# Patient Record
Sex: MALE | Race: WHITE | NOT HISPANIC OR LATINO | Employment: OTHER | URBAN - METROPOLITAN AREA
[De-identification: names, ages, dates, MRNs, and addresses within clinical notes are randomized per-mention and may not be internally consistent; named-entity substitution may affect disease eponyms.]

---

## 2017-01-08 DIAGNOSIS — E03.9 ACQUIRED HYPOTHYROIDISM: ICD-10-CM

## 2017-01-08 RX ORDER — LEVOTHYROXINE SODIUM 0.07 MG/1
TABLET ORAL
Qty: 35 TABLET | Refills: 5 | Status: SHIPPED | OUTPATIENT
Start: 2017-01-08 | End: 2017-04-10 | Stop reason: SDUPTHER

## 2017-01-18 ENCOUNTER — CONSULT (OUTPATIENT)
Dept: ONCOLOGY | Facility: CLINIC | Age: 77
End: 2017-01-18

## 2017-01-18 ENCOUNTER — LAB (OUTPATIENT)
Dept: LAB | Facility: HOSPITAL | Age: 77
End: 2017-01-18

## 2017-01-18 VITALS
HEIGHT: 68 IN | OXYGEN SATURATION: 100 % | WEIGHT: 162.2 LBS | HEART RATE: 63 BPM | RESPIRATION RATE: 12 BRPM | SYSTOLIC BLOOD PRESSURE: 110 MMHG | TEMPERATURE: 97.7 F | DIASTOLIC BLOOD PRESSURE: 60 MMHG | BODY MASS INDEX: 24.58 KG/M2

## 2017-01-18 DIAGNOSIS — D69.6 THROMBOCYTOPENIA (HCC): ICD-10-CM

## 2017-01-18 DIAGNOSIS — D75.89 MACROCYTOSIS: ICD-10-CM

## 2017-01-18 DIAGNOSIS — D50.0 IRON DEFICIENCY ANEMIA DUE TO CHRONIC BLOOD LOSS: Primary | ICD-10-CM

## 2017-01-18 DIAGNOSIS — D50.8 OTHER IRON DEFICIENCY ANEMIA: Primary | ICD-10-CM

## 2017-01-18 PROBLEM — D50.9 IRON DEFICIENCY ANEMIA: Status: ACTIVE | Noted: 2017-01-18

## 2017-01-18 LAB
BASOPHILS # BLD AUTO: 0.06 10*3/MM3 (ref 0–0.1)
BASOPHILS NFR BLD AUTO: 1 % (ref 0–1.1)
DEPRECATED RDW RBC AUTO: 44.4 FL (ref 37–49)
EOSINOPHIL # BLD AUTO: 0.21 10*3/MM3 (ref 0–0.36)
EOSINOPHIL NFR BLD AUTO: 3.6 % (ref 1–5)
ERYTHROCYTE [DISTWIDTH] IN BLOOD BY AUTOMATED COUNT: 12.5 % (ref 11.7–14.5)
FERRITIN SERPL-MCNC: 474.4 NG/ML (ref 30–400)
HCT VFR BLD AUTO: 40.2 % (ref 40–49)
HGB BLD-MCNC: 13.4 G/DL (ref 13.5–16.5)
HGB RETIC QN: 37.3 PG (ref 29.8–36.1)
HOLD SPECIMEN: NORMAL
IMM GRANULOCYTES # BLD: 0.01 10*3/MM3 (ref 0–0.03)
IMM GRANULOCYTES NFR BLD: 0.2 % (ref 0–0.5)
IMM RETICS NFR: 2.5 % (ref 3–15.8)
IRON 24H UR-MRATE: 116 MCG/DL (ref 59–158)
IRON SATN MFR SERPL: 44 % (ref 14–48)
LYMPHOCYTES # BLD AUTO: 1.07 10*3/MM3 (ref 1–3.5)
LYMPHOCYTES NFR BLD AUTO: 18.3 % (ref 20–49)
MCH RBC QN AUTO: 32.3 PG (ref 27–33)
MCHC RBC AUTO-ENTMCNC: 33.3 G/DL (ref 32–35)
MCV RBC AUTO: 96.9 FL (ref 83–97)
MONOCYTES # BLD AUTO: 0.62 10*3/MM3 (ref 0.25–0.8)
MONOCYTES NFR BLD AUTO: 10.6 % (ref 4–12)
NEUTROPHILS # BLD AUTO: 3.88 10*3/MM3 (ref 1.5–7)
NEUTROPHILS NFR BLD AUTO: 66.3 % (ref 39–75)
NRBC BLD MANUAL-RTO: 0 /100 WBC (ref 0–0)
PLATELET # BLD AUTO: 141 10*3/MM3 (ref 150–375)
PLATELETS.RETICULATED NFR BLD AUTO: 5.5 % (ref 1.1–6.1)
PMV BLD AUTO: 9.9 FL (ref 8.9–12.1)
RBC # BLD AUTO: 4.15 10*6/MM3 (ref 4.3–5.5)
RETICS/RBC NFR AUTO: 0.79 % (ref 0.6–2)
TIBC SERPL-MCNC: 265 MCG/DL (ref 249–505)
TRANSFERRIN SERPL-MCNC: 189 MG/DL (ref 200–360)
WBC NRBC COR # BLD: 5.85 10*3/MM3 (ref 4–10)
WHOLE BLOOD HOLD SPECIMEN: NORMAL

## 2017-01-18 PROCEDURE — 83540 ASSAY OF IRON: CPT | Performed by: INTERNAL MEDICINE

## 2017-01-18 PROCEDURE — 82728 ASSAY OF FERRITIN: CPT | Performed by: INTERNAL MEDICINE

## 2017-01-18 PROCEDURE — 82607 VITAMIN B-12: CPT | Performed by: INTERNAL MEDICINE

## 2017-01-18 PROCEDURE — 85046 RETICYTE/HGB CONCENTRATE: CPT | Performed by: INTERNAL MEDICINE

## 2017-01-18 PROCEDURE — 36415 COLL VENOUS BLD VENIPUNCTURE: CPT | Performed by: INTERNAL MEDICINE

## 2017-01-18 PROCEDURE — 85055 RETICULATED PLATELET ASSAY: CPT | Performed by: INTERNAL MEDICINE

## 2017-01-18 PROCEDURE — 84466 ASSAY OF TRANSFERRIN: CPT | Performed by: INTERNAL MEDICINE

## 2017-01-18 PROCEDURE — 85025 COMPLETE CBC W/AUTO DIFF WBC: CPT | Performed by: INTERNAL MEDICINE

## 2017-01-18 PROCEDURE — 99204 OFFICE O/P NEW MOD 45 MIN: CPT | Performed by: INTERNAL MEDICINE

## 2017-01-18 NOTE — MR AVS SNAPSHOT
Rosas Christianson   1/18/2017 11:00 AM   Consult    Dept Phone:  [phone]   Encounter #:  [id number]    Provider:  June Smiley MD   Department:  [facility] CBC GROUP: CONSULTANTS IN BLOOD DISORDERS AND CANCER                Your Full Care Plan              Your Updated Medication List          This list is accurate as of: 1/18/17 11:36 AM.  Always use your most recent med list.                celecoxib 200 MG capsule   Commonly known as:  CeleBREX   TAKE ONE CAPSULE BY MOUTH DAILY       glucosamine sulfate 500 MG capsule capsule       levothyroxine 75 MCG tablet   Commonly known as:  SYNTHROID, LEVOTHROID   TAKE ONE TABLET BY MOUTH DAILY MONDAY THROUGH SATURDAY AND TAKE TWO TABLETS ON SUNDAY       lisinopril 2.5 MG tablet   Commonly known as:  PRINIVIL,ZESTRIL   Take 1 tablet by mouth Daily.       MULTIVITAMIN ADULT PO       Saw [place] (Serenoa repens) 1000 MG capsule               Instructions     None    Patient Instructions History      Upcoming Appointments     Visit Type Date Time Department    LAB NEW PT CBC CHANTELLE 1/18/2017 10:00 AM [facility] LAB [facility]    NEW PT - HEMATOLOGY 1/18/2017 11:00 AM [facility] ONC CBC [facility]    FOLLOW UP 1 UNIT 4/11/2017 11:40 AM [facility] ONC CBC [facility]    LAB 4/11/2017 11:00 AM [facility] ONC CBC LAB [facility]      MyChart Signup     Our records indicate that you have declined [facility] sign[facility]. If you would like to sign up for [employer]hart, please email [facility] [email or web address] or call [phone] to obtain an activation code.             Other Info from Your Visit           Your Appointments     Apr 11, 2017 11:00 AM EDT   Lab with LAB CHAIR 4 CBC NIYA   [facility] ONCOLOGY CBC LAB ([place])    [address] [address]   [phone]            Apr 11, 2017 11:40 AM EDT   FOLLOW UP with June Smiley MD   [facility] CBC GROUP: CONSULTANTS IN BLOOD DISORDERS AND CANCER (CBC  "[facility]    [address] [address] [phone]              Allergies     Aspirin      Nsaids      Prednisone        Reason for Visit     Follow-up new patient      Vital Signs     Blood Pressure Pulse Temperature Respirations Height Weight    110/60 63 97.7 °F (36.5 °C) (Oral) 12 68.11\" (173 cm) 162 lb 3.2 oz (73.6 kg)    Oxygen Saturation Body Mass Index Smoking Status             100% 24.58 kg/m2 Former Smoker           "

## 2017-01-18 NOTE — LETTER
January 18, 2017     Mann Bee MD  [address] [facility]  [address] [facility]  [facility] [address]    Patient: Rosas Christianson   YOB: 1940   Date of Visit: 1/18/2017       Dear Dr. Rohit MD:    Thank you for referring Rosas Christianson to me for evaluation. Below are the relevant portions of my assessment and plan of care.    If you have questions, please do not hesitate to call me. I look forward to following Rossa along with you.         Sincerely,        June Smiley MD        CC: MD June Pavon MD  1/18/2017  1:11 PM  Signed  Subjective     REASON FOR CONSULTATION:  Provide an opinion on any further workup or treatment on:    · Anemia  · Thrombocytopenia                       REQUESTING PHYSICIAN: Mann Bee, *      RECORDS OBTAINED:  Records of the patients history including those obtained from the referring provider were reviewed and summarized in detail.    HISTORY OF PRESENT ILLNESS:      Rosas Christianson is a 76 y.o. patient was referred by Mann Bee, * for evaluation of anemia and thrombocytopenia.    Patient has ulcerative colitis which was diagnosed in the year 2000.  He had severe blood per rectum that persisted for a long period of time.  He did not respond to prednisone therapy and had total colectomy in 2001.  He patient's bowel movements are currently loose.  He is not noticing blood in the stool.    Patient start taking a multiple vitamin several years ago.  He has not needed to take iron or B12 in the past.  He denies having fatigue at present but reports having history of fatigue that improved after he was placed on thyroid replacement.     In October 2016, he was found to be anemic with a hemoglobin of 13.0 and his platelets were at 124,000.      Patient reports easy bruisability limited to the upper extremities.  He is not having problems with bleeding and is not having bruising in other areas of positive.    Past Medical History      Diagnosis Date   • Arthritis    • Esophageal reflux    • Eye exam, routine 2011   • H/O ulcer disease    • Herpes zoster    • Hypertension    • Hypothyroidism    • Macrocytic anemia    • Neuropathy    • Thrombocytopenia    • Torn rotator cuff      Right SHoulder   • Ulcerative colitis        Past Surgical History   Procedure Laterality Date   • Cataract extraction Bilateral    • Colonoscopy  2005     has a colostomy   • Skin cancer destruction  01/2016     on head         MEDICATIONS:    Current Outpatient Prescriptions:   •  celecoxib (CeleBREX) 200 MG capsule, TAKE ONE CAPSULE BY MOUTH DAILY, Disp: 30 capsule, Rfl: 4  •  glucosamine sulfate 500 MG capsule capsule, Take  by mouth 3 (three) times a day with meals., Disp: , Rfl:   •  levothyroxine (SYNTHROID, LEVOTHROID) 75 MCG tablet, TAKE ONE TABLET BY MOUTH DAILY MONDAY THROUGH SATURDAY AND TAKE TWO TABLETS ON SUNDAY, Disp: 35 tablet, Rfl: 5  •  lisinopril (PRINIVIL,ZESTRIL) 2.5 MG tablet, Take 1 tablet by mouth Daily., Disp: 30 tablet, Rfl: 5  •  Multiple Vitamins-Minerals (MULTIVITAMIN ADULT PO), Take  by mouth., Disp: , Rfl:   •  Saw [place], Serenoa repens, 1000 MG capsule, Take  by mouth., Disp: , Rfl:      ALLERGIES:  Allergies   Allergen Reactions   • Aspirin    • Nsaids    • Prednisone         Social History     Social History   • Marital status:      Spouse name: N/A   • Number of children: N/A   • Years of education: N/A     Occupational History   • Not on file.     Social History Main Topics   • Smoking status: Former Smoker   • Smokeless tobacco: Never Used   • Alcohol use Yes      Comment: rare   • Drug use: Not on file   • Sexual activity: Not on file     Other Topics Concern   • Not on file     Social History Narrative       Family history is unknown by patient.    REVIEW OF SYSTEMS:  GENERAL:  No fever or chills. No weight change.    SKIN:  No rashes or lesions.  HEME/LYMPH: Anemia and thrombocytopenia.  He has easy bruisability over the  "upper extremities  EYES:  No vision changes or diplopia.  ENT:  No mouth or gum bleeding, epistaxis, hoarseness or dysphagia.  RESPIRATORY:  No cough, shortness of breath, hemoptysis or wheezing.  CVS:  No chest pain, palpitations or lower extremity swelling.  GI:  See history of present illness.  :  No dysuria, hematuria or increased frequency.   MUSCULOSKELETAL:  No bone pain or joint stiffness.  NEUROLOGICAL:  No dizziness, global weakness, loss of consciousness or seizures.  PSYCHIATRIC:  No anxiety, mood changes or depression.      Objective   VITAL SIGNS:  Vitals:    01/18/17 1017   BP: 110/60   Pulse: 63   Resp: 12   Temp: 97.7 °F (36.5 °C)   TempSrc: Oral   SpO2: 100%   Weight: 162 lb 3.2 oz (73.6 kg)   Height: 68.11\" (173 cm)  Comment: new height   PainSc: 0-No pain       Wt Readings from Last 3 Encounters:   01/18/17 162 lb 3.2 oz (73.6 kg)   12/21/16 169 lb 3.2 oz (76.7 kg)   11/16/16 166 lb (75.3 kg)       PHYSICAL EXAMINATION  GENERAL:  The patient appears in good general condition not in acute distress.  SKIN:  No skin rashes or lesions. No ecchymosis or petechiae.  HEAD:  Normocephalic.  EYES: No jaundice. No pallor.   NECK:  Supple with good ROM. No thyromegaly or masses.  LYMPHATICS:  No cervical, supraclavicular or axillary lymphadenopathy.  CHEST:  Lungs clear to auscultation. No added sounds.  CARDIAC:  Normal S1 & S2. No murmurs.  ABDOMEN:  Soft and non-tender. No palpable hepatosplenomegaly or masses.  He has ostomy in the right lower quadrant.  Liquid stool with no blood.  EXTREMITIES:  No cyanosis or edema. No calf tenderness.  NEUROLOGICAL:  No focal neurological deficits.  .    RESULT REVIEW:     Results from last 7 days  Lab Units 01/18/17  1006   WBC 10*3/mm3 5.85   NEUTROS ABS 10*3/mm3 3.88   HEMOGLOBIN g/dL 13.4*   HEMATOCRIT % 40.2   PLATELETS 10*3/mm3 141*           Lab Results   Component Value Date    FERRITIN 398.50 11/16/2016    TIBC 295 11/16/2016     No results found for: " FOLATE  Lab Results   Component Value Date    SQAIKXCR50 795 10/24/2016       Assessment/Plan   1.  Macrocytic anemia.  He had the globin of 13.0 with an .5 on 10/17/16.  The patient's history of ulcerative colitis with symmetrical increased risk for B12 deficiency especially that there is involvement of the distal ileum disease.  B12 level was obtained on 10/24/16 and was at 795.  Folate was normal as well.  He has been taking a multivitamin for several years.  Repeat labs from today showed some improvement.  To further evaluate for B12 deficiency I will obtain a methylmalonic acid level today.  With his prior history of rectal bleeding, I would repeat iron studies today.  He has hypothyroidism and he is being treated with levothyroxin.  He does not have history of heavy alcohol use.  He had an ultrasound of the abdomen that did not show evidence of hepatosplenomegaly.    2.  Thrombocytopenia.  Platelets were at 124,000 on 10/17/16 and they have improved to 141,000 today.  In addition to the B12 deficiency, immune mediated from cytopenia would need to be considered since he does have an underlying autoimmune disease in the form of ulcerative colitis.    We'll contact the patient if he is deficient.  We will see him in follow-up in 3 months and repeats CBC at his return visit.      June Smiley MD  01/18/17

## 2017-01-18 NOTE — Clinical Note
January 18, 2017     Mann Bee MD  [address] [facility]  Second [address]  [place] [address]    Patient: Rosas Christianson   YOB: 1940   Date of Visit: 1/18/2017       Dear Dr. Rohit MD:    Thank you for referring Rosas Christianson to me for evaluation. Below are the relevant portions of my assessment and plan of care.     Macrocytic anemia.  He had the globin of 13.0 with an .5 on 10/17/16.  The patient's history of ulcerative colitis with symmetrical increased risk for B12 deficiency especially that there is involvement of the distal ileum disease.  B12 level was obtained on 10/24/16 and was at 795.  Folate was normal as well.  He has been taking a multivitamin for several years.  Repeat labs from today showed some improvement.  To further evaluate for B12 deficiency I will obtain a methylmalonic acid level today.  With his prior history of rectal bleeding, I would repeat iron studies today.  He has hypothyroidism and he is being treated with levothyroxin.  He does not have history of heavy alcohol use.  He had an ultrasound of the abdomen that did not show evidence of hepatosplenomegaly.    2.  Thrombocytopenia.  Platelets were at 124,000 on 10/17/16 and they have improved to 141,000 today.  In addition to the B12 deficiency, immune mediated from cytopenia would need to be considered since he does have an underlying autoimmune disease in the form of ulcerative colitis.    We'll contact the patient if he is deficient.  We will see him in follow-up in 3 months and repeats CBC at his return visit.      June Smiley MD  01/18/17                     If you have questions, please do not hesitate to call me. I look forward to following Rosas along with you.         Sincerely,        June Smiley MD        CC: No Recipients

## 2017-01-18 NOTE — PROGRESS NOTES
Subjective     REASON FOR CONSULTATION:  Provide an opinion on any further workup or treatment on:    · Anemia  · Thrombocytopenia                       REQUESTING PHYSICIAN: Mann Bee, *      RECORDS OBTAINED:  Records of the patients history including those obtained from the referring provider were reviewed and summarized in detail.    HISTORY OF PRESENT ILLNESS:      Rosas Christianson is a 76 y.o. patient was referred by Mann Bee, * for evaluation of anemia and thrombocytopenia.    Patient has ulcerative colitis which was diagnosed in the year 2000.  He had severe blood per rectum that persisted for a long period of time.  He did not respond to prednisone therapy and had total colectomy in 2001.  He patient's bowel movements are currently loose.  He is not noticing blood in the stool.    Patient start taking a multiple vitamin several years ago.  He has not needed to take iron or B12 in the past.  He denies having fatigue at present but reports having history of fatigue that improved after he was placed on thyroid replacement.     In October 2016, he was found to be anemic with a hemoglobin of 13.0 and his platelets were at 124,000.      Patient reports easy bruisability limited to the upper extremities.  He is not having problems with bleeding and is not having bruising in other areas of positive.    Past Medical History   Diagnosis Date   • Arthritis    • Esophageal reflux    • Eye exam, routine 2011   • H/O ulcer disease    • Herpes zoster    • Hypertension    • Hypothyroidism    • Macrocytic anemia    • Neuropathy    • Thrombocytopenia    • Torn rotator cuff      Right SHoulder   • Ulcerative colitis        Past Surgical History   Procedure Laterality Date   • Cataract extraction Bilateral    • Colonoscopy  2005     has a colostomy   • Skin cancer destruction  01/2016     on head         MEDICATIONS:    Current Outpatient Prescriptions:   •  celecoxib (CeleBREX) 200 MG capsule, TAKE ONE  CAPSULE BY MOUTH DAILY, Disp: 30 capsule, Rfl: 4  •  glucosamine sulfate 500 MG capsule capsule, Take  by mouth 3 (three) times a day with meals., Disp: , Rfl:   •  levothyroxine (SYNTHROID, LEVOTHROID) 75 MCG tablet, TAKE ONE TABLET BY MOUTH DAILY MONDAY THROUGH SATURDAY AND TAKE TWO TABLETS ON SUNDAY, Disp: 35 tablet, Rfl: 5  •  lisinopril (PRINIVIL,ZESTRIL) 2.5 MG tablet, Take 1 tablet by mouth Daily., Disp: 30 tablet, Rfl: 5  •  Multiple Vitamins-Minerals (MULTIVITAMIN ADULT PO), Take  by mouth., Disp: , Rfl:   •  Saw [place], Serenoa repens, 1000 MG capsule, Take  by mouth., Disp: , Rfl:      ALLERGIES:  Allergies   Allergen Reactions   • Aspirin    • Nsaids    • Prednisone         Social History     Social History   • Marital status:      Spouse name: N/A   • Number of children: N/A   • Years of education: N/A     Occupational History   • Not on file.     Social History Main Topics   • Smoking status: Former Smoker   • Smokeless tobacco: Never Used   • Alcohol use Yes      Comment: rare   • Drug use: Not on file   • Sexual activity: Not on file     Other Topics Concern   • Not on file     Social History Narrative       Family history is unknown by patient.    REVIEW OF SYSTEMS:  GENERAL:  No fever or chills. No weight change.    SKIN:  No rashes or lesions.  HEME/LYMPH: Anemia and thrombocytopenia.  He has easy bruisability over the upper extremities  EYES:  No vision changes or diplopia.  ENT:  No mouth or gum bleeding, epistaxis, hoarseness or dysphagia.  RESPIRATORY:  No cough, shortness of breath, hemoptysis or wheezing.  CVS:  No chest pain, palpitations or lower extremity swelling.  GI:  See history of present illness.  :  No dysuria, hematuria or increased frequency.   MUSCULOSKELETAL:  No bone pain or joint stiffness.  NEUROLOGICAL:  No dizziness, global weakness, loss of consciousness or seizures.  PSYCHIATRIC:  No anxiety, mood changes or depression.      Objective   VITAL SIGNS:  Vitals:  "   01/18/17 1017   BP: 110/60   Pulse: 63   Resp: 12   Temp: 97.7 °F (36.5 °C)   TempSrc: Oral   SpO2: 100%   Weight: 162 lb 3.2 oz (73.6 kg)   Height: 68.11\" (173 cm)  Comment: new height   PainSc: 0-No pain       Wt Readings from Last 3 Encounters:   01/18/17 162 lb 3.2 oz (73.6 kg)   12/21/16 169 lb 3.2 oz (76.7 kg)   11/16/16 166 lb (75.3 kg)       PHYSICAL EXAMINATION  GENERAL:  The patient appears in good general condition not in acute distress.  SKIN:  No skin rashes or lesions. No ecchymosis or petechiae.  HEAD:  Normocephalic.  EYES: No jaundice. No pallor.   NECK:  Supple with good ROM. No thyromegaly or masses.  LYMPHATICS:  No cervical, supraclavicular or axillary lymphadenopathy.  CHEST:  Lungs clear to auscultation. No added sounds.  CARDIAC:  Normal S1 & S2. No murmurs.  ABDOMEN:  Soft and non-tender. No palpable hepatosplenomegaly or masses.  He has ostomy in the right lower quadrant.  Liquid stool with no blood.  EXTREMITIES:  No cyanosis or edema. No calf tenderness.  NEUROLOGICAL:  No focal neurological deficits.  .    RESULT REVIEW:     Results from last 7 days  Lab Units 01/18/17  1006   WBC 10*3/mm3 5.85   NEUTROS ABS 10*3/mm3 3.88   HEMOGLOBIN g/dL 13.4*   HEMATOCRIT % 40.2   PLATELETS 10*3/mm3 141*           Lab Results   Component Value Date    FERRITIN 398.50 11/16/2016    TIBC 295 11/16/2016     No results found for: FOLATE  Lab Results   Component Value Date    [id number] 795 10/24/2016       Assessment/Plan   1.  Macrocytic anemia.  He had the globin of 13.0 with an .5 on 10/17/16.  The patient's history of ulcerative colitis with symmetrical increased risk for B12 deficiency especially that there is involvement of the distal ileum disease.  B12 level was obtained on 10/24/16 and was at 795.  Folate was normal as well.  He has been taking a multivitamin for several years.  Repeat labs from today showed some improvement.  To further evaluate for B12 deficiency I will obtain a " methylmalonic acid level today.  With his prior history of rectal bleeding, I would repeat iron studies today.  He has hypothyroidism and he is being treated with levothyroxin.  He does not have history of heavy alcohol use.  He had an ultrasound of the abdomen that did not show evidence of hepatosplenomegaly.    2.  Thrombocytopenia.  Platelets were at 124,000 on 10/17/16 and they have improved to 141,000 today.  In addition to the B12 deficiency, immune mediated from cytopenia would need to be considered since he does have an underlying autoimmune disease in the form of ulcerative colitis.    We'll contact the patient if he is deficient.  We will see him in follow-up in 3 months and repeats CBC at his return visit.      June Smiley MD  01/18/17

## 2017-01-23 LAB — METHYLMALONATE SERPL-SCNC: 229 NMOL/L (ref 0–378)

## 2017-04-10 ENCOUNTER — OFFICE VISIT (OUTPATIENT)
Dept: INTERNAL MEDICINE | Facility: CLINIC | Age: 77
End: 2017-04-10

## 2017-04-10 VITALS
BODY MASS INDEX: 25.67 KG/M2 | HEIGHT: 68 IN | HEART RATE: 55 BPM | WEIGHT: 169.4 LBS | OXYGEN SATURATION: 99 % | TEMPERATURE: 97.9 F | SYSTOLIC BLOOD PRESSURE: 130 MMHG | DIASTOLIC BLOOD PRESSURE: 64 MMHG

## 2017-04-10 DIAGNOSIS — E03.9 ACQUIRED HYPOTHYROIDISM: ICD-10-CM

## 2017-04-10 DIAGNOSIS — M15.3 POST-TRAUMATIC OSTEOARTHRITIS OF MULTIPLE JOINTS: ICD-10-CM

## 2017-04-10 DIAGNOSIS — I10 ESSENTIAL HYPERTENSION: Primary | ICD-10-CM

## 2017-04-10 PROCEDURE — 99214 OFFICE O/P EST MOD 30 MIN: CPT | Performed by: FAMILY MEDICINE

## 2017-04-10 RX ORDER — LISINOPRIL 2.5 MG/1
2.5 TABLET ORAL DAILY
Qty: 90 TABLET | Refills: 2 | Status: SHIPPED | OUTPATIENT
Start: 2017-04-10 | End: 2018-01-14 | Stop reason: SDUPTHER

## 2017-04-10 RX ORDER — CELECOXIB 200 MG/1
200 CAPSULE ORAL DAILY
Qty: 90 CAPSULE | Refills: 2 | Status: SHIPPED | OUTPATIENT
Start: 2017-04-10 | End: 2018-01-02 | Stop reason: SDUPTHER

## 2017-04-10 RX ORDER — LEVOTHYROXINE SODIUM 0.07 MG/1
75 TABLET ORAL DAILY
Qty: 90 TABLET | Refills: 2 | Status: SHIPPED | OUTPATIENT
Start: 2017-04-10 | End: 2017-11-08 | Stop reason: DRUGHIGH

## 2017-04-10 NOTE — PROGRESS NOTES
Subjective   Rosas Christianson is a 77 y.o. male.     Chief Complaint   Patient presents with   • Hypertension   • Thyroid Problem   • Arthritis         History of Present Illness patient comes with long-standing hypertension hypothyroidism and arthritis is doing very well with his medications he has no abdominal pain and elevated blood pressure small dose of ACE inhibitor to control his blood pressure he likes the benefit is scheduled Celebrex would like continue same labs reviewed and has been therapeutic in the past.  We will order labs to be done in the next couple months he is in the midst of a workup for hematologic issue at this point and is being followed by hemolytic    The following portions of the patient's history were reviewed and updated as appropriate: allergies, current medications, past family history, past medical history, past social history, past surgical history and problem list.    Review of Systems   Constitutional: Negative.    HENT: Negative.    Eyes: Negative.    Respiratory: Negative.    Cardiovascular: Negative.    Gastrointestinal: Negative.    Endocrine: Negative.    Genitourinary: Negative.    Musculoskeletal: Positive for gait problem.   Hematological: Negative.    Psychiatric/Behavioral: Negative.        Objective   Physical Exam   Constitutional: He is oriented to person, place, and time. He appears well-developed and well-nourished.   HENT:   Head: Atraumatic.   Mouth/Throat: Oropharynx is clear and moist.   Eyes: Conjunctivae are normal. Pupils are equal, round, and reactive to light.   Neck: Normal range of motion. No thyromegaly present.   Cardiovascular: Normal rate and regular rhythm.    Pulmonary/Chest: Effort normal and breath sounds normal.   Musculoskeletal: He exhibits no edema.   Lymphadenopathy:     He has no cervical adenopathy.   Neurological: He is alert and oriented to person, place, and time.   Skin: Skin is warm and dry.   Psychiatric: He has a normal mood and affect.  His behavior is normal. Judgment and thought content normal.   Nursing note and vitals reviewed.      Assessment/Plan   Rosas was seen today for hypertension, thyroid problem and arthritis.    Diagnoses and all orders for this visit:    Essential hypertension    Acquired hypothyroidism  -     levothyroxine (SYNTHROID, LEVOTHROID) 75 MCG tablet; Take 1 tablet by mouth Daily.    Post-traumatic osteoarthritis of multiple joints    Other orders  -     celecoxib (CeleBREX) 200 MG capsule; Take 1 capsule by mouth Daily.  -     lisinopril (PRINIVIL,ZESTRIL) 2.5 MG tablet; Take 1 tablet by mouth Daily.      Follow-up as needed usually in 6 months labs before that visit any monitoring blood pressure goals less than 140/90

## 2017-04-11 ENCOUNTER — LAB (OUTPATIENT)
Dept: LAB | Facility: HOSPITAL | Age: 77
End: 2017-04-11

## 2017-04-11 ENCOUNTER — OFFICE VISIT (OUTPATIENT)
Dept: ONCOLOGY | Facility: CLINIC | Age: 77
End: 2017-04-11

## 2017-04-11 VITALS
RESPIRATION RATE: 14 BRPM | WEIGHT: 152.1 LBS | BODY MASS INDEX: 23.05 KG/M2 | HEIGHT: 68 IN | TEMPERATURE: 97.7 F | SYSTOLIC BLOOD PRESSURE: 110 MMHG | HEART RATE: 54 BPM | DIASTOLIC BLOOD PRESSURE: 70 MMHG

## 2017-04-11 DIAGNOSIS — D50.0 IRON DEFICIENCY ANEMIA DUE TO CHRONIC BLOOD LOSS: Primary | ICD-10-CM

## 2017-04-11 DIAGNOSIS — D50.0 IRON DEFICIENCY ANEMIA DUE TO CHRONIC BLOOD LOSS: ICD-10-CM

## 2017-04-11 DIAGNOSIS — D69.6 THROMBOCYTOPENIA (HCC): ICD-10-CM

## 2017-04-11 LAB
BASOPHILS # BLD AUTO: 0.07 10*3/MM3 (ref 0–0.1)
BASOPHILS NFR BLD AUTO: 1.3 % (ref 0–1.1)
DEPRECATED RDW RBC AUTO: 44.4 FL (ref 37–49)
EOSINOPHIL # BLD AUTO: 0.16 10*3/MM3 (ref 0–0.36)
EOSINOPHIL NFR BLD AUTO: 2.9 % (ref 1–5)
ERYTHROCYTE [DISTWIDTH] IN BLOOD BY AUTOMATED COUNT: 12.4 % (ref 11.7–14.5)
HCT VFR BLD AUTO: 39.8 % (ref 40–49)
HGB BLD-MCNC: 13.3 G/DL (ref 13.5–16.5)
IMM GRANULOCYTES # BLD: 0.01 10*3/MM3 (ref 0–0.03)
IMM GRANULOCYTES NFR BLD: 0.2 % (ref 0–0.5)
LYMPHOCYTES # BLD AUTO: 1.26 10*3/MM3 (ref 1–3.5)
LYMPHOCYTES NFR BLD AUTO: 22.7 % (ref 20–49)
MCH RBC QN AUTO: 32.6 PG (ref 27–33)
MCHC RBC AUTO-ENTMCNC: 33.4 G/DL (ref 32–35)
MCV RBC AUTO: 97.5 FL (ref 83–97)
MONOCYTES # BLD AUTO: 0.66 10*3/MM3 (ref 0.25–0.8)
MONOCYTES NFR BLD AUTO: 11.9 % (ref 4–12)
NEUTROPHILS # BLD AUTO: 3.38 10*3/MM3 (ref 1.5–7)
NEUTROPHILS NFR BLD AUTO: 61 % (ref 39–75)
NRBC BLD MANUAL-RTO: 0 /100 WBC (ref 0–0)
PLATELET # BLD AUTO: 107 10*3/MM3 (ref 150–375)
PMV BLD AUTO: 10.3 FL (ref 8.9–12.1)
RBC # BLD AUTO: 4.08 10*6/MM3 (ref 4.3–5.5)
WBC NRBC COR # BLD: 5.54 10*3/MM3 (ref 4–10)

## 2017-04-11 PROCEDURE — 85025 COMPLETE CBC W/AUTO DIFF WBC: CPT

## 2017-04-11 PROCEDURE — 36415 COLL VENOUS BLD VENIPUNCTURE: CPT

## 2017-04-11 PROCEDURE — 99214 OFFICE O/P EST MOD 30 MIN: CPT | Performed by: INTERNAL MEDICINE

## 2017-04-11 NOTE — PROGRESS NOTES
Subjective     REASON FOR FOLLOW UP:    · Anemia  · Thrombocytopenia    HISTORY OF PRESENT ILLNESS:      Rosas Christianson is a 77 y.o. patient with anemia and thrombocytopenia. Returns today for follow-up reporting no problems with fatigue or bleeding.  He reports some easy bruisability but this is chronic effects upper extremities.  He is not noticing any blood in the stool.  He has an ostomy bag. He isTaking a multivitamin once daily      Past Medical History:   Diagnosis Date   • Arthritis    • Cancer     non melatonin   • Esophageal reflux    • Eye exam, routine 2011   • H/O ulcer disease    • Herpes zoster    • Hypertension    • Hypothyroidism    • Macrocytic anemia    • Neuropathy    • Peptic ulceration    • Thrombocytopenia    • Torn rotator cuff     Right SHoulder   • Ulcerative colitis        Past Surgical History:   Procedure Laterality Date   • CATARACT EXTRACTION Bilateral    • CATARACT EXTRACTION, BILATERAL     • COLON SURGERY  2000   • COLONOSCOPY  2005    has a colostomy   • DENTAL PROCEDURE     • HAND SURGERY Left    • SKIN CANCER DESTRUCTION  01/2016    on head         MEDICATIONS:    Current Outpatient Prescriptions:   •  celecoxib (CeleBREX) 200 MG capsule, Take 1 capsule by mouth Daily., Disp: 90 capsule, Rfl: 2  •  glucosamine sulfate 500 MG capsule capsule, Take 2,000 mg by mouth Daily., Disp: , Rfl:   •  levothyroxine (SYNTHROID, LEVOTHROID) 75 MCG tablet, Take 1 tablet by mouth Daily., Disp: 90 tablet, Rfl: 2  •  lisinopril (PRINIVIL,ZESTRIL) 2.5 MG tablet, Take 1 tablet by mouth Daily., Disp: 90 tablet, Rfl: 2  •  Multiple Vitamins-Minerals (MULTIVITAMIN ADULT PO), Take  by mouth., Disp: , Rfl:   •  Saw Palmetto, Serenoa repens, 1000 MG capsule, Take  by mouth., Disp: , Rfl:      ALLERGIES:  Allergies   Allergen Reactions   • Aspirin    • Nsaids    • Prednisone         Social History     Social History   • Marital status:      Spouse name: Cynthia   • Number of children: 2   • Years of  "education: College     Occupational History   •       [employer]     Social History Main Topics   • Smoking status: Former Smoker     Packs/day: 3.00     Years: 30.00     Types: Cigarettes   • Smokeless tobacco: Never Used   • Alcohol use Yes      Comment: rare   • Drug use: No   • Sexual activity: Not on file     Other Topics Concern   • Not on file     Social History Narrative       Cancer-related family history is not on file.    REVIEW OF SYSTEMS:  GENERAL:  No fever or chills. No weight change.  No fatigue.  SKIN:  No rashes or lesions.  HEME/LYMPH: Anemia and thrombocytopenia.  He has easy bruisability over the upper extremities  EYES:  No vision changes or diplopia.  ENT:  No mouth or gum bleeding, epistaxis, hoarseness or dysphagia.  RESPIRATORY:  No cough, shortness of breath, hemoptysis or wheezing.  CVS:  No chest pain, palpitations or lower extremity swelling.  GI:  Bowel movements are usually loose.  :  No dysuria, hematuria or increased frequency.   MUSCULOSKELETAL:  No bone pain or joint stiffness.        Objective   VITAL SIGNS:  Vitals:    04/11/17 1122   BP: 110/70   Pulse: 54   Resp: 14   Temp: 97.7 °F (36.5 °C)   Weight: 152 lb 1.6 oz (69 kg)   Height: 68.11\" (173 cm)   PainSc: 1  Comment: rt. shoulder       Wt Readings from Last 3 Encounters:   04/11/17 152 lb 1.6 oz (69 kg)   04/10/17 169 lb 6.4 oz (76.8 kg)   01/18/17 162 lb 3.2 oz (73.6 kg)       PHYSICAL EXAMINATION  GENERAL:  The patient appears in good general condition not in acute distress.  SKIN:  No skin rashes or lesions. No ecchymosis or petechiae.  HEAD:  Normocephalic.  EYES: No jaundice. No pallor.   NECK:  Supple with good ROM. No thyromegaly or masses.  LYMPHATICS:  No cervical, supraclavicular or axillary lymphadenopathy.  CHEST:  Lungs clear to auscultation. No added sounds.  CARDIAC:  Normal S1 & S2. No murmurs.  ABDOMEN:  Soft and non-tender. No palpable hepatosplenomegaly or masses.  He has ostomy in the " right lower quadrant.        RESULT REVIEW:     Results from last 7 days  Lab Units 04/11/17  1024   WBC 10*3/mm3 5.54   NEUTROS ABS 10*3/mm3 3.38   HEMOGLOBIN g/dL 13.3*   HEMATOCRIT % 39.8*   PLATELETS 10*3/mm3 107*           Lab Results   Component Value Date    FERRITIN 474.40 (H) 01/18/2017    FERRITIN 398.50 11/16/2016    IRON 116 01/18/2017    TIBC 265 01/18/2017    TIBC 295 11/16/2016       Lab Results   Component Value Date    LEBQWUCH39 1410 (H) 01/18/2017    [id number] 795 10/24/2016       Assessment/Plan   1.  Macrocytic anemia.  He had Hemoglobin of 13.0 with an .5 on 10/17/16.  He does not have evidence of vitamin deficiency at present to explain the mild anemia.  The anemia is likely attributed to anemia of chronic disease.  He is taking a multivitamin daily and I asked him to continue it.    2.  Thrombocytopenia. Immature platelet fraction slightly elevated. 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  The count is staying above 100,000 and he is asymptomatic.  Therefore, not requiring any treatment.  We discussed today the changes that would develop if the platelet count significantly drops and he will notify us if he notices any worsening of his bruising or if he notices any petechiae.  If platelet count significantly drops, he would need to stop Celebrex.      3.  The patient indicated that he may need to have a right shoulder surgery.  Platelet count is above 100,000 and he is cleared to have the procedure done from a hematologic standpoint.    We will see the patient in follow-up 6 months with a CBC, reticulocyte, IPF ferritin and iron panel.        June Smiley MD  04/11/17

## 2017-04-13 LAB — METHYLMALONATE SERPL-SCNC: 217 NMOL/L (ref 0–378)

## 2017-09-26 ENCOUNTER — OFFICE VISIT (OUTPATIENT)
Dept: ONCOLOGY | Facility: CLINIC | Age: 77
End: 2017-09-26

## 2017-09-26 ENCOUNTER — LAB (OUTPATIENT)
Dept: LAB | Facility: HOSPITAL | Age: 77
End: 2017-09-26

## 2017-09-26 VITALS
HEIGHT: 68 IN | SYSTOLIC BLOOD PRESSURE: 132 MMHG | HEART RATE: 49 BPM | OXYGEN SATURATION: 100 % | RESPIRATION RATE: 16 BRPM | TEMPERATURE: 97.6 F | BODY MASS INDEX: 24.86 KG/M2 | WEIGHT: 164 LBS | DIASTOLIC BLOOD PRESSURE: 78 MMHG

## 2017-09-26 DIAGNOSIS — D69.6 THROMBOCYTOPENIA (HCC): Primary | ICD-10-CM

## 2017-09-26 DIAGNOSIS — D50.0 IRON DEFICIENCY ANEMIA DUE TO CHRONIC BLOOD LOSS: ICD-10-CM

## 2017-09-26 DIAGNOSIS — D63.8 ANEMIA OF CHRONIC DISEASE: ICD-10-CM

## 2017-09-26 DIAGNOSIS — D69.6 THROMBOCYTOPENIA (HCC): ICD-10-CM

## 2017-09-26 LAB
BASOPHILS # BLD AUTO: 0.04 10*3/MM3 (ref 0–0.1)
BASOPHILS NFR BLD AUTO: 0.7 % (ref 0–1.1)
DEPRECATED RDW RBC AUTO: 48.3 FL (ref 37–49)
EOSINOPHIL # BLD AUTO: 0.19 10*3/MM3 (ref 0–0.36)
EOSINOPHIL NFR BLD AUTO: 3.3 % (ref 1–5)
ERYTHROCYTE [DISTWIDTH] IN BLOOD BY AUTOMATED COUNT: 13.2 % (ref 11.7–14.5)
FERRITIN SERPL-MCNC: 449.9 NG/ML (ref 30–400)
HCT VFR BLD AUTO: 39.7 % (ref 40–49)
HGB BLD-MCNC: 13 G/DL (ref 13.5–16.5)
HGB RETIC QN: 36.7 PG (ref 29.8–36.1)
IMM GRANULOCYTES # BLD: 0.02 10*3/MM3 (ref 0–0.03)
IMM GRANULOCYTES NFR BLD: 0.3 % (ref 0–0.5)
IMM RETICS NFR: 5.1 % (ref 3–15.8)
IRON 24H UR-MRATE: 91 MCG/DL (ref 59–158)
IRON SATN MFR SERPL: 39 % (ref 14–48)
LYMPHOCYTES # BLD AUTO: 1.25 10*3/MM3 (ref 1–3.5)
LYMPHOCYTES NFR BLD AUTO: 21.9 % (ref 20–49)
MCH RBC QN AUTO: 32.3 PG (ref 27–33)
MCHC RBC AUTO-ENTMCNC: 32.7 G/DL (ref 32–35)
MCV RBC AUTO: 98.8 FL (ref 83–97)
MONOCYTES # BLD AUTO: 0.77 10*3/MM3 (ref 0.25–0.8)
MONOCYTES NFR BLD AUTO: 13.5 % (ref 4–12)
NEUTROPHILS # BLD AUTO: 3.45 10*3/MM3 (ref 1.5–7)
NEUTROPHILS NFR BLD AUTO: 60.3 % (ref 39–75)
NRBC BLD MANUAL-RTO: 0 /100 WBC (ref 0–0)
PLATELET # BLD AUTO: 109 10*3/MM3 (ref 150–375)
PLATELETS.RETICULATED NFR BLD AUTO: 6.6 % (ref 1.1–6.1)
PMV BLD AUTO: 10.5 FL (ref 8.9–12.1)
RBC # BLD AUTO: 4.02 10*6/MM3 (ref 4.3–5.5)
RETICS/RBC NFR AUTO: 0.83 % (ref 0.6–2)
TIBC SERPL-MCNC: 231 MCG/DL (ref 249–505)
TRANSFERRIN SERPL-MCNC: 165 MG/DL (ref 200–360)
WBC NRBC COR # BLD: 5.72 10*3/MM3 (ref 4–10)

## 2017-09-26 PROCEDURE — 85046 RETICYTE/HGB CONCENTRATE: CPT | Performed by: INTERNAL MEDICINE

## 2017-09-26 PROCEDURE — 85025 COMPLETE CBC W/AUTO DIFF WBC: CPT | Performed by: INTERNAL MEDICINE

## 2017-09-26 PROCEDURE — 83540 ASSAY OF IRON: CPT | Performed by: INTERNAL MEDICINE

## 2017-09-26 PROCEDURE — 85055 RETICULATED PLATELET ASSAY: CPT | Performed by: INTERNAL MEDICINE

## 2017-09-26 PROCEDURE — 99213 OFFICE O/P EST LOW 20 MIN: CPT | Performed by: INTERNAL MEDICINE

## 2017-09-26 PROCEDURE — 84466 ASSAY OF TRANSFERRIN: CPT | Performed by: INTERNAL MEDICINE

## 2017-09-26 PROCEDURE — 82728 ASSAY OF FERRITIN: CPT | Performed by: INTERNAL MEDICINE

## 2017-09-26 PROCEDURE — 36415 COLL VENOUS BLD VENIPUNCTURE: CPT | Performed by: INTERNAL MEDICINE

## 2017-09-26 NOTE — PROGRESS NOTES
Subjective     REASON FOR FOLLOW UP:    · Anemia  · Thrombocytopenia    HISTORY OF PRESENT ILLNESS:      Rosas Christianson is a 77 y.o. patient with anemia and thrombocytopenia. Returns today for follow-up reporting no new symptoms.  He denies fatigue or bleeding.  He reports easy bruisability over his upper extremities.  He is not noticing any blood in the stool.  He has an ostomy bag. He is taking a multivitamin once daily      Past Medical History:   Diagnosis Date   • Arthritis    • Cancer     non melatonin   • Esophageal reflux    • Eye exam, routine 2011   • H/O ulcer disease    • Herpes zoster    • Hypertension    • Hypothyroidism    • Macrocytic anemia    • Neuropathy    • Peptic ulceration    • Thrombocytopenia    • Torn rotator cuff     Right SHoulder   • Ulcerative colitis        Past Surgical History:   Procedure Laterality Date   • CATARACT EXTRACTION Bilateral    • CATARACT EXTRACTION, BILATERAL     • COLON SURGERY  2000    removed colon   • COLONOSCOPY  2005    has a colostomy   • DENTAL PROCEDURE     • HAND SURGERY Left    • SKIN CANCER DESTRUCTION  01/2016    on head         MEDICATIONS:    Current Outpatient Prescriptions:   •  celecoxib (CeleBREX) 200 MG capsule, Take 1 capsule by mouth Daily., Disp: 90 capsule, Rfl: 2  •  glucosamine sulfate 500 MG capsule capsule, Take 2,000 mg by mouth Daily., Disp: , Rfl:   •  levothyroxine (SYNTHROID, LEVOTHROID) 75 MCG tablet, Take 1 tablet by mouth Daily., Disp: 90 tablet, Rfl: 2  •  lisinopril (PRINIVIL,ZESTRIL) 2.5 MG tablet, Take 1 tablet by mouth Daily., Disp: 90 tablet, Rfl: 2  •  Multiple Vitamins-Minerals (MULTIVITAMIN ADULT PO), Take  by mouth., Disp: , Rfl:   •  Saw Palmetto, Serenoa repens, 1000 MG capsule, Take  by mouth., Disp: , Rfl:      ALLERGIES:  Allergies   Allergen Reactions   • Aspirin    • Nsaids    • Prednisone         Social History     Social History   • Marital status:      Spouse name: Cynthia   • Number of children: 2   • Years  "of education: College     Occupational History   •  [employer]     [employer]     Social History Main Topics   • Smoking status: Former Smoker     Packs/day: 3.00     Years: 30.00     Types: Cigarettes   • Smokeless tobacco: Never Used   • Alcohol use Yes      Comment: rare   • Drug use: No   • Sexual activity: Not on file     Other Topics Concern   • Not on file     Social History Narrative       Cancer-related family history is not on file.    REVIEW OF SYSTEMS:  GENERAL:  No fever or chills. No weight change.  No fatigue.  SKIN:  No rashes or lesions.  HEME/LYMPH: Anemia and thrombocytopenia.  He has easy bruisability over the upper extremities  RESPIRATORY:  No cough, shortness of breath, hemoptysis or wheezing.  CVS:  No chest pain, palpitations or lower extremity swelling.  GI:  Bowel movements are usually loose.  :  No hematuria or increased frequency.   MUSCULOSKELETAL:  No bone pain or joint stiffness.        Objective   VITAL SIGNS:  Vitals:    09/26/17 1026   BP: 132/78   Pulse: (!) 49   Resp: 16   Temp: 97.6 °F (36.4 °C)   TempSrc: Oral   SpO2: 100%   Weight: 164 lb (74.4 kg)   Height: 68.11\" (173 cm)   PainSc: 3  Comment: Bilat foot pain.   PainLoc: Foot       Wt Readings from Last 3 Encounters:   09/26/17 164 lb (74.4 kg)   04/11/17 152 lb 1.6 oz (69 kg)   04/10/17 169 lb 6.4 oz (76.8 kg)       PHYSICAL EXAMINATION  GENERAL:  The patient appears in good general condition not in acute distress.  SKIN:  No skin rashes or lesions. Ecchymosis over upper extremities.   EYES: No jaundice. No pallor.         RESULT REVIEW:     Results from last 7 days  Lab Units 09/26/17  1010   WBC 10*3/mm3 5.72   NEUTROS ABS 10*3/mm3 3.45   HEMOGLOBIN g/dL 13.0*   HEMATOCRIT % 39.7*   PLATELETS 10*3/mm3 109*       Results from last 7 days  Lab Units 09/26/17  1010   FERRITIN ng/mL 449.90*   IRON mcg/dL 91   TIBC mcg/dL 231*       Lab Results   Component Value Date    FERRITIN 449.90 (H) 09/26/2017    FERRITIN " 474.40 (H) 01/18/2017    FERRITIN 398.50 11/16/2016    IRON 91 09/26/2017    IRON 116 01/18/2017    TIBC 231 (L) 09/26/2017    TIBC 265 01/18/2017    TIBC 295 11/16/2016       Lab Results   Component Value Date    [id number] 1410 (H) 01/18/2017    [id number] 795 10/24/2016       Assessment/Plan   1.  Macrocytic anemia likely attributed to anemia of chronic disease.  He has a Hemoglobin of 13.0.  He does not have evidence of vitamin deficiency.  He has been taking a senior multivitamin daily for a long time.    2.  Thrombocytopenia. Immature platelet fraction slightly elevated. 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  The platelet count is stable. If he has a progressive drop in the platelet count, he would need to stop Celebrex.  However, he states that he develops severe arthritic pain if he stops Celebrex.      The patient has labs done through his PCP every 3 months. We will therefore see him in follow-up 1 year with a CBC and IPF.        June Smiley MD  09/26/17

## 2017-10-04 ENCOUNTER — OFFICE VISIT (OUTPATIENT)
Dept: PAIN MEDICINE | Facility: CLINIC | Age: 77
End: 2017-10-04

## 2017-10-04 VITALS
BODY MASS INDEX: 24.71 KG/M2 | WEIGHT: 163 LBS | SYSTOLIC BLOOD PRESSURE: 135 MMHG | HEART RATE: 52 BPM | RESPIRATION RATE: 16 BRPM | DIASTOLIC BLOOD PRESSURE: 85 MMHG | OXYGEN SATURATION: 99 % | TEMPERATURE: 97.5 F | HEIGHT: 68 IN

## 2017-10-04 DIAGNOSIS — G62.9 NEUROPATHY: Primary | ICD-10-CM

## 2017-10-04 DIAGNOSIS — D63.8 ANEMIA OF CHRONIC DISEASE: ICD-10-CM

## 2017-10-04 DIAGNOSIS — D69.6 THROMBOCYTOPENIA (HCC): ICD-10-CM

## 2017-10-04 DIAGNOSIS — G89.21 CHRONIC PAIN DUE TO TRAUMA: ICD-10-CM

## 2017-10-04 DIAGNOSIS — Z91.89 HISTORY OF FROSTBITE: ICD-10-CM

## 2017-10-04 LAB
POC AMPHETAMINES: NEGATIVE
POC BARBITURATES: NEGATIVE
POC BENZODIAZEPHINES: NEGATIVE
POC COCAINE: NEGATIVE
POC METHADONE: POSITIVE
POC METHAMPHETAMINE SCREEN URINE: NEGATIVE
POC OPIATES: NEGATIVE
POC OXYCODONE: NEGATIVE
POC PHENCYCLIDINE: NEGATIVE
POC PROPOXYPHENE: NEGATIVE
POC THC: NEGATIVE
POC TRICYCLIC ANTIDEPRESSANTS: POSITIVE

## 2017-10-04 PROCEDURE — 80305 DRUG TEST PRSMV DIR OPT OBS: CPT | Performed by: ANESTHESIOLOGY

## 2017-10-04 PROCEDURE — 99213 OFFICE O/P EST LOW 20 MIN: CPT | Performed by: ANESTHESIOLOGY

## 2017-10-04 RX ORDER — METHADONE HYDROCHLORIDE 5 MG/1
5 TABLET ORAL DAILY
COMMUNITY
End: 2017-10-04 | Stop reason: SDUPTHER

## 2017-10-04 RX ORDER — METHADONE HYDROCHLORIDE 5 MG/1
5 TABLET ORAL DAILY
Qty: 30 TABLET | Refills: 0 | Status: SHIPPED | OUTPATIENT
Start: 2017-10-04 | End: 2017-11-06 | Stop reason: SDUPTHER

## 2017-10-04 NOTE — PATIENT INSTRUCTIONS
--- Follow-up in one month  -- initiate methadone for peripheral neuropathy    A medication management agreement is signed by the patient and the provider today.  Reviewed with the patient that this contract is specific to controlled substances, specifically pain medication.  Reviewed core of pain medicine is to decrease pain and increase functional level.  Reviewed the medication must be picked up as a written prescription from this office and will not be called into any pharmacy.  Reviewed the use of [place] within the office setting.  Reviewed causes for potential of discontinuation of opiates,  including but not limited to consideration for diversion, obtaining other controlled substances from other license practitioners, or  inappropriate office behavior.  Patient understands to use a controlled substance as prescribed by physician and to avoid improper use of controlled substances.  Patient will also verbalized understanding that prescriptions to be filled at the same pharmacy  unless office staff is made aware of this.  Patient understands they can be submitted to random urine drug screens and/or random pill counts on any request.  Patient understands they are not to receive early refills.  The patient must produce an official police report for any effort to replace controlled substances that are lost or stolen.  No use of illegal street drugs while receiving controlled substances from this prescribing provider.  The patient is to take medication as prescribed  and not deviate from the normal schedule without consultation from the provider.  Patient is not to share the medication with others or to take medication with alcohol or other sedatives.  Use caution when driving or operating machinery.  Alert office if the patient becomes pregnant or begins nursing a child.  Reviewed the use of controlled substances recreates a risk for respiratory depression, which may result in serious harm or even death.  Must  always keep the prescription in the original container.  Patient is to store controlled substances in a locked cabinet or other secure storage unit that is cool, dry and out of sunlight.  Patient must immediately notify office if any controlled substances is stolen or improperly taken by another individual.  Reviewed risk for physical dependence, tolerance and addiction.    he does have a significant history of PN and demonstrates moderate improvement with multimodal therapy including opioid therapy, which allows him to engage in ADLs and family activities. The continuation of opioid therapy is therefore not contraindicated. We will however respect the March 2016 CDC Guidelines and will plan to avoid overexposure to opioids and avoid dose escalation.    We discussed alternatives, but he has failed with therapeutic doses of gabapentin and Lyrica, currently is using NSAID, no PT options for this problem, could consider advanced neuromodulation implanted device but he also has thrombocytopenia which makes this consideration difficult.

## 2017-10-04 NOTE — PROGRESS NOTES
"CHIEF COMPLAINT    F/U bilateral foot pain, last visit 6-. Pt states he is not having pain in his back as he was before and that his cane has helped that. His foot pain which he's had for years has never gone away, and states he only gets relief by taking an old prescription of methadone-bottle dated 2-5-15. He states has only been taking one a day and it makes the pain tolerable. His pain is a 2/10 sitting down, but goes up to 4-7/10 after he has been up and walking. He also states Gabapentin and Lyrica (old prescriptions) have not helped his pain. Describes the pain as his foot going to sleep and feeling like it is on fire.     Subjective   Rosas Christianson is a 77 y.o. male  who presents to the office for follow-up.He has a history of low back pain and sciatica as well as a history of peripheral neuropathy in the feet.  He has a history of thrombocytopenia.   History of IPN with a history of frostbite injury in the distant past.        Foot Pain   This is a chronic problem. The current episode started more than 1 year ago. The problem occurs constantly. The problem has been gradually worsening. Associated symptoms include fatigue (pt is seeing a hematoligist because they thought he may be anemic \"low platelets\") and numbness. Pertinent negatives include no chest pain, chills, coughing, fever, headaches, nausea, vomiting or weakness. The symptoms are aggravated by walking, standing and exertion. He has tried acetaminophen, heat, ice, NSAIDs, oral narcotics, rest, relaxation, position changes and immobilization for the symptoms.        PEG Assessment   What number best describes your pain on average in the past week?6  What number best describes how, during the past week, pain has interfered with your enjoyment of life?4  What number best describes how, during the past week, pain has interfered with your general activity?  6      The following portions of the patient's history were reviewed and updated as " appropriate: allergies, current medications, past family history, past medical history, past social history, past surgical history and problem list.    -------    The following portions of the patient's history were reviewed and updated as appropriate: allergies, current medications, past family history, past medical history, past social history, past surgical history and problem list.    Allergies   Allergen Reactions   • Aspirin    • Nsaids    • Prednisone          Current Outpatient Prescriptions:   •  celecoxib (CeleBREX) 200 MG capsule, Take 1 capsule by mouth Daily., Disp: 90 capsule, Rfl: 2  •  glucosamine sulfate 500 MG capsule capsule, Take 2,000 mg by mouth Daily., Disp: , Rfl:   •  levothyroxine (SYNTHROID, LEVOTHROID) 75 MCG tablet, Take 1 tablet by mouth Daily., Disp: 90 tablet, Rfl: 2  •  lisinopril (PRINIVIL,ZESTRIL) 2.5 MG tablet, Take 1 tablet by mouth Daily., Disp: 90 tablet, Rfl: 2  •  methadone (DOLOPHINE) 5 MG tablet, Take 1 tablet by mouth Daily., Disp: 30 tablet, Rfl: 0  •  Multiple Vitamins-Minerals (MULTIVITAMIN ADULT PO), Take  by mouth., Disp: , Rfl:   •  Saw Palmetto, Serenoa repens, 1000 MG capsule, Take  by mouth., Disp: , Rfl:     Current Outpatient Prescriptions on File Prior to Visit   Medication Sig Dispense Refill   • celecoxib (CeleBREX) 200 MG capsule Take 1 capsule by mouth Daily. 90 capsule 2   • glucosamine sulfate 500 MG capsule capsule Take 2,000 mg by mouth Daily.     • levothyroxine (SYNTHROID, LEVOTHROID) 75 MCG tablet Take 1 tablet by mouth Daily. 90 tablet 2   • lisinopril (PRINIVIL,ZESTRIL) 2.5 MG tablet Take 1 tablet by mouth Daily. 90 tablet 2   • Multiple Vitamins-Minerals (MULTIVITAMIN ADULT PO) Take  by mouth.     • Saw Palmetto, Serenoa repens, 1000 MG capsule Take  by mouth.       No current facility-administered medications on file prior to visit.        Patient Active Problem List   Diagnosis   • Post-traumatic osteoarthritis of multiple joints   • Esophageal  "reflux   • Hypertension   • Hypothyroidism   • Neuropathy   • Ulcerative colitis   • H/O colectomy   • Anemia of chronic disease   • Thrombocytopenia   • History of frostbite       Past Medical History:   Diagnosis Date   • Arthritis    • Cancer     non melatonin   • Esophageal reflux    • Eye exam, routine 2011   • H/O ulcer disease    • Herpes zoster    • Hypertension    • Hypothyroidism    • Macrocytic anemia    • Neuropathy    • Peptic ulceration    • Thrombocytopenia    • Torn rotator cuff     Right [place]oulder   • Torn rotator cuff     left shoulder   • Ulcerative colitis        Past Surgical History:   Procedure Laterality Date   • CATARACT EXTRACTION Bilateral    • CATARACT EXTRACTION, BILATERAL     • COLON SURGERY  2000    removed colon   • COLONOSCOPY  2005    has a colostomy   • DENTAL PROCEDURE     • HAND SURGERY Left    • SKIN CANCER DESTRUCTION  01/2016    on head       Family History   Problem Relation Age of Onset   • Heart disease Mother    • Heart disease Father    • Asthma Brother        Social History     Social History   • Marital status:      Spouse name: Cynthia   • Number of children: 2   • Years of education: College     Occupational History   •  [employer]     [place]     Social History Main Topics   • Smoking status: Former Smoker     Packs/day: 3.00     Years: 30.00     Types: Cigarettes   • Smokeless tobacco: Never Used   • Alcohol use Yes      Comment: rare   • Drug use: No   • Sexual activity: Defer     Other Topics Concern   • Not on file     Social History Narrative       -------        Review of Systems   Constitutional: Positive for fatigue (pt is seeing a hematoligist because they thought he may be anemic \"low platelets\"). Negative for chills and fever.   Respiratory: Negative for apnea, cough, shortness of breath and wheezing.    Cardiovascular: Negative for chest pain and palpitations.   Gastrointestinal: Negative for constipation, diarrhea, nausea and vomiting. " "  Genitourinary: Positive for difficulty urinating (pt states some because of prostate for years).   Musculoskeletal: Positive for gait problem (walks with cane).   Neurological: Positive for numbness. Negative for dizziness, weakness, light-headedness and headaches.   Psychiatric/Behavioral: Positive for sleep disturbance. Negative for agitation, confusion, hallucinations and suicidal ideas. The patient is not nervous/anxious.                I reviewed records from [facility] from 2015, and methadone was trialed for idiopathic peripheral neuropathy at that time.    --    -------    Prelim UDS Immunoassay:    THC  (marijuana)  negative  SANDY (cocaine) negative  OPI (opiate) negative  AMP (amphet) negative  MET (methamph) negative  PCP (pcp)  negative  MDMA (ecstacy) negative  BAR (barbituate) negative  BZO (benzodiaz) negative  MTD (methadone) positive  TCA (tricyclic) positive  OXY (oxycodone) negative    GreenTemp warm  Appearance WNL    -------        --    Vitals:    10/04/17 0818   BP: 135/85   Pulse: 52   Resp: 16   Temp: 97.5 °F (36.4 °C)   SpO2: 99%   Weight: 163 lb (73.9 kg)   Height: 68\" (172.7 cm)   PainSc:   2   PainLoc: Foot  Comment: bilateral         Objective   Physical Exam   Constitutional: He is oriented to person, place, and time. Vital signs are normal. He appears well-developed and well-nourished.  Non-toxic appearance. No distress.   HENT:   Head: Normocephalic and atraumatic.   Right Ear: Hearing and external ear normal.   Left Ear: Hearing and external ear normal.   Nose: Nose normal.   Eyes: Conjunctivae and lids are normal. Pupils are equal, round, and reactive to light.   Pulmonary/Chest: Effort normal. No respiratory distress.   Abdominal: Normal appearance.   Musculoskeletal:        Right foot: There is tenderness.        Left foot: There is tenderness.       Neurological Sensory Findings -  Altered sharp/dull right ankle/foot discrimination and altered sharp/dull left ankle/foot " discrimination.    Vascular Status -  His exam exhibits no right foot edema. His exam exhibits no left foot edema.   Skin Integrity  -  His right foot skin is intact.     Rosas 's left foot skin is intact. .  Neurological: He is alert and oriented to person, place, and time. No cranial nerve deficit.   Psychiatric: He has a normal mood and affect. His behavior is normal.   Nursing note and vitals reviewed.          Assessment/Plan   Rosas was seen today for pain.    Diagnoses and all orders for this visit:    Neuropathy  -     POC Urine Drug Screen, Triage  -     Urine Drug Screen Confirmation - Urine, Urine, Clean Catch    Chronic pain due to trauma  -     POC Urine Drug Screen, Triage  -     Urine Drug Screen Confirmation - Urine, Urine, Clean Catch    History of frostbite  -     POC Urine Drug Screen, Triage  -     Urine Drug Screen Confirmation - Urine, Urine, Clean Catch    Anemia of chronic disease  -     POC Urine Drug Screen, Triage  -     Urine Drug Screen Confirmation - Urine, Urine, Clean Catch    Thrombocytopenia  -     POC Urine Drug Screen, Triage  -     Urine Drug Screen Confirmation - Urine, Urine, Clean Catch    Other orders  -     methadone (DOLOPHINE) 5 MG tablet; Take 1 tablet by mouth Daily.  -     SCANNED - LABS      At last visit we had trialed methadone which was previously tolerated well.  Use of methadone at judicious doses is not unreasonable.      --- Follow-up in one month  -- initiate methadone for peripheral neuropathy    A medication management agreement is signed by the patient and the provider today.  Reviewed with the patient that this contract is specific to controlled substances, specifically pain medication.  Reviewed core of pain medicine is to decrease pain and increase functional level.  Reviewed the medication must be picked up as a written prescription from this office and will not be called into any pharmacy.  Reviewed the use of [place] within the office setting.   Reviewed causes for potential of discontinuation of opiates,  including but not limited to consideration for diversion, obtaining other controlled substances from other license practitioners, or  inappropriate office behavior.  Patient understands to use a controlled substance as prescribed by physician and to avoid improper use of controlled substances.  Patient will also verbalized understanding that prescriptions to be filled at the same pharmacy  unless office staff is made aware of this.  Patient understands they can be submitted to random urine drug screens and/or random pill counts on any request.  Patient understands they are not to receive early refills.  The patient must produce an official police report for any effort to replace controlled substances that are lost or stolen.  No use of illegal street drugs while receiving controlled substances from this prescribing provider.  The patient is to take medication as prescribed  and not deviate from the normal schedule without consultation from the provider.  Patient is not to share the medication with others or to take medication with alcohol or other sedatives.  Use caution when driving or operating machinery.  Alert office if the patient becomes pregnant or begins nursing a child.  Reviewed the use of controlled substances recreates a risk for respiratory depression, which may result in serious harm or even death.  Must always keep the prescription in the original container.  Patient is to store controlled substances in a locked cabinet or other secure storage unit that is cool, dry and out of sunlight.  Patient must immediately notify office if any controlled substances is stolen or improperly taken by another individual.  Reviewed risk for physical dependence, tolerance and addiction.    he does have a significant history of PN and demonstrates moderate improvement with multimodal therapy including opioid therapy, which allows him to engage in ADLs and  family activities. The continuation of opioid therapy is therefore not contraindicated. We will however respect the March 2016 CDC Guidelines and will plan to avoid overexposure to opioids and avoid dose escalation.    We discussed alternatives, but he has failed with therapeutic doses of gabapentin and Lyrica, currently is using NSAID, no PT options for this problem, could consider advanced neuromodulation implanted device but he also has thrombocytopenia which makes this consideration difficult.               JENNA REPORT    As part of the patient's treatment plan, I am prescribing controlled substances. The patient has been made aware of appropriate use of such medications, including potential risk of somnolence, limited ability to drive and/or work safely, and the potential for dependence or overdose. It has also bee made clear that these medications are for use by this patient only, without concomitant use of alcohol or other substances unless prescribed.     Patient has completed prescribing agreement detailing terms of continued prescribing of controlled substances, including monitoring JENNA reports, urine drug screening, and pill counts if necessary. The patient is aware that inappropriate use will results in cessation of prescribing such medications.    JENNA report has been reviewed and scanned into the patient's chart.    Date of last JENNA : as above    History and physical exam exhibit continued safe and appropriate use of controlled substances.      EMR Dragon/Transcription disclaimer:   Much of this encounter note is an electronic transcription/translation of spoken language to printed text. The electronic translation of spoken language may permit erroneous, or at times, nonsensical words or phrases to be inadvertently transcribed; Although I have reviewed the note for such errors, some may still exist.

## 2017-11-03 ENCOUNTER — OFFICE VISIT (OUTPATIENT)
Dept: INTERNAL MEDICINE | Facility: CLINIC | Age: 77
End: 2017-11-03

## 2017-11-03 VITALS
WEIGHT: 162.2 LBS | OXYGEN SATURATION: 99 % | SYSTOLIC BLOOD PRESSURE: 124 MMHG | BODY MASS INDEX: 24.58 KG/M2 | DIASTOLIC BLOOD PRESSURE: 66 MMHG | HEIGHT: 68 IN | HEART RATE: 58 BPM | TEMPERATURE: 97.8 F

## 2017-11-03 DIAGNOSIS — E03.9 ACQUIRED HYPOTHYROIDISM: ICD-10-CM

## 2017-11-03 DIAGNOSIS — I10 ESSENTIAL HYPERTENSION: Primary | ICD-10-CM

## 2017-11-03 DIAGNOSIS — K21.9 GASTROESOPHAGEAL REFLUX DISEASE WITHOUT ESOPHAGITIS: ICD-10-CM

## 2017-11-03 DIAGNOSIS — D63.8 ANEMIA OF CHRONIC DISEASE: ICD-10-CM

## 2017-11-03 DIAGNOSIS — Z00.00 HEALTH CARE MAINTENANCE: ICD-10-CM

## 2017-11-03 DIAGNOSIS — Z93.3 PRESENCE OF COLOSTOMY (HCC): ICD-10-CM

## 2017-11-03 DIAGNOSIS — Z90.49 H/O COLECTOMY: ICD-10-CM

## 2017-11-03 PROBLEM — M19.90 ARTHRITIS: Status: ACTIVE | Noted: 2017-11-03

## 2017-11-03 LAB
ALBUMIN SERPL-MCNC: 3.8 G/DL (ref 3.5–5.2)
ALBUMIN/GLOB SERPL: 1.3 G/DL
ALP SERPL-CCNC: 103 U/L (ref 39–117)
ALT SERPL-CCNC: 21 U/L (ref 1–41)
AST SERPL-CCNC: 29 U/L (ref 1–40)
BILIRUB SERPL-MCNC: 0.4 MG/DL (ref 0.1–1.2)
BUN SERPL-MCNC: 18 MG/DL (ref 8–23)
BUN/CREAT SERPL: 17.6 (ref 7–25)
CALCIUM SERPL-MCNC: 9.5 MG/DL (ref 8.6–10.5)
CHLORIDE SERPL-SCNC: 105 MMOL/L (ref 98–107)
CO2 SERPL-SCNC: 27.1 MMOL/L (ref 22–29)
CREAT SERPL-MCNC: 1.02 MG/DL (ref 0.76–1.27)
GFR SERPLBLD CREATININE-BSD FMLA CKD-EPI: 71 ML/MIN/1.73
GFR SERPLBLD CREATININE-BSD FMLA CKD-EPI: 86 ML/MIN/1.73
GLOBULIN SER CALC-MCNC: 2.9 GM/DL
GLUCOSE SERPL-MCNC: 85 MG/DL (ref 65–99)
POTASSIUM SERPL-SCNC: 4.8 MMOL/L (ref 3.5–5.2)
PROT SERPL-MCNC: 6.7 G/DL (ref 6–8.5)
SODIUM SERPL-SCNC: 143 MMOL/L (ref 136–145)
TSH SERPL DL<=0.005 MIU/L-ACNC: 4.78 MIU/ML (ref 0.27–4.2)

## 2017-11-03 PROCEDURE — 99397 PER PM REEVAL EST PAT 65+ YR: CPT | Performed by: FAMILY MEDICINE

## 2017-11-03 PROCEDURE — 90732 PPSV23 VACC 2 YRS+ SUBQ/IM: CPT | Performed by: FAMILY MEDICINE

## 2017-11-03 PROCEDURE — 99214 OFFICE O/P EST MOD 30 MIN: CPT | Performed by: FAMILY MEDICINE

## 2017-11-03 PROCEDURE — 90662 IIV NO PRSV INCREASED AG IM: CPT | Performed by: FAMILY MEDICINE

## 2017-11-03 PROCEDURE — 90472 IMMUNIZATION ADMIN EACH ADD: CPT | Performed by: FAMILY MEDICINE

## 2017-11-03 PROCEDURE — 90471 IMMUNIZATION ADMIN: CPT | Performed by: FAMILY MEDICINE

## 2017-11-03 RX ORDER — LANSOPRAZOLE 30 MG/1
30 CAPSULE, DELAYED RELEASE ORAL DAILY
Qty: 30 CAPSULE | Refills: 2 | Status: SHIPPED | OUTPATIENT
Start: 2017-11-03 | End: 2019-08-29 | Stop reason: SDUPTHER

## 2017-11-03 NOTE — PROGRESS NOTES
Rosas Christianson is a 77 y.o. male.  Seen 11/03/2017    Assessment/Plan   Problem List Items Addressed This Visit        Cardiovascular and Mediastinum    Hypertension - Primary    Relevant Orders    Comprehensive Metabolic Panel       Digestive    Esophageal reflux    Relevant Medications    lansoprazole (PREVACID) 30 MG capsule       Endocrine    Hypothyroidism    Relevant Orders    TSH       Hematopoietic and Hemostatic    Anemia of chronic disease    Overview     Followed by Dr. Scott            Other    H/O colectomy    Overview     2000  [facility]         Presence of colostomy    Overview     2000         Health care maintenance    Relevant Orders    Pneumococcal Polysaccharide Vaccine 23-Valent Greater Than or Equal To 1yo Subcutaneous / IM (Completed)    Flu Vaccine High Dose PF 65YR+ (Completed)           Continue levothyroxin PPI hypertensive medication and arthritis medication follow-up results of blood work to guide ongoing treatment otherwise as needed or in 6 months for chronic medical problems    Subjective     Chief Complaint   Patient presents with   • Annual Exam   • follow up to hypertension   • follow up to arthritis   Hypothyroidism and GERD  Social History   Substance Use Topics   • Smoking status: Former Smoker     Packs/day: 3.00     Years: 30.00     Types: Cigarettes   • Smokeless tobacco: Never Used   • Alcohol use Yes      Comment: rare       History of Present Illness   She comes in for follow-up for chronic medical problems of hypertension and arthritis hypothyroidism and GERD he feels fairly stable has not changed his activity much his blood pressures well controlled he is here for therapeutic monitoring of his thyroid replacement therapy his reflux is well controlled with Prevacid is no chest pain shortness of breath or increased fatigue  The following portions of the patient's history were reviewed and updated as appropriate:PMHroutine: Social history , Allergies, Current Medications,  "Active Problem List and Health Maintenance    Review of Systems   Constitutional: Negative.    HENT: Negative.    Eyes: Negative.    Respiratory: Negative.    Cardiovascular: Negative.    Gastrointestinal: Negative.    Endocrine: Negative.    Genitourinary: Negative.    Musculoskeletal: Positive for gait problem.   Hematological: Negative.    Psychiatric/Behavioral: Negative.        Objective   Vitals:    11/03/17 0757   BP: 124/66   BP Location: Left arm   Patient Position: Sitting   Cuff Size: Adult   Pulse: 58   Temp: 97.8 °F (36.6 °C)   TempSrc: Oral   SpO2: 99%   Weight: 162 lb 3.2 oz (73.6 kg)   Height: 68\" (172.7 cm)     Body mass index is 24.66 kg/(m^2).  Physical Exam   Constitutional: He is oriented to person, place, and time. He appears well-developed and well-nourished. No distress.   HENT:   Head: Normocephalic and atraumatic.   Eyes: Conjunctivae and EOM are normal. Pupils are equal, round, and reactive to light. Right eye exhibits no discharge. Left eye exhibits no discharge. No scleral icterus.   Neck: Normal range of motion. Neck supple. No tracheal deviation present. No thyromegaly present.   Cardiovascular: Normal rate, regular rhythm, normal heart sounds, intact distal pulses and normal pulses.  Exam reveals no gallop.    No murmur heard.  Pulmonary/Chest: Effort normal and breath sounds normal. No respiratory distress. He has no wheezes. He has no rales.   Abdominal: Soft. Bowel sounds are normal.       Musculoskeletal: Normal range of motion.   Neurological: He is alert and oriented to person, place, and time. He exhibits normal muscle tone. Coordination normal.   Skin: Skin is warm. No rash noted. No erythema. No pallor.   Psychiatric: He has a normal mood and affect. His behavior is normal. Judgment and thought content normal.   Nursing note and vitals reviewed.    Reviewed Data:  Office Visit on 10/04/2017   Component Date Value Ref Range Status   • Methamphetaine Screen, Urine 10/04/2017 " Negative  Negative Final   • POC Amphetamines 10/04/2017 Negative  Negative Final   • Barbiturates Screen 10/04/2017 Negative  Negative Final   • Benzodiazepine Screen 10/04/2017 Negative  Negative Final   • Cocaine Screen 10/04/2017 Negative  Negative Final   • Methadone Screen 10/04/2017 Positive  Negative Final   • Opiate Screen 10/04/2017 Negative  Negative Final   • Oxycodone, Screen 10/04/2017 Negative  Negative Final   • Phencyclidine (PCP) Screen 10/04/2017 Negative  Negative Final   • Propoxyphene Screen 10/04/2017 Negative  Negative Final   • THC, Screen 10/04/2017 Negative  Negative Final   • Tricyclic Antidepressants Screen 10/04/2017 Positive  Negative Final     He is using methadone to control his bilateral foot pain

## 2017-11-03 NOTE — PROGRESS NOTES
Subjective   Rosas Christianson is a 77 y.o. male.     Chief Complaint   Patient presents with   • Annual Exam   • follow up to hypertension   • follow up to arthritis         History of Present Illness   Rosas Christianson 77 y.o. male who presents for an Annual Wellness Visit.  he has a history of   Patient Active Problem List   Diagnosis   • Post-traumatic osteoarthritis of multiple joints   • Esophageal reflux   • Hypertension   • Hypothyroidism   • Neuropathy   • Ulcerative colitis   • H/O colectomy   • Anemia of chronic disease   • Thrombocytopenia   • History of frostbite   • Arthritis   • Presence of colostomy   • Health care maintenance   .  he has been feeling stable no change.  Labs results discussed in detail with the patient.  Plan to update vaccines if needed today.  I  reviewed health maintenance with him as part of my preventative care plan.    Health Habits:  Dental Exam. unknown  Eye Exam. unknown  Exercise: 0 times/week.  Current exercise activities include: none    The following portions of the patient's history were reviewed and updated as appropriate: allergies, current medications, past family history, past medical history, past social history, past surgical history and problem list.    Review of Systems   Constitutional: Negative.    HENT: Negative.    Eyes: Negative.    Respiratory: Negative.    Cardiovascular: Negative.    Gastrointestinal: Negative.    Endocrine: Negative.    Genitourinary: Negative.    Musculoskeletal: Positive for gait problem.   Hematological: Negative.    Psychiatric/Behavioral: Negative.        Objective   Physical Exam   Constitutional: He is oriented to person, place, and time. He appears well-developed and well-nourished.   HENT:   Head: Normocephalic and atraumatic.   Mouth/Throat: Oropharynx is clear and moist.   Eyes: Conjunctivae are normal. Pupils are equal, round, and reactive to light.   Neck: Normal range of motion. No thyromegaly present.   Cardiovascular: Normal rate  and regular rhythm.    Pulmonary/Chest: Effort normal and breath sounds normal.   Abdominal: Soft. Bowel sounds are normal.       Musculoskeletal: He exhibits edema.   Lymphadenopathy:     He has no cervical adenopathy.   Neurological: He is alert and oriented to person, place, and time.   Skin: Skin is warm and dry.   Psychiatric: He has a normal mood and affect. His behavior is normal. Judgment and thought content normal.   Nursing note and vitals reviewed.      Assessment/Plan   Rosas was seen today for annual exam, follow up to hypertension and follow up to arthritis.    Diagnoses and all orders for this visit:    Essential hypertension  -     Comprehensive Metabolic Panel    Gastroesophageal reflux disease without esophagitis  -     lansoprazole (PREVACID) 30 MG capsule; Take 1 capsule by mouth Daily.    Acquired hypothyroidism  -     TSH    Presence of colostomy    H/O colectomy    Anemia of chronic disease    Health care maintenance  -     Pneumococcal Polysaccharide Vaccine 23-Valent Greater Than or Equal To 1yo Subcutaneous / IM  -     Flu Vaccine High Dose PF 65YR+

## 2017-11-06 ENCOUNTER — OFFICE VISIT (OUTPATIENT)
Dept: PAIN MEDICINE | Facility: CLINIC | Age: 77
End: 2017-11-06

## 2017-11-06 ENCOUNTER — HOSPITAL ENCOUNTER (OUTPATIENT)
Dept: CARDIOLOGY | Facility: HOSPITAL | Age: 77
Discharge: HOME | End: 2017-11-06
Attending: ANESTHESIOLOGY | Admitting: ANESTHESIOLOGY

## 2017-11-06 VITALS
TEMPERATURE: 96 F | DIASTOLIC BLOOD PRESSURE: 69 MMHG | HEIGHT: 68 IN | HEART RATE: 68 BPM | SYSTOLIC BLOOD PRESSURE: 113 MMHG | OXYGEN SATURATION: 98 % | RESPIRATION RATE: 18 BRPM | BODY MASS INDEX: 24.46 KG/M2 | WEIGHT: 161.4 LBS

## 2017-11-06 DIAGNOSIS — Z79.891 LONG TERM CURRENT USE OF METHADONE FOR PAIN CONTROL: ICD-10-CM

## 2017-11-06 DIAGNOSIS — M79.672 BILATERAL FOOT PAIN: ICD-10-CM

## 2017-11-06 DIAGNOSIS — G89.4 CHRONIC PAIN SYNDROME: Primary | ICD-10-CM

## 2017-11-06 DIAGNOSIS — M15.3 POST-TRAUMATIC OSTEOARTHRITIS OF MULTIPLE JOINTS: ICD-10-CM

## 2017-11-06 DIAGNOSIS — D69.6 THROMBOCYTOPENIA (HCC): ICD-10-CM

## 2017-11-06 DIAGNOSIS — M79.671 BILATERAL FOOT PAIN: ICD-10-CM

## 2017-11-06 DIAGNOSIS — Z91.89 HISTORY OF FROSTBITE: ICD-10-CM

## 2017-11-06 DIAGNOSIS — G62.9 NEUROPATHY: Chronic | ICD-10-CM

## 2017-11-06 PROCEDURE — 93005 ELECTROCARDIOGRAM TRACING: CPT | Performed by: ANESTHESIOLOGY

## 2017-11-06 PROCEDURE — 99214 OFFICE O/P EST MOD 30 MIN: CPT | Performed by: ANESTHESIOLOGY

## 2017-11-06 PROCEDURE — 93010 ELECTROCARDIOGRAM REPORT: CPT | Performed by: INTERNAL MEDICINE

## 2017-11-06 RX ORDER — METHADONE HYDROCHLORIDE 5 MG/1
5 TABLET ORAL DAILY
Qty: 30 TABLET | Refills: 0 | Status: SHIPPED | OUTPATIENT
Start: 2017-11-06 | End: 2018-05-03

## 2017-11-06 NOTE — PROGRESS NOTES
CHIEF COMPLAINT  Follow-up for foot pain. Mr. Christianson states that his foot pain is a little better since his last appt.    Subjective   Rosas Christianson is a 77 y.o. male  who presents to the office for follow-up.He has a history of bilateral foot pain.      Foot Pain   This is a chronic problem. The current episode started more than 1 year ago. The problem occurs constantly. The problem has been gradually improving. Associated symptoms include arthralgias and coughing. Pertinent negatives include no chest pain, chills, congestion, fatigue, fever, headaches, nausea, numbness, rash, vomiting or weakness. The symptoms are aggravated by walking, standing and exertion. He has tried acetaminophen, heat, ice, NSAIDs, oral narcotics, rest, relaxation, position changes and immobilization for the symptoms. The treatment provided significant relief.        PEG Assessment   What number best describes your pain on average in the past week?4  What number best describes how, during the past week, pain has interfered with your enjoyment of life?2  What number best describes how, during the past week, pain has interfered with your general activity?  4      The following portions of the patient's history were reviewed and updated as appropriate: allergies, current medications, past family history, past medical history, past social history, past surgical history and problem list.    Review of Systems   Constitutional: Negative for chills, fatigue and fever.   HENT: Negative for congestion.    Eyes: Negative for visual disturbance.   Respiratory: Positive for cough. Negative for shortness of breath and wheezing.    Cardiovascular: Negative.  Negative for chest pain.   Gastrointestinal: Positive for diarrhea. Negative for constipation, nausea and vomiting.   Endocrine: Negative for polydipsia and polyuria.   Genitourinary: Negative for difficulty urinating.   Musculoskeletal: Positive for arthralgias.        Bilateral foot pain   Skin:  "Negative for rash and wound.   Allergic/Immunologic: Negative for immunocompromised state.   Neurological: Negative for weakness, numbness and headaches.   Hematological: Bruises/bleeds easily.   Psychiatric/Behavioral: Negative for sleep disturbance and suicidal ideas. The patient is not nervous/anxious.                  Vitals:    11/06/17 0847   BP: 113/69   Pulse: 68   Resp: 18   Temp: 96 °F (35.6 °C)   SpO2: 98%   Weight: 161 lb 6.4 oz (73.2 kg)   Height: 68\" (172.7 cm)   PainSc:   3   PainLoc: Foot         Objective   Physical Exam   Constitutional: He is oriented to person, place, and time. Vital signs are normal. He appears well-developed and well-nourished.  Non-toxic appearance. No distress.   HENT:   Head: Normocephalic and atraumatic.   Right Ear: Hearing normal.   Left Ear: Hearing normal.   Eyes: Conjunctivae and lids are normal.   Pulmonary/Chest: Effort normal. No respiratory distress.   Abdominal: Normal appearance.   Musculoskeletal:        Right foot: There is tenderness.        Left foot: There is tenderness.       Neurological Sensory Findings -  Altered sharp/dull right ankle/foot discrimination and altered sharp/dull left ankle/foot discrimination.    Vascular Status -  His exam exhibits no right foot edema. His exam exhibits no left foot edema.   Skin Integrity  -  His right foot skin is intact.     Rosas 's left foot skin is intact. .  Neurological: He is alert and oriented to person, place, and time. No cranial nerve deficit.   Psychiatric: He has a normal mood and affect. His behavior is normal.   Nursing note and vitals reviewed.          Assessment/Plan   Rosas was seen today for foot pain.    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        --- Follow-up 3 months    -- continue multimodal management with methadone and also Celebrex    -- last EKG was reviewed and is above.  Last year's QTc was appropriate... Obtaining another EKG as patients who use methadone need an annual EKG at a minimum to monitor against excessive QTc prolongation    Patient appears stable with current regimen. No adverse effects. Regarding continuation of opioids, there is no evidence of aberrant behavior or any red flags.  The patient continues with appropriate response to opioid therapy. ADL's remain intact by self.     he does have a significant history of traumatic foot pain and demonstrates moderate improvement with multimodal therapy including opioid therapy, which allows him to engage in ADLs and family activities. The continuation of opioid therapy is therefore not contraindicated. We will however respect the March 2016 CDC Guidelines and will plan to avoid overexposure to opioids and avoid dose escalation.                 JENNA REPORT    As part of the patient's treatment plan, I am prescribing controlled substances. The patient has been made aware of appropriate use of such medications, including potential risk of somnolence, limited ability to drive and/or work safely, and the potential for dependence or overdose. It has also bee made clear that these medications are for use by this patient only, without concomitant use of alcohol or other substances unless prescribed.     Patient has completed prescribing agreement detailing terms of continued prescribing of controlled substances, including monitoring JENNA reports, urine drug screening, and pill counts if necessary. The patient is aware that inappropriate use will results in cessation of prescribing such medications.    JENNA report has been reviewed and scanned into the patient's chart.    Date of last JENNA : as  above    History and physical exam exhibit continued safe and appropriate use of controlled substances.      EMR Dragon/Transcription disclaimer:   Much of this encounter note is an electronic transcription/translation of spoken language to printed text. The electronic translation of spoken language may permit erroneous, or at times, nonsensical words or phrases to be inadvertently transcribed; Although I have reviewed the note for such errors, some may still exist.

## 2017-11-08 ENCOUNTER — TELEPHONE (OUTPATIENT)
Dept: INTERNAL MEDICINE | Facility: CLINIC | Age: 77
End: 2017-11-08

## 2017-11-08 DIAGNOSIS — E03.9 HYPOTHYROIDISM, UNSPECIFIED TYPE: Primary | ICD-10-CM

## 2017-11-08 RX ORDER — LEVOTHYROXINE SODIUM 88 UG/1
88 TABLET ORAL DAILY
Qty: 30 TABLET | Refills: 0 | Status: SHIPPED | OUTPATIENT
Start: 2017-11-08 | End: 2017-12-04 | Stop reason: SDUPTHER

## 2017-11-08 NOTE — TELEPHONE ENCOUNTER
----- Message from Rashid Klein MD sent at 11/6/2017  8:04 AM EST -----  Check levothyroxine dose if 75 µg increased to 88 µg daily # 30 RF 1recheck TSH in one month

## 2017-12-03 ENCOUNTER — RESULTS ENCOUNTER (OUTPATIENT)
Dept: INTERNAL MEDICINE | Facility: CLINIC | Age: 77
End: 2017-12-03

## 2017-12-03 DIAGNOSIS — E03.9 HYPOTHYROIDISM, UNSPECIFIED TYPE: ICD-10-CM

## 2017-12-04 RX ORDER — LEVOTHYROXINE SODIUM 88 UG/1
TABLET ORAL
Qty: 30 TABLET | Refills: 2 | Status: SHIPPED | OUTPATIENT
Start: 2017-12-04 | End: 2018-05-27 | Stop reason: SDUPTHER

## 2017-12-06 ENCOUNTER — TELEPHONE (OUTPATIENT)
Dept: INTERNAL MEDICINE | Facility: CLINIC | Age: 77
End: 2017-12-06

## 2017-12-06 ENCOUNTER — APPOINTMENT (OUTPATIENT)
Dept: LAB | Facility: HOSPITAL | Age: 77
End: 2017-12-06

## 2017-12-06 LAB — TSH SERPL DL<=0.05 MIU/L-ACNC: 3.57 MIU/ML (ref 0.27–4.2)

## 2017-12-06 PROCEDURE — 84443 ASSAY THYROID STIM HORMONE: CPT | Performed by: FAMILY MEDICINE

## 2017-12-06 PROCEDURE — 36415 COLL VENOUS BLD VENIPUNCTURE: CPT

## 2017-12-06 NOTE — TELEPHONE ENCOUNTER
----- Message from Rashid Klein MD sent at 12/6/2017  3:10 PM EST -----  Reconcile levothyroxine medication if he is taking 88 µg daily continue 88 µg daily recheck in 9 months

## 2018-01-02 RX ORDER — CELECOXIB 200 MG/1
CAPSULE ORAL
Qty: 90 CAPSULE | Refills: 0 | Status: SHIPPED | OUTPATIENT
Start: 2018-01-02 | End: 2018-03-29 | Stop reason: SDUPTHER

## 2018-01-15 RX ORDER — LISINOPRIL 2.5 MG/1
TABLET ORAL
Qty: 90 TABLET | Refills: 0 | Status: SHIPPED | OUTPATIENT
Start: 2018-01-15 | End: 2018-05-03

## 2018-02-05 ENCOUNTER — TELEPHONE (OUTPATIENT)
Dept: INTERNAL MEDICINE | Facility: CLINIC | Age: 78
End: 2018-02-05

## 2018-02-05 DIAGNOSIS — M79.644 FINGER PAIN, RIGHT: Primary | ICD-10-CM

## 2018-02-05 NOTE — TELEPHONE ENCOUNTER
----- Message from Jenn Rowe CMA sent at 2/5/2018  9:49 AM EST -----  Regarding: RE: Non-Urgent Medical Question  Contact: [phone]  .    ----- Message -----     From: Rosas Christianson     Sent: 2/5/2018   9:41 AM       To: [facility]  Subject: Non-Urgent Medical Question                      I have something inbedded in my index finger that is infected and very sore. I has been there for more then a year and not visible. I have tried to dig it out many times but have not succeeded. It is getting worse and my whole finger is swollen and red. It will probably take an xray to find it and surgery to get it out, so what do I do?

## 2018-02-09 ENCOUNTER — TELEPHONE (OUTPATIENT)
Dept: INTERNAL MEDICINE | Facility: CLINIC | Age: 78
End: 2018-02-09

## 2018-02-09 NOTE — TELEPHONE ENCOUNTER
----- Message from Rosas Christianson sent at 2/9/2018  8:56 AM EST -----  Regarding: Referral Request  Contact: [phone]  Dr Klein referred me to a doctor to look at my infected finger, and they were supposed to contact me, but they have not done so. Please send me there name and or phone number so I can make an appointment.

## 2018-02-09 NOTE — TELEPHONE ENCOUNTER
----- Message from Rosas Christianson sent at 2/9/2018  8:56 AM EST -----  Regarding: Referral Request  Contact: [phone]  Dr Klein referred me to a doctor to look at my infected finger, and they were supposed to contact me, but they have not done so. Please send me there name and or phone number so I can make an appointment.

## 2018-03-14 ENCOUNTER — TRANSCRIBE ORDERS (OUTPATIENT)
Dept: PHYSICAL THERAPY | Facility: CLINIC | Age: 78
End: 2018-03-14

## 2018-03-14 DIAGNOSIS — S60.229A CONTUSION OF HAND INCLUDING FINGERS, INITIAL ENCOUNTER: Primary | ICD-10-CM

## 2018-03-14 DIAGNOSIS — S60.00XA CONTUSION OF HAND INCLUDING FINGERS, INITIAL ENCOUNTER: Primary | ICD-10-CM

## 2018-03-20 ENCOUNTER — TREATMENT (OUTPATIENT)
Dept: PHYSICAL THERAPY | Facility: CLINIC | Age: 78
End: 2018-03-20

## 2018-03-20 DIAGNOSIS — M79.644 PAIN IN FINGER OF RIGHT HAND: Primary | ICD-10-CM

## 2018-03-20 PROCEDURE — 97018 PARAFFIN BATH THERAPY: CPT | Performed by: PHYSICAL THERAPIST

## 2018-03-20 PROCEDURE — 97161 PT EVAL LOW COMPLEX 20 MIN: CPT | Performed by: PHYSICAL THERAPIST

## 2018-03-20 PROCEDURE — 97035 APP MDLTY 1+ULTRASOUND EA 15: CPT | Performed by: PHYSICAL THERAPIST

## 2018-03-20 NOTE — PROGRESS NOTES
Physical Therapy Initial Evaluation and Plan of Care    Patient Name: Rosas Christianson       Patient MRN: [id number]  : 1940  Physician: Aristides Akhtar MD  Date: 3/20/2018    Encounter Diagnoses   Name Primary?   • Pain in finger of right hand Yes     Subjective Evaluation    History of Present Illness  Onset date: 1 year ago.  Mechanism of injury: I got a thorn in it about a year ago I dug out. Finger swell off and on. Just finished antibiotic for infection in finger. Swelling is worse in the morning. Pain is not as bad now. Used to be very tender on the tip of finger.      Patient Occupation:  Pain  Current pain ratin  At worst pain ratin  Location: tip palmar side of IF  Quality: sharp  Aggravating factors: lifting, movement and repetitive movement  Progression: improved    Social Support  Lives with: spouse    Hand dominance: right    Diagnostic Tests  X-ray: normal    Treatments  No previous or current treatments  Patient Goals  Patient goals for therapy: increased strength, independence with ADLs/IADLs, increased motion, decreased pain and decreased edema        Objective       Observations     Right Wrist/Hand   Positive for edema (mod in R IF).     Neurological Testing     Sensation     Wrist/Hand     Right   Intact: light touch    Active Range of Motion     Right Wrist   Normal active range of motion    Left Digits    Flexion   Index     MCP: 80 degrees    PIP: 104 degrees    DIP: 75 degrees    Right Digits   Flexion   Index     MCP: 80 degrees    PIP: 90 degrees    DIP: 60 degrees  Middle     MCP: 0 degrees    PIP: 0 degrees    DIP: 0 degrees    Additional Active Range of Motion Details  Normal motion of all other fingers     Passive Range of Motion     Right Digits   Flexion   Index     MCP: 99 degrees    PIP: 100 degrees    DIP: 70 degrees    Strength/Myotome Testing     Left Wrist/Hand      (2nd hand position)     Trial 1: 72 lbs    Thumb  Strength  Palmar/Three-Point Pinch     Trial 1: 17 lbs    Right Wrist/Hand      (2nd hand position)     Trial 1: 83 lbs    Trial 2: 83 lbs    Trial 3: 80 lbs    Average: 82 lbs    Thumb Strength   Palmar/Three-Point Pinch     Trial 1: 11 lbs    Comments: pain    Tests     Right Wrist/Hand   Negative digital stress, RCL varus stress and UCL valgus stress.     Swelling     Left Wrist/Hand   Index     Proximal: 6.2 cm    Middle: 7.5 cm    Distal: 8.1 cm    Right Wrist/Hand   Index     Proximal: 6.8 cm    Middle: 9.5 cm    Distal: 9 cm    Additional Swelling Details  DIP, PIP,MCP joints         Assessment & Plan     Assessment  Impairments: abnormal or restricted ROM, activity intolerance, impaired physical strength, lacks appropriate home exercise program and pain with function  Assessment details: Patient is a good candidate for physical therapy to address the listed impairments and functional limitations.   Prognosis: good  Functional Limitations: carrying objects, lifting, pulling, pushing and unable to perform repetitive tasks  Goals  Plan Goals: STGs: To be met in 2 weeks:  1. Patient is independent with HEP to maintain gains made in PT.  2. Circumferential measurement of R IF PIP joint is at least 0.2 cm less.   LTGs: To be met in 4 weeks:  1. Patient can achieve full composite flexion of IF without pain or restriction.  2. Circumferential measurement is at least 0.5 cm less at R IF PIP joint.     Plan  Therapy options: will be seen for skilled physical therapy services  Planned modality interventions: cryotherapy, thermotherapy (paraffin bath), ultrasound and high voltage pulsed current (pain management)  Planned therapy interventions: functional ROM exercises, joint mobilization, home exercise program, manual therapy, stretching and strengthening  Frequency: 2x week  Duration in weeks: 4  Treatment plan discussed with: patient        See Exercise, Manual, and Modality Logs for complete treatment.      PROCEDURES AND MODALITIES:  Paraffin: Rx Minutes: 8 min + setup pre-rx  Moist Heat:    Ice: Rx Minutes: Other: (5 min) post-rx  E-Stim:    Ultrasound: Rx Minutes: 7/9  Ionto:   Traction:    Dry Needling:         Therapy Exercise [id number] 5 minutes     Timed Code Treatment: 14 Minutes  Total Treatment Time: 55 Minutes    PT SIGNATURE: Fiorella Hyde PT, DPT  [place] License # [id number]  DATE TREATMENT INITIATED: 3/20/2018    Initial Certification  Certification Period: 6/18/2018  I certify that the therapy services are furnished while this patient is under my care.  The services outlined above are required by this patient, and will be reviewed every 90 days.     PHYSICIAN: Aristides Akhtar MD     DATE:     Please sign and return via fax to [phone].. Thank you, [facility].

## 2018-03-23 ENCOUNTER — TREATMENT (OUTPATIENT)
Dept: PHYSICAL THERAPY | Facility: CLINIC | Age: 78
End: 2018-03-23

## 2018-03-23 DIAGNOSIS — M79.644 PAIN IN FINGER OF RIGHT HAND: Primary | ICD-10-CM

## 2018-03-23 PROCEDURE — 97140 MANUAL THERAPY 1/> REGIONS: CPT | Performed by: PHYSICAL THERAPIST

## 2018-03-23 PROCEDURE — 97018 PARAFFIN BATH THERAPY: CPT | Performed by: PHYSICAL THERAPIST

## 2018-03-23 NOTE — PROGRESS NOTES
Physical Therapy Daily Progress Note    Visit #: 2  Rosas Christianson reports: I am wearing compression. I have trouble finding time to do the exercises and use ice.   Pain Scale (0-10):     Subjective       Objective       Swelling     Right Wrist/Hand   Index     Proximal: 8.7 cm    Middle: 8.5 cm    Distal: 6.7 cm     See Exercise, Manual, and Modality Logs for complete treatment.     PROCEDURES AND MODALITIES:  Paraffin: Rx Minutes: 10 min + setup pre-rx  Moist Heat:    Ice: Rx Minutes: Other: (5 min) post-rx  E-Stim:    Ultrasound: Rx Minutes: 7/9  Ionto:   Traction:    Dry Needling:         Manual PT [id number] 5 minutes and Therapy Exercise [id number] 3 minutes     Timed Code Treatment: 17 Minutes  Total Treatment Time: 35 Minutes    Assessment/Plan  Edema in finger is improved as circumferential measurements are improved at all joints. Continue current HEP and compression.    Progress strengthening /stabilization /functional activity      Fiorella Hyde PT, DPT  Physical Therapist  [place] License # [id number]

## 2018-03-28 ENCOUNTER — TREATMENT (OUTPATIENT)
Dept: PHYSICAL THERAPY | Facility: CLINIC | Age: 78
End: 2018-03-28

## 2018-03-28 DIAGNOSIS — M79.644 PAIN IN FINGER OF RIGHT HAND: Primary | ICD-10-CM

## 2018-03-28 PROCEDURE — 97110 THERAPEUTIC EXERCISES: CPT | Performed by: PHYSICAL THERAPIST

## 2018-03-28 PROCEDURE — 97014 ELECTRIC STIMULATION THERAPY: CPT | Performed by: PHYSICAL THERAPIST

## 2018-03-28 PROCEDURE — 97018 PARAFFIN BATH THERAPY: CPT | Performed by: PHYSICAL THERAPIST

## 2018-03-28 NOTE — PROGRESS NOTES
Physical Therapy Daily Progress Note    Visit #: 3  Rosas Christianson reports: Finger feels about the same. More swollen in the morning.   Pain Scale (0-10):     Subjective       Objective       Swelling     Right Wrist/Hand   Index     Proximal: 9.2 cm    Middle: 9 cm    Distal: 6.7 cm     See Exercise, Manual, and Modality Logs for complete treatment.     PROCEDURES AND MODALITIES:  Paraffin: Rx Minutes: 10 min + setup pre-rx  Moist Heat:    Ice: Rx Minutes: Other: (5 min) post-rx  E-Stim: Rx Minutes: 15 mins  Ultrasound:    Ionto:   Traction:    Dry Needling:         Manual PT [id number] 5 minutes and Therapy Exercise [id number] 6 minutes     Timed Code Treatment: 11 Minutes  Total Treatment Time: 30 Minutes    Assessment/Plan  Measurements taken today pre estim were larger than last visit mahnaz at MCP and PIP joints. Post estim, ice and compression IF PIP joint measured 8.5 cm in circumference.      Progress per Plan of Care      Fiorella Hyde PT, DPT  Physical Therapist  [place] License # [id number]

## 2018-03-29 DIAGNOSIS — E03.9 ACQUIRED HYPOTHYROIDISM: ICD-10-CM

## 2018-03-29 RX ORDER — CELECOXIB 200 MG/1
CAPSULE ORAL
Qty: 90 CAPSULE | Refills: 0 | Status: SHIPPED | OUTPATIENT
Start: 2018-03-29 | End: 2018-06-24 | Stop reason: SDUPTHER

## 2018-03-29 RX ORDER — LEVOTHYROXINE SODIUM 0.07 MG/1
TABLET ORAL
Qty: 90 TABLET | Refills: 1 | OUTPATIENT
Start: 2018-03-29

## 2018-03-30 ENCOUNTER — TREATMENT (OUTPATIENT)
Dept: PHYSICAL THERAPY | Facility: CLINIC | Age: 78
End: 2018-03-30

## 2018-03-30 ENCOUNTER — TELEPHONE (OUTPATIENT)
Dept: INTERNAL MEDICINE | Facility: CLINIC | Age: 78
End: 2018-03-30

## 2018-03-30 DIAGNOSIS — M79.644 PAIN IN FINGER OF RIGHT HAND: Primary | ICD-10-CM

## 2018-03-30 PROCEDURE — 97110 THERAPEUTIC EXERCISES: CPT | Performed by: PHYSICAL THERAPIST

## 2018-03-30 PROCEDURE — 97014 ELECTRIC STIMULATION THERAPY: CPT | Performed by: PHYSICAL THERAPIST

## 2018-03-30 PROCEDURE — 97018 PARAFFIN BATH THERAPY: CPT | Performed by: PHYSICAL THERAPIST

## 2018-03-30 NOTE — PROGRESS NOTES
Physical Therapy Daily Progress Note    Visit #: 4  Rosas Christianson reports: Estim helped with swelling but it came back.   Pain Scale (0-10):     Subjective       Objective       Swelling     Right Wrist/Hand   Index     Proximal: 8.8 cm    Middle: 8.2 cm     See Exercise, Manual, and Modality Logs for complete treatment.     PROCEDURES AND MODALITIES:  Paraffin: Rx Minutes: 10 min + setup pre-rx  Moist Heat:    Ice: Rx Minutes: 15 mins post-rx  E-Stim: Rx Minutes: 15 mins  Ultrasound:    Ionto:   Traction:    Dry Needling:         Manual PT [id number] 4 minutes and Therapy Exercise [id number] 6 minutes     Timed Code Treatment: 10 Minutes  Total Treatment Time: 40 Minutes    Assessment/Plan  Circumference of IF is improved significantly after estim, ice and compression. Returns within a day. Encouraged patient to continue wearing compression and using ice. Mobility of finger is good and no c/o pain.     Progress per Plan of Care      Fiorella Hyde PT, DPT  Physical Therapist  [place] License # [id number]

## 2018-03-30 NOTE — TELEPHONE ENCOUNTER
----- Message from Rosas Christianson sent at 3/30/2018 10:44 AM EDT -----  Regarding: Prescription Question  Contact: [phone]  My Levothyroxine was canceled, why?

## 2018-03-30 NOTE — TELEPHONE ENCOUNTER
Patient notified that the office received a refill request for levothyroxine 75 mcg not 88 mcg that he is to be taking.  He said that he does not need this now and will contact his pharmacy when he needs this.

## 2018-04-02 ENCOUNTER — TREATMENT (OUTPATIENT)
Dept: PHYSICAL THERAPY | Facility: CLINIC | Age: 78
End: 2018-04-02

## 2018-04-02 DIAGNOSIS — M79.644 PAIN IN FINGER OF RIGHT HAND: Primary | ICD-10-CM

## 2018-04-02 PROCEDURE — 97014 ELECTRIC STIMULATION THERAPY: CPT | Performed by: PHYSICAL THERAPIST

## 2018-04-02 PROCEDURE — 97110 THERAPEUTIC EXERCISES: CPT | Performed by: PHYSICAL THERAPIST

## 2018-04-02 PROCEDURE — 97018 PARAFFIN BATH THERAPY: CPT | Performed by: PHYSICAL THERAPIST

## 2018-04-02 NOTE — PROGRESS NOTES
Physical Therapy Daily Progress Note    Visit #: 5  Rosas Christianson reports: Finger felt pretty good yesterday, swollen this morning.   Pain Scale (0-10):     Subjective       Objective       Swelling     Right Wrist/Hand   Little     Middle: 8 cm     See Exercise, Manual, and Modality Logs for complete treatment.     PROCEDURES AND MODALITIES:  Paraffin: Rx Minutes: 10 min + setup pre-rx  Moist Heat:    Ice: Rx Minutes: 15 mins post-rx  E-Stim: Rx Minutes: 20 mins  Ultrasound:    Ionto:   Traction:    Dry Needling:         Manual PT [id number] 4 minutes and Therapy Exercise [id number] 6 minutes     Timed Code Treatment: 10 Minutes  Total Treatment Time: 45 Minutes    Assessment/Plan  Edema is improving with each visit per circumferential measurements.     Progress per Plan of Care      Fiorella Hyde PT, DPT  Physical Therapist  [place] License # [id number]

## 2018-04-04 ENCOUNTER — TREATMENT (OUTPATIENT)
Dept: PHYSICAL THERAPY | Facility: CLINIC | Age: 78
End: 2018-04-04

## 2018-04-04 DIAGNOSIS — M79.644 PAIN IN FINGER OF RIGHT HAND: Primary | ICD-10-CM

## 2018-04-04 PROCEDURE — 97110 THERAPEUTIC EXERCISES: CPT | Performed by: PHYSICAL THERAPIST

## 2018-04-04 PROCEDURE — 97018 PARAFFIN BATH THERAPY: CPT | Performed by: PHYSICAL THERAPIST

## 2018-04-04 PROCEDURE — 97014 ELECTRIC STIMULATION THERAPY: CPT | Performed by: PHYSICAL THERAPIST

## 2018-04-04 NOTE — PROGRESS NOTES
Physical Therapy Daily Progress Note    Visit #: 6  Rosas Christianson reports: Finger is more swollen today. Think it is getting better overall.  Pain Scale (0-10):     Subjective       Objective   See Exercise, Manual, and Modality Logs for complete treatment.     PROCEDURES AND MODALITIES:  Paraffin: Rx Minutes: 10 min + setup pre-rx  Moist Heat:    Ice: Rx Minutes: 15 mins post-rx  E-Stim: Rx Minutes: 20 mins  Ultrasound:    Ionto:   Traction:    Dry Needling:         Manual PT [id number] 4 minutes and Therapy Exercise [id number] 6 minutes     Timed Code Treatment: 10 Minutes  Total Treatment Time: 45 Minutes    Assessment/Plan  estim and compression sees to be the most beneficial in reducing edema in IF. Edema is significantly reduced at end of visit but returns in between visits. Maintaining full AROM.     Progress per Plan of Care      Fiorella Hyde PT, DPT  Physical Therapist  [place] License # [id number]

## 2018-04-09 ENCOUNTER — TREATMENT (OUTPATIENT)
Dept: PHYSICAL THERAPY | Facility: CLINIC | Age: 78
End: 2018-04-09

## 2018-04-09 DIAGNOSIS — M79.644 PAIN IN FINGER OF RIGHT HAND: Primary | ICD-10-CM

## 2018-04-09 PROCEDURE — 97018 PARAFFIN BATH THERAPY: CPT | Performed by: PHYSICAL THERAPIST

## 2018-04-09 PROCEDURE — 97110 THERAPEUTIC EXERCISES: CPT | Performed by: PHYSICAL THERAPIST

## 2018-04-09 PROCEDURE — 97014 ELECTRIC STIMULATION THERAPY: CPT | Performed by: PHYSICAL THERAPIST

## 2018-04-09 NOTE — PROGRESS NOTES
Physical Therapy Daily Progress Note    Visit #: 7  Rosas Christianson reports: Finger is red but not as swollen.  Pain Scale (0-10):     Subjective       Objective       Swelling     Right Wrist/Hand   Index     Middle: 8.2 cm     See Exercise, Manual, and Modality Logs for complete treatment.     PROCEDURES AND MODALITIES:  Paraffin: Rx Minutes: 10 min + setup pre-rx  Moist Heat:    Ice: Rx Minutes: 15 mins post-rx  E-Stim: Rx Minutes: 20 mins  Ultrasound:    Ionto:   Traction:    Dry Needling:         Therapy Exercise [id number] 9 minutes     Timed Code Treatment: 9 Minutes  Total Treatment Time: 42 Minutes    Assessment/Plan  Circumference of IF improved overall and mostly posy estim and compression. Reduced to 8.0 cm at PIP joint.     Progress strengthening /stabilization /functional activity      Fiorella Hyde PT, DPT  Physical Therapist  [place] License # [id number]

## 2018-04-11 ENCOUNTER — TREATMENT (OUTPATIENT)
Dept: PHYSICAL THERAPY | Facility: CLINIC | Age: 78
End: 2018-04-11

## 2018-04-11 DIAGNOSIS — M79.644 PAIN IN FINGER OF RIGHT HAND: Primary | ICD-10-CM

## 2018-04-11 PROCEDURE — 97014 ELECTRIC STIMULATION THERAPY: CPT | Performed by: PHYSICAL THERAPIST

## 2018-04-11 PROCEDURE — 97110 THERAPEUTIC EXERCISES: CPT | Performed by: PHYSICAL THERAPIST

## 2018-04-11 PROCEDURE — 97018 PARAFFIN BATH THERAPY: CPT | Performed by: PHYSICAL THERAPIST

## 2018-04-11 NOTE — PROGRESS NOTES
Physical Therapy Daily Progress Note    Visit #: 8  Rosas Christianson reports: MD wants me to continue PT. He put me on a new medication for inflammation.   Pain Scale (0-10):     Subjective       Objective   See Exercise, Manual, and Modality Logs for complete treatment.     PROCEDURES AND MODALITIES:  Paraffin: Rx Minutes: 10 min + setup pre-rx  Moist Heat:    Ice: Rx Minutes: 10 mins post-rx  E-Stim: Rx Minutes: 20 mins  Ultrasound:    Ionto:   Traction:    Dry Needling:         Therapy Exercise [id number] 9 minutes     Timed Code Treatment: 9 Minutes  Total Treatment Time: 45 Minutes    Assessment/Plan  Reduction of edema is apparent but continues to have edema in redness in IF, no pain.     Progress per Plan of Care      Fiorella Hyde, PT, DPT  Physical Therapist  [place] License # [id number]

## 2018-04-16 ENCOUNTER — TREATMENT (OUTPATIENT)
Dept: PHYSICAL THERAPY | Facility: CLINIC | Age: 78
End: 2018-04-16

## 2018-04-16 DIAGNOSIS — M79.644 PAIN IN FINGER OF RIGHT HAND: Primary | ICD-10-CM

## 2018-04-16 PROCEDURE — 97018 PARAFFIN BATH THERAPY: CPT | Performed by: PHYSICAL THERAPIST

## 2018-04-16 PROCEDURE — 97110 THERAPEUTIC EXERCISES: CPT | Performed by: PHYSICAL THERAPIST

## 2018-04-16 PROCEDURE — 97014 ELECTRIC STIMULATION THERAPY: CPT | Performed by: PHYSICAL THERAPIST

## 2018-04-16 NOTE — PROGRESS NOTES
Physical Therapy Daily Progress Note    Visit #: 9  Rosas Christianson reports: Knuckle is not as swollen . Finger is better since starting PT. Stays red all the time.    Pain Scale (0-10):     Subjective       Objective       Swelling     Right Wrist/Hand   Index     Proximal: 8.8 cm    Middle: 8.2 cm    Distal: 6.5 cm     See Exercise, Manual, and Modality Logs for complete treatment.     PROCEDURES AND MODALITIES:  Paraffin: Rx Minutes: 10 min + setup pre-rx  Moist Heat:    Ice: Rx Minutes: 10 mins post-rx  E-Stim: Rx Minutes: 20 mins  Ultrasound:    Ionto:   Traction:    Dry Needling:         Therapy Exercise [id number] 9 minutes     Timed Code Treatment: 9 Minutes  Total Treatment Time: 45 Minutes    Assessment/Plan  Significant improvement in edema since IE. Minimal in past few visits.    Progress per Plan of Care      Fiorella Hyde PT, DPT  Physical Therapist  [place] License # [id number]

## 2018-04-18 ENCOUNTER — TREATMENT (OUTPATIENT)
Dept: PHYSICAL THERAPY | Facility: CLINIC | Age: 78
End: 2018-04-18

## 2018-04-18 DIAGNOSIS — M79.644 PAIN IN FINGER OF RIGHT HAND: Primary | ICD-10-CM

## 2018-04-18 PROCEDURE — 97018 PARAFFIN BATH THERAPY: CPT | Performed by: PHYSICAL THERAPIST

## 2018-04-18 PROCEDURE — 97014 ELECTRIC STIMULATION THERAPY: CPT | Performed by: PHYSICAL THERAPIST

## 2018-04-18 PROCEDURE — 97110 THERAPEUTIC EXERCISES: CPT | Performed by: PHYSICAL THERAPIST

## 2018-04-18 NOTE — PROGRESS NOTES
Physical Therapy Daily Progress Note    Visit #: 10  Rosas Christianson reports: Finger is less swollen today. Knuckle is purple today.   Pain Scale (0-10):     Subjective       Objective       Swelling     Right Wrist/Hand   Index     Middle: 7.9 cm     See Exercise, Manual, and Modality Logs for complete treatment.     PROCEDURES AND MODALITIES:  Paraffin: Rx Minutes: 10 min + setup pre-rx  Moist Heat:    Ice: Rx Minutes: 10 mins post-rx  E-Stim: Rx Minutes: 20 mins  Ultrasound:    Ionto:   Traction:    Dry Needling:         Therapy Exercise [id number] 9 minutes     Timed Code Treatment: 9 Minutes  Total Treatment Time: 45 Minutes    Assessment/Plan  Edema is improved in finger today. Improved overall. All goals met.     Other DC       Fiorella Hyde, PT, DPT  Physical Therapist  [place] License # [id number]

## 2018-05-03 ENCOUNTER — OFFICE VISIT (OUTPATIENT)
Dept: INTERNAL MEDICINE | Facility: CLINIC | Age: 78
End: 2018-05-03

## 2018-05-03 VITALS
HEIGHT: 68 IN | DIASTOLIC BLOOD PRESSURE: 62 MMHG | SYSTOLIC BLOOD PRESSURE: 101 MMHG | RESPIRATION RATE: 16 BRPM | WEIGHT: 162.3 LBS | OXYGEN SATURATION: 98 % | HEART RATE: 59 BPM | BODY MASS INDEX: 24.6 KG/M2

## 2018-05-03 DIAGNOSIS — I10 ESSENTIAL HYPERTENSION: Primary | ICD-10-CM

## 2018-05-03 DIAGNOSIS — K21.9 GASTROESOPHAGEAL REFLUX DISEASE WITHOUT ESOPHAGITIS: ICD-10-CM

## 2018-05-03 DIAGNOSIS — E03.9 ACQUIRED HYPOTHYROIDISM: ICD-10-CM

## 2018-05-03 PROCEDURE — 99214 OFFICE O/P EST MOD 30 MIN: CPT | Performed by: FAMILY MEDICINE

## 2018-05-03 NOTE — PROGRESS NOTES
Rosas Christianson is a 78 y.o. male.      Assessment/Plan   Problem List Items Addressed This Visit        Cardiovascular and Mediastinum    Hypertension - Primary       Digestive    Esophageal reflux       Endocrine    Hypothyroidism      Other Visit Diagnoses    None.        continue levothyroxine continue Prevacid  Stop his lisinopril he's going to continue monitoring his blood pressure follow up otherwise as needed or in 6 months with blood work at that time      Chief Complaint   Patient presents with   • follow up for hypertension   • follow up for thyroid     Social History   Substance Use Topics   • Smoking status: Former Smoker     Packs/day: 3.00     Years: 30.00     Types: Cigarettes   • Smokeless tobacco: Never Used   • Alcohol use Yes      Comment: rare       History of Present Illness   Follow up Chronic medical problems of hypertension, esophageal reflux, hypothyroidism.  HE monitors his blood pressure and getting readings in the low 100s, his reflux medicine is adequately working as well as recent TSH thyroid therapeutic replacement with levothyroxine 88 µg daily.  He has not had any recurrence of symptoms that were associated with peptic ulcer in the past  The following portions of the patient's history were reviewed and updated as appropriate:PMHroutine: Social history , Past Medical History, Allergies, Current Medications, Active Problem List and Health Maintenance    Review of Systems   Constitutional: Negative.    HENT: Negative.    Eyes: Negative.    Respiratory: Negative.    Cardiovascular: Negative.    Gastrointestinal: Negative.    Endocrine: Negative.    Genitourinary: Negative.    Musculoskeletal: Positive for gait problem.   Hematological: Negative.    Psychiatric/Behavioral: Negative.        Objective   Vitals:    05/03/18 0805   BP: 101/62   BP Location: Right arm   Patient Position: Sitting   Cuff Size: Adult   Pulse: 59   Resp: 16   SpO2: 98%   Weight: 73.6 kg (162 lb 4.8 oz)   Height:  "172.7 cm (68\")     Body mass index is 24.68 kg/m².  Physical Exam   Constitutional: He is oriented to person, place, and time. He appears well-developed and well-nourished. No distress.   HENT:   Head: Normocephalic and atraumatic.   Eyes: Conjunctivae are normal. Right eye exhibits no discharge. Left eye exhibits no discharge. No scleral icterus.   Neck: Normal range of motion. Neck supple. No tracheal deviation present. No thyromegaly present.   Cardiovascular: Normal rate, regular rhythm, normal heart sounds, intact distal pulses and normal pulses.  Exam reveals no gallop.    No murmur heard.  Pulmonary/Chest: Effort normal and breath sounds normal. No respiratory distress. He has no wheezes. He has no rales.   Musculoskeletal: Normal range of motion.   He has a cane   Neurological: He is alert and oriented to person, place, and time. He exhibits normal muscle tone. Coordination normal.   Skin: Skin is warm. No rash noted. No erythema. No pallor.   Psychiatric: He has a normal mood and affect. His behavior is normal. Judgment and thought content normal.   Nursing note and vitals reviewed.    Reviewed Data:  No visits with results within 1 Month(s) from this visit.   Latest known visit with results is:   Results Encounter on 12/03/2017   Component Date Value Ref Range Status   • TSH 12/06/2017 3.570  0.270 - 4.200 mIU/mL Final         "

## 2018-05-18 ENCOUNTER — DOCUMENTATION (OUTPATIENT)
Dept: PHYSICAL THERAPY | Facility: CLINIC | Age: 78
End: 2018-05-18

## 2018-05-18 DIAGNOSIS — M79.644 PAIN IN FINGER OF RIGHT HAND: Primary | ICD-10-CM

## 2018-05-29 RX ORDER — LEVOTHYROXINE SODIUM 88 UG/1
TABLET ORAL
Qty: 30 TABLET | Refills: 6 | Status: SHIPPED | OUTPATIENT
Start: 2018-05-29 | End: 2018-12-03 | Stop reason: DRUGHIGH

## 2018-06-25 RX ORDER — CELECOXIB 200 MG/1
CAPSULE ORAL
Qty: 90 CAPSULE | Refills: 1 | Status: SHIPPED | OUTPATIENT
Start: 2018-06-25 | End: 2018-12-23 | Stop reason: SDUPTHER

## 2018-07-12 NOTE — PROGRESS NOTES
Discharge Report    Patient Name: Rosas Christianson       Patient MRN: [id number]  : 1940  Physician:  Date: 2018    Encounter Diagnoses   Name Primary?   • Pain in finger of right hand Yes       Treatment has included therapeutic exercise, manual therapy, electrical stimulation, cryotherapy and paraaffin    No notes on file      Assessment: Edema is improved in finger today. Improved overall. All goals met.      Progress towards goals: All Met    Discharge Reason: Patient achieved goals      Plan of Care: Continue with current home exercise program as instructed    Prognosis: excellent    Understanding at Discharge: good

## 2018-10-05 ENCOUNTER — OFFICE VISIT (OUTPATIENT)
Dept: ONCOLOGY | Facility: CLINIC | Age: 78
End: 2018-10-05

## 2018-10-05 ENCOUNTER — LAB (OUTPATIENT)
Dept: LAB | Facility: HOSPITAL | Age: 78
End: 2018-10-05

## 2018-10-05 VITALS
HEIGHT: 68 IN | RESPIRATION RATE: 16 BRPM | TEMPERATURE: 97.4 F | SYSTOLIC BLOOD PRESSURE: 140 MMHG | WEIGHT: 163.8 LBS | BODY MASS INDEX: 24.83 KG/M2 | OXYGEN SATURATION: 100 % | HEART RATE: 52 BPM | DIASTOLIC BLOOD PRESSURE: 76 MMHG

## 2018-10-05 DIAGNOSIS — D69.6 THROMBOCYTOPENIA (HCC): ICD-10-CM

## 2018-10-05 DIAGNOSIS — D63.8 ANEMIA OF CHRONIC DISEASE: ICD-10-CM

## 2018-10-05 DIAGNOSIS — D63.8 ANEMIA OF CHRONIC DISEASE: Primary | ICD-10-CM

## 2018-10-05 LAB
BASOPHILS # BLD AUTO: 0.11 10*3/MM3 (ref 0–0.1)
BASOPHILS NFR BLD AUTO: 1.6 % (ref 0–1.1)
DEPRECATED RDW RBC AUTO: 44.8 FL (ref 37–49)
EOSINOPHIL # BLD AUTO: 0.37 10*3/MM3 (ref 0–0.36)
EOSINOPHIL NFR BLD AUTO: 5.3 % (ref 1–5)
ERYTHROCYTE [DISTWIDTH] IN BLOOD BY AUTOMATED COUNT: 12.9 % (ref 11.7–14.5)
HCT VFR BLD AUTO: 38.1 % (ref 40–49)
HGB BLD-MCNC: 13 G/DL (ref 13.5–16.5)
IMM GRANULOCYTES # BLD: 0.18 10*3/MM3 (ref 0–0.03)
IMM GRANULOCYTES NFR BLD: 2.6 % (ref 0–0.5)
LYMPHOCYTES # BLD AUTO: 1.54 10*3/MM3 (ref 1–3.5)
LYMPHOCYTES NFR BLD AUTO: 22.1 % (ref 20–49)
MCH RBC QN AUTO: 32.4 PG (ref 27–33)
MCHC RBC AUTO-ENTMCNC: 34.1 G/DL (ref 32–35)
MCV RBC AUTO: 95 FL (ref 83–97)
MONOCYTES # BLD AUTO: 0.81 10*3/MM3 (ref 0.25–0.8)
MONOCYTES NFR BLD AUTO: 11.6 % (ref 4–12)
NEUTROPHILS # BLD AUTO: 3.96 10*3/MM3 (ref 1.5–7)
NEUTROPHILS NFR BLD AUTO: 56.8 % (ref 39–75)
NRBC BLD MANUAL-RTO: 0 /100 WBC (ref 0–0)
PLATELET # BLD AUTO: 108 10*3/MM3 (ref 150–375)
PLATELETS.RETICULATED NFR BLD AUTO: 5.6 % (ref 1.1–6.1)
PMV BLD AUTO: 11.2 FL (ref 8.9–12.1)
RBC # BLD AUTO: 4.01 10*6/MM3 (ref 4.3–5.5)
WBC NRBC COR # BLD: 6.97 10*3/MM3 (ref 4–10)

## 2018-10-05 PROCEDURE — 36415 COLL VENOUS BLD VENIPUNCTURE: CPT | Performed by: INTERNAL MEDICINE

## 2018-10-05 PROCEDURE — 85055 RETICULATED PLATELET ASSAY: CPT | Performed by: INTERNAL MEDICINE

## 2018-10-05 PROCEDURE — 85025 COMPLETE CBC W/AUTO DIFF WBC: CPT | Performed by: INTERNAL MEDICINE

## 2018-10-05 PROCEDURE — 99213 OFFICE O/P EST LOW 20 MIN: CPT | Performed by: INTERNAL MEDICINE

## 2018-10-05 NOTE — PROGRESS NOTES
Subjective     CHIEF COMPLAINT:      Chief Complaint   Patient presents with   • Annual Exam     no concerns         HISTORY OF PRESENT ILLNESS:     Rosas Christianson is a 78 y.o. male patient who returns today for follow up on anemia and thrombocytopenia.  Patient returns today for follow-up reporting no new symptoms.  His osteoarthritis is under good control with him taking Celebrex daily.  He noticed that his arthritis worsens all over his body about 3 days after stopping Celebrex.  Is not having problems with bleeding.  He has some bruises over his forearms from bumping into objects.      Past Medical History:   Diagnosis Date   • Arthritis    • Cancer (CMS/HCC)     non melatonin   • Esophageal reflux    • Eye exam, routine 2011   • H/O ulcer disease    • Herpes zoster    • Hypertension    • Hypothyroidism    • Macrocytic anemia    • Neuropathy    • Peptic ulceration    • Thrombocytopenia (CMS/HCC)    • Torn rotator cuff     Right [place]   • Torn rotator cuff     left shoulder   • Ulcerative colitis (CMS/HCC)        Past Surgical History:   Procedure Laterality Date   • CATARACT EXTRACTION Bilateral    • CATARACT EXTRACTION, BILATERAL     • COLON SURGERY  2000    removed colon   • COLONOSCOPY  2005    has a colostomy   • DENTAL PROCEDURE     • HAND SURGERY Left    • SKIN CANCER DESTRUCTION  01/2016    on head       Cancer-related family history is not on file.  Social History   Substance Use Topics   • Smoking status: Former Smoker     Packs/day: 3.00     Years: 30.00     Types: Cigarettes   • Smokeless tobacco: Never Used   • Alcohol use Yes      Comment: rare       MEDICATIONS:    Current Outpatient Prescriptions:   •  celecoxib (CeleBREX) 200 MG capsule, TAKE ONE CAPSULE BY MOUTH DAILY, Disp: 90 capsule, Rfl: 1  •  glucosamine sulfate 500 MG capsule capsule, Take 2,000 mg by mouth Daily., Disp: , Rfl:   •  lansoprazole (PREVACID) 30 MG capsule, Take 1 capsule by mouth Daily. (Patient taking differently: Take 30  "mg by mouth As Needed.), Disp: 30 capsule, Rfl: 2  •  levothyroxine (SYNTHROID, LEVOTHROID) 88 MCG tablet, TAKE ONE TABLET BY MOUTH DAILY, Disp: 30 tablet, Rfl: 6  •  Multiple Vitamins-Minerals (MULTIVITAMIN ADULT PO), Take 1 tablet by mouth Daily., Disp: , Rfl:   •  Saw Palmetto, Serenoa repens, 1000 MG capsule, Take 1 tablet by mouth Daily., Disp: , Rfl:     ALLERGIES:  Allergies   Allergen Reactions   • Aspirin    • Nsaids    • Prednisone        REVIEW OF SYSTEMS:  Review of Systems   Constitutional: Negative for chills, fever and unexpected weight change.   HENT: Negative for mouth sores, nosebleeds, sore throat and voice change.    Eyes: Negative for visual disturbance.   Respiratory: Negative for cough and shortness of breath.    Cardiovascular: Negative for chest pain and leg swelling.   Gastrointestinal: Negative for abdominal pain, blood in stool, constipation, diarrhea, nausea and vomiting.   Genitourinary: Negative for dysuria, frequency and hematuria.   Musculoskeletal: Positive for back pain and joint swelling. Negative for arthralgias.   Skin: Negative for rash.   Neurological: Positive for numbness. Negative for dizziness and headaches.   Hematological: Negative for adenopathy. Does not bruise/bleed easily.   Psychiatric/Behavioral: Negative for dysphoric mood. The patient is not nervous/anxious.          Objective   VITAL SIGNS:     Vitals:    10/05/18 0924   BP: 140/76   Pulse: 52   Resp: 16   Temp: 97.4 °F (36.3 °C)   TempSrc: Oral   SpO2: 100%   Weight: 74.3 kg (163 lb 12.8 oz)   Height: 172 cm (67.72\")  Comment: new    PainSc: 0-No pain     Body mass index is 25.11 kg/m².     Wt Readings from Last 3 Encounters:   10/05/18 74.3 kg (163 lb 12.8 oz)   05/03/18 73.6 kg (162 lb 4.8 oz)   11/06/17 73.2 kg (161 lb 6.4 oz)       PHYSICAL EXAMINATION:  GENERAL:  The patient appears in good general condition, not in acute distress.  SKIN: Warm and dry.  Ecchymosis over the forearms.  No petechiae.  HEAD: "  Normocephalic.  EYES:  No Jaundice. No Pallor. Pupils equal. EOMI.  NECK:  Supple with Good ROM. No Thyromegaly. No Masses.  LYMPHATICS:  No cervical or supraclavicular lymphadenopathy.  ABDOMEN:  Soft. No tenderness. No Hepatomegaly. No Splenomegaly. No masses.  EXTREMITIES:  Osteoarthritis changes in the hands. No Edema. No Calf tenderness.   NEUROLOGICAL:  No Focal neurological deficits.      DIAGNOSTIC DATA:       Results from last 7 days  Lab Units 10/05/18  0848   WBC 10*3/mm3 6.97   NEUTROS ABS 10*3/mm3 3.96   HEMOGLOBIN g/dL 13.0*   HEMATOCRIT % 38.1*   PLATELETS 10*3/mm3 108*     IPF 5.6%    Assessment/Plan    1.  Macrocytic anemia.  It is attributed to anemia of chronic disease.  No evidence of vitamin deficiency.  He had elevated iron levels in the labs in 2017.  I asked him to check his multivitamin to make sure he does not have any iron.  He states that he eats raisins regularly and we discussed cutting down on this.  His anemia is stable.  He is asymptomatic.  I did not recommend any intervention.    2.  Thrombocytopenia.  He previously had elevated immature platelet fraction.  He has history of inflammatory bowel disease.  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  It is stable.  There is a possibility that Celebrex is contributing to the thrombocytopenia.  He needs the medicine due to his arthritis and usually worked notices worsening after coming off of it.  Therefore, I gave him the okay to continue Celebrex especially that his platelet count is only mildly low and stable.      PLAN:    1.  Okay to continue Celebrex.    2.  I asked the patient check on his supplement to make sure it does not contain any iron.    3.  Follow-up in one year with CBC and IPF.  I asked him to notify us sooner if he is noticing any worsening of the bruising.      June Smiley MD  10/05/18        Handoff

## 2018-11-02 ENCOUNTER — DOCUMENTATION (OUTPATIENT)
Dept: INTERNAL MEDICINE | Facility: CLINIC | Age: 78
End: 2018-11-02

## 2018-11-26 ENCOUNTER — TELEPHONE (OUTPATIENT)
Dept: INTERNAL MEDICINE | Facility: CLINIC | Age: 78
End: 2018-11-26

## 2018-11-26 NOTE — TELEPHONE ENCOUNTER
"Regarding: Complaint  Contact: [phone]  ----- Message from Mychart, Generic sent at 11/21/2018  2:27 PM EST -----    I cannot finish the\"reason for visit\" because the reason is not there! It is an annual physical exam, with NO specific  reason. I am not going to lie to finish it!  "

## 2018-11-28 ENCOUNTER — OFFICE VISIT (OUTPATIENT)
Dept: INTERNAL MEDICINE | Facility: CLINIC | Age: 78
End: 2018-11-28

## 2018-11-28 VITALS
OXYGEN SATURATION: 99 % | TEMPERATURE: 97.5 F | SYSTOLIC BLOOD PRESSURE: 144 MMHG | HEART RATE: 51 BPM | BODY MASS INDEX: 24.95 KG/M2 | DIASTOLIC BLOOD PRESSURE: 76 MMHG | HEIGHT: 68 IN | WEIGHT: 164.6 LBS

## 2018-11-28 DIAGNOSIS — G62.9 NEUROPATHY: Chronic | ICD-10-CM

## 2018-11-28 DIAGNOSIS — E03.9 ACQUIRED HYPOTHYROIDISM: ICD-10-CM

## 2018-11-28 DIAGNOSIS — K21.9 GASTROESOPHAGEAL REFLUX DISEASE WITHOUT ESOPHAGITIS: Primary | ICD-10-CM

## 2018-11-28 DIAGNOSIS — Z90.49 H/O COLECTOMY: ICD-10-CM

## 2018-11-28 DIAGNOSIS — Z23 NEED FOR INFLUENZA VACCINATION: ICD-10-CM

## 2018-11-28 DIAGNOSIS — Z00.00 HEALTH CARE MAINTENANCE: ICD-10-CM

## 2018-11-28 DIAGNOSIS — M19.90 ARTHRITIS: ICD-10-CM

## 2018-11-28 LAB
CHOLEST SERPL-MCNC: 139 MG/DL (ref 0–200)
HDLC SERPL-MCNC: 50 MG/DL (ref 40–60)
LDLC SERPL CALC-MCNC: 77 MG/DL (ref 0–100)
TRIGL SERPL-MCNC: 58 MG/DL (ref 0–150)
TSH SERPL DL<=0.005 MIU/L-ACNC: 5.29 MIU/ML (ref 0.27–4.2)
VLDLC SERPL CALC-MCNC: 11.6 MG/DL (ref 5–40)

## 2018-11-28 PROCEDURE — G0008 ADMIN INFLUENZA VIRUS VAC: HCPCS | Performed by: FAMILY MEDICINE

## 2018-11-28 PROCEDURE — 90662 IIV NO PRSV INCREASED AG IM: CPT | Performed by: FAMILY MEDICINE

## 2018-11-28 PROCEDURE — 99397 PER PM REEVAL EST PAT 65+ YR: CPT | Performed by: FAMILY MEDICINE

## 2018-11-28 PROCEDURE — 99214 OFFICE O/P EST MOD 30 MIN: CPT | Performed by: FAMILY MEDICINE

## 2018-11-28 NOTE — PROGRESS NOTES
Subjective   Rosas Christianson is a 78 y.o. male.     Chief Complaint   Patient presents with   • Annual Exam   • follow up to GERD   • follow up to hypothyroidism   • follow up to arthritis         History of Present Illness   Follow-up chronic medical problems of GERD hypothyroidism arthritis doing well with no chest pain shortness breath increased fatigue symptoms be well controlled present medications.  Rosas Christianson 78 y.o. male who presents for an Annual Wellness Visit.  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  he has been feeling well.  Labs results discussed in detail with the patient.  Plan to update vaccines if needed today.  I  reviewed health maintenance with him as part of my preventative care plan.    Health Habits:  Dental Exam. dentures  Eye Exam. up to date  Exercise: 3 times/week.  Current exercise activities include: walking      The following portions of the patient's history were reviewed and updated as appropriate: allergies, current medications, past family history, past medical history, past social history, past surgical history and problem list.    Review of Systems   Constitutional: Negative.    HENT: Negative.    Eyes: Negative.    Respiratory: Negative.    Cardiovascular: Negative.    Gastrointestinal: Negative.    Endocrine: Negative.    Genitourinary: Negative.    Musculoskeletal: Positive for gait problem.   Hematological: Negative.    Psychiatric/Behavioral: Negative.        Objective   Physical Exam   Constitutional: He is oriented to person, place,  and time. He appears well-developed and well-nourished. No distress.   HENT:   Head: Normocephalic and atraumatic.   Right Ear: External ear normal.   Left Ear: External ear normal.   Nose: Nose normal.   Mouth/Throat: Oropharynx is clear and moist.   Eyes: Conjunctivae and EOM are normal. Pupils are equal, round, and reactive to light. Right eye exhibits no discharge. Left eye exhibits no discharge. No scleral icterus.   Neck: Normal range of motion. Neck supple. No tracheal deviation present. No thyromegaly present.   Cardiovascular: Normal rate, regular rhythm, normal heart sounds, intact distal pulses and normal pulses. Exam reveals no gallop.   No murmur heard.  Pulmonary/Chest: Effort normal and breath sounds normal. No respiratory distress. He has no wheezes. He has no rales.   Abdominal: Soft. Bowel sounds are normal. He exhibits no distension. There is no tenderness.   Musculoskeletal: Normal range of motion. He exhibits no edema.   Neurological: He is alert and oriented to person, place, and time. He exhibits normal muscle tone. Coordination normal.   Skin: Skin is warm. No rash noted. No erythema. No pallor.   Psychiatric: He has a normal mood and affect. His behavior is normal. Judgment and thought content normal.   Nursing note and vitals reviewed.      Assessment/Plan   Rosas was seen today for annual exam, follow up to gerd, follow up to hypothyroidism and follow up to arthritis.    Diagnoses and all orders for this visit:    Gastroesophageal reflux disease without esophagitis    Acquired hypothyroidism  -     TSH    Arthritis    Health care maintenance  -     Lipid Panel    Neuropathy    H/O colectomy    Need for influenza vaccination    Other orders  -     Fluzone High Dose =>65Years      Continue present management of chronic medical problems follow-up results of    Ongoing medication adjustments follow-up otherwise as needed or in 6-9 months

## 2018-12-03 ENCOUNTER — TELEPHONE (OUTPATIENT)
Dept: INTERNAL MEDICINE | Facility: CLINIC | Age: 78
End: 2018-12-03

## 2018-12-03 DIAGNOSIS — E03.9 ACQUIRED HYPOTHYROIDISM: Primary | ICD-10-CM

## 2018-12-03 RX ORDER — LEVOTHYROXINE SODIUM 0.1 MG/1
100 TABLET ORAL DAILY
Qty: 30 TABLET | Refills: 1 | Status: SHIPPED | OUTPATIENT
Start: 2018-12-03 | End: 2019-01-07 | Stop reason: DRUGHIGH

## 2018-12-03 NOTE — TELEPHONE ENCOUNTER
----- Message from Rashid Klein MD sent at 11/29/2018  4:22 PM EST -----  Increase levothyroxine to 100 µg daily #30 Rf 1 recheck TSH in one month confirm patient's previous dose of levothyroxine was 88 µg daily

## 2018-12-08 ENCOUNTER — RESULTS ENCOUNTER (OUTPATIENT)
Dept: INTERNAL MEDICINE | Facility: CLINIC | Age: 78
End: 2018-12-08

## 2018-12-08 DIAGNOSIS — E03.9 ACQUIRED HYPOTHYROIDISM: ICD-10-CM

## 2018-12-24 RX ORDER — CELECOXIB 200 MG/1
CAPSULE ORAL
Qty: 90 CAPSULE | Refills: 0 | Status: SHIPPED | OUTPATIENT
Start: 2018-12-24 | End: 2019-03-25 | Stop reason: SDUPTHER

## 2019-01-03 LAB — TSH SERPL DL<=0.005 MIU/L-ACNC: 4.21 MIU/ML (ref 0.27–4.2)

## 2019-01-07 ENCOUNTER — TELEPHONE (OUTPATIENT)
Dept: INTERNAL MEDICINE | Facility: CLINIC | Age: 79
End: 2019-01-07

## 2019-01-07 DIAGNOSIS — E03.9 ACQUIRED HYPOTHYROIDISM: Primary | ICD-10-CM

## 2019-01-07 RX ORDER — LEVOTHYROXINE SODIUM 0.12 MG/1
125 TABLET ORAL DAILY
Qty: 30 TABLET | Refills: 0 | Status: SHIPPED | OUTPATIENT
Start: 2019-01-07 | End: 2019-02-04 | Stop reason: SDUPTHER

## 2019-01-07 NOTE — TELEPHONE ENCOUNTER
Patient notified and expressed understanding.  Patient confirmed that he is taking levothyroxine 100 mcg daily - new prescription sent to [facility].

## 2019-01-07 NOTE — TELEPHONE ENCOUNTER
----- Message from Rashid Klein MD sent at 1/4/2019  1:21 PM EST -----  Confirm levothyroxine dose 100 µg daily if he has been taking this regularly then increase to 125 µg daily #30 Rf 1 recheck TSH in one month

## 2019-01-29 DIAGNOSIS — E03.9 ACQUIRED HYPOTHYROIDISM: ICD-10-CM

## 2019-01-29 RX ORDER — LEVOTHYROXINE SODIUM 0.1 MG/1
TABLET ORAL
Qty: 30 TABLET | Refills: 0 | OUTPATIENT
Start: 2019-01-29

## 2019-02-01 ENCOUNTER — RESULTS ENCOUNTER (OUTPATIENT)
Dept: INTERNAL MEDICINE | Facility: CLINIC | Age: 79
End: 2019-02-01

## 2019-02-01 DIAGNOSIS — E03.9 ACQUIRED HYPOTHYROIDISM: ICD-10-CM

## 2019-02-04 RX ORDER — LEVOTHYROXINE SODIUM 0.12 MG/1
TABLET ORAL
Qty: 30 TABLET | Refills: 0 | Status: SHIPPED | OUTPATIENT
Start: 2019-02-04 | End: 2019-03-06 | Stop reason: SDUPTHER

## 2019-02-05 LAB — TSH SERPL DL<=0.005 MIU/L-ACNC: 3.02 MIU/ML (ref 0.27–4.2)

## 2019-02-06 ENCOUNTER — TELEPHONE (OUTPATIENT)
Dept: INTERNAL MEDICINE | Facility: CLINIC | Age: 79
End: 2019-02-06

## 2019-02-06 NOTE — TELEPHONE ENCOUNTER
----- Message from Rashid Klein MD sent at 2/5/2019  4:16 PM EST -----  Thyroid therapeutic continue same dose recheck tsh  in 6 months

## 2019-03-06 RX ORDER — LEVOTHYROXINE SODIUM 0.12 MG/1
TABLET ORAL
Qty: 30 TABLET | Refills: 2 | Status: SHIPPED | OUTPATIENT
Start: 2019-03-06 | End: 2019-06-01 | Stop reason: SDUPTHER

## 2019-03-25 RX ORDER — CELECOXIB 200 MG/1
CAPSULE ORAL
Qty: 90 CAPSULE | Refills: 0 | Status: SHIPPED | OUTPATIENT
Start: 2019-03-25 | End: 2019-06-19 | Stop reason: SDUPTHER

## 2019-04-22 ENCOUNTER — TELEPHONE (OUTPATIENT)
Dept: INTERNAL MEDICINE | Facility: CLINIC | Age: 79
End: 2019-04-22

## 2019-04-22 NOTE — TELEPHONE ENCOUNTER
Patient called stating that he received a summons for jury duty.  He wanted to know if Dr. Klein would be able to write a letter stating that he is unable to do this.  Please advise.

## 2019-04-23 NOTE — TELEPHONE ENCOUNTER
Please write note for patient on stationary to be excluded from jury duty secondary to medical history and present problems

## 2019-06-01 DIAGNOSIS — E03.9 ACQUIRED HYPOTHYROIDISM: Primary | ICD-10-CM

## 2019-06-03 RX ORDER — LEVOTHYROXINE SODIUM 0.12 MG/1
TABLET ORAL
Qty: 90 TABLET | Refills: 0 | Status: SHIPPED | OUTPATIENT
Start: 2019-06-03 | End: 2019-08-28 | Stop reason: SDUPTHER

## 2019-06-19 RX ORDER — CELECOXIB 200 MG/1
CAPSULE ORAL
Qty: 90 CAPSULE | Refills: 0 | Status: SHIPPED | OUTPATIENT
Start: 2019-06-19 | End: 2019-09-19 | Stop reason: SDUPTHER

## 2019-08-28 DIAGNOSIS — E03.9 ACQUIRED HYPOTHYROIDISM: ICD-10-CM

## 2019-08-28 RX ORDER — LEVOTHYROXINE SODIUM 0.12 MG/1
TABLET ORAL
Qty: 90 TABLET | Refills: 0 | Status: SHIPPED | OUTPATIENT
Start: 2019-08-28 | End: 2019-11-23 | Stop reason: SDUPTHER

## 2019-08-29 ENCOUNTER — OFFICE VISIT (OUTPATIENT)
Dept: INTERNAL MEDICINE | Facility: CLINIC | Age: 79
End: 2019-08-29

## 2019-08-29 VITALS
DIASTOLIC BLOOD PRESSURE: 72 MMHG | OXYGEN SATURATION: 98 % | SYSTOLIC BLOOD PRESSURE: 120 MMHG | WEIGHT: 171 LBS | TEMPERATURE: 98.8 F | HEART RATE: 51 BPM | BODY MASS INDEX: 24.54 KG/M2

## 2019-08-29 DIAGNOSIS — K21.9 GASTROESOPHAGEAL REFLUX DISEASE WITHOUT ESOPHAGITIS: Primary | ICD-10-CM

## 2019-08-29 DIAGNOSIS — D63.8 ANEMIA OF CHRONIC DISEASE: ICD-10-CM

## 2019-08-29 DIAGNOSIS — E03.9 ACQUIRED HYPOTHYROIDISM: ICD-10-CM

## 2019-08-29 PROCEDURE — 99214 OFFICE O/P EST MOD 30 MIN: CPT | Performed by: FAMILY MEDICINE

## 2019-08-29 NOTE — PROGRESS NOTES
Rosas Christianson is a 79 y.o. male.      Assessment/Plan   Problem List Items Addressed This Visit        Digestive    Esophageal reflux - Primary       Endocrine    Hypothyroidism    Relevant Orders    TSH    T4, Free       Hematopoietic and Hemostatic    Anemia of chronic disease    Overview     Followed by Dr. Scott         Relevant Orders    CBC & Differential         Follow-up results of blood work for ongoing management of chronic problems otherwise as needed or    Return in about 6 months (around 2/29/2020), or if symptoms worsen or fail to improve, for Recheck, Next scheduled follow up.      Chief Complaint   Patient presents with   • follow up to gastroesophageal reflux disease w/o esophagitis     Social History     Tobacco Use   • Smoking status: Former Smoker     Packs/day: 3.00     Years: 30.00     Pack years: 90.00     Types: Cigarettes   • Smokeless tobacco: Never Used   Substance Use Topics   • Alcohol use: Yes     Comment: rare   • Drug use: No       History of Present Illness   Patient follow-up for chronic medical problems of GERD hypothyroidism anemia doing fairly well with present treatment plan although he like to continue the Celebrex because it seems to help his musculoskeletal pain  He needs laboratory evaluation of thyroid.  There is been no change in resting heart rate  The following portions of the patient's history were reviewed and updated as appropriate:PMHroutine: Social history , Allergies, Current Medications, Active Problem List and Health Maintenance    Review of Systems   Constitutional: Negative.    HENT: Negative.    Eyes: Negative.    Respiratory: Negative.    Cardiovascular: Negative.    Gastrointestinal: Negative.    Endocrine: Negative.    Genitourinary: Negative.    Musculoskeletal: Positive for gait problem.   Hematological: Negative.    Psychiatric/Behavioral: Negative.        Objective   Vitals:    08/29/19 1228   BP: 120/72   BP Location: Left arm   Pulse: 51   Temp:  98.8 °F (37.1 °C)   SpO2: 98%   Weight: 77.6 kg (171 lb)     Body mass index is 24.54 kg/m².  Physical Exam   Constitutional: He is oriented to person, place, and time. He appears well-developed and well-nourished. No distress.   HENT:   Head: Normocephalic and atraumatic.   Eyes: Conjunctivae are normal. Right eye exhibits no discharge. Left eye exhibits no discharge. No scleral icterus.   Neck: Normal range of motion. Neck supple. No tracheal deviation present. No thyromegaly present.   Cardiovascular: Normal rate, regular rhythm, normal heart sounds, intact distal pulses and normal pulses. Exam reveals no gallop.   No murmur heard.  Pulmonary/Chest: Effort normal and breath sounds normal. No respiratory distress. He has no wheezes. He has no rales.   Abdominal: There is no tenderness.   colostomy   Musculoskeletal: Normal range of motion. He exhibits no edema.   He has a cane   Neurological: He is alert and oriented to person, place, and time. He exhibits normal muscle tone. Coordination normal.   Skin: Skin is warm. No rash noted. No erythema. No pallor.   Psychiatric: He has a normal mood and affect. His behavior is normal. Judgment and thought content normal.   Nursing note and vitals reviewed.    Reviewed Data:  No visits with results within 1 Month(s) from this visit.   Latest known visit with results is:   Results Encounter on 02/01/2019   Component Date Value Ref Range Status   • TSH 02/04/2019 3.020  0.270 - 4.200 mIU/mL Final

## 2019-08-30 LAB
BASOPHILS # BLD AUTO: (no result) 10*3/UL
BASOPHILS # BLD MANUAL: 0.07 10*3/MM3 (ref 0–0.2)
BASOPHILS NFR BLD MANUAL: 1 % (ref 0–1.5)
DIFFERENTIAL COMMENT: ABNORMAL
EOSINOPHIL # BLD AUTO: (no result) 10*3/UL
EOSINOPHIL # BLD MANUAL: 0.22 10*3/MM3 (ref 0–0.4)
EOSINOPHIL NFR BLD AUTO: (no result) %
EOSINOPHIL NFR BLD MANUAL: 3.1 % (ref 0.3–6.2)
ERYTHROCYTE [DISTWIDTH] IN BLOOD BY AUTOMATED COUNT: 13.3 % (ref 12.3–15.4)
HCT VFR BLD AUTO: 42.3 % (ref 37.5–51)
HGB BLD-MCNC: 13.5 G/DL (ref 13–17.7)
LYMPHOCYTES # BLD AUTO: (no result) 10*3/UL
LYMPHOCYTES # BLD MANUAL: 1.24 10*3/MM3 (ref 0.7–3.1)
LYMPHOCYTES NFR BLD AUTO: (no result) %
LYMPHOCYTES NFR BLD MANUAL: 17.5 % (ref 19.6–45.3)
MCH RBC QN AUTO: 31.5 PG (ref 26.6–33)
MCHC RBC AUTO-ENTMCNC: 31.9 G/DL (ref 31.5–35.7)
MCV RBC AUTO: 98.8 FL (ref 79–97)
MONOCYTES # BLD MANUAL: 0.58 10*3/MM3 (ref 0.1–0.9)
MONOCYTES NFR BLD AUTO: (no result) %
MONOCYTES NFR BLD MANUAL: 8.2 % (ref 5–12)
NEUTROPHILS # BLD MANUAL: 4.98 10*3/MM3 (ref 1.7–7)
NEUTROPHILS NFR BLD AUTO: (no result) %
NEUTROPHILS NFR BLD MANUAL: 70.1 % (ref 42.7–76)
PLATELET # BLD AUTO: 97 10*3/MM3 (ref 140–450)
PLATELET BLD QL SMEAR: ABNORMAL
RBC # BLD AUTO: 4.28 10*6/MM3 (ref 4.14–5.8)
RBC MORPH BLD: ABNORMAL
T4 FREE SERPL-MCNC: 1.55 NG/DL (ref 0.82–1.77)
TSH SERPL DL<=0.005 MIU/L-ACNC: 2.55 UIU/ML (ref 0.45–4.5)
WBC # BLD AUTO: 7.1 10*3/MM3 (ref 3.4–10.8)

## 2019-09-06 ENCOUNTER — OFFICE VISIT (OUTPATIENT)
Dept: INTERNAL MEDICINE | Facility: CLINIC | Age: 79
End: 2019-09-06

## 2019-09-06 VITALS
TEMPERATURE: 98 F | SYSTOLIC BLOOD PRESSURE: 118 MMHG | BODY MASS INDEX: 23.45 KG/M2 | OXYGEN SATURATION: 99 % | HEIGHT: 70 IN | HEART RATE: 46 BPM | WEIGHT: 163.8 LBS | DIASTOLIC BLOOD PRESSURE: 66 MMHG

## 2019-09-06 DIAGNOSIS — K51.018 ULCERATIVE PANCOLITIS WITH OTHER COMPLICATION (HCC): ICD-10-CM

## 2019-09-06 DIAGNOSIS — Z23 NEED FOR INFLUENZA VACCINATION: ICD-10-CM

## 2019-09-06 DIAGNOSIS — M79.672 BILATERAL FOOT PAIN: ICD-10-CM

## 2019-09-06 DIAGNOSIS — Z93.3 PRESENCE OF COLOSTOMY (HCC): ICD-10-CM

## 2019-09-06 DIAGNOSIS — M79.671 BILATERAL FOOT PAIN: ICD-10-CM

## 2019-09-06 DIAGNOSIS — G62.9 NEUROPATHY: Chronic | ICD-10-CM

## 2019-09-06 DIAGNOSIS — Z00.00 MEDICARE ANNUAL WELLNESS VISIT, SUBSEQUENT: Primary | ICD-10-CM

## 2019-09-06 PROCEDURE — G0439 PPPS, SUBSEQ VISIT: HCPCS | Performed by: FAMILY MEDICINE

## 2019-09-06 PROCEDURE — G0008 ADMIN INFLUENZA VIRUS VAC: HCPCS | Performed by: FAMILY MEDICINE

## 2019-09-06 PROCEDURE — 90653 IIV ADJUVANT VACCINE IM: CPT | Performed by: FAMILY MEDICINE

## 2019-09-06 NOTE — PROGRESS NOTES
The ABCs of the Annual Wellness Visit  Subsequent Medicare Wellness Visit    Chief Complaint   Patient presents with   • Medicare Wellness-subsequent       Subjective   History of Present Illness:  Rosas Christianson is a 79 y.o. male who presents for a Subsequent Medicare Wellness Visit.    HEALTH RISK ASSESSMENT    Recent Hospitalizations:  No hospitalization(s) within the last year.    Current Medical Providers:  Patient Care Team:  Rashid Klein MD as PCP - General (Family Medicine)  June Smiley MD as Consulting Physician (Hematology and Oncology)  Mann Bee MD as Referring Physician (Gastroenterology)  Garfield Santacruz MD as Consulting Physician (Dermatology)    Smoking Status:  Social History     Tobacco Use   Smoking Status Former Smoker   • Packs/day: 3.00   • Years: 30.00   • Pack years: 90.00   • Types: Cigarettes   Smokeless Tobacco Never Used       Alcohol Consumption:  Social History     Substance and Sexual Activity   Alcohol Use Yes    Comment: rare       Depression Screen:   PHQ-2/PHQ-9 Depression Screening 9/6/2019   Little interest or pleasure in doing things 0   Feeling down, depressed, or hopeless 0   Trouble falling or staying asleep, or sleeping too much -   Feeling tired or having little energy -   Poor appetite or overeating -   Feeling bad about yourself - or that you are a failure or have let yourself or your family down -   Trouble concentrating on things, such as reading the newspaper or watching television -   Moving or speaking so slowly that other people could have noticed. Or the opposite - being so fidgety or restless that you have been moving around a lot more than usual -   Thoughts that you would be better off dead, or of hurting yourself in some way -   Total Score 0   If you checked off any problems, how difficult have these problems made it for you to do your work, take care of things at home, or get along with other people? -       Fall Risk  Screen:  LUPE Fall Risk Assessment was completed, and patient is at LOW risk for falls.Assessment completed on:8/29/2019    Health Habits and Functional and Cognitive Screening:  Functional & Cognitive Status 9/6/2019   Do you have difficulty preparing food and eating? No   Do you have difficulty bathing yourself, getting dressed or grooming yourself? No   Do you have difficulty using the toilet? No   Do you have difficulty moving around from place to place? Yes   Do you have trouble with steps or getting out of a bed or a chair? No   Current Diet Well Balanced Diet   Dental Exam Up to date   Eye Exam Up to date   Exercise (times per week) 6 times per week   Current Exercise Activities Include Stationary Bicycling/Spin Class   Do you need help using the phone?  No   Are you deaf or do you have serious difficulty hearing?  No   Do you need help with transportation? Yes   Do you need help shopping? No   Do you need help preparing meals?  No   Do you need help with housework?  No   Do you need help with laundry? No   Do you need help taking your medications? No   Do you need help managing money? No   Do you ever drive or ride in a car without wearing a seat belt? No   Have you felt unusual stress, anger or loneliness in the last month? No   Who do you live with? Spouse   If you need help, do you have trouble finding someone available to you? No   Have you been bothered in the last four weeks by sexual problems? No   Do you have difficulty concentrating, remembering or making decisions? No         Does the patient have evidence of cognitive impairment? No    Asprin use counseling:Does not need ASA but is currently taking (advised patient that ASA is not indicated and patient chooses to stop it)    Age-appropriate Screening Schedule:  Refer to the list below for future screening recommendations based on patient's age, sex and/or medical conditions. Orders for these recommended tests are listed in the plan section. The  patient has been provided with a written plan.    Health Maintenance   Topic Date Due   • ZOSTER VACCINE (2 of 2) 12/19/2016   • INFLUENZA VACCINE  08/01/2019   • TDAP/TD VACCINES (2 - Td) 10/24/2026   • PNEUMOCOCCAL VACCINES (65+ LOW/MEDIUM RISK)  Addressed          The following portions of the patient's history were reviewed and updated as appropriate: allergies, current medications, past family history, past medical history, past social history, past surgical history and problem list.    Outpatient Medications Prior to Visit   Medication Sig Dispense Refill   • celecoxib (CeleBREX) 200 MG capsule TAKE ONE CAPSULE BY MOUTH DAILY 90 capsule 0   • glucosamine sulfate 500 MG capsule capsule Take 2,000 mg by mouth Daily.     • levothyroxine (SYNTHROID, LEVOTHROID) 125 MCG tablet TAKE ONE TABLET BY MOUTH DAILY 90 tablet 0   • Multiple Vitamins-Minerals (MULTIVITAMIN ADULT PO) Take 1 tablet by mouth Daily.     • Saw Palmetto, Serenoa repens, 1000 MG capsule Take 1 tablet by mouth Daily.       No facility-administered medications prior to visit.        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  Compared to one year ago, the patient feels his mental health is the same.    Reviewed chart for potential of high risk  "medication in the elderly: yes  Reviewed chart for potential of harmful drug interactions in the elderly:yes    Objective         Vitals:    09/06/19 1145   BP: 118/66   BP Location: Left arm   Patient Position: Sitting   Cuff Size: Adult   Pulse: (!) 46   Temp: 98 °F (36.7 °C)   TempSrc: Oral   SpO2: 99%   Weight: 74.3 kg (163 lb 12.8 oz)   Height: 177.8 cm (70\")   PainSc: 0-No pain       Body mass index is 23.5 kg/m².  Discussed the patient's BMI with him. The BMI is in the acceptable range.    Physical Exam          Assessment/Plan   Medicare Risks and Personalized Health Plan  CMS Preventative Services Quick Reference  Advance Directive Discussion  Fall Risk Neuropathy    The above risks/problems have been discussed with the patient.  Pertinent information has been shared with the patient in the After Visit Summary.  Follow up plans and orders are seen below in the Assessment/Plan Section.    Diagnoses and all orders for this visit:    1. Medicare annual wellness visit, subsequent (Primary)    2. Neuropathy    3. Bilateral foot pain    4. Ulcerative pancolitis with other complication (CMS/HCC)    5. Presence of colostomy (CMS/HCC)    6. Need for influenza vaccination    Other orders  -     Fluad Quad >65 years      Follow Up:  Return in about 6 months (around 3/6/2020), or if symptoms worsen or fail to improve, for Recheck, Next scheduled follow up.     An After Visit Summary and PPPS were given to the patient.             "

## 2019-09-19 RX ORDER — CELECOXIB 200 MG/1
CAPSULE ORAL
Qty: 90 CAPSULE | Refills: 0 | Status: SHIPPED | OUTPATIENT
Start: 2019-09-19 | End: 2019-12-15 | Stop reason: SDUPTHER

## 2019-10-21 ENCOUNTER — LAB (OUTPATIENT)
Dept: LAB | Facility: HOSPITAL | Age: 79
End: 2019-10-21

## 2019-10-21 ENCOUNTER — OFFICE VISIT (OUTPATIENT)
Dept: ONCOLOGY | Facility: CLINIC | Age: 79
End: 2019-10-21

## 2019-10-21 VITALS
TEMPERATURE: 97.6 F | RESPIRATION RATE: 16 BRPM | HEIGHT: 70 IN | WEIGHT: 163.9 LBS | BODY MASS INDEX: 23.46 KG/M2 | OXYGEN SATURATION: 100 % | SYSTOLIC BLOOD PRESSURE: 158 MMHG | DIASTOLIC BLOOD PRESSURE: 90 MMHG | HEART RATE: 51 BPM

## 2019-10-21 DIAGNOSIS — D69.6 THROMBOCYTOPENIA (HCC): Primary | ICD-10-CM

## 2019-10-21 DIAGNOSIS — D53.9 MACROCYTIC ANEMIA: ICD-10-CM

## 2019-10-21 LAB
BASOPHILS # BLD AUTO: 0.1 10*3/MM3 (ref 0–0.2)
BASOPHILS NFR BLD AUTO: 1.2 % (ref 0–1.5)
DEPRECATED RDW RBC AUTO: 44.7 FL (ref 37–54)
EOSINOPHIL # BLD AUTO: 0.36 10*3/MM3 (ref 0–0.4)
EOSINOPHIL NFR BLD AUTO: 4.3 % (ref 0.3–6.2)
ERYTHROCYTE [DISTWIDTH] IN BLOOD BY AUTOMATED COUNT: 13 % (ref 12.3–15.4)
HCT VFR BLD AUTO: 41.7 % (ref 37.5–51)
HGB BLD-MCNC: 14 G/DL (ref 13–17.7)
IMM GRANULOCYTES # BLD AUTO: 0.11 10*3/MM3 (ref 0–0.05)
IMM GRANULOCYTES NFR BLD AUTO: 1.3 % (ref 0–0.5)
LYMPHOCYTES # BLD AUTO: 1.44 10*3/MM3 (ref 0.7–3.1)
LYMPHOCYTES NFR BLD AUTO: 17.1 % (ref 19.6–45.3)
MCH RBC QN AUTO: 31.7 PG (ref 26.6–33)
MCHC RBC AUTO-ENTMCNC: 33.6 G/DL (ref 31.5–35.7)
MCV RBC AUTO: 94.6 FL (ref 79–97)
MONOCYTES # BLD AUTO: 1.25 10*3/MM3 (ref 0.1–0.9)
MONOCYTES NFR BLD AUTO: 14.9 % (ref 5–12)
NEUTROPHILS # BLD AUTO: 5.14 10*3/MM3 (ref 1.7–7)
NEUTROPHILS NFR BLD AUTO: 61.2 % (ref 42.7–76)
NRBC BLD AUTO-RTO: 0 /100 WBC (ref 0–0.2)
PLATELET # BLD AUTO: 103 10*3/MM3 (ref 140–450)
PLATELETS.RETICULATED NFR BLD AUTO: 6 % (ref 0.9–6.5)
PMV BLD AUTO: 11.2 FL (ref 6–12)
RBC # BLD AUTO: 4.41 10*6/MM3 (ref 4.14–5.8)
WBC NRBC COR # BLD: 8.4 10*3/MM3 (ref 3.4–10.8)

## 2019-10-21 PROCEDURE — G0463 HOSPITAL OUTPT CLINIC VISIT: HCPCS | Performed by: INTERNAL MEDICINE

## 2019-10-21 PROCEDURE — 85025 COMPLETE CBC W/AUTO DIFF WBC: CPT

## 2019-10-21 PROCEDURE — 99213 OFFICE O/P EST LOW 20 MIN: CPT | Performed by: INTERNAL MEDICINE

## 2019-10-21 PROCEDURE — 85055 RETICULATED PLATELET ASSAY: CPT

## 2019-10-21 PROCEDURE — 36416 COLLJ CAPILLARY BLOOD SPEC: CPT

## 2019-10-21 NOTE — PROGRESS NOTES
Subjective     CHIEF COMPLAINT:      Chief Complaint   Patient presents with   • Annual Exam     no concerns       HISTORY OF PRESENT ILLNESS:     Rosas Christianson is a 79 y.o. male patient who returns today for follow up on his thrombocytopenia.  He returns today for follow-up reporting no problem with bleeding.  However, he has easy bruising especially over his upper extremities.  He continues to take Celebrex 1 tablet daily.  Without it, his arthritis pain is severe.        REVIEW OF SYSTEMS:  Review of Systems   Constitutional: Negative for chills, fever and unexpected weight change.   HENT: Negative for mouth sores, nosebleeds, sore throat and voice change.    Eyes: Negative for visual disturbance.   Respiratory: Negative for cough and shortness of breath.    Cardiovascular: Negative for chest pain and leg swelling.   Gastrointestinal: Negative for abdominal pain, blood in stool, constipation, diarrhea, nausea and vomiting.   Genitourinary: Negative for dysuria, frequency and hematuria.   Musculoskeletal: Negative for arthralgias, back pain and joint swelling.   Skin: Negative for rash.   Neurological: Negative for dizziness, numbness and headaches.   Hematological: Negative for adenopathy. Bruises/bleeds easily.   Psychiatric/Behavioral: Negative for dysphoric mood. The patient is not nervous/anxious.      I verified the ROS obtained by the MA.        MEDICATIONS:    Current Outpatient Medications:   •  celecoxib (CeleBREX) 200 MG capsule, TAKE ONE CAPSULE BY MOUTH DAILY, Disp: 90 capsule, Rfl: 0  •  glucosamine sulfate 500 MG capsule capsule, Take 2,000 mg by mouth Daily., Disp: , Rfl:   •  levothyroxine (SYNTHROID, LEVOTHROID) 125 MCG tablet, TAKE ONE TABLET BY MOUTH DAILY, Disp: 90 tablet, Rfl: 0  •  Multiple Vitamins-Minerals (MULTIVITAMIN ADULT PO), Take 1 tablet by mouth Daily., Disp: , Rfl:   •  Saw Palmetto, Serenoa repens, 1000 MG capsule, Take 1 tablet by mouth Daily., Disp: , Rfl:  "    ALLERGIES:  Allergies   Allergen Reactions   • Aspirin    • Nsaids    • Prednisone          Objective   VITAL SIGNS:     Vitals:    10/21/19 1154   BP: 158/90   Pulse: 51   Resp: 16   Temp: 97.6 °F (36.4 °C)   TempSrc: Oral   SpO2: 100%   Weight: 74.3 kg (163 lb 14.4 oz)   Height: 177.8 cm (70\")   PainSc: 0-No pain     Body mass index is 23.52 kg/m².     Wt Readings from Last 3 Encounters:   10/21/19 74.3 kg (163 lb 14.4 oz)   09/06/19 74.3 kg (163 lb 12.8 oz)   08/29/19 77.6 kg (171 lb)       PHYSICAL EXAMINATION:  GENERAL:  The patient appears in good general condition, not in acute distress.  SKIN: Warm and dry. No skin rashes.  Mild upper extremity ecchymosis.  HEAD:  Normocephalic.  EYES:  No Jaundice. No Pallor.   ABDOMEN:  Soft. No tenderness. No Hepatomegaly. No Splenomegaly. No masses.  Colostomy in the right lower quadrant.  NEUROLOGICAL:  No Focal neurological deficits.         DIAGNOSTIC DATA:     Results from last 7 days   Lab Units 10/21/19  1116   WBC 10*3/mm3 8.40   NEUTROS ABS 10*3/mm3 5.14   HEMOGLOBIN g/dL 14.0   HEMATOCRIT % 41.7   PLATELETS 10*3/mm3 103*       Assessment/Plan   1.  Macrocytic anemia.  It is attributed to anemia of chronic disease.  his anemia improved and hemoglobin is today in the normal range at 14.0.    2.  Chronic ITP.  He previously had elevated immature platelet fraction.  Celebrex is likely contributing.  However, he needs the medicine for his significant arthritis.  Given that his platelets are relatively stable, he was given okay to continue Celebrex.      PLAN:    1.  Patient given okay to continue Celebrex.  2.  Follow-up in 1 year with CBC.  We will see him sooner if he is noticing changes in his bruising.    June Smiley MD  10/21/19            "

## 2019-11-23 DIAGNOSIS — E03.9 ACQUIRED HYPOTHYROIDISM: ICD-10-CM

## 2019-11-25 RX ORDER — LEVOTHYROXINE SODIUM 0.12 MG/1
TABLET ORAL
Qty: 90 TABLET | Refills: 0 | Status: SHIPPED | OUTPATIENT
Start: 2019-11-25 | End: 2020-02-20

## 2019-12-16 RX ORDER — CELECOXIB 200 MG/1
CAPSULE ORAL
Qty: 90 CAPSULE | Refills: 1 | Status: SHIPPED | OUTPATIENT
Start: 2019-12-16 | End: 2020-06-15

## 2020-02-20 DIAGNOSIS — E03.9 ACQUIRED HYPOTHYROIDISM: ICD-10-CM

## 2020-02-20 RX ORDER — LEVOTHYROXINE SODIUM 0.12 MG/1
TABLET ORAL
Qty: 90 TABLET | Refills: 0 | Status: SHIPPED | OUTPATIENT
Start: 2020-02-20 | End: 2020-05-20

## 2020-02-28 ENCOUNTER — OFFICE VISIT (OUTPATIENT)
Dept: INTERNAL MEDICINE | Facility: CLINIC | Age: 80
End: 2020-02-28

## 2020-02-28 VITALS
HEIGHT: 70 IN | DIASTOLIC BLOOD PRESSURE: 74 MMHG | SYSTOLIC BLOOD PRESSURE: 146 MMHG | HEART RATE: 47 BPM | WEIGHT: 162.4 LBS | TEMPERATURE: 97.7 F | BODY MASS INDEX: 23.25 KG/M2 | OXYGEN SATURATION: 99 %

## 2020-02-28 DIAGNOSIS — M19.90 ARTHRITIS: ICD-10-CM

## 2020-02-28 DIAGNOSIS — G62.9 NEUROPATHY: Chronic | ICD-10-CM

## 2020-02-28 DIAGNOSIS — E03.9 ACQUIRED HYPOTHYROIDISM: ICD-10-CM

## 2020-02-28 DIAGNOSIS — K21.9 GASTROESOPHAGEAL REFLUX DISEASE WITHOUT ESOPHAGITIS: Primary | ICD-10-CM

## 2020-02-28 PROBLEM — Z79.891 LONG TERM CURRENT USE OF METHADONE FOR PAIN CONTROL: Status: RESOLVED | Noted: 2017-11-06 | Resolved: 2020-02-28

## 2020-02-28 LAB
ALBUMIN SERPL-MCNC: 4 G/DL (ref 3.5–5.2)
ALBUMIN/GLOB SERPL: 1.4 G/DL
ALP SERPL-CCNC: 82 U/L (ref 39–117)
ALT SERPL-CCNC: 18 U/L (ref 1–41)
AST SERPL-CCNC: 32 U/L (ref 1–40)
BILIRUB SERPL-MCNC: 0.6 MG/DL (ref 0.2–1.2)
BUN SERPL-MCNC: 22 MG/DL (ref 8–23)
BUN/CREAT SERPL: 18.2 (ref 7–25)
CALCIUM SERPL-MCNC: 9.3 MG/DL (ref 8.6–10.5)
CHLORIDE SERPL-SCNC: 105 MMOL/L (ref 98–107)
CO2 SERPL-SCNC: 24.9 MMOL/L (ref 22–29)
CREAT SERPL-MCNC: 1.21 MG/DL (ref 0.76–1.27)
GLOBULIN SER CALC-MCNC: 2.9 GM/DL
GLUCOSE SERPL-MCNC: 91 MG/DL (ref 65–99)
POTASSIUM SERPL-SCNC: 4.3 MMOL/L (ref 3.5–5.2)
PROT SERPL-MCNC: 6.9 G/DL (ref 6–8.5)
SODIUM SERPL-SCNC: 140 MMOL/L (ref 136–145)
T4 FREE SERPL-MCNC: 1.47 NG/DL (ref 0.93–1.7)
TSH SERPL DL<=0.005 MIU/L-ACNC: 2.71 UIU/ML (ref 0.27–4.2)

## 2020-02-28 PROCEDURE — 99214 OFFICE O/P EST MOD 30 MIN: CPT | Performed by: FAMILY MEDICINE

## 2020-02-28 NOTE — PROGRESS NOTES
Rosas Christianson is a 80 y.o. male.      Assessment/Plan   Problem List Items Addressed This Visit        Digestive    Esophageal reflux - Primary       Endocrine    Hypothyroidism    Relevant Orders    TSH    T4, Free       Nervous and Auditory    Neuropathy (Chronic)    Overview     Followed by Dr. Denise colorado         Relevant Orders    Comprehensive Metabolic Panel       Musculoskeletal and Integument    Arthritis    Overview     celebrex              Trial of Metanx  Follow-up results of blood work to renal function abdominal side effects Celebrex use both regulation of thyroid therapy    Return in about 6 months (around 8/28/2020), or if symptoms worsen or fail to improve, for Recheck, Next scheduled follow up.      Chief Complaint   Patient presents with   • follow up to hypothyroidism   GERD neuropathy arthritis  Social History     Tobacco Use   • Smoking status: Former Smoker     Packs/day: 3.00     Years: 30.00     Pack years: 90.00     Types: Cigarettes   • Smokeless tobacco: Never Used   Substance Use Topics   • Alcohol use: Yes     Comment: rare   • Drug use: No       History of Present Illness   Patient follows up for chronic problems of neuropathy hypothyroidism neuropathy arthritis who is not taking methadone anymore has bilateral foot pain that is chronic longstanding seems to be getting worse thought to be neuropathic in origin ever since he had his feet frostbite bilaterally years ago he is using a cane he is not falling down he just feels that they are most worse with pain when he is walking  The following portions of the patient's history were reviewed and updated as appropriate:PMHroutine: Social history , Allergies, Current Medications, Active Problem List and Health Maintenance    Review of Systems   Constitutional: Negative.    HENT: Negative.    Eyes: Negative.    Respiratory: Negative.    Cardiovascular: Negative.    Gastrointestinal: Negative.    Endocrine: Negative.    Genitourinary:  "Negative.    Musculoskeletal: Positive for gait problem.        Bilateral chronic foot pain   Neurological: Negative for dizziness, tremors, weakness and light-headedness.   Hematological: Negative.    Psychiatric/Behavioral: Negative.        Objective   Vitals:    02/28/20 0840   BP: 146/74   BP Location: Right arm   Patient Position: Sitting   Cuff Size: Adult   Pulse: (!) 47   Temp: 97.7 °F (36.5 °C)   TempSrc: Oral   SpO2: 99%   Weight: 73.7 kg (162 lb 6.4 oz)   Height: 177.8 cm (70\")     Body mass index is 23.3 kg/m².  Physical Exam   Constitutional: He is oriented to person, place, and time. He appears well-developed and well-nourished.   HENT:   Head: Normocephalic and atraumatic.   Eyes: Pupils are equal, round, and reactive to light. No scleral icterus.   Neck: Normal range of motion. No thyromegaly present.   Cardiovascular: Normal rate and regular rhythm.   Pulmonary/Chest: Effort normal and breath sounds normal.   Musculoskeletal: Normal range of motion. He exhibits no edema.   Neurological: He is alert and oriented to person, place, and time.   Skin: Skin is warm. No rash noted. No erythema. No pallor.   Psychiatric: He has a normal mood and affect. His behavior is normal. Judgment and thought content normal.   Nursing note and vitals reviewed.    Reviewed Data:  No visits with results within 1 Month(s) from this visit.   Latest known visit with results is:   Lab on 10/21/2019   Component Date Value Ref Range Status   • IPF 10/21/2019 6.00  0.90 - 6.50 % Final   • WBC 10/21/2019 8.40  3.40 - 10.80 10*3/mm3 Final   • RBC 10/21/2019 4.41  4.14 - 5.80 10*6/mm3 Final   • Hemoglobin 10/21/2019 14.0  13.0 - 17.7 g/dL Final   • Hematocrit 10/21/2019 41.7  37.5 - 51.0 % Final   • MCV 10/21/2019 94.6  79.0 - 97.0 fL Final   • MCH 10/21/2019 31.7  26.6 - 33.0 pg Final   • MCHC 10/21/2019 33.6  31.5 - 35.7 g/dL Final   • RDW 10/21/2019 13.0  12.3 - 15.4 % Final   • RDW-SD 10/21/2019 44.7  37.0 - 54.0 fl Final   • " MPV 10/21/2019 11.2  6.0 - 12.0 fL Final   • Platelets 10/21/2019 103* 140 - 450 10*3/mm3 Final   • Neutrophil % 10/21/2019 61.2  42.7 - 76.0 % Final   • Lymphocyte % 10/21/2019 17.1* 19.6 - 45.3 % Final   • Monocyte % 10/21/2019 14.9* 5.0 - 12.0 % Final   • Eosinophil % 10/21/2019 4.3  0.3 - 6.2 % Final   • Basophil % 10/21/2019 1.2  0.0 - 1.5 % Final   • Immature Grans % 10/21/2019 1.3* 0.0 - 0.5 % Final   • Neutrophils, Absolute 10/21/2019 5.14  1.70 - 7.00 10*3/mm3 Final   • Lymphocytes, Absolute 10/21/2019 1.44  0.70 - 3.10 10*3/mm3 Final   • Monocytes, Absolute 10/21/2019 1.25* 0.10 - 0.90 10*3/mm3 Final   • Eosinophils, Absolute 10/21/2019 0.36  0.00 - 0.40 10*3/mm3 Final   • Basophils, Absolute 10/21/2019 0.10  0.00 - 0.20 10*3/mm3 Final   • Immature Grans, Absolute 10/21/2019 0.11* 0.00 - 0.05 10*3/mm3 Final   • nRBC 10/21/2019 0.0  0.0 - 0.2 /100 WBC Final

## 2020-03-16 ENCOUNTER — TELEPHONE (OUTPATIENT)
Dept: INTERNAL MEDICINE | Facility: CLINIC | Age: 80
End: 2020-03-16

## 2020-03-16 RX ORDER — L-METHYLFOLATE-ALGAE-VIT B12-B6 CAP 3-90.314-2-35 MG 3-90.314-2-35 MG
1 CAP ORAL 2 TIMES DAILY
Qty: 60 CAPSULE | Refills: 6 | Status: SHIPPED | OUTPATIENT
Start: 2020-03-16 | End: 2020-10-29

## 2020-03-16 NOTE — TELEPHONE ENCOUNTER
Patient called stating that Dr. Kleni gave him a sample of Metanx (?) and this is seems to be working.  He would like to have a prescription for this.

## 2020-04-23 RX ORDER — LISINOPRIL 2.5 MG/1
2.5 TABLET ORAL DAILY
Qty: 90 TABLET | Refills: 1 | Status: SHIPPED | OUTPATIENT
Start: 2020-04-23 | End: 2020-10-13

## 2020-05-20 DIAGNOSIS — E03.9 ACQUIRED HYPOTHYROIDISM: ICD-10-CM

## 2020-05-20 RX ORDER — LEVOTHYROXINE SODIUM 0.12 MG/1
TABLET ORAL
Qty: 90 TABLET | Refills: 0 | Status: SHIPPED | OUTPATIENT
Start: 2020-05-20 | End: 2020-08-14

## 2020-06-15 RX ORDER — CELECOXIB 200 MG/1
CAPSULE ORAL
Qty: 90 CAPSULE | Refills: 0 | Status: SHIPPED | OUTPATIENT
Start: 2020-06-15 | End: 2020-09-18 | Stop reason: SDUPTHER

## 2020-08-14 DIAGNOSIS — E03.9 ACQUIRED HYPOTHYROIDISM: ICD-10-CM

## 2020-08-14 RX ORDER — LEVOTHYROXINE SODIUM 0.12 MG/1
TABLET ORAL
Qty: 90 TABLET | Refills: 0 | Status: SHIPPED | OUTPATIENT
Start: 2020-08-14 | End: 2020-11-10

## 2020-08-24 ENCOUNTER — TELEPHONE (OUTPATIENT)
Dept: INTERNAL MEDICINE | Facility: CLINIC | Age: 80
End: 2020-08-24

## 2020-08-24 DIAGNOSIS — M75.100 TEAR OF ROTATOR CUFF, UNSPECIFIED LATERALITY, UNSPECIFIED TEAR EXTENT, UNSPECIFIED WHETHER TRAUMATIC: Primary | ICD-10-CM

## 2020-08-24 NOTE — TELEPHONE ENCOUNTER
----- Message from Jenn Rowe CMA sent at 8/24/2020  7:38 AM EDT -----  Regarding: Complaint  Contact: [phone]  .    ----- Message -----  From: Rosas Christianson  Sent: 8/23/2020   1:55 PM EDT  To: Alison Morton [facility]  Subject: Complaint                                        I tore my rotator cuff, again. I was with [facility] the last time I tore it, but they do not accept my [employer] insurance. I need a new doctor recommendation.  It really hurts me now.

## 2020-09-14 RX ORDER — CELECOXIB 200 MG/1
CAPSULE ORAL
Qty: 90 CAPSULE | Refills: 0 | OUTPATIENT
Start: 2020-09-14

## 2020-09-18 RX ORDER — CELECOXIB 200 MG/1
200 CAPSULE ORAL DAILY
Qty: 30 CAPSULE | Refills: 0 | Status: SHIPPED | OUTPATIENT
Start: 2020-09-18 | End: 2020-10-13

## 2020-10-13 RX ORDER — LISINOPRIL 2.5 MG/1
TABLET ORAL
Qty: 90 TABLET | Refills: 0 | Status: SHIPPED | OUTPATIENT
Start: 2020-10-13 | End: 2021-01-12

## 2020-10-13 RX ORDER — CELECOXIB 200 MG/1
CAPSULE ORAL
Qty: 30 CAPSULE | Refills: 0 | Status: SHIPPED | OUTPATIENT
Start: 2020-10-13 | End: 2020-10-19

## 2020-10-19 RX ORDER — CELECOXIB 200 MG/1
CAPSULE ORAL
Qty: 30 CAPSULE | Refills: 0 | Status: SHIPPED | OUTPATIENT
Start: 2020-10-19 | End: 2020-11-10

## 2020-10-29 ENCOUNTER — LAB (OUTPATIENT)
Dept: LAB | Facility: HOSPITAL | Age: 80
End: 2020-10-29

## 2020-10-29 ENCOUNTER — OFFICE VISIT (OUTPATIENT)
Dept: ONCOLOGY | Facility: CLINIC | Age: 80
End: 2020-10-29

## 2020-10-29 VITALS
SYSTOLIC BLOOD PRESSURE: 129 MMHG | WEIGHT: 163 LBS | HEART RATE: 51 BPM | HEIGHT: 68 IN | RESPIRATION RATE: 18 BRPM | BODY MASS INDEX: 24.71 KG/M2 | TEMPERATURE: 97.5 F | OXYGEN SATURATION: 100 % | DIASTOLIC BLOOD PRESSURE: 74 MMHG

## 2020-10-29 DIAGNOSIS — R79.89 ELEVATED FERRITIN LEVEL: ICD-10-CM

## 2020-10-29 DIAGNOSIS — D69.6 THROMBOCYTOPENIA (HCC): Primary | ICD-10-CM

## 2020-10-29 DIAGNOSIS — D53.9 MACROCYTIC ANEMIA: ICD-10-CM

## 2020-10-29 LAB
BASOPHILS # BLD AUTO: 0.09 10*3/MM3 (ref 0–0.2)
BASOPHILS NFR BLD AUTO: 1.3 % (ref 0–1.5)
DEPRECATED RDW RBC AUTO: 45.9 FL (ref 37–54)
EOSINOPHIL # BLD AUTO: 0.29 10*3/MM3 (ref 0–0.4)
EOSINOPHIL NFR BLD AUTO: 4.1 % (ref 0.3–6.2)
ERYTHROCYTE [DISTWIDTH] IN BLOOD BY AUTOMATED COUNT: 12.5 % (ref 12.3–15.4)
HCT VFR BLD AUTO: 38.5 % (ref 37.5–51)
HGB BLD-MCNC: 12.6 G/DL (ref 13–17.7)
IMM GRANULOCYTES # BLD AUTO: 0.03 10*3/MM3 (ref 0–0.05)
IMM GRANULOCYTES NFR BLD AUTO: 0.4 % (ref 0–0.5)
LYMPHOCYTES # BLD AUTO: 1.42 10*3/MM3 (ref 0.7–3.1)
LYMPHOCYTES NFR BLD AUTO: 19.9 % (ref 19.6–45.3)
MCH RBC QN AUTO: 32.6 PG (ref 26.6–33)
MCHC RBC AUTO-ENTMCNC: 32.7 G/DL (ref 31.5–35.7)
MCV RBC AUTO: 99.7 FL (ref 79–97)
MONOCYTES # BLD AUTO: 1.06 10*3/MM3 (ref 0.1–0.9)
MONOCYTES NFR BLD AUTO: 14.8 % (ref 5–12)
NEUTROPHILS NFR BLD AUTO: 4.25 10*3/MM3 (ref 1.7–7)
NEUTROPHILS NFR BLD AUTO: 59.5 % (ref 42.7–76)
NRBC BLD AUTO-RTO: 0 /100 WBC (ref 0–0.2)
PLATELET # BLD AUTO: 111 10*3/MM3 (ref 140–450)
PMV BLD AUTO: 9.6 FL (ref 6–12)
RBC # BLD AUTO: 3.86 10*6/MM3 (ref 4.14–5.8)
WBC # BLD AUTO: 7.14 10*3/MM3 (ref 3.4–10.8)

## 2020-10-29 PROCEDURE — 85025 COMPLETE CBC W/AUTO DIFF WBC: CPT

## 2020-10-29 PROCEDURE — 36415 COLL VENOUS BLD VENIPUNCTURE: CPT

## 2020-10-29 PROCEDURE — 99214 OFFICE O/P EST MOD 30 MIN: CPT | Performed by: INTERNAL MEDICINE

## 2020-10-29 RX ORDER — INFLUENZA A VIRUS A/MICHIGAN/45/2015 X-275 (H1N1) ANTIGEN (FORMALDEHYDE INACTIVATED), INFLUENZA A VIRUS A/SINGAPORE/INFIMH-16-0019/2016 IVR-186 (H3N2) ANTIGEN (FORMALDEHYDE INACTIVATED), INFLUENZA B VIRUS B/PHUKET/3073/2013 ANTIGEN (FORMALDEHYDE INACTIVATED), AND INFLUENZA B VIRUS B/MARYLAND/15/2016 BX-69A ANTIGEN (FORMALDEHYDE INACTIVATED) 60; 60; 60; 60 UG/.7ML; UG/.7ML; UG/.7ML; UG/.7ML
INJECTION, SUSPENSION INTRAMUSCULAR
COMMUNITY
Start: 2020-09-30 | End: 2020-10-30

## 2020-10-29 RX ORDER — LANOLIN ALCOHOL/MO/W.PET/CERES
1000 CREAM (GRAM) TOPICAL DAILY
Start: 2020-10-29 | End: 2022-12-12 | Stop reason: HOSPADM

## 2020-10-29 RX ORDER — CHLORAL HYDRATE 500 MG
1000 CAPSULE ORAL
COMMUNITY

## 2020-10-29 NOTE — PROGRESS NOTES
Subjective     CHIEF COMPLAINT:      Chief Complaint   Patient presents with   • Follow-up       HISTORY OF PRESENT ILLNESS:     Rosas Christianson is a 80 y.o. male patient who returns today for follow up on his thrombocytopenia and anemia.  He returns today for follow-up reporting no new changes.  He is not having problems with bleeding or bruising.  He denies fatigue.    Patient states that he is not able to tolerate vegetables in his diet.  When he eats vegetables, he has a bowel movement shortly after and notices that the food is undigested.    REVIEW OF SYSTEMS:  Review of Systems   Constitutional: Negative for fatigue, fever and unexpected weight change.   HENT: Negative for nosebleeds and voice change.    Eyes: Negative for visual disturbance.   Respiratory: Negative for cough and shortness of breath.    Cardiovascular: Negative for chest pain and leg swelling.   Gastrointestinal: Positive for diarrhea. Negative for abdominal pain, blood in stool, constipation, nausea and vomiting.        Patient has a colostomy   Genitourinary: Negative for frequency and hematuria.   Musculoskeletal: Positive for arthralgias. Negative for back pain and joint swelling.   Skin: Negative for rash.   Neurological: Negative for dizziness and headaches.   Hematological: Negative for adenopathy. Does not bruise/bleed easily.   Psychiatric/Behavioral: Negative for dysphoric mood. The patient is not nervous/anxious.          I reviewed and verified the CC and ROS obtained by the MA.     Past Medical History:   Diagnosis Date   • Arthritis    • Cancer (CMS/HCC)     non melatonin   • Esophageal reflux    • Eye exam, routine 2011   • H/O ulcer disease    • Herpes zoster    • Hypertension    • Hypothyroidism    • Macrocytic anemia    • Neuropathy    • Peptic ulceration    • Thrombocytopenia (CMS/HCC)    • Torn rotator cuff     Right [place]   • Torn rotator cuff     left shoulder   • Ulcerative colitis (CMS/HCC)        Past Surgical  "History:   Procedure Laterality Date   • CATARACT EXTRACTION Bilateral    • CATARACT EXTRACTION, BILATERAL     • COLON SURGERY  2000    removed colon   • COLONOSCOPY  2005    has a colostomy   • DENTAL PROCEDURE     • HAND SURGERY Left    • SKIN CANCER DESTRUCTION  01/2016    on head       Cancer-related family history is not on file.  Social History     Tobacco Use   • Smoking status: Former Smoker     Packs/day: 3.00     Years: 30.00     Pack years: 90.00     Types: Cigarettes   • Smokeless tobacco: Never Used   Substance Use Topics   • Alcohol use: Yes     Comment: rare       MEDICATIONS:    Current Outpatient Medications:   •  celecoxib (CeleBREX) 200 MG capsule, TAKE ONE CAPSULE BY MOUTH DAILY, Disp: 30 capsule, Rfl: 0  •  Cholecalciferol (VITAMIN D3 PO), Take  by mouth., Disp: , Rfl:   •  Fluzone High-Dose Quadrivalent 0.7 ML suspension prefilled syringe, , Disp: , Rfl:   •  glucosamine sulfate 500 MG capsule capsule, Take 2,000 mg by mouth Daily., Disp: , Rfl:   •  levothyroxine (SYNTHROID, LEVOTHROID) 125 MCG tablet, TAKE ONE TABLET BY MOUTH DAILY, Disp: 90 tablet, Rfl: 0  •  lisinopril (PRINIVIL,ZESTRIL) 2.5 MG tablet, TAKE ONE TABLET BY MOUTH DAILY, Disp: 90 tablet, Rfl: 0  •  Multiple Vitamins-Minerals (MULTIVITAMIN ADULT PO), Take 1 tablet by mouth Daily., Disp: , Rfl:   •  Omega-3 Fatty Acids (fish oil) 1000 MG capsule capsule, Take  by mouth., Disp: , Rfl:   •  Saw Palmetto, Serenoa repens, 1000 MG capsule, Take 1 tablet by mouth Daily., Disp: , Rfl:     ALLERGIES:  Allergies   Allergen Reactions   • Aspirin    • Nsaids    • Prednisone          Objective   VITAL SIGNS:     Vitals:    10/29/20 1229   BP: 129/74   Pulse: 51   Resp: 18   Temp: 97.5 °F (36.4 °C)   TempSrc: Skin   SpO2: 100%   Weight: 73.9 kg (163 lb)   Height: 171.5 cm (67.52\")  Comment: new height   PainSc: 0-No pain     Body mass index is 25.14 kg/m².     Wt Readings from Last 3 Encounters:   10/29/20 73.9 kg (163 lb)   02/28/20 73.7 kg " (162 lb 6.4 oz)   10/21/19 74.3 kg (163 lb 14.4 oz)       PHYSICAL EXAMINATION:  GENERAL:  The patient appears in good general condition, not in acute distress.  SKIN: No skin rashes. No ecchymosis.  HEAD:  Normocephalic.  EYES:  No Jaundice. No Pallor. Pupils equal. EOMI.  NECK:  Supple with Good ROM. No Thyromegaly. No Masses.  LYMPHATICS:  No cervical or supraclavicular lymphadenopathy.  CHEST: Normal respiratory effort.   CARDIAC:  No edema.  ABDOMEN:  Soft. No tenderness. No Hepatomegaly. No Splenomegaly. No masses.  Colostomy in the right lower quadrant.  EXTREMITIES: Osteoarthritis changes in the hands bilaterally.  NEUROLOGICAL:  No Focal neurological deficits.       DIAGNOSTIC DATA:     Results from last 7 days   Lab Units 10/29/20  1225   WBC 10*3/mm3 7.14   NEUTROS ABS 10*3/mm3 4.25   HEMOGLOBIN g/dL 12.6*   HEMATOCRIT % 38.5   PLATELETS 10*3/mm3 111*     Component      Latest Ref Rng & Units 1/18/2017 9/26/2017 10/5/2018 10/21/2019          12:18 PM 10:10 AM  8:48 AM 11:16 AM   IPF      0.90 - 6.50 % 5.50 6.60 (H) 5.60 6.00     Component      Latest Ref Rng & Units 1/18/2017 1/18/2017 9/26/2017 9/26/2017          12:18 PM 12:18 PM 10:10 AM 10:10 AM   TIBC      249 - 505 mcg/dL  265 231 (L)    UIBC      mcg/dL       Iron      59 - 158 mcg/dL  116 91    Iron Saturation      14 - 48 %  44 39    Transferrin      200 - 360 mg/dL  189 (L) 165 (L)    Ferritin      30.00 - 400.00 ng/mL 474.40 (H)   449.90 (H)       Assessment/Plan   1.  Macrocytic anemia.    · It is attributed to anemia of chronic disease.    · His anemia worsened and hemoglobin is down to 12.6 today.  · With the patient's ulcerative colitis and prior colon surgery, he is at increased risk for decreased absorption of vitamin B12.  · Previous labs showed no evidence of iron deficiency.  · I recommended starting vitamin B12 1000 mcg daily.    2.  Chronic thrombocytopenia.  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  · He had a mildly  elevated IPF.  · Celebrex may be contributing to his thrombocytopenia.  However, he needs the medicine due to his significant arthritis.  He usually develops significant worsening of his arthritis about 3 days after stopping Celebrex.  · He was therefore given the okay to continue Celebrex despite the thrombocytopenia.  · He is not having problems with bleeding.    3.  Elevated ferritin level.  He had ferritin up to 450 in the past.  However, transferrin saturation was less than 50%.  I explained to the patient that the pattern is indicating a reactive process (acute phase reaction).  I explained that this is unlikely to represent underlying hemochromatosis.      PLAN:    1.  Start vitamin B12 1000 mcg daily.  2.  Okay to continue Celebrex.  3.  Follow-up in 1 year with CBC ferritin iron panel B12 and folate levels.        June Smiley MD  10/29/20

## 2020-10-30 ENCOUNTER — OFFICE VISIT (OUTPATIENT)
Dept: INTERNAL MEDICINE | Facility: CLINIC | Age: 80
End: 2020-10-30

## 2020-10-30 ENCOUNTER — LAB (OUTPATIENT)
Dept: LAB | Facility: HOSPITAL | Age: 80
End: 2020-10-30

## 2020-10-30 VITALS
BODY MASS INDEX: 24.51 KG/M2 | SYSTOLIC BLOOD PRESSURE: 114 MMHG | HEIGHT: 68 IN | WEIGHT: 161.7 LBS | HEART RATE: 50 BPM | OXYGEN SATURATION: 99 % | DIASTOLIC BLOOD PRESSURE: 74 MMHG | TEMPERATURE: 97.1 F

## 2020-10-30 DIAGNOSIS — Z93.3 PRESENCE OF COLOSTOMY (HCC): ICD-10-CM

## 2020-10-30 DIAGNOSIS — I10 ESSENTIAL HYPERTENSION: Primary | ICD-10-CM

## 2020-10-30 DIAGNOSIS — E03.9 ACQUIRED HYPOTHYROIDISM: ICD-10-CM

## 2020-10-30 DIAGNOSIS — Z90.49 H/O COLECTOMY: ICD-10-CM

## 2020-10-30 DIAGNOSIS — M19.90 ARTHRITIS: ICD-10-CM

## 2020-10-30 LAB
ALBUMIN SERPL-MCNC: 3.8 G/DL (ref 3.5–5.2)
ALBUMIN/GLOB SERPL: 1.5 G/DL
ALP SERPL-CCNC: 90 U/L (ref 39–117)
ALT SERPL W P-5'-P-CCNC: 23 U/L (ref 1–41)
ANION GAP SERPL CALCULATED.3IONS-SCNC: 7.8 MMOL/L (ref 5–15)
AST SERPL-CCNC: 33 U/L (ref 1–40)
BILIRUB SERPL-MCNC: 0.4 MG/DL (ref 0–1.2)
BUN SERPL-MCNC: 30 MG/DL (ref 8–23)
BUN/CREAT SERPL: 25.4 (ref 7–25)
CALCIUM SPEC-SCNC: 9.4 MG/DL (ref 8.6–10.5)
CHLORIDE SERPL-SCNC: 106 MMOL/L (ref 98–107)
CHOLEST SERPL-MCNC: 119 MG/DL (ref 0–200)
CO2 SERPL-SCNC: 22.2 MMOL/L (ref 22–29)
CREAT SERPL-MCNC: 1.18 MG/DL (ref 0.76–1.27)
GFR SERPL CREATININE-BSD FRML MDRD: 59 ML/MIN/1.73
GLOBULIN UR ELPH-MCNC: 2.6 GM/DL
GLUCOSE SERPL-MCNC: 96 MG/DL (ref 65–99)
HDLC SERPL-MCNC: 44 MG/DL (ref 40–60)
LDLC SERPL CALC-MCNC: 63 MG/DL (ref 0–100)
LDLC/HDLC SERPL: 1.45 {RATIO}
POTASSIUM SERPL-SCNC: 4.7 MMOL/L (ref 3.5–5.2)
PROT SERPL-MCNC: 6.4 G/DL (ref 6–8.5)
SODIUM SERPL-SCNC: 136 MMOL/L (ref 136–145)
T4 FREE SERPL-MCNC: 1.37 NG/DL (ref 0.93–1.7)
TRIGL SERPL-MCNC: 55 MG/DL (ref 0–150)
TSH SERPL DL<=0.05 MIU/L-ACNC: 1.66 UIU/ML (ref 0.27–4.2)
VLDLC SERPL-MCNC: 12 MG/DL (ref 5–40)

## 2020-10-30 PROCEDURE — 84439 ASSAY OF FREE THYROXINE: CPT | Performed by: FAMILY MEDICINE

## 2020-10-30 PROCEDURE — 80053 COMPREHEN METABOLIC PANEL: CPT | Performed by: FAMILY MEDICINE

## 2020-10-30 PROCEDURE — 80061 LIPID PANEL: CPT | Performed by: FAMILY MEDICINE

## 2020-10-30 PROCEDURE — 99214 OFFICE O/P EST MOD 30 MIN: CPT | Performed by: FAMILY MEDICINE

## 2020-10-30 PROCEDURE — 84443 ASSAY THYROID STIM HORMONE: CPT | Performed by: FAMILY MEDICINE

## 2020-10-30 PROCEDURE — G0439 PPPS, SUBSEQ VISIT: HCPCS | Performed by: FAMILY MEDICINE

## 2020-10-30 NOTE — PROGRESS NOTES
The ABCs of the Annual Wellness Visit  Subsequent Medicare Wellness Visit    Chief Complaint   Patient presents with   • follow up to hypothyroidism   • follow up to hypertension   • Medicare Wellness-subsequent       Subjective   History of Present Illness:  Rosas Christianson is a 80 y.o. male who presents for a Subsequent Medicare Wellness Visit.    HEALTH RISK ASSESSMENT    Recent Hospitalizations:  No hospitalization(s) within the last year.    Current Medical Providers:  Patient Care Team:  Rashid Klein MD as PCP - General (Family Medicine)  June Smiley MD as Consulting Physician (Hematology and Oncology)  Mann Bee MD as Referring Physician (Gastroenterology)  Garfield Santacruz MD as Consulting Physician (Dermatology)    Smoking Status:  Social History     Tobacco Use   Smoking Status Former Smoker   • Packs/day: 3.00   • Years: 30.00   • Pack years: 90.00   • Types: Cigarettes   Smokeless Tobacco Never Used       Alcohol Consumption:  Social History     Substance and Sexual Activity   Alcohol Use Yes    Comment: rare       Depression Screen:   PHQ-2/PHQ-9 Depression Screening 10/30/2020   Little interest or pleasure in doing things 0   Feeling down, depressed, or hopeless 0   Trouble falling or staying asleep, or sleeping too much 0   Feeling tired or having little energy 0   Poor appetite or overeating 0   Feeling bad about yourself - or that you are a failure or have let yourself or your family down 0   Trouble concentrating on things, such as reading the newspaper or watching television 0   Moving or speaking so slowly that other people could have noticed. Or the opposite - being so fidgety or restless that you have been moving around a lot more than usual 0   Thoughts that you would be better off dead, or of hurting yourself in some way 0   Total Score 0   If you checked off any problems, how difficult have these problems made it for you to do your work, take care of things at  home, or get along with other people? Not difficult at all       Fall Risk Screen:  LUPE Fall Risk Assessment was completed, and patient is at HIGH risk for falls. Assessment completed on:10/30/2020    Health Habits and Functional and Cognitive Screening:  Functional & Cognitive Status 10/30/2020   Do you have difficulty preparing food and eating? No   Do you have difficulty bathing yourself, getting dressed or grooming yourself? No   Do you have difficulty using the toilet? No   Do you have difficulty moving around from place to place? No   Do you have trouble with steps or getting out of a bed or a chair? No   Current Diet Well Balanced Diet   Dental Exam Up to date   Eye Exam Up to date   Exercise (times per week) 6 times per week   Current Exercises Include Bicycling Outdoors;Stationary Bicycling/Spin Class   Current Exercise Activities Include -   Do you need help using the phone?  No   Are you deaf or do you have serious difficulty hearing?  No   Do you need help with transportation? No   Do you need help shopping? No   Do you need help preparing meals?  No   Do you need help with housework?  No   Do you need help with laundry? No   Do you need help taking your medications? No   Do you need help managing money? No   Do you ever drive or ride in a car without wearing a seat belt? No   Have you felt unusual stress, anger or loneliness in the last month? No   Who do you live with? Spouse   If you need help, do you have trouble finding someone available to you? No   Have you been bothered in the last four weeks by sexual problems? No   Do you have difficulty concentrating, remembering or making decisions? No         Does the patient have evidence of cognitive impairment? No    Asprin use counseling:Does not need ASA (and currently is not on it)    Age-appropriate Screening Schedule:  Refer to the list below for future screening recommendations based on patient's age, sex and/or medical conditions. Orders for  these recommended tests are listed in the plan section. The patient has been provided with a written plan.    Health Maintenance   Topic Date Due   • ZOSTER VACCINE (2 of 2) 12/19/2016   • TDAP/TD VACCINES (2 - Td) 10/24/2026   • INFLUENZA VACCINE  Completed          The following portions of the patient's history were reviewed and updated as appropriate: allergies, current medications, past family history, past medical history, past social history, past surgical history and problem list.    Outpatient Medications Prior to Visit   Medication Sig Dispense Refill   • celecoxib (CeleBREX) 200 MG capsule TAKE ONE CAPSULE BY MOUTH DAILY 30 capsule 0   • Cholecalciferol (VITAMIN D3 PO) Take 1 each by mouth Daily.     • glucosamine sulfate 500 MG capsule capsule Take 2,000 mg by mouth Daily.     • levothyroxine (SYNTHROID, LEVOTHROID) 125 MCG tablet TAKE ONE TABLET BY MOUTH DAILY 90 tablet 0   • lisinopril (PRINIVIL,ZESTRIL) 2.5 MG tablet TAKE ONE TABLET BY MOUTH DAILY 90 tablet 0   • Multiple Vitamins-Minerals (MULTIVITAMIN ADULT PO) Take 1 tablet by mouth Daily.     • Omega-3 Fatty Acids (fish oil) 1000 MG capsule capsule Take 1,000 mg by mouth Daily With Breakfast.     • Saw Palmetto, Serenoa repens, 1000 MG capsule Take 1 tablet by mouth Daily.     • vitamin B-12 (CYANOCOBALAMIN) 1000 MCG tablet Take 1 tablet by mouth Daily.     • Fluzone High-Dose Quadrivalent 0.7 ML suspension prefilled syringe        No facility-administered medications prior to visit.        Patient Active Problem List   Diagnosis   • Post-traumatic osteoarthritis of multiple joints   • Esophageal reflux   • Hypertension   • Hypothyroidism   • Neuropathy   • Ulcerative colitis (CMS/HCC)   • H/O colectomy   • Anemia of chronic disease   • Thrombocytopenia (CMS/HCC)   • History of frostbite   • Arthritis   • Presence of colostomy (CMS/HCC)   • Health care maintenance   • Bilateral foot pain   • ERRONEOUS ENCOUNTER--DISREGARD   • Medicare annual  "wellness visit, subsequent       Advanced Care Planning:  ACP discussion was held with the patient during this visit. Patient does not have an advance directive, information provided.    Review of Systems    Compared to one year ago, the patient feels his physical health is better.  Compared to one year ago, the patient feels his mental health is better.    Reviewed chart for potential of high risk medication in the elderly: yes  Reviewed chart for potential of harmful drug interactions in the elderly:yes    Objective         Vitals:    10/30/20 0753   BP: 114/74   BP Location: Right arm   Patient Position: Sitting   Cuff Size: Adult   Pulse: 50   Temp: 97.1 °F (36.2 °C)   TempSrc: Temporal   SpO2: 99%   Weight: 73.3 kg (161 lb 11.2 oz)   Height: 171.5 cm (67.52\")   PainSc: 0-No pain       Body mass index is 24.94 kg/m².  Discussed the patient's BMI with him. The BMI is in the acceptable range.    Physical Exam          Assessment/Plan   Medicare Risks and Personalized Health Plan  CMS Preventative Services Quick Reference  Advance Directive Discussion  Inactivity/Sedentary    The above risks/problems have been discussed with the patient.  Pertinent information has been shared with the patient in the After Visit Summary.  Follow up plans and orders are seen below in the Assessment/Plan Section.    Diagnoses and all orders for this visit:    1. Essential hypertension (Primary)  -     Lipid Panel  -     Comprehensive Metabolic Panel    2. Acquired hypothyroidism  -     TSH  -     T4, Free    3. H/O colectomy    4. Presence of colostomy (CMS/[facility])    5. Arthritis      Follow Up:  Return in about 6 months (around 4/30/2021), or if symptoms worsen or fail to improve, for Recheck.     An After Visit Summary and PPPS were given to the patient.       Chronic medical problems      "

## 2020-10-30 NOTE — PROGRESS NOTES
Rosas Christianson is a 80 y.o. male.      Assessment/Plan   Problems Addressed this Visit        Cardiovascular and Mediastinum    Hypertension - Primary    Relevant Orders    Lipid Panel    Comprehensive Metabolic Panel       Endocrine    Hypothyroidism    Relevant Orders    TSH    T4, Free       Musculoskeletal and Integument    Arthritis       Other    H/O colectomy    Presence of colostomy (CMS/[facility])      Diagnoses       Codes Comments    Essential hypertension    -  Primary ICD-10-CM: I10  ICD-9-CM: 401.9     Acquired hypothyroidism     ICD-10-CM: E03.9  ICD-9-CM: 244.9     H/O colectomy     ICD-10-CM: Z90.49  ICD-9-CM: V45.89     Presence of colostomy (CMS/HCC)     ICD-10-CM: Z93.3  ICD-9-CM: V44.3     Arthritis     ICD-10-CM: M19.90  ICD-9-CM: 716.90            Continue present treatment of hypertension hypothyroidism and arthritis as prescribed monitor for signs and symptoms attributable to dysfunctioning colostomy follow-up results of blood work for further management otherwise as needed or    Return in about 6 months (around 4/30/2021), or if symptoms worsen or fail to improve, for Recheck.      Chief Complaint   Patient presents with   • follow up to hypothyroidism   • follow up to hypertension   • Medicare Wellness-subsequent     Social History     Tobacco Use   • Smoking status: Former Smoker     Packs/day: 3.00     Years: 30.00     Pack years: 90.00     Types: Cigarettes   • Smokeless tobacco: Never Used   Substance Use Topics   • Alcohol use: Yes     Comment: rare   • Drug use: No       History of Present Illness   Patient follows up for ongoing management of chronic medical problems of hypothyroidism hypertension arthritis doing well no acute concerns functioning colostomy no problems  Blood pressure well controlled no chest pain shortness of breath or increased fatigue no no signs or symptoms of poor regulation of his thyroid  The following portions of the patient's history were reviewed and updated as  "appropriate:PMHroutine: Social history , Allergies, Current Medications, Active Problem List and Health Maintenance    Review of Systems   Constitutional: Negative.    HENT: Negative.    Respiratory: Negative.    Cardiovascular: Negative.    Gastrointestinal:        Colostomy in place   Genitourinary: Negative.    Musculoskeletal: Negative.    Neurological: Negative.        Objective   Vitals:    10/30/20 0753   BP: 114/74   BP Location: Right arm   Patient Position: Sitting   Cuff Size: Adult   Pulse: 50   Temp: 97.1 °F (36.2 °C)   TempSrc: Temporal   SpO2: 99%   Weight: 73.3 kg (161 lb 11.2 oz)   Height: 171.5 cm (67.52\")     Body mass index is 24.94 kg/m².  Physical Exam  Vitals signs and nursing note reviewed.   Constitutional:       Appearance: Normal appearance. He is well-developed. He is not diaphoretic.   HENT:      Head: Normocephalic and atraumatic.      Right Ear: Tympanic membrane, ear canal and external ear normal.      Left Ear: Tympanic membrane, ear canal and external ear normal.   Eyes:      General: Lids are normal. No scleral icterus.     Extraocular Movements: Extraocular movements intact.      Conjunctiva/sclera: Conjunctivae normal.   Neck:      Musculoskeletal: Normal range of motion.      Thyroid: No thyroid mass or thyromegaly.      Vascular: No carotid bruit or JVD.   Cardiovascular:      Rate and Rhythm: Normal rate and regular rhythm.      Pulses: Normal pulses.           Radial pulses are 2+ on the right side and 2+ on the left side.      Heart sounds: Normal heart sounds. No murmur.   Pulmonary:      Effort: Pulmonary effort is normal. No respiratory distress.      Breath sounds: Normal breath sounds.   Abdominal:      General: There is no distension.      Palpations: Abdomen is soft. There is no mass.      Tenderness: There is no abdominal tenderness. There is no guarding or rebound.      Hernia: No hernia is present.      Comments: Right-sided colostomy   Musculoskeletal:      Right " lower leg: No edema.      Left lower leg: No edema.   Skin:     General: Skin is warm and dry.      Coloration: Skin is not pale.      Findings: No erythema or rash.   Neurological:      General: No focal deficit present.      Mental Status: He is alert and oriented to person, place, and time.      Sensory: No sensory deficit.      Deep Tendon Reflexes: Reflexes are normal and symmetric.   Psychiatric:         Mood and Affect: Mood normal.         Behavior: Behavior normal. Behavior is cooperative.         Thought Content: Thought content normal.         Judgment: Judgment normal.       Reviewed Data:  Lab on 10/29/2020   Component Date Value Ref Range Status   • WBC 10/29/2020 7.14  3.40 - 10.80 10*3/mm3 Final   • RBC 10/29/2020 3.86* 4.14 - 5.80 10*6/mm3 Final   • Hemoglobin 10/29/2020 12.6* 13.0 - 17.7 g/dL Final   • Hematocrit 10/29/2020 38.5  37.5 - 51.0 % Final   • MCV 10/29/2020 99.7* 79.0 - 97.0 fL Final   • MCH 10/29/2020 32.6  26.6 - 33.0 pg Final   • MCHC 10/29/2020 32.7  31.5 - 35.7 g/dL Final   • RDW 10/29/2020 12.5  12.3 - 15.4 % Final   • RDW-SD 10/29/2020 45.9  37.0 - 54.0 fl Final   • MPV 10/29/2020 9.6  6.0 - 12.0 fL Final   • Platelets 10/29/2020 111* 140 - 450 10*3/mm3 Final   • Neutrophil % 10/29/2020 59.5  42.7 - 76.0 % Final   • Lymphocyte % 10/29/2020 19.9  19.6 - 45.3 % Final   • Monocyte % 10/29/2020 14.8* 5.0 - 12.0 % Final   • Eosinophil % 10/29/2020 4.1  0.3 - 6.2 % Final   • Basophil % 10/29/2020 1.3  0.0 - 1.5 % Final   • Immature Grans % 10/29/2020 0.4  0.0 - 0.5 % Final   • Neutrophils, Absolute 10/29/2020 4.25  1.70 - 7.00 10*3/mm3 Final   • Lymphocytes, Absolute 10/29/2020 1.42  0.70 - 3.10 10*3/mm3 Final   • Monocytes, Absolute 10/29/2020 1.06* 0.10 - 0.90 10*3/mm3 Final   • Eosinophils, Absolute 10/29/2020 0.29  0.00 - 0.40 10*3/mm3 Final   • Basophils, Absolute 10/29/2020 0.09  0.00 - 0.20 10*3/mm3 Final   • Immature Grans, Absolute 10/29/2020 0.03  0.00 - 0.05 10*3/mm3 Final    • nRBC 10/29/2020 0.0  0.0 - 0.2 /100 WBC Final

## 2020-11-09 DIAGNOSIS — E03.9 ACQUIRED HYPOTHYROIDISM: ICD-10-CM

## 2020-11-10 RX ORDER — LEVOTHYROXINE SODIUM 0.12 MG/1
TABLET ORAL
Qty: 90 TABLET | Refills: 3 | Status: SHIPPED | OUTPATIENT
Start: 2020-11-10 | End: 2020-11-12

## 2020-11-10 RX ORDER — CELECOXIB 200 MG/1
CAPSULE ORAL
Qty: 30 CAPSULE | Refills: 6 | Status: SHIPPED | OUTPATIENT
Start: 2020-11-10 | End: 2020-12-07

## 2020-11-12 DIAGNOSIS — E03.9 ACQUIRED HYPOTHYROIDISM: ICD-10-CM

## 2020-11-12 RX ORDER — LEVOTHYROXINE SODIUM 0.12 MG/1
TABLET ORAL
Qty: 90 TABLET | Refills: 3 | Status: SHIPPED | OUTPATIENT
Start: 2020-11-12 | End: 2021-10-27 | Stop reason: SDUPTHER

## 2020-12-08 RX ORDER — CELECOXIB 200 MG/1
CAPSULE ORAL
Qty: 30 CAPSULE | Refills: 5 | Status: SHIPPED | OUTPATIENT
Start: 2020-12-08 | End: 2021-10-27 | Stop reason: SDUPTHER

## 2021-01-12 RX ORDER — LISINOPRIL 2.5 MG/1
TABLET ORAL
Qty: 90 TABLET | Refills: 2 | Status: SHIPPED | OUTPATIENT
Start: 2021-01-12 | End: 2021-01-14 | Stop reason: SDUPTHER

## 2021-01-14 RX ORDER — LISINOPRIL 2.5 MG/1
2.5 TABLET ORAL DAILY
Qty: 90 TABLET | Refills: 2 | Status: SHIPPED | OUTPATIENT
Start: 2021-01-14 | End: 2021-10-27 | Stop reason: SDUPTHER

## 2021-03-09 DIAGNOSIS — Z23 IMMUNIZATION DUE: ICD-10-CM

## 2021-04-27 ENCOUNTER — OFFICE VISIT (OUTPATIENT)
Dept: INTERNAL MEDICINE | Facility: CLINIC | Age: 81
End: 2021-04-27

## 2021-04-27 ENCOUNTER — LAB (OUTPATIENT)
Dept: LAB | Facility: HOSPITAL | Age: 81
End: 2021-04-27

## 2021-04-27 VITALS
WEIGHT: 161.7 LBS | OXYGEN SATURATION: 99 % | BODY MASS INDEX: 24.51 KG/M2 | HEART RATE: 55 BPM | HEIGHT: 68 IN | SYSTOLIC BLOOD PRESSURE: 134 MMHG | DIASTOLIC BLOOD PRESSURE: 72 MMHG

## 2021-04-27 DIAGNOSIS — E03.9 ACQUIRED HYPOTHYROIDISM: ICD-10-CM

## 2021-04-27 DIAGNOSIS — M17.0 PRIMARY OSTEOARTHRITIS OF BOTH KNEES: ICD-10-CM

## 2021-04-27 DIAGNOSIS — M19.90 ARTHRITIS: ICD-10-CM

## 2021-04-27 DIAGNOSIS — I10 ESSENTIAL HYPERTENSION: Primary | ICD-10-CM

## 2021-04-27 LAB
ALBUMIN SERPL-MCNC: 3.4 G/DL (ref 3.5–5.2)
ALBUMIN/GLOB SERPL: 1.5 G/DL
ALP SERPL-CCNC: 69 U/L (ref 39–117)
ALT SERPL W P-5'-P-CCNC: 19 U/L (ref 1–41)
ANION GAP SERPL CALCULATED.3IONS-SCNC: 8.6 MMOL/L (ref 5–15)
AST SERPL-CCNC: 25 U/L (ref 1–40)
BILIRUB SERPL-MCNC: 0.4 MG/DL (ref 0–1.2)
BUN SERPL-MCNC: 26 MG/DL (ref 8–23)
BUN/CREAT SERPL: 24.3 (ref 7–25)
CALCIUM SPEC-SCNC: 9 MG/DL (ref 8.6–10.5)
CHLORIDE SERPL-SCNC: 105 MMOL/L (ref 98–107)
CO2 SERPL-SCNC: 24.4 MMOL/L (ref 22–29)
CREAT SERPL-MCNC: 1.07 MG/DL (ref 0.76–1.27)
GFR SERPL CREATININE-BSD FRML MDRD: 66 ML/MIN/1.73
GLOBULIN UR ELPH-MCNC: 2.3 GM/DL
GLUCOSE SERPL-MCNC: 90 MG/DL (ref 65–99)
POTASSIUM SERPL-SCNC: 4.8 MMOL/L (ref 3.5–5.2)
PROT SERPL-MCNC: 5.7 G/DL (ref 6–8.5)
SODIUM SERPL-SCNC: 138 MMOL/L (ref 136–145)

## 2021-04-27 PROCEDURE — 99214 OFFICE O/P EST MOD 30 MIN: CPT | Performed by: FAMILY MEDICINE

## 2021-04-27 PROCEDURE — 36415 COLL VENOUS BLD VENIPUNCTURE: CPT | Performed by: FAMILY MEDICINE

## 2021-04-27 PROCEDURE — 80053 COMPREHEN METABOLIC PANEL: CPT | Performed by: FAMILY MEDICINE

## 2021-04-27 NOTE — PROGRESS NOTES
"Chief Complaint  follow up to hypertension    Subjective          Rosas Christianson presents to [facility]  History of Present Illness  Patient follows up for ongoing management of chronic medical problems of hypothyroidism hypertension bilateral knee pain.  He has been taking Celebrex he thinks it helps although he has still has persistent pain he has been having difficulty contacting orthopedics for further knee evaluation although he does not want any knee surgery  2016 knee x-rays reveal joint space narrowing and arthritis  Objective   Vital Signs:   /72 (BP Location: Right arm, Patient Position: Sitting, Cuff Size: Adult)   Pulse 55   Ht 171.5 cm (67.52\")   Wt 73.3 kg (161 lb 11.2 oz)   SpO2 99%   BMI 24.94 kg/m²     Physical Exam  Constitutional:       Appearance: Normal appearance.   HENT:      Head: Normocephalic and atraumatic.   Neck:      Comments: Thyroid no nodules felt or enlargement  Cardiovascular:      Rate and Rhythm: Normal rate and regular rhythm.      Pulses: Normal pulses.      Heart sounds: Normal heart sounds.   Pulmonary:      Effort: Pulmonary effort is normal.      Breath sounds: Normal breath sounds.   Musculoskeletal:      Right lower leg: Edema present.      Left lower leg: Edema present.      Comments: Decreased range of motion knees bilaterally uses a cane for ambulation   Skin:     General: Skin is warm and dry.      Findings: No erythema.   Neurological:      Mental Status: He is alert.   Psychiatric:         Mood and Affect: Mood normal.         Behavior: Behavior normal.         Thought Content: Thought content normal.         Judgment: Judgment normal.        Result Review :     Common labs    Common Labsle 10/29/20 10/30/20 10/30/20     [id number]   Glucose   96   BUN   30 (A)   Creatinine   1.18   eGFR Non African Am   59 (A)   Sodium   136   Potassium   4.7   Chloride   106   Calcium   9.4   Albumin   3.80   Total Bilirubin   0.4 "   Alkaline Phosphatase   90   AST (SGOT)   33   ALT (SGPT)   23   WBC 7.14     Hemoglobin 12.6 (A)     Hematocrit 38.5     Platelets 111 (A)     Total Cholesterol  119    Triglycerides  55    HDL Cholesterol  44    LDL Cholesterol   63    (A) Abnormal value                      Assessment and Plan    Diagnoses and all orders for this visit:    1. Essential hypertension (Primary)  -     Comprehensive Metabolic Panel    2. Acquired hypothyroidism    3. Arthritis    4. Primary osteoarthritis of both knees  -     Ambulatory Referral to Sports Medicine    Recommend consultation with sports medicine for injection versus evaluation for synthetic cartilage.  Hypertension continue lisinopril  Hypothyroidism continue levothyroxine  Follow-up results of CMP for further evaluation of renal status with long-term anti-inflammatory use follow-up otherwise as needed or    Follow Up   Return in about 6 months (around 10/27/2021), or if symptoms worsen or fail to improve, for Recheck.  Patient was given instructions and counseling regarding his condition or for health maintenance advice. Please see specific information pulled into the AVS if appropriate.

## 2021-04-30 ENCOUNTER — OFFICE VISIT (OUTPATIENT)
Dept: SPORTS MEDICINE | Facility: CLINIC | Age: 81
End: 2021-04-30

## 2021-04-30 VITALS
DIASTOLIC BLOOD PRESSURE: 71 MMHG | SYSTOLIC BLOOD PRESSURE: 125 MMHG | RESPIRATION RATE: 16 BRPM | BODY MASS INDEX: 25.27 KG/M2 | OXYGEN SATURATION: 99 % | WEIGHT: 161 LBS | HEART RATE: 54 BPM | TEMPERATURE: 97.3 F | HEIGHT: 67 IN

## 2021-04-30 DIAGNOSIS — M25.562 PAIN IN BOTH KNEES, UNSPECIFIED CHRONICITY: Primary | ICD-10-CM

## 2021-04-30 DIAGNOSIS — M25.561 PAIN IN BOTH KNEES, UNSPECIFIED CHRONICITY: Primary | ICD-10-CM

## 2021-04-30 DIAGNOSIS — M17.0 BILATERAL PRIMARY OSTEOARTHRITIS OF KNEE: ICD-10-CM

## 2021-04-30 PROCEDURE — 73562 X-RAY EXAM OF KNEE 3: CPT | Performed by: FAMILY MEDICINE

## 2021-04-30 PROCEDURE — 99214 OFFICE O/P EST MOD 30 MIN: CPT | Performed by: FAMILY MEDICINE
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
## 2021-05-18 ENCOUNTER — TELEPHONE (OUTPATIENT)
Dept: INTERNAL MEDICINE | Facility: CLINIC | Age: 81
End: 2021-05-18

## 2021-05-28 ENCOUNTER — TELEPHONE (OUTPATIENT)
Dept: SPORTS MEDICINE | Facility: CLINIC | Age: 81
End: 2021-05-28

## 2021-05-28 NOTE — TELEPHONE ENCOUNTER
[facility] 3 weeks instead of 5. Rx was filled with [employer] and delivered on 05/28/21.     Patient has been scheduled.    Xavier as: PT SUPPLIED

## 2021-06-02 ENCOUNTER — PROCEDURE VISIT (OUTPATIENT)
Dept: SPORTS MEDICINE | Facility: CLINIC | Age: 81
End: 2021-06-02

## 2021-06-02 VITALS
HEART RATE: 58 BPM | WEIGHT: 160 LBS | SYSTOLIC BLOOD PRESSURE: 126 MMHG | OXYGEN SATURATION: 98 % | DIASTOLIC BLOOD PRESSURE: 78 MMHG | BODY MASS INDEX: 25.11 KG/M2 | TEMPERATURE: 96.9 F | HEIGHT: 67 IN

## 2021-06-02 DIAGNOSIS — M17.0 BILATERAL PRIMARY OSTEOARTHRITIS OF KNEE: Primary | ICD-10-CM

## 2021-06-02 PROCEDURE — 20610 DRAIN/INJ JOINT/BURSA W/O US: CPT | Performed by: FAMILY MEDICINE

## 2021-06-02 NOTE — PROGRESS NOTES
Large Joint Arthrocentesis: R knee  Date/Time: 6/2/2021 1:27 PM  Consent given by: patient  Timeout: Immediately prior to procedure a time out was called to verify the correct patient, procedure, equipment, support staff and site/side marked as required   Supporting Documentation  Indications: pain   Procedure Details  Location: knee - R knee  Needle size: 25 G  Approach: anterolateral  Medications administered: 25 mg sodium hyaluronate 25 MG/2.5ML  Patient tolerance: patient tolerated the procedure well with no immediate complications    Large Joint Arthrocentesis: L knee  Date/Time: 6/2/2021 1:27 PM  Consent given by: patient  Timeout: Immediately prior to procedure a time out was called to verify the correct patient, procedure, equipment, support staff and site/side marked as required   Supporting Documentation  Indications: pain   Procedure Details  Location: knee - L knee  Needle size: 25 G  Approach: anterolateral  Medications administered: 25 mg sodium hyaluronate 25 MG/2.5ML  Patient tolerance: patient tolerated the procedure well with no immediate complications

## 2021-06-09 ENCOUNTER — PROCEDURE VISIT (OUTPATIENT)
Dept: SPORTS MEDICINE | Facility: CLINIC | Age: 81
End: 2021-06-09

## 2021-06-09 VITALS
DIASTOLIC BLOOD PRESSURE: 60 MMHG | HEIGHT: 67 IN | BODY MASS INDEX: 25.11 KG/M2 | WEIGHT: 160 LBS | HEART RATE: 59 BPM | OXYGEN SATURATION: 99 % | TEMPERATURE: 98.4 F | SYSTOLIC BLOOD PRESSURE: 110 MMHG

## 2021-06-09 DIAGNOSIS — M17.0 BILATERAL PRIMARY OSTEOARTHRITIS OF KNEE: Primary | ICD-10-CM

## 2021-06-09 PROCEDURE — 20610 DRAIN/INJ JOINT/BURSA W/O US: CPT | Performed by: FAMILY MEDICINE

## 2021-06-09 NOTE — PROGRESS NOTES
Large Joint Arthrocentesis: L knee  Date/Time: 6/9/2021 2:00 PM  Consent given by: patient  Timeout: Immediately prior to procedure a time out was called to verify the correct patient, procedure, equipment, support staff and site/side marked as required   Supporting Documentation  Indications: pain   Procedure Details  Location: knee - L knee  Needle size: 25 G  Approach: anterolateral  Medications administered: 25 mg sodium hyaluronate 25 MG/2.5ML  Patient tolerance: patient tolerated the procedure well with no immediate complications    Large Joint Arthrocentesis: R knee  Date/Time: 6/9/2021 2:00 PM  Consent given by: patient  Timeout: Immediately prior to procedure a time out was called to verify the correct patient, procedure, equipment, support staff and site/side marked as required   Supporting Documentation  Indications: pain   Procedure Details  Location: knee - R knee  Needle size: 25 G  Approach: anterolateral  Medications administered: 25 mg sodium hyaluronate 25 MG/2.5ML  Patient tolerance: patient tolerated the procedure well with no immediate complications

## 2021-06-16 ENCOUNTER — PROCEDURE VISIT (OUTPATIENT)
Dept: SPORTS MEDICINE | Facility: CLINIC | Age: 81
End: 2021-06-16

## 2021-06-16 VITALS
BODY MASS INDEX: 25.11 KG/M2 | SYSTOLIC BLOOD PRESSURE: 138 MMHG | TEMPERATURE: 98.9 F | HEART RATE: 60 BPM | HEIGHT: 67 IN | WEIGHT: 160 LBS | DIASTOLIC BLOOD PRESSURE: 80 MMHG | OXYGEN SATURATION: 99 %

## 2021-06-16 DIAGNOSIS — M17.0 BILATERAL PRIMARY OSTEOARTHRITIS OF KNEE: Primary | ICD-10-CM

## 2021-06-16 PROCEDURE — 20610 DRAIN/INJ JOINT/BURSA W/O US: CPT | Performed by: FAMILY MEDICINE

## 2021-06-16 NOTE — PROGRESS NOTES
Large Joint Arthrocentesis: R knee  Date/Time: 6/16/2021 1:59 PM  Consent given by: patient  Timeout: Immediately prior to procedure a time out was called to verify the correct patient, procedure, equipment, support staff and site/side marked as required   Supporting Documentation  Indications: pain   Procedure Details  Location: knee - R knee  Needle size: 25 G  Approach: anterolateral  Medications administered: 25 mg sodium hyaluronate 25 MG/2.5ML  Patient tolerance: patient tolerated the procedure well with no immediate complications    Large Joint Arthrocentesis: L knee  Date/Time: 6/16/2021 2:00 PM  Consent given by: patient  Timeout: Immediately prior to procedure a time out was called to verify the correct patient, procedure, equipment, support staff and site/side marked as required   Supporting Documentation  Indications: pain   Procedure Details  Location: knee - L knee  Needle size: 25 G  Approach: anterolateral  Medications administered: 25 mg sodium hyaluronate 25 MG/2.5ML  Patient tolerance: patient tolerated the procedure well with no immediate complications

## 2021-10-27 ENCOUNTER — LAB (OUTPATIENT)
Dept: LAB | Facility: HOSPITAL | Age: 81
End: 2021-10-27

## 2021-10-27 ENCOUNTER — OFFICE VISIT (OUTPATIENT)
Dept: INTERNAL MEDICINE | Facility: CLINIC | Age: 81
End: 2021-10-27

## 2021-10-27 VITALS — WEIGHT: 160.1 LBS | BODY MASS INDEX: 25.13 KG/M2 | OXYGEN SATURATION: 100 % | HEIGHT: 67 IN

## 2021-10-27 DIAGNOSIS — I10 PRIMARY HYPERTENSION: ICD-10-CM

## 2021-10-27 DIAGNOSIS — E03.9 ACQUIRED HYPOTHYROIDISM: Primary | ICD-10-CM

## 2021-10-27 LAB
ANION GAP SERPL CALCULATED.3IONS-SCNC: 8.7 MMOL/L (ref 5–15)
BUN SERPL-MCNC: 25 MG/DL (ref 8–23)
BUN/CREAT SERPL: 22.7 (ref 7–25)
CALCIUM SPEC-SCNC: 9.4 MG/DL (ref 8.6–10.5)
CHLORIDE SERPL-SCNC: 105 MMOL/L (ref 98–107)
CO2 SERPL-SCNC: 22.3 MMOL/L (ref 22–29)
CREAT SERPL-MCNC: 1.1 MG/DL (ref 0.76–1.27)
GFR SERPL CREATININE-BSD FRML MDRD: 64 ML/MIN/1.73
GLUCOSE SERPL-MCNC: 99 MG/DL (ref 65–99)
POTASSIUM SERPL-SCNC: 4.8 MMOL/L (ref 3.5–5.2)
SODIUM SERPL-SCNC: 136 MMOL/L (ref 136–145)
T4 FREE SERPL-MCNC: 1.37 NG/DL (ref 0.93–1.7)
TSH SERPL DL<=0.05 MIU/L-ACNC: 2.43 UIU/ML (ref 0.27–4.2)

## 2021-10-27 PROCEDURE — 80048 BASIC METABOLIC PNL TOTAL CA: CPT | Performed by: FAMILY MEDICINE

## 2021-10-27 PROCEDURE — 36415 COLL VENOUS BLD VENIPUNCTURE: CPT | Performed by: FAMILY MEDICINE

## 2021-10-27 PROCEDURE — 84439 ASSAY OF FREE THYROXINE: CPT | Performed by: FAMILY MEDICINE

## 2021-10-27 PROCEDURE — 84443 ASSAY THYROID STIM HORMONE: CPT | Performed by: FAMILY MEDICINE

## 2021-10-27 PROCEDURE — 99214 OFFICE O/P EST MOD 30 MIN: CPT | Performed by: FAMILY MEDICINE

## 2021-10-27 RX ORDER — CELECOXIB 200 MG/1
200 CAPSULE ORAL DAILY
Qty: 90 CAPSULE | Refills: 2 | Status: SHIPPED | OUTPATIENT
Start: 2021-10-27 | End: 2022-05-04 | Stop reason: SDUPTHER

## 2021-10-27 RX ORDER — LISINOPRIL 2.5 MG/1
2.5 TABLET ORAL DAILY
Qty: 90 TABLET | Refills: 2 | Status: SHIPPED | OUTPATIENT
Start: 2021-10-27 | End: 2022-02-17 | Stop reason: SDUPTHER

## 2021-10-27 RX ORDER — LEVOTHYROXINE SODIUM 0.12 MG/1
125 TABLET ORAL DAILY
Qty: 90 TABLET | Refills: 3 | Status: SHIPPED | OUTPATIENT
Start: 2021-10-27 | End: 2022-10-24

## 2021-10-27 NOTE — PROGRESS NOTES
"Chief Complaint  follow up to hypertension    Subjective          Rosas Christianson presents to [facility] PRIMARY [facility]  History of Present Illness  Patient follows up for ongoing management of chronic medical problems hypertension hypothyroidism osteoarthritis he had injections through sports medicine he has no symptoms were attributable to dysregulation of his thyroid and his need of thyroid monitoring  Feel he is a little dizzy lightheaded when he tries to walk and so he does not walk as much as he has in the past he does not have any weakness in his lower extremities he says he drinks about 50 ounces of fluid daily discussed the need of adequate hydration with colostomy present  Objective   Vital Signs:   Ht 170.2 cm (67.01\")   Wt 72.6 kg (160 lb 1.6 oz)   SpO2 100%   BMI 25.07 kg/m²     Physical Exam  Vitals and nursing note reviewed.   Constitutional:       Appearance: Normal appearance.   HENT:      Head: Normocephalic and atraumatic.   Neck:      Vascular: No carotid bruit.   Cardiovascular:      Rate and Rhythm: Regular rhythm. Bradycardia present.      Pulses: Normal pulses.      Heart sounds: Normal heart sounds.   Pulmonary:      Effort: Pulmonary effort is normal.      Breath sounds: Normal breath sounds.   Musculoskeletal:      Right lower leg: No edema.      Left lower leg: No edema.   Neurological:      Mental Status: He is alert.   Psychiatric:         Mood and Affect: Mood normal.         Behavior: Behavior normal.         Thought Content: Thought content normal.         Judgment: Judgment normal.        Result Review :     Common labs    Common Labsle 4/27/21   Glucose 90   BUN 26 (A)   Creatinine 1.07   eGFR Non African Am 66   Sodium 138   Potassium 4.8   Chloride 105   Calcium 9.0   Albumin 3.40 (A)   Total Bilirubin 0.4   Alkaline Phosphatase 69   AST (SGOT) 25   ALT (SGPT) 19   (A) Abnormal value                      Assessment and Plan    Diagnoses and all orders for this " visit:    1. Acquired hypothyroidism (Primary)  -     TSH  -     T4, Free  -     levothyroxine (SYNTHROID, LEVOTHROID) 125 MCG tablet; Take 1 tablet by mouth Daily.  Dispense: 90 tablet; Refill: 3    2. Primary hypertension  -     Basic Metabolic Panel    Other orders  -     lisinopril (PRINIVIL,ZESTRIL) 2.5 MG tablet; Take 1 tablet by mouth Daily.  Dispense: 90 tablet; Refill: 2  -     celecoxib (CeleBREX) 200 MG capsule; Take 1 capsule by mouth Daily.  Dispense: 90 capsule; Refill: 2    Increase fluids drinking at least 60 ounces daily follow-up results of blood work monitor blood pressure goals less than 140/90 greater than 100/60 heart rate is well call with results over the next month otherwise    Follow Up {Instructions Charge Capture  Follow-up Communications :23}  Return in about 6 months (around 4/27/2022), or if symptoms worsen or fail to improve, for Recheck.  Patient was given instructions and counseling regarding his condition or for health maintenance advice. Please see specific information pulled into the AVS if appropriate.

## 2021-10-28 ENCOUNTER — OFFICE VISIT (OUTPATIENT)
Dept: ONCOLOGY | Facility: CLINIC | Age: 81
End: 2021-10-28

## 2021-10-28 ENCOUNTER — LAB (OUTPATIENT)
Dept: LAB | Facility: HOSPITAL | Age: 81
End: 2021-10-28

## 2021-10-28 VITALS
RESPIRATION RATE: 16 BRPM | OXYGEN SATURATION: 100 % | SYSTOLIC BLOOD PRESSURE: 134 MMHG | BODY MASS INDEX: 25.55 KG/M2 | HEIGHT: 67 IN | WEIGHT: 162.8 LBS | TEMPERATURE: 97.7 F | HEART RATE: 57 BPM | DIASTOLIC BLOOD PRESSURE: 76 MMHG

## 2021-10-28 DIAGNOSIS — D53.9 MACROCYTIC ANEMIA: Primary | ICD-10-CM

## 2021-10-28 DIAGNOSIS — D53.9 MACROCYTIC ANEMIA: ICD-10-CM

## 2021-10-28 DIAGNOSIS — D69.6 THROMBOCYTOPENIA (HCC): ICD-10-CM

## 2021-10-28 DIAGNOSIS — R79.89 ELEVATED FERRITIN LEVEL: ICD-10-CM

## 2021-10-28 LAB
BASOPHILS # BLD AUTO: 0.08 10*3/MM3 (ref 0–0.2)
BASOPHILS NFR BLD AUTO: 1.2 % (ref 0–1.5)
DEPRECATED RDW RBC AUTO: 44.6 FL (ref 37–54)
EOSINOPHIL # BLD AUTO: 0.22 10*3/MM3 (ref 0–0.4)
EOSINOPHIL NFR BLD AUTO: 3.2 % (ref 0.3–6.2)
ERYTHROCYTE [DISTWIDTH] IN BLOOD BY AUTOMATED COUNT: 12.5 % (ref 12.3–15.4)
FERRITIN SERPL-MCNC: 630.8 NG/ML (ref 30–400)
FOLATE SERPL-MCNC: >20 NG/ML (ref 4.78–24.2)
HCT VFR BLD AUTO: 39.5 % (ref 37.5–51)
HGB BLD-MCNC: 13 G/DL (ref 13–17.7)
IMM GRANULOCYTES # BLD AUTO: 0.02 10*3/MM3 (ref 0–0.05)
IMM GRANULOCYTES NFR BLD AUTO: 0.3 % (ref 0–0.5)
IRON 24H UR-MRATE: 127 MCG/DL (ref 59–158)
IRON SATN MFR SERPL: 45 % (ref 14–48)
LYMPHOCYTES # BLD AUTO: 1.51 10*3/MM3 (ref 0.7–3.1)
LYMPHOCYTES NFR BLD AUTO: 22.1 % (ref 19.6–45.3)
MCH RBC QN AUTO: 31.9 PG (ref 26.6–33)
MCHC RBC AUTO-ENTMCNC: 32.9 G/DL (ref 31.5–35.7)
MCV RBC AUTO: 97.1 FL (ref 79–97)
MONOCYTES # BLD AUTO: 0.77 10*3/MM3 (ref 0.1–0.9)
MONOCYTES NFR BLD AUTO: 11.3 % (ref 5–12)
NEUTROPHILS NFR BLD AUTO: 4.22 10*3/MM3 (ref 1.7–7)
NEUTROPHILS NFR BLD AUTO: 61.9 % (ref 42.7–76)
NRBC BLD AUTO-RTO: 0 /100 WBC (ref 0–0.2)
PLATELET # BLD AUTO: 106 10*3/MM3 (ref 140–450)
PMV BLD AUTO: 9.8 FL (ref 6–12)
RBC # BLD AUTO: 4.07 10*6/MM3 (ref 4.14–5.8)
TIBC SERPL-MCNC: 280 MCG/DL (ref 249–505)
TRANSFERRIN SERPL-MCNC: 200 MG/DL (ref 200–360)
VIT B12 BLD-MCNC: 1260 PG/ML (ref 211–946)
WBC # BLD AUTO: 6.82 10*3/MM3 (ref 3.4–10.8)

## 2021-10-28 PROCEDURE — 82746 ASSAY OF FOLIC ACID SERUM: CPT | Performed by: INTERNAL MEDICINE

## 2021-10-28 PROCEDURE — 82728 ASSAY OF FERRITIN: CPT

## 2021-10-28 PROCEDURE — 85025 COMPLETE CBC W/AUTO DIFF WBC: CPT

## 2021-10-28 PROCEDURE — 82607 VITAMIN B-12: CPT | Performed by: INTERNAL MEDICINE

## 2021-10-28 PROCEDURE — 84466 ASSAY OF TRANSFERRIN: CPT

## 2021-10-28 PROCEDURE — 99214 OFFICE O/P EST MOD 30 MIN: CPT | Performed by: INTERNAL MEDICINE

## 2021-10-28 PROCEDURE — 36415 COLL VENOUS BLD VENIPUNCTURE: CPT

## 2021-10-28 PROCEDURE — 83540 ASSAY OF IRON: CPT

## 2021-10-28 NOTE — PROGRESS NOTES
"Subjective     CHIEF COMPLAINT:      Chief Complaint   Patient presents with   • Annual Exam     no concerns       HISTORY OF PRESENT ILLNESS:     Rosas Christianson is a 81 y.o. male patient who returns today for follow up on his anemia and thrombocytopenia.  He returns today for follow-up reporting no new symptoms.  He is taking the vitamin B12 daily as instructed.  He reports occasional bruising.  No problem with bleeding.      ROS:  Pertinent ROS is in the HPI.     Past medical, surgical, social and family history were reviewed.     MEDICATIONS:    Current Outpatient Medications:   •  celecoxib (CeleBREX) 200 MG capsule, Take 1 capsule by mouth Daily., Disp: 90 capsule, Rfl: 2  •  Cholecalciferol (VITAMIN D3 PO), Take 1 each by mouth Daily., Disp: , Rfl:   •  glucosamine sulfate 500 MG capsule capsule, Take 2,000 mg by mouth Daily., Disp: , Rfl:   •  levothyroxine (SYNTHROID, LEVOTHROID) 125 MCG tablet, Take 1 tablet by mouth Daily., Disp: 90 tablet, Rfl: 3  •  lisinopril (PRINIVIL,ZESTRIL) 2.5 MG tablet, Take 1 tablet by mouth Daily., Disp: 90 tablet, Rfl: 2  •  Multiple Vitamins-Minerals (MULTIVITAMIN ADULT PO), Take 1 tablet by mouth Daily., Disp: , Rfl:   •  Omega-3 Fatty Acids (fish oil) 1000 MG capsule capsule, Take 1,000 mg by mouth Daily With Breakfast., Disp: , Rfl:   •  Saw Palmetto, Serenoa repens, 1000 MG capsule, Take 1 tablet by mouth Daily., Disp: , Rfl:   •  vitamin B-12 (CYANOCOBALAMIN) 1000 MCG tablet, Take 1 tablet by mouth Daily., Disp: , Rfl:     Objective   VITAL SIGNS:     Vitals:    10/28/21 1343   BP: 134/76   Pulse: 57   Resp: 16   Temp: 97.7 °F (36.5 °C)   TempSrc: Temporal   SpO2: 100%   Weight: 73.8 kg (162 lb 12.8 oz)   Height: 171 cm (67.32\")  Comment: new ht   PainSc: 0-No pain     Body mass index is 25.25 kg/m².     Wt Readings from Last 5 Encounters:   10/28/21 73.8 kg (162 lb 12.8 oz)   10/27/21 72.6 kg (160 lb 1.6 oz)   06/16/21 72.6 kg (160 lb)   06/09/21 72.6 kg (160 lb) "   06/02/21 72.6 kg (160 lb)       PHYSICAL EXAMINATION:   GENERAL: The patient appears in good general condition, not in acute distress.   SKIN: Small areas of ecchymosis over the hands.  No petechiae.  EYES: No jaundice.  LYMPHATICS: No cervical, supraclavicular or axillary lymphadenopathy.  CHEST: Normal respiratory effort.   ABDOMEN: Soft. No tenderness. No Hepatomegaly. No Splenomegaly. No masses.  Patient has ostomy in place.  EXTREMITIES: Osteoarthritic changes in the hands bilaterally.    DIAGNOSTIC DATA:     Results from last 7 days   Lab Units 10/28/21  1332   WBC 10*3/mm3 6.82   NEUTROS ABS 10*3/mm3 4.22   HEMOGLOBIN g/dL 13.0   HEMATOCRIT % 39.5   PLATELETS 10*3/mm3 106*     Results from last 7 days   Lab Units 10/27/21  0936   SODIUM mmol/L 136   POTASSIUM mmol/L 4.8   CHLORIDE mmol/L 105   CO2 mmol/L 22.3   BUN mg/dL 25*   CREATININE mg/dL 1.10   CALCIUM mg/dL 9.4   GLUCOSE mg/dL 99         Lab 10/28/21  1332   IRON 127   IRON SATURATION 45   TIBC 280   TRANSFERRIN 200   FERRITIN 630.80*   FOLATE >20.00   VITAMIN B 12 1,260*        Assessment/Plan   *Macrocytic anemia.    · It is attributed to anemia of chronic disease.    · Hemoglobin decreased to 12.6 on 10/29/2020.  · With the patient's ulcerative colitis and prior colon surgery, he is at increased risk for decreased absorption of vitamin B12.  · Patient was started on vitamin B12 1000 mcg daily on 10/29/2020.  · Hemoglobin improved to 13.0 today.  · MCV improved to 97.1 today.   · Vitamin B12 is 1260 and folate is >20.    *Chronic thrombocytopenia.  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  · He had a mildly elevated IPF.  · Celebrex may be contributing to his thrombocytopenia.  However, he needs the medicine due to his significant arthritis.  He usually develops significant worsening of his arthritis about 3 days after stopping Celebrex.  · Platelet count was 111,000 on 10/29/2020.  · Platelets are 106,000 today.  · Patient is not having  problems with bleeding or bruising.    *Elevated ferritin level.    · He had ferritin up to 450 in the past.    · However, transferrin saturation was less than 50%.    · This was attributed to acute phase reaction.  Hemochromatosis was considered unlikely.   · Ferritin is 630 today. Transferrin saturation is 45%.      PLAN:    1.  Continue vitamin B12 1000 mcg daily.  2.  Since platelets are staying >70,000, okay to continue Celebrex.  3.  We will see him in follow-up in 1 year.  We will obtain CBC ferritin iron panel B12 and folate levels at his return visit.        June Smiley MD  10/28/21

## 2022-02-17 RX ORDER — LISINOPRIL 2.5 MG/1
2.5 TABLET ORAL DAILY
Qty: 90 TABLET | Refills: 1 | Status: SHIPPED | OUTPATIENT
Start: 2022-02-17 | End: 2022-04-11 | Stop reason: SDUPTHER

## 2022-04-11 RX ORDER — LISINOPRIL 2.5 MG/1
2.5 TABLET ORAL DAILY
Qty: 90 TABLET | Refills: 1 | Status: SHIPPED | OUTPATIENT
Start: 2022-04-11 | End: 2022-05-19 | Stop reason: SDUPTHER

## 2022-04-21 ENCOUNTER — OFFICE VISIT (OUTPATIENT)
Dept: SPORTS MEDICINE | Facility: CLINIC | Age: 82
End: 2022-04-21

## 2022-04-21 VITALS
WEIGHT: 170 LBS | SYSTOLIC BLOOD PRESSURE: 120 MMHG | DIASTOLIC BLOOD PRESSURE: 80 MMHG | OXYGEN SATURATION: 99 % | HEART RATE: 94 BPM | RESPIRATION RATE: 16 BRPM | HEIGHT: 67 IN | TEMPERATURE: 98 F | BODY MASS INDEX: 26.68 KG/M2

## 2022-04-21 DIAGNOSIS — M17.0 BILATERAL PRIMARY OSTEOARTHRITIS OF KNEE: Primary | ICD-10-CM

## 2022-04-21 PROCEDURE — 99213 OFFICE O/P EST LOW 20 MIN: CPT | Performed by: FAMILY MEDICINE

## 2022-04-21 PROCEDURE — 73562 X-RAY EXAM OF KNEE 3: CPT | Performed by: FAMILY MEDICINE

## 2022-04-21 NOTE — PROGRESS NOTES
"Rosas is a 82 y.o. year old male presents to [facility]    Chief Complaint   Patient presents with   • Osteoarthritis     F/u eval for B/L knee pain with OA - NKI - reports pain has returned - previous gel injections 06/2021 - here today with new x-rays for further evaluation and treatment        History of Present Illness  sxs recurred past mo. Not to the point of taking OTCs. Both knees bother him. Good response to gel, minimal to CSI. Interested in again.    I have reviewed the patient's medical, family, and social history in detail and updated the computerized patient record.    /80 (BP Location: Left arm, Patient Position: Sitting, Cuff Size: Adult)   Pulse 94   Temp 98 °F (36.7 °C) (Temporal)   Resp 16   Ht 171 cm (67.32\")   Wt 77.1 kg (170 lb)   SpO2 99%   BMI 26.37 kg/m²      Physical Exam    Mask worn thru encounter  Vital signs reviewed.   General: No acute distress.  Eyes: conjunctiva clear; pupils equally round and reactive  ENT: external ears atraumatic  CV: no peripheral edema  Resp: normal respiratory effort, no use of accessory muscles  Skin: no rashes or wounds; normal turgor  Psych: mood and affect appropriate; recent and remote memory intact  Neuro: sensation to light touch intact    MSK Exam  B/l knee: + crepitus. No effusion. Full ROM      Bilateral Knee X-Ray  Indication: Pain    Views: AP, Lateral, and [place]    Findings:  No fracture  No bony lesion  Normal soft tissues  Tricompartmental DJD, bone on bone phenomenon of medial compartments.     prior studies were available for comparison.         Diagnoses and all orders for this visit:    Bilateral primary osteoarthritis of knee  -     XR Knee 3 View Bilateral  -     Visco Treatment; Future    responded well to visco in past. Will set up for another series.      Follow Up   No follow-ups on file.  Patient was given instructions and counseling regarding his condition or for health maintenance " advice. Please see specific information pulled into the AVS if appropriate.     EMR Dragon/Transcription disclaimer:    Much of this encounter note is an electronic transcription/translation of spoken language to printed text.  The electronic translation of spoken language may permit erroneous, or at times, nonsensical words or phrases to be inadvertently transcribed.  Although I have reviewed the note for such errors some may still exist.

## 2022-04-27 ENCOUNTER — LAB (OUTPATIENT)
Dept: LAB | Facility: HOSPITAL | Age: 82
End: 2022-04-27

## 2022-04-27 ENCOUNTER — OFFICE VISIT (OUTPATIENT)
Dept: INTERNAL MEDICINE | Facility: CLINIC | Age: 82
End: 2022-04-27

## 2022-04-27 VITALS
WEIGHT: 158.3 LBS | SYSTOLIC BLOOD PRESSURE: 150 MMHG | BODY MASS INDEX: 24.84 KG/M2 | HEART RATE: 58 BPM | HEIGHT: 67 IN | OXYGEN SATURATION: 99 % | DIASTOLIC BLOOD PRESSURE: 80 MMHG

## 2022-04-27 DIAGNOSIS — E03.9 ACQUIRED HYPOTHYROIDISM: ICD-10-CM

## 2022-04-27 DIAGNOSIS — Z00.00 MEDICARE ANNUAL WELLNESS VISIT, SUBSEQUENT: ICD-10-CM

## 2022-04-27 DIAGNOSIS — I10 PRIMARY HYPERTENSION: Primary | ICD-10-CM

## 2022-04-27 DIAGNOSIS — R35.1 NOCTURIA: ICD-10-CM

## 2022-04-27 LAB
ANION GAP SERPL CALCULATED.3IONS-SCNC: 9 MMOL/L (ref 5–15)
BUN SERPL-MCNC: 16 MG/DL (ref 8–23)
BUN/CREAT SERPL: 13.7 (ref 7–25)
CALCIUM SPEC-SCNC: 9.1 MG/DL (ref 8.6–10.5)
CHLORIDE SERPL-SCNC: 98 MMOL/L (ref 98–107)
CO2 SERPL-SCNC: 26 MMOL/L (ref 22–29)
CREAT SERPL-MCNC: 1.17 MG/DL (ref 0.76–1.27)
EGFRCR SERPLBLD CKD-EPI 2021: 62.2 ML/MIN/1.73
GLUCOSE SERPL-MCNC: 97 MG/DL (ref 65–99)
POTASSIUM SERPL-SCNC: 4.3 MMOL/L (ref 3.5–5.2)
SODIUM SERPL-SCNC: 133 MMOL/L (ref 136–145)
T4 FREE SERPL-MCNC: 1.49 NG/DL (ref 0.93–1.7)
TSH SERPL DL<=0.05 MIU/L-ACNC: 2.23 UIU/ML (ref 0.27–4.2)

## 2022-04-27 PROCEDURE — 80048 BASIC METABOLIC PNL TOTAL CA: CPT | Performed by: FAMILY MEDICINE

## 2022-04-27 PROCEDURE — 84439 ASSAY OF FREE THYROXINE: CPT | Performed by: FAMILY MEDICINE

## 2022-04-27 PROCEDURE — 1126F AMNT PAIN NOTED NONE PRSNT: CPT | Performed by: FAMILY MEDICINE

## 2022-04-27 PROCEDURE — 1170F FXNL STATUS ASSESSED: CPT | Performed by: FAMILY MEDICINE

## 2022-04-27 PROCEDURE — G0439 PPPS, SUBSEQ VISIT: HCPCS | Performed by: FAMILY MEDICINE

## 2022-04-27 PROCEDURE — 1159F MED LIST DOCD IN RCRD: CPT | Performed by: FAMILY MEDICINE

## 2022-04-27 PROCEDURE — 84443 ASSAY THYROID STIM HORMONE: CPT | Performed by: FAMILY MEDICINE

## 2022-04-27 PROCEDURE — 36415 COLL VENOUS BLD VENIPUNCTURE: CPT | Performed by: FAMILY MEDICINE

## 2022-04-27 PROCEDURE — 99214 OFFICE O/P EST MOD 30 MIN: CPT | Performed by: FAMILY MEDICINE

## 2022-04-27 RX ORDER — TAMSULOSIN HYDROCHLORIDE 0.4 MG/1
1 CAPSULE ORAL DAILY
Qty: 30 CAPSULE | Refills: 3 | Status: SHIPPED | OUTPATIENT
Start: 2022-04-27 | End: 2022-07-22 | Stop reason: SDUPTHER

## 2022-04-27 NOTE — PROGRESS NOTES
"Chief Complaint  follow up to hypothyroidism, follow up to hypertension, and Medicare Wellness-subsequent    Subjective          Rosas Christianson presents to [facility] PRIMARY [facility]ARE  History of Present Illness  Patient follows up for ongoing management of chronic problems of hypothyroidism hypertension he brings in blood pressure readings mostly less than 140/90 occasional 150/90 no chest pain shortness of breath or increased fatigue he has been adjusting his lisinopril presently at 2.5 mg and is trying to increase his fluid since previous blood work revealed some dehydration  Is followed routinely through hematology for chronic anemia  He gets he wakes quite a few times during the night for urinating he attributes this to increasing his fluid intake  Objective   Vital Signs:   /80 (BP Location: Left arm, Patient Position: Sitting, Cuff Size: Adult)   Pulse 58   Ht 171 cm (67.32\")   Wt 71.8 kg (158 lb 4.8 oz)   SpO2 99%   BMI 24.56 kg/m²     Physical Exam  Vitals and nursing note reviewed.   Constitutional:       Appearance: Normal appearance.   HENT:      Head: Normocephalic and atraumatic.      Right Ear: Tympanic membrane, ear canal and external ear normal.      Left Ear: Tympanic membrane, ear canal and external ear normal.   Eyes:      General: No scleral icterus.  Cardiovascular:      Rate and Rhythm: Normal rate and regular rhythm.      Pulses: Normal pulses.      Heart sounds: Normal heart sounds.   Pulmonary:      Effort: Pulmonary effort is normal.      Breath sounds: Normal breath sounds.   Musculoskeletal:      Right lower leg: Edema present.      Left lower leg: Edema present.      Comments: Trace  Cane for walking   Neurological:      Mental Status: He is alert.   Psychiatric:         Mood and Affect: Mood normal.         Behavior: Behavior normal.         Thought Content: Thought content normal.         Judgment: Judgment normal.        Result Review :     Common labs  "   Common Labsle 10/27/21 10/28/21   Glucose 99    BUN 25 (A)    Creatinine 1.10    eGFR Non African Am 64    Sodium 136    Potassium 4.8    Chloride 105    Calcium 9.4    WBC  6.82   Hemoglobin  13.0   Hematocrit  39.5   Platelets  106 (A)   (A) Abnormal value                      Assessment and Plan    Diagnoses and all orders for this visit:    1. Primary hypertension (Primary)  -     Basic Metabolic Panel    2. Acquired hypothyroidism  -     TSH  -     T4, Free    3. Medicare annual wellness visit, subsequent    4. Nocturia  -     tamsulosin (FLOMAX) 0.4 MG capsule 24 hr capsule; Take 1 capsule by mouth Daily.  Dispense: 30 capsule; Refill: 3      BMI is within normal parameters. No follow-up required.  Follow-up results of blood work  Patient will call in 1 month benefit of Flomax for nocturia if symptoms persist recommend consultation with urology   Recommend continue adequate hydration but do not drink much fluid after supper    Follow Up   Return in about 6 months (around 10/27/2022), or if symptoms worsen or fail to improve, for Recheck.  Patient was given instructions and counseling regarding his condition or for health maintenance advice. Please see specific information pulled into the AVS if appropriate.

## 2022-04-27 NOTE — PROGRESS NOTES
The ABCs of the Annual Wellness Visit  Subsequent Medicare Wellness Visit    Chief Complaint   Patient presents with   • follow up to hypothyroidism   • follow up to hypertension   • Medicare Wellness-subsequent      Subjective    History of Present Illness:  Rosas Christianson is a 82 y.o. male who presents for a Subsequent Medicare Wellness Visit.    The following portions of the patient's history were reviewed and   updated as appropriate: allergies, current medications, past family history, past medical history, past social history, past surgical history and problem list.     Compared to one year ago, the patient feels his physical   health is the same.    Compared to one year ago, the patient feels his mental   health is the same.    Recent Hospitalizations:  He was not admitted to the hospital during the last year.       Current Medical Providers:  Patient Care Team:  Rashid Klein MD as PCP - General (Family Medicine)  June Smiley MD as Consulting Physician (Hematology and Oncology)  Mann Bee MD as Referring Physician (Gastroenterology)  Garfield Santacruz MD as Consulting Physician (Dermatology)    Outpatient Medications Prior to Visit   Medication Sig Dispense Refill   • celecoxib (CeleBREX) 200 MG capsule Take 1 capsule by mouth Daily. 90 capsule 2   • Cholecalciferol (VITAMIN D3 PO) Take 1 each by mouth Daily.     • glucosamine sulfate 500 MG capsule capsule Take 2,000 mg by mouth Daily.     • levothyroxine (SYNTHROID, LEVOTHROID) 125 MCG tablet Take 1 tablet by mouth Daily. 90 tablet 3   • lisinopril (PRINIVIL,ZESTRIL) 2.5 MG tablet Take 1 tablet by mouth Daily. 90 tablet 1   • Multiple Vitamins-Minerals (MULTIVITAMIN ADULT PO) Take 1 tablet by mouth Daily.     • Omega-3 Fatty Acids (fish oil) 1000 MG capsule capsule Take 1,000 mg by mouth Daily With Breakfast.     • vitamin B-12 (CYANOCOBALAMIN) 1000 MCG tablet Take 1 tablet by mouth Daily.     • Saw Palmetto, Serenoa repens,  "1000 MG capsule Take 1 tablet by mouth Daily.       No facility-administered medications prior to visit.       No opioid medication identified on active medication list. I have reviewed chart for other potential  high risk medication/s and harmful drug interactions in the elderly.          Aspirin is not on active medication list.  Aspirin use is not indicated based on review of current medical condition/s. Risk of harm outweighs potential benefits.  .    Patient Active Problem List   Diagnosis   • Post-traumatic osteoarthritis of multiple joints   • Esophageal reflux   • Hypertension   • Hypothyroidism   • Neuropathy   • Ulcerative colitis (HCC)   • H/O colectomy   • Anemia of chronic disease   • Thrombocytopenia (HCC)   • History of frostbite   • Arthritis   • Presence of colostomy (HCC)   • Health care maintenance   • Bilateral foot pain   • ERRONEOUS ENCOUNTER--DISREGARD   • Medicare annual wellness visit, subsequent   • Primary osteoarthritis of both knees     Advance Care Planning   Advance Directive is not on file.  Information provided  Hanane - daughter        Objective       Vitals:    22 0802   BP: 150/80   BP Location: Left arm   Patient Position: Sitting   Cuff Size: Adult   Pulse: 58   SpO2: 99%   Weight: 71.8 kg (158 lb 4.8 oz)   Height: 171 cm (67.32\")   PainSc: 0-No pain     BMI Readings from Last 1 Encounters:   22 24.56 kg/m²   BMI is within normal parameters. No follow-up required.    Does the patient have evidence of cognitive impairment? No    Physical Exam            HEALTH RISK ASSESSMENT    Smoking Status:  Social History     Tobacco Use   Smoking Status Former Smoker   • Packs/day: 3.00   • Years: 33.00   • Pack years: 99.00   • Types: Cigarettes   • Start date: 1956   • Quit date: 1985   • Years since quittin.0   Smokeless Tobacco Never Used   Tobacco Comment    parents gave them to me     Alcohol Consumption:  Social History     Substance and Sexual Activity "   Alcohol Use Yes    Comment: rare     Fall Risk Screen:    LUPE Fall Risk Assessment was completed, and patient is at MODERATE risk for falls. Assessment completed on:4/27/2022    Depression Screening:  PHQ-2/PHQ-9 Depression Screening 4/27/2022   Retired PHQ-9 Total Score -   Retired Total Score -   Little Interest or Pleasure in Doing Things 0-->not at all   Feeling Down, Depressed or Hopeless 0-->not at all   PHQ-9: Brief Depression Severity Measure Score 0       Health Habits and Functional and Cognitive Screening:  Functional & Cognitive Status 4/27/2022   Do you have difficulty preparing food and eating? No   Do you have difficulty bathing yourself, getting dressed or grooming yourself? No   Do you have difficulty using the toilet? No   Do you have difficulty moving around from place to place? No   Do you have trouble with steps or getting out of a bed or a chair? No   Current Diet Well Balanced Diet   Dental Exam Up to date   Eye Exam Not up to date   Exercise (times per week) 6 times per week   Current Exercises Include Stationary Bicycling/Spin Class   Current Exercise Activities Include -   Do you need help using the phone?  No   Are you deaf or do you have serious difficulty hearing?  No   Do you need help with transportation? No   Do you need help shopping? No   Do you need help preparing meals?  No   Do you need help with housework?  No   Do you need help with laundry? No   Do you need help taking your medications? No   Do you need help managing money? No   Do you ever drive or ride in a car without wearing a seat belt? No   Have you felt unusual stress, anger or loneliness in the last month? No   Who do you live with? Spouse   If you need help, do you have trouble finding someone available to you? No   Have you been bothered in the last four weeks by sexual problems? No   Do you have difficulty concentrating, remembering or making decisions? Yes       Age-appropriate Screening Schedule:  Refer to  the list below for future screening recommendations based on patient's age, sex and/or medical conditions. Orders for these recommended tests are listed in the plan section. The patient has been provided with a written plan.    Health Maintenance   Topic Date Due   • ZOSTER VACCINE (2 of 2) 12/19/2016   • INFLUENZA VACCINE  08/01/2022   • TDAP/TD VACCINES (2 - Td or Tdap) 10/24/2026              Assessment/Plan     CMS Preventative Services Quick Reference  Risk Factors Identified During Encounter  Immunizations Discussed/Encouraged (specific Immunizations; Shingrix  The above risks/problems have been discussed with the patient.  Follow up actions/plans if indicated are seen below in the Assessment/Plan Section.  Pertinent information has been shared with the patient in the After Visit Summary.    Diagnoses and all orders for this visit:    1. Primary hypertension (Primary)  -     Basic Metabolic Panel    2. Acquired hypothyroidism  -     TSH  -     T4, Free    3. Medicare annual wellness visit, subsequent    4. Nocturia  -     tamsulosin (FLOMAX) 0.4 MG capsule 24 hr capsule; Take 1 capsule by mouth Daily.  Dispense: 30 capsule; Refill: 3        Follow Up:   Return in about 6 months (around 10/27/2022), or if symptoms worsen or fail to improve, for Recheck.     An After Visit Summary and PPPS were given to the patient.  Ongoing  management of chronic medical problems.

## 2022-05-04 RX ORDER — CELECOXIB 200 MG/1
200 CAPSULE ORAL DAILY
Qty: 90 CAPSULE | Refills: 1 | Status: SHIPPED | OUTPATIENT
Start: 2022-05-04 | End: 2022-10-31 | Stop reason: SDUPTHER

## 2022-05-19 RX ORDER — LISINOPRIL 5 MG/1
5 TABLET ORAL DAILY
Qty: 90 TABLET | Refills: 1 | Status: SHIPPED | OUTPATIENT
Start: 2022-05-19 | End: 2022-11-14

## 2022-05-26 ENCOUNTER — APPOINTMENT (OUTPATIENT)
Dept: GENERAL RADIOLOGY | Facility: HOSPITAL | Age: 82
End: 2022-05-26

## 2022-05-26 PROCEDURE — 71101 X-RAY EXAM UNILAT RIBS/CHEST: CPT | Performed by: FAMILY MEDICINE

## 2022-05-26 PROCEDURE — 73030 X-RAY EXAM OF SHOULDER: CPT | Performed by: FAMILY MEDICINE

## 2022-06-02 ENCOUNTER — OFFICE VISIT (OUTPATIENT)
Dept: INTERNAL MEDICINE | Facility: CLINIC | Age: 82
End: 2022-06-02

## 2022-06-02 VITALS
OXYGEN SATURATION: 100 % | BODY MASS INDEX: 22.73 KG/M2 | SYSTOLIC BLOOD PRESSURE: 176 MMHG | HEART RATE: 62 BPM | HEIGHT: 70 IN | WEIGHT: 158.8 LBS | DIASTOLIC BLOOD PRESSURE: 80 MMHG

## 2022-06-02 DIAGNOSIS — S22.41XG CLOSED FRACTURE OF MULTIPLE RIBS OF RIGHT SIDE WITH DELAYED HEALING: ICD-10-CM

## 2022-06-02 DIAGNOSIS — I10 PRIMARY HYPERTENSION: ICD-10-CM

## 2022-06-02 DIAGNOSIS — R91.1 LUNG NODULE: Primary | ICD-10-CM

## 2022-06-02 PROCEDURE — 99214 OFFICE O/P EST MOD 30 MIN: CPT | Performed by: FAMILY MEDICINE

## 2022-06-02 RX ORDER — HYDROCODONE BITARTRATE AND ACETAMINOPHEN 5; 325 MG/1; MG/1
1 TABLET ORAL EVERY 8 HOURS PRN
Qty: 40 TABLET | Refills: 0 | Status: SHIPPED | OUTPATIENT
Start: 2022-06-02 | End: 2022-10-27

## 2022-06-02 NOTE — PROGRESS NOTES
"Chief Complaint  discuss spot on lung hypertension hypothyroidism    Subjective        Rosas Christianson presents to [facility] PRIMARY [facility]ARE  History of Present Illness  Patient with recent fall tripped and 2.  Cannot to a nail fell off deck fractured right ribs 2 through 4 x-ray revealed nodule right upper lung patient is not getting adequate pain relief with Tylenol and anti-inflammatories  He did have leftover hydrocodone which seemed to help him with his pain much better he has colostomy bowel effects  No associated chest pain shortness of breath dizziness or vision changes.  Blood pressure well controlled  Objective   Vital Signs:  /80 (BP Location: Left arm, Patient Position: Sitting, Cuff Size: Adult)   Pulse 62   Ht 177.8 cm (70\")   Wt 72 kg (158 lb 12.8 oz)   SpO2 100%   BMI 22.79 kg/m²     BMI is within normal parameters. No other follow-up for BMI required.      Physical Exam  Vitals and nursing note reviewed.   Cardiovascular:      Rate and Rhythm: Normal rate and regular rhythm.      Pulses: Normal pulses.      Heart sounds: Normal heart sounds.   Pulmonary:      Effort: Pulmonary effort is normal.      Breath sounds: Normal breath sounds.   Chest:      Chest wall: Tenderness present.   Neurological:      Mental Status: He is alert.   Psychiatric:         Mood and Affect: Mood normal.         Behavior: Behavior normal.         Thought Content: Thought content normal.         Judgment: Judgment normal.        Result Review :    Common labs    Common Labsle 10/27/21 10/28/21 4/27/22   Glucose 99  97   BUN 25 (A)  16   Creatinine 1.10  1.17   eGFR Non African Am 64     Sodium 136  133 (A)   Potassium 4.8  4.3   Chloride 105  98   Calcium 9.4  9.1   WBC  6.82    Hemoglobin  13.0    Hematocrit  39.5    Platelets  106 (A)    (A) Abnormal value            Data reviewed: Radiologic studies May 2022 films fracture right 2 4          Assessment and Plan   Diagnoses and all orders for " this visit:    1. Lung nodule (Primary)  -     CT Chest Without Contrast; Future    2. Closed fracture of multiple ribs of right side with delayed healing  -     CT Chest Without Contrast; Future  -     HYDROcodone-acetaminophen (NORCO) 5-325 MG per tablet; Take 1 tablet by mouth Every 8 (Eight) Hours As Needed for Moderate Pain .  Dispense: 40 tablet; Refill: 0    3. Primary hypertension    Continue lisinopril for hypertension monitor with a goal less than 140/90  Follow-up results of testing for further delineation of ribs as well as lung nodule  Pain control with hydrocodone         Follow Up   Return in about 2 weeks (around 6/16/2022), or if symptoms worsen or fail to improve, for Recheck.  Patient was given instructions and counseling regarding his condition or for health maintenance advice. Please see specific information pulled into the AVS if appropriate.

## 2022-06-14 ENCOUNTER — PROCEDURE VISIT (OUTPATIENT)
Dept: SPORTS MEDICINE | Facility: CLINIC | Age: 82
End: 2022-06-14

## 2022-06-14 VITALS
RESPIRATION RATE: 16 BRPM | DIASTOLIC BLOOD PRESSURE: 70 MMHG | SYSTOLIC BLOOD PRESSURE: 120 MMHG | HEIGHT: 70 IN | BODY MASS INDEX: 22.62 KG/M2 | WEIGHT: 158 LBS | HEART RATE: 61 BPM | OXYGEN SATURATION: 99 % | TEMPERATURE: 98 F

## 2022-06-14 DIAGNOSIS — M17.0 BILATERAL PRIMARY OSTEOARTHRITIS OF KNEE: ICD-10-CM

## 2022-06-14 PROCEDURE — 20611 DRAIN/INJ JOINT/BURSA W/US: CPT | Performed by: FAMILY MEDICINE

## 2022-06-14 RX ORDER — SODIUM HYALURONATE INTRA-ARTICULAR SOLN PREF SYR 25 MG/2.5ML 25/2.5 MG/ML
SOLUTION PREFILLED SYRINGE INTRAARTICULAR
COMMUNITY
Start: 2022-06-06 | End: 2022-12-12 | Stop reason: HOSPADM

## 2022-06-14 NOTE — PROGRESS NOTES
- Large Joint Arthrocentesis: R knee on 6/14/2022 3:51 PM  Indications: pain  Details: 22 G needle, ultrasound-guided superolateral approach  Medications: 25 mg sodium hyaluronate 25 MG/2.5ML  Outcome: tolerated well, no immediate complications  Procedure, treatment alternatives, risks and benefits explained, specific risks discussed. Consent was given by the patient. Immediately prior to procedure a time out was called to verify the correct patient, procedure, equipment, support staff and site/side marked as required. Patient was prepped and draped in the usual sterile fashion.     - Large Joint Arthrocentesis: L knee on 6/14/2022 3:52 PM  Indications: pain  Details: 22 G needle, ultrasound-guided superolateral approach  Medications: 25 mg sodium hyaluronate 25 MG/2.5ML  Outcome: tolerated well, no immediate complications  Procedure, treatment alternatives, risks and benefits explained, specific risks discussed. Consent was given by the patient. Immediately prior to procedure a time out was called to verify the correct patient, procedure, equipment, support staff and site/side marked as required. Patient was prepped and draped in the usual sterile fashion.

## 2022-06-21 ENCOUNTER — PROCEDURE VISIT (OUTPATIENT)
Dept: SPORTS MEDICINE | Facility: CLINIC | Age: 82
End: 2022-06-21

## 2022-06-21 VITALS
OXYGEN SATURATION: 98 % | SYSTOLIC BLOOD PRESSURE: 115 MMHG | BODY MASS INDEX: 22.62 KG/M2 | HEIGHT: 70 IN | DIASTOLIC BLOOD PRESSURE: 60 MMHG | TEMPERATURE: 97.8 F | HEART RATE: 55 BPM | RESPIRATION RATE: 16 BRPM | WEIGHT: 158 LBS

## 2022-06-21 DIAGNOSIS — M17.0 BILATERAL PRIMARY OSTEOARTHRITIS OF KNEE: Primary | ICD-10-CM

## 2022-06-21 PROCEDURE — 20611 DRAIN/INJ JOINT/BURSA W/US: CPT | Performed by: FAMILY MEDICINE

## 2022-06-21 NOTE — PROGRESS NOTES
- Large Joint Arthrocentesis: R knee on 6/21/2022 4:05 PM  Indications: pain  Details: 22 G needle, ultrasound-guided superolateral approach  Medications: 25 mg sodium hyaluronate 25 MG/2.5ML  Outcome: tolerated well, no immediate complications  Procedure, treatment alternatives, risks and benefits explained, specific risks discussed. Consent was given by the patient. Immediately prior to procedure a time out was called to verify the correct patient, procedure, equipment, support staff and site/side marked as required. Patient was prepped and draped in the usual sterile fashion.     - Large Joint Arthrocentesis: L knee on 6/21/2022 4:05 PM  Indications: pain  Details: 22 G needle, ultrasound-guided superolateral approach  Medications: 25 mg sodium hyaluronate 25 MG/2.5ML  Outcome: tolerated well, no immediate complications  Procedure, treatment alternatives, risks and benefits explained, specific risks discussed. Consent was given by the patient. Immediately prior to procedure a time out was called to verify the correct patient, procedure, equipment, support staff and site/side marked as required. Patient was prepped and draped in the usual sterile fashion.

## 2022-06-27 ENCOUNTER — HOSPITAL ENCOUNTER (OUTPATIENT)
Dept: CT IMAGING | Facility: HOSPITAL | Age: 82
Discharge: HOME | End: 2022-06-27
Admitting: FAMILY MEDICINE

## 2022-06-27 DIAGNOSIS — R91.1 LUNG NODULE: ICD-10-CM

## 2022-06-27 DIAGNOSIS — S22.41XG CLOSED FRACTURE OF MULTIPLE RIBS OF RIGHT SIDE WITH DELAYED HEALING: ICD-10-CM

## 2022-06-27 PROCEDURE — 76377 3D RENDER W/INTRP POSTPROCES: CPT

## 2022-06-27 PROCEDURE — 71250 CT THORAX DX C-: CPT

## 2022-06-28 ENCOUNTER — PROCEDURE VISIT (OUTPATIENT)
Dept: SPORTS MEDICINE | Facility: CLINIC | Age: 82
End: 2022-06-28

## 2022-06-28 VITALS
SYSTOLIC BLOOD PRESSURE: 130 MMHG | OXYGEN SATURATION: 99 % | WEIGHT: 158 LBS | DIASTOLIC BLOOD PRESSURE: 80 MMHG | HEIGHT: 70 IN | HEART RATE: 62 BPM | TEMPERATURE: 97.8 F | RESPIRATION RATE: 16 BRPM | BODY MASS INDEX: 22.62 KG/M2

## 2022-06-28 DIAGNOSIS — M17.0 BILATERAL PRIMARY OSTEOARTHRITIS OF KNEE: Primary | ICD-10-CM

## 2022-06-28 PROCEDURE — 20611 DRAIN/INJ JOINT/BURSA W/US: CPT | Performed by: FAMILY MEDICINE

## 2022-06-28 NOTE — PROGRESS NOTES
- Large Joint Arthrocentesis: R knee on 6/28/2022 4:17 PM  Indications: pain  Details: 22 G needle, ultrasound-guided superolateral approach  Medications: 25 mg sodium hyaluronate 25 MG/2.5ML  Outcome: tolerated well, no immediate complications  Procedure, treatment alternatives, risks and benefits explained, specific risks discussed. Consent was given by the patient. Immediately prior to procedure a time out was called to verify the correct patient, procedure, equipment, support staff and site/side marked as required. Patient was prepped and draped in the usual sterile fashion.     - Large Joint Arthrocentesis: L knee on 6/28/2022 4:17 PM  Indications: pain  Details: 22 G needle, ultrasound-guided superolateral approach  Medications: 25 mg sodium hyaluronate 25 MG/2.5ML  Outcome: tolerated well, no immediate complications  Procedure, treatment alternatives, risks and benefits explained, specific risks discussed. Consent was given by the patient. Immediately prior to procedure a time out was called to verify the correct patient, procedure, equipment, support staff and site/side marked as required. Patient was prepped and draped in the usual sterile fashion.

## 2022-06-28 NOTE — PROGRESS NOTES
SUPARTZ FX PFS 25MG/2.5ML   DOS 06/21/2022 #2/3 B/L KNEE SUPARTZ INJECTIONS   NDC# [address]-1  LOT# [id number]  EXP 12/31/2024

## 2022-06-28 NOTE — PROGRESS NOTES
SUPARTZ FX PFS 25MG/2.5ML   DOS 06/28/2022 #3/3 B/L KNEE SUPARTZ INJECTIONS   NDC# [address]-6  LOT# [id number]  EXP 12/31/2024

## 2022-07-22 DIAGNOSIS — R35.1 NOCTURIA: ICD-10-CM

## 2022-07-22 RX ORDER — TAMSULOSIN HYDROCHLORIDE 0.4 MG/1
1 CAPSULE ORAL DAILY
Qty: 30 CAPSULE | Refills: 3 | Status: SHIPPED | OUTPATIENT
Start: 2022-07-22 | End: 2022-08-24 | Stop reason: SDUPTHER

## 2022-08-24 DIAGNOSIS — R35.1 NOCTURIA: ICD-10-CM

## 2022-08-24 RX ORDER — TAMSULOSIN HYDROCHLORIDE 0.4 MG/1
1 CAPSULE ORAL DAILY
Qty: 30 CAPSULE | Refills: 3 | Status: SHIPPED | OUTPATIENT
Start: 2022-08-24 | End: 2023-01-23 | Stop reason: SDUPTHER

## 2022-10-24 DIAGNOSIS — E03.9 ACQUIRED HYPOTHYROIDISM: ICD-10-CM

## 2022-10-24 RX ORDER — LEVOTHYROXINE SODIUM 0.12 MG/1
TABLET ORAL
Qty: 90 TABLET | Refills: 1 | Status: SHIPPED | OUTPATIENT
Start: 2022-10-24

## 2022-10-27 ENCOUNTER — OFFICE VISIT (OUTPATIENT)
Dept: INTERNAL MEDICINE | Facility: CLINIC | Age: 82
End: 2022-10-27

## 2022-10-27 VITALS
WEIGHT: 161.4 LBS | BODY MASS INDEX: 23.11 KG/M2 | SYSTOLIC BLOOD PRESSURE: 134 MMHG | OXYGEN SATURATION: 100 % | HEART RATE: 52 BPM | DIASTOLIC BLOOD PRESSURE: 76 MMHG | HEIGHT: 70 IN

## 2022-10-27 DIAGNOSIS — I10 PRIMARY HYPERTENSION: ICD-10-CM

## 2022-10-27 DIAGNOSIS — I10 PRIMARY HYPERTENSION: Primary | ICD-10-CM

## 2022-10-27 DIAGNOSIS — E03.9 ACQUIRED HYPOTHYROIDISM: ICD-10-CM

## 2022-10-27 PROCEDURE — G0009 ADMIN PNEUMOCOCCAL VACCINE: HCPCS | Performed by: FAMILY MEDICINE

## 2022-10-27 PROCEDURE — 99214 OFFICE O/P EST MOD 30 MIN: CPT | Performed by: FAMILY MEDICINE

## 2022-10-27 PROCEDURE — 90677 PCV20 VACCINE IM: CPT | Performed by: FAMILY MEDICINE

## 2022-10-27 NOTE — PROGRESS NOTES
"Chief Complaint  follow up to hypertension and follow up to hypothyroidism    Subjective        Rosas Christiansno presents to [facility]  History of Present Illness  Patient follows up for ongoing management of hypertension hypothyroidism he feels things are well no acute concerns no elevated blood pressure readings no chest pain shortness of breath or increased fatigue and no symptoms relatable to dysregulation of his thyroid  Objective   Vital Signs:  /76 (BP Location: Right arm, Patient Position: Sitting, Cuff Size: Adult)   Pulse 52   Ht 177.8 cm (70\")   Wt 73.2 kg (161 lb 6.4 oz)   SpO2 100%   BMI 23.16 kg/m²   Estimated body mass index is 23.16 kg/m² as calculated from the following:    Height as of this encounter: 177.8 cm (70\").    Weight as of this encounter: 73.2 kg (161 lb 6.4 oz).    BMI is within normal parameters. No other follow-up for BMI required.      Physical Exam  Vitals and nursing note reviewed.   Constitutional:       Appearance: Normal appearance.   HENT:      Head: Normocephalic and atraumatic.      Right Ear: External ear normal.      Left Ear: External ear normal.   Eyes:      General: No scleral icterus.  Cardiovascular:      Rate and Rhythm: Normal rate and regular rhythm.      Pulses: Normal pulses.      Heart sounds: Normal heart sounds.   Pulmonary:      Effort: Pulmonary effort is normal.      Breath sounds: Normal breath sounds.   Musculoskeletal:      Right lower leg: Edema present.      Left lower leg: Edema present.      Comments: Trace  Cane for walking   Neurological:      Mental Status: He is alert.   Psychiatric:         Mood and Affect: Mood normal.         Behavior: Behavior normal.         Thought Content: Thought content normal.         Judgment: Judgment normal.        Result Review :    Common labs    Common Labs 4/27/22   Glucose 97   BUN 16   Creatinine 1.17   Sodium 133 (A)   Potassium 4.3   Chloride 98   Calcium 9.1   (A) " Abnormal value                      Assessment and Plan   Diagnoses and all orders for this visit:    1. Primary hypertension (Primary)  -     Cancel: Basic metabolic panel; Future  -     Basic metabolic panel; Future    2. Acquired hypothyroidism    Other orders  -     Pneumococcal Conjugate Vaccine 20-Valent All    Hypothyroidism continue levothyroxine 125 mcg daily  Follow-up results of blood work otherwise as needed or       Follow Up   Return in about 6 months (around 4/27/2023), or if symptoms worsen or fail to improve, for Recheck.  Patient was given instructions and counseling regarding his condition or for health maintenance advice. Please see specific information pulled into the AVS if appropriate.

## 2022-10-28 LAB
BUN SERPL-MCNC: 23 MG/DL (ref 8–23)
BUN/CREAT SERPL: 20.7 (ref 7–25)
CALCIUM SERPL-MCNC: 9.2 MG/DL (ref 8.6–10.5)
CHLORIDE SERPL-SCNC: 102 MMOL/L (ref 98–107)
CO2 SERPL-SCNC: 25.4 MMOL/L (ref 22–29)
CREAT SERPL-MCNC: 1.11 MG/DL (ref 0.76–1.27)
EGFRCR SERPLBLD CKD-EPI 2021: 66.3 ML/MIN/1.73
GLUCOSE SERPL-MCNC: 97 MG/DL (ref 65–99)
POTASSIUM SERPL-SCNC: 4.9 MMOL/L (ref 3.5–5.2)
SODIUM SERPL-SCNC: 133 MMOL/L (ref 136–145)

## 2022-10-31 RX ORDER — CELECOXIB 200 MG/1
200 CAPSULE ORAL DAILY
Qty: 90 CAPSULE | Refills: 1 | Status: SHIPPED | OUTPATIENT
Start: 2022-10-31 | End: 2023-02-01 | Stop reason: SDUPTHER

## 2022-11-08 ENCOUNTER — OFFICE VISIT (OUTPATIENT)
Dept: ONCOLOGY | Facility: CLINIC | Age: 82
End: 2022-11-08

## 2022-11-08 ENCOUNTER — LAB (OUTPATIENT)
Dept: LAB | Facility: HOSPITAL | Age: 82
End: 2022-11-08

## 2022-11-08 VITALS
TEMPERATURE: 97.1 F | WEIGHT: 164.2 LBS | HEIGHT: 70 IN | HEART RATE: 57 BPM | RESPIRATION RATE: 16 BRPM | BODY MASS INDEX: 23.51 KG/M2 | OXYGEN SATURATION: 100 % | DIASTOLIC BLOOD PRESSURE: 81 MMHG | SYSTOLIC BLOOD PRESSURE: 150 MMHG

## 2022-11-08 DIAGNOSIS — D53.9 MACROCYTIC ANEMIA: Primary | ICD-10-CM

## 2022-11-08 DIAGNOSIS — R79.89 ELEVATED FERRITIN LEVEL: ICD-10-CM

## 2022-11-08 DIAGNOSIS — D69.6 THROMBOCYTOPENIA: ICD-10-CM

## 2022-11-08 LAB
BASOPHILS # BLD AUTO: 0.07 10*3/MM3 (ref 0–0.2)
BASOPHILS NFR BLD AUTO: 1.3 % (ref 0–1.5)
DEPRECATED RDW RBC AUTO: 47.7 FL (ref 37–54)
EOSINOPHIL # BLD AUTO: 0.21 10*3/MM3 (ref 0–0.4)
EOSINOPHIL NFR BLD AUTO: 3.9 % (ref 0.3–6.2)
ERYTHROCYTE [DISTWIDTH] IN BLOOD BY AUTOMATED COUNT: 12.9 % (ref 12.3–15.4)
FERRITIN SERPL-MCNC: 445.2 NG/ML (ref 30–400)
HCT VFR BLD AUTO: 35.9 % (ref 37.5–51)
HGB BLD-MCNC: 12.1 G/DL (ref 13–17.7)
HGB RETIC QN AUTO: 37.7 PG (ref 29.8–36.1)
IMM GRANULOCYTES # BLD AUTO: 0.01 10*3/MM3 (ref 0–0.05)
IMM GRANULOCYTES NFR BLD AUTO: 0.2 % (ref 0–0.5)
IMM RETICS NFR: 3.1 % (ref 3–15.8)
IRON 24H UR-MRATE: 106 MCG/DL (ref 59–158)
IRON SATN MFR SERPL: 39 % (ref 14–48)
LYMPHOCYTES # BLD AUTO: 1.03 10*3/MM3 (ref 0.7–3.1)
LYMPHOCYTES NFR BLD AUTO: 19.2 % (ref 19.6–45.3)
MCH RBC QN AUTO: 33.8 PG (ref 26.6–33)
MCHC RBC AUTO-ENTMCNC: 33.7 G/DL (ref 31.5–35.7)
MCV RBC AUTO: 100.3 FL (ref 79–97)
MONOCYTES # BLD AUTO: 0.74 10*3/MM3 (ref 0.1–0.9)
MONOCYTES NFR BLD AUTO: 13.8 % (ref 5–12)
NEUTROPHILS NFR BLD AUTO: 3.31 10*3/MM3 (ref 1.7–7)
NEUTROPHILS NFR BLD AUTO: 61.6 % (ref 42.7–76)
NRBC BLD AUTO-RTO: 0 /100 WBC (ref 0–0.2)
PLATELET # BLD AUTO: 113 10*3/MM3 (ref 140–450)
PMV BLD AUTO: 9.3 FL (ref 6–12)
RBC # BLD AUTO: 3.58 10*6/MM3 (ref 4.14–5.8)
RETICS # AUTO: 0.04 10*6/MM3 (ref 0.02–0.13)
RETICS/RBC NFR AUTO: 1.06 % (ref 0.7–1.9)
TIBC SERPL-MCNC: 270 MCG/DL (ref 249–505)
TRANSFERRIN SERPL-MCNC: 193 MG/DL (ref 200–360)
WBC NRBC COR # BLD: 5.37 10*3/MM3 (ref 3.4–10.8)

## 2022-11-08 PROCEDURE — 99214 OFFICE O/P EST MOD 30 MIN: CPT | Performed by: INTERNAL MEDICINE

## 2022-11-08 PROCEDURE — 85025 COMPLETE CBC W/AUTO DIFF WBC: CPT

## 2022-11-08 PROCEDURE — 84466 ASSAY OF TRANSFERRIN: CPT

## 2022-11-08 PROCEDURE — 83540 ASSAY OF IRON: CPT

## 2022-11-08 PROCEDURE — 36415 COLL VENOUS BLD VENIPUNCTURE: CPT

## 2022-11-08 PROCEDURE — 85046 RETICYTE/HGB CONCENTRATE: CPT | Performed by: INTERNAL MEDICINE

## 2022-11-08 PROCEDURE — 82728 ASSAY OF FERRITIN: CPT

## 2022-11-08 PROCEDURE — 82607 VITAMIN B-12: CPT | Performed by: INTERNAL MEDICINE

## 2022-11-08 NOTE — PROGRESS NOTES
Subjective     CHIEF COMPLAINT:      Chief Complaint   Patient presents with   • Annual Exam     No concerns     HISTORY OF PRESENT ILLNESS:     Rosas Christianson is a 82 y.o. male patient who returns today for follow up on his anemia and thrombocytopenia.  He returns today for follow-up reporting no problems with fatigue.  He is not noticing any blood in the stool.  He is taking vitamin B12 regularly.    Patient reports that he had a fall 2 months ago.  He did not have problem with bruising but he developed right-sided rib pain due to the fall.    ROS:  Pertinent ROS is in the HPI.     Past medical, surgical, social and family history were reviewed.     MEDICATIONS:    Current Outpatient Medications:   •  celecoxib (CeleBREX) 200 MG capsule, Take 1 capsule by mouth Daily., Disp: 90 capsule, Rfl: 1  •  Cholecalciferol (VITAMIN D3 PO), Take 1 each by mouth Daily., Disp: , Rfl:   •  glucosamine sulfate 500 MG capsule capsule, Take 2,000 mg by mouth Daily., Disp: , Rfl:   •  levothyroxine (SYNTHROID, LEVOTHROID) 125 MCG tablet, TAKE ONE TABLET BY MOUTH DAILY, Disp: 90 tablet, Rfl: 1  •  lisinopril (PRINIVIL,ZESTRIL) 5 MG tablet, Take 1 tablet by mouth Daily., Disp: 90 tablet, Rfl: 1  •  Multiple Vitamins-Minerals (MULTIVITAMIN ADULT PO), Take 1 tablet by mouth Daily., Disp: , Rfl:   •  Omega-3 Fatty Acids (fish oil) 1000 MG capsule capsule, Take 1,000 mg by mouth Daily With Breakfast., Disp: , Rfl:   •  Supartz FX 25 MG/2.5ML solution prefilled syringe, , Disp: , Rfl:   •  tamsulosin (FLOMAX) 0.4 MG capsule 24 hr capsule, Take 1 capsule by mouth Daily., Disp: 30 capsule, Rfl: 3  •  vitamin B-12 (CYANOCOBALAMIN) 1000 MCG tablet, Take 1 tablet by mouth Daily., Disp: , Rfl:   Objective     VITAL SIGNS:     Vitals:    11/08/22 1524   BP: 150/81   Pulse: 57   Resp: 16   Temp: 97.1 °F (36.2 °C)   TempSrc: Temporal   SpO2: 100%   Weight: 74.5 kg (164 lb 3.2 oz)   PainSc: 0-No pain     Body mass index is 23.56 kg/m².     Wt  Readings from Last 5 Encounters:   11/08/22 74.5 kg (164 lb 3.2 oz)   10/27/22 73.2 kg (161 lb 6.4 oz)   06/28/22 71.7 kg (158 lb)   06/21/22 71.7 kg (158 lb)   06/14/22 71.7 kg (158 lb)     PHYSICAL EXAMINATION:   GENERAL: The patient appears in good general condition, not in acute distress.   SKIN: No ecchymosis.  EYES: No jaundice. No pallor.  CHEST: Normal respiratory effort.  Lungs clear bilaterally.  No added sounds.  CVS: Normal S1-S2.  No murmurs.  ABDOMEN: Soft.  Colostomy in the left lower quadrant.  No tenderness. No Hepatomegaly. No Splenomegaly. No masses.    DIAGNOSTIC DATA:     Results from last 7 days   Lab Units 11/08/22  1407   WBC 10*3/mm3 5.37   NEUTROS ABS 10*3/mm3 3.31   HEMOGLOBIN g/dL 12.1*   HEMATOCRIT % 35.9*   PLATELETS 10*3/mm3 113*     Component      Latest Ref Rng & Units 11/8/2022   Immature Reticulocyte Fraction      3.0 - 15.8 % 3.1   Reticulocyte %      0.70 - 1.90 % 1.06   Reticulocyte Absolute      0.0200 - 0.1300 10*6/mm3 0.0378   Reticulated Hgb      29.8 - 36.1 pg 37.7 (H)         Lab 11/08/22  1407   IRON 106   IRON SATURATION 39   TIBC 270   TRANSFERRIN 193*   FERRITIN 445.20*        Assessment & Plan    *Macrocytic anemia.    · It is attributed to anemia of chronic disease.    · Hemoglobin decreased to 12.6 on 10/29/2020.  · With the patient's ulcerative colitis and prior colon surgery, he is at increased risk for decreased absorption of vitamin B12.  · Patient was started on vitamin B12 1000 mcg daily on 10/29/2020.  · Hemoglobin improved to 13.0 on 10/28/2021. MCV improved to 97.1.  · Vitamin B12 was 1260 and folate was >20.  · Hemoglobin decreased to 12.1 today.  · MCV increased to 100.3.  · He is taking vitamin B12 regularly.  · The etiology of the drop in the hemoglobin level is not clear.  No recent blood loss.  · No evidence of hemolysis based on his reticulocyte count of 1.06.    *Chronic thrombocytopenia.  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  · He had a  mildly elevated IPF.  · Celebrex may be contributing to his thrombocytopenia.  However, he needs the medicine due to his significant arthritis.  He usually develops significant worsening of his arthritis about 3 days after stopping Celebrex.  · Platelet count was 111,000 on 10/29/2020.  · Platelet count is 113,000 today.  · He is not having problem with bleeding.    *Elevated ferritin level.    · He had ferritin up to 450 in the past.    · However, transferrin saturation was less than 50%.    · This was attributed to acute phase reaction.  Hemochromatosis was considered unlikely.   · Ferritin was 630 and transferrin saturation was 45% on 10/28/2021.  · Ferritin decreased to 445 today.  Transferrin saturation is 39%.      PLAN:    1.  Continue vitamin B12 1000 mcg daily.  2.  Okay to continue Celebrex since platelet count is above 70,000.   3.  Follow-up in 6 months.  We will obtain CBC ferritin iron panel vitamin B12 and folate levels.        June Smiley MD  11/08/22

## 2022-11-09 LAB — VIT B12 BLD-MCNC: 805 PG/ML (ref 211–946)

## 2022-11-14 RX ORDER — LISINOPRIL 5 MG/1
TABLET ORAL
Qty: 90 TABLET | Refills: 1 | Status: SHIPPED | OUTPATIENT
Start: 2022-11-14

## 2022-11-29 ENCOUNTER — OFFICE VISIT (OUTPATIENT)
Dept: SPORTS MEDICINE | Facility: CLINIC | Age: 82
End: 2022-11-29

## 2022-11-29 VITALS
RESPIRATION RATE: 16 BRPM | WEIGHT: 164 LBS | HEART RATE: 58 BPM | DIASTOLIC BLOOD PRESSURE: 60 MMHG | BODY MASS INDEX: 23.48 KG/M2 | OXYGEN SATURATION: 99 % | SYSTOLIC BLOOD PRESSURE: 122 MMHG | TEMPERATURE: 98.2 F | HEIGHT: 70 IN

## 2022-11-29 DIAGNOSIS — M17.0 BILATERAL PRIMARY OSTEOARTHRITIS OF KNEE: Primary | ICD-10-CM

## 2022-11-29 PROCEDURE — 99213 OFFICE O/P EST LOW 20 MIN: CPT | Performed by: FAMILY MEDICINE

## 2022-11-29 NOTE — PROGRESS NOTES
"Rosas is a 82 y.o. year old male presents to [facility]    Chief Complaint   Patient presents with   • Osteoarthritis     F/u eval for B/L knee pain with OA - ambulating with a cane today, reports walking is becoming more difficult for him - here for further evaluation and treatment        History of Present Illness  Gel series completed June 2022.  This did provide him with relief for several months.  Pain is however recurred, does at times feel unstable on the knees.  Cannot safely bend down due to the pain.  He ambulates with a cane.  Is inquiring about cortisone injections though he also is interested in repeating gel series.    I have reviewed the patient's medical, family, and social history in detail and updated the computerized patient record.    /60 (BP Location: Left arm, Patient Position: Sitting, Cuff Size: Adult)   Pulse 58   Temp 98.2 °F (36.8 °C) (Temporal)   Resp 16   Ht 177.8 cm (70\")   Wt 74.4 kg (164 lb)   SpO2 99%   BMI 23.53 kg/m²      Physical Exam    Mask worn thru encounter  Vital signs reviewed.   General: No acute distress.  Eyes: conjunctiva clear; pupils equally round and reactive  ENT: external ears atraumatic  CV: no peripheral edema  Resp: normal respiratory effort, no use of accessory muscles  Skin: no rashes or wounds; normal turgor  Psych: mood and affect appropriate; recent and remote memory intact  Neuro: sensation to light touch intact    MSK Exam  Antalgic gait                 Diagnoses and all orders for this visit:    Bilateral primary osteoarthritis of knee  -     Visco Treatment; Future    Will plan to do CSI + gel #1. Discussed genicular nerve block as option. Not interested in surgery as he is primary caregiver for wife.      Follow Up   No follow-ups on file.  Patient was given instructions and counseling regarding his condition or for health maintenance advice. Please see specific information pulled into the AVS if " appropriate.     EMR Dragon/Transcription disclaimer:    Much of this encounter note is an electronic transcription/translation of spoken language to printed text.  The electronic translation of spoken language may permit erroneous, or at times, nonsensical words or phrases to be inadvertently transcribed.  Although I have reviewed the note for such errors some may still exist.

## 2022-12-08 ENCOUNTER — APPOINTMENT (OUTPATIENT)
Dept: GENERAL RADIOLOGY | Facility: HOSPITAL | Age: 82
End: 2022-12-08

## 2022-12-08 PROCEDURE — 73610 X-RAY EXAM OF ANKLE: CPT

## 2022-12-08 PROCEDURE — 73610 X-RAY EXAM OF ANKLE: CPT | Performed by: FAMILY MEDICINE

## 2022-12-12 ENCOUNTER — TELEPHONE (OUTPATIENT)
Dept: ORTHOPEDIC SURGERY | Facility: CLINIC | Age: 82
End: 2022-12-12

## 2022-12-12 ENCOUNTER — OFFICE VISIT (OUTPATIENT)
Dept: ORTHOPEDIC SURGERY | Facility: CLINIC | Age: 82
End: 2022-12-12

## 2022-12-12 ENCOUNTER — PATIENT ROUNDING (BHMG ONLY) (OUTPATIENT)
Dept: ORTHOPEDIC SURGERY | Facility: CLINIC | Age: 82
End: 2022-12-12

## 2022-12-12 VITALS — HEIGHT: 70 IN | WEIGHT: 170 LBS | BODY MASS INDEX: 24.34 KG/M2

## 2022-12-12 DIAGNOSIS — S82.55XA CLOSED NONDISPLACED FRACTURE OF MEDIAL MALLEOLUS OF LEFT TIBIA, INITIAL ENCOUNTER: ICD-10-CM

## 2022-12-12 DIAGNOSIS — S82.65XA CLOSED NONDISPLACED FRACTURE OF LATERAL MALLEOLUS OF LEFT FIBULA, INITIAL ENCOUNTER: Primary | ICD-10-CM

## 2022-12-12 PROCEDURE — 99214 OFFICE O/P EST MOD 30 MIN: CPT | Performed by: NURSE PRACTITIONER
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
## 2022-12-12 NOTE — PROGRESS NOTES
A [employer] Message has been sent to the patient for PATIENT ROUNDING with [facility] [place]

## 2022-12-19 ENCOUNTER — OFFICE VISIT (OUTPATIENT)
Dept: ORTHOPEDIC SURGERY | Facility: CLINIC | Age: 82
End: 2022-12-19

## 2022-12-19 VITALS — HEIGHT: 70 IN | WEIGHT: 170 LBS | BODY MASS INDEX: 24.34 KG/M2 | TEMPERATURE: 97.5 F

## 2022-12-19 DIAGNOSIS — S82.65XA CLOSED NONDISPLACED FRACTURE OF LATERAL MALLEOLUS OF LEFT FIBULA, INITIAL ENCOUNTER: ICD-10-CM

## 2022-12-19 DIAGNOSIS — S82.55XA CLOSED NONDISPLACED FRACTURE OF MEDIAL MALLEOLUS OF LEFT TIBIA, INITIAL ENCOUNTER: ICD-10-CM

## 2022-12-19 DIAGNOSIS — R52 PAIN: Primary | ICD-10-CM

## 2022-12-19 PROCEDURE — 73610 X-RAY EXAM OF ANKLE: CPT | Performed by: ORTHOPAEDIC SURGERY

## 2022-12-19 PROCEDURE — 99214 OFFICE O/P EST MOD 30 MIN: CPT | Performed by: ORTHOPAEDIC SURGERY
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
## 2023-01-19 ENCOUNTER — OFFICE VISIT (OUTPATIENT)
Dept: ORTHOPEDIC SURGERY | Facility: CLINIC | Age: 83
End: 2023-01-19
Payer: MEDICARE

## 2023-01-19 VITALS — TEMPERATURE: 97.1 F | HEIGHT: 70 IN | WEIGHT: 159 LBS | BODY MASS INDEX: 22.76 KG/M2

## 2023-01-19 DIAGNOSIS — S82.65XD CLOSED NONDISPLACED FRACTURE OF LATERAL MALLEOLUS OF LEFT FIBULA WITH ROUTINE HEALING, SUBSEQUENT ENCOUNTER: Primary | ICD-10-CM

## 2023-01-19 DIAGNOSIS — S82.55XD CLOSED NONDISPLACED FRACTURE OF MEDIAL MALLEOLUS OF LEFT TIBIA WITH ROUTINE HEALING, SUBSEQUENT ENCOUNTER: ICD-10-CM

## 2023-01-19 PROCEDURE — 73610 X-RAY EXAM OF ANKLE: CPT | Performed by: ORTHOPAEDIC SURGERY

## 2023-01-19 PROCEDURE — 99213 OFFICE O/P EST LOW 20 MIN: CPT | Performed by: ORTHOPAEDIC SURGERY
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
## 2023-01-23 DIAGNOSIS — R35.1 NOCTURIA: ICD-10-CM

## 2023-01-25 RX ORDER — TAMSULOSIN HYDROCHLORIDE 0.4 MG/1
1 CAPSULE ORAL DAILY
Qty: 30 CAPSULE | Refills: 3 | Status: SHIPPED | OUTPATIENT
Start: 2023-01-25

## 2023-02-01 RX ORDER — CELECOXIB 200 MG/1
200 CAPSULE ORAL DAILY
Qty: 90 CAPSULE | Refills: 1 | Status: SHIPPED | OUTPATIENT
Start: 2023-02-01

## 2023-02-09 ENCOUNTER — OFFICE VISIT (OUTPATIENT)
Dept: ORTHOPEDIC SURGERY | Facility: CLINIC | Age: 83
End: 2023-02-09
Payer: MEDICARE

## 2023-02-09 VITALS — WEIGHT: 159 LBS | TEMPERATURE: 97.1 F | BODY MASS INDEX: 22.76 KG/M2 | HEIGHT: 70 IN

## 2023-02-09 DIAGNOSIS — R52 PAIN: Primary | ICD-10-CM

## 2023-02-09 DIAGNOSIS — S82.55XD CLOSED NONDISPLACED FRACTURE OF MEDIAL MALLEOLUS OF LEFT TIBIA WITH ROUTINE HEALING, SUBSEQUENT ENCOUNTER: ICD-10-CM

## 2023-02-09 DIAGNOSIS — S82.65XD CLOSED NONDISPLACED FRACTURE OF LATERAL MALLEOLUS OF LEFT FIBULA WITH ROUTINE HEALING, SUBSEQUENT ENCOUNTER: ICD-10-CM

## 2023-02-09 PROCEDURE — 73610 X-RAY EXAM OF ANKLE: CPT | Performed by: ORTHOPAEDIC SURGERY

## 2023-02-09 PROCEDURE — 99213 OFFICE O/P EST LOW 20 MIN: CPT | Performed by: ORTHOPAEDIC SURGERY
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
## 2023-02-27 ENCOUNTER — PROCEDURE VISIT (OUTPATIENT)
Dept: SPORTS MEDICINE | Facility: CLINIC | Age: 83
End: 2023-02-27
Payer: MEDICARE

## 2023-02-27 VITALS
BODY MASS INDEX: 21.47 KG/M2 | DIASTOLIC BLOOD PRESSURE: 70 MMHG | TEMPERATURE: 98 F | RESPIRATION RATE: 16 BRPM | HEIGHT: 70 IN | OXYGEN SATURATION: 96 % | SYSTOLIC BLOOD PRESSURE: 122 MMHG | WEIGHT: 150 LBS | HEART RATE: 53 BPM

## 2023-02-27 DIAGNOSIS — M17.0 BILATERAL PRIMARY OSTEOARTHRITIS OF KNEE: Primary | ICD-10-CM

## 2023-02-27 PROCEDURE — 20610 DRAIN/INJ JOINT/BURSA W/O US: CPT | Performed by: FAMILY MEDICINE

## 2023-02-27 NOTE — PROGRESS NOTES
Large Joint Arthrocentesis: R knee  Date/Time: 2/27/2023 12:18 PM  Consent given by: patient  Site marked: site marked  Timeout: Immediately prior to procedure a time out was called to verify the correct patient, procedure, equipment, support staff and site/side marked as required   Supporting Documentation  Indications: pain   Procedure Details  Location: knee - R knee  Preparation: Patient was prepped and draped in the usual sterile fashion  Needle size: 22 G  Approach: anterolateral  Medications administered: 25 mg sodium hyaluronate 25 MG/2.5ML  Patient tolerance: patient tolerated the procedure well with no immediate complications    Large Joint Arthrocentesis: L knee  Date/Time: 2/27/2023 12:18 PM  Consent given by: patient  Site marked: site marked  Timeout: Immediately prior to procedure a time out was called to verify the correct patient, procedure, equipment, support staff and site/side marked as required   Supporting Documentation  Indications: pain   Procedure Details  Location: knee - L knee  Preparation: Patient was prepped and draped in the usual sterile fashion  Needle size: 22 G  Approach: anterolateral  Medications administered: 25 mg sodium hyaluronate 25 MG/2.5ML  Patient tolerance: patient tolerated the procedure well with no immediate complications

## 2023-03-02 ENCOUNTER — OFFICE VISIT (OUTPATIENT)
Dept: ORTHOPEDIC SURGERY | Facility: CLINIC | Age: 83
End: 2023-03-02
Payer: MEDICARE

## 2023-03-02 VITALS — WEIGHT: 150 LBS | TEMPERATURE: 97.1 F | BODY MASS INDEX: 21.47 KG/M2 | HEIGHT: 70 IN

## 2023-03-02 DIAGNOSIS — S82.65XD CLOSED NONDISPLACED FRACTURE OF LATERAL MALLEOLUS OF LEFT FIBULA WITH ROUTINE HEALING, SUBSEQUENT ENCOUNTER: Primary | ICD-10-CM

## 2023-03-02 DIAGNOSIS — R52 PAIN: ICD-10-CM

## 2023-03-02 DIAGNOSIS — S82.55XD CLOSED NONDISPLACED FRACTURE OF MEDIAL MALLEOLUS OF LEFT TIBIA WITH ROUTINE HEALING, SUBSEQUENT ENCOUNTER: ICD-10-CM

## 2023-03-02 PROCEDURE — 99213 OFFICE O/P EST LOW 20 MIN: CPT | Performed by: ORTHOPAEDIC SURGERY

## 2023-03-02 PROCEDURE — 73610 X-RAY EXAM OF ANKLE: CPT | Performed by: ORTHOPAEDIC SURGERY
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
## 2023-03-06 ENCOUNTER — PROCEDURE VISIT (OUTPATIENT)
Dept: SPORTS MEDICINE | Facility: CLINIC | Age: 83
End: 2023-03-06
Payer: MEDICARE

## 2023-03-06 VITALS
SYSTOLIC BLOOD PRESSURE: 100 MMHG | WEIGHT: 150 LBS | OXYGEN SATURATION: 98 % | BODY MASS INDEX: 21.47 KG/M2 | TEMPERATURE: 98.5 F | DIASTOLIC BLOOD PRESSURE: 68 MMHG | HEART RATE: 66 BPM | HEIGHT: 70 IN

## 2023-03-06 DIAGNOSIS — M17.0 BILATERAL PRIMARY OSTEOARTHRITIS OF KNEE: Primary | ICD-10-CM

## 2023-03-06 PROCEDURE — 20610 DRAIN/INJ JOINT/BURSA W/O US: CPT | Performed by: FAMILY MEDICINE

## 2023-03-06 NOTE — PROGRESS NOTES
Large Joint Arthrocentesis: L knee  Date/Time: 3/6/2023 11:03 AM  Consent given by: patient  Site marked: site marked  Timeout: Immediately prior to procedure a time out was called to verify the correct patient, procedure, equipment, support staff and site/side marked as required   Supporting Documentation  Indications: pain   Procedure Details  Location: knee - L knee  Preparation: Patient was prepped and draped in the usual sterile fashion  Needle size: 22 G  Approach: anterolateral  Medications administered: 25 mg sodium hyaluronate 25 MG/2.5ML  Patient tolerance: patient tolerated the procedure well with no immediate complications    Large Joint Arthrocentesis: R knee  Date/Time: 3/6/2023 11:03 AM  Consent given by: patient  Site marked: site marked  Timeout: Immediately prior to procedure a time out was called to verify the correct patient, procedure, equipment, support staff and site/side marked as required   Supporting Documentation  Indications: pain   Procedure Details  Location: knee - R knee  Preparation: Patient was prepped and draped in the usual sterile fashion  Needle size: 22 G  Approach: anterolateral  Medications administered: 25 mg sodium hyaluronate 25 MG/2.5ML  Patient tolerance: patient tolerated the procedure well with no immediate complications

## 2023-03-13 ENCOUNTER — PROCEDURE VISIT (OUTPATIENT)
Dept: SPORTS MEDICINE | Facility: CLINIC | Age: 83
End: 2023-03-13
Payer: MEDICARE

## 2023-03-13 VITALS
SYSTOLIC BLOOD PRESSURE: 122 MMHG | HEART RATE: 74 BPM | WEIGHT: 150 LBS | TEMPERATURE: 98.4 F | OXYGEN SATURATION: 98 % | HEIGHT: 70 IN | BODY MASS INDEX: 21.47 KG/M2 | RESPIRATION RATE: 16 BRPM | DIASTOLIC BLOOD PRESSURE: 66 MMHG

## 2023-03-13 DIAGNOSIS — M17.0 BILATERAL PRIMARY OSTEOARTHRITIS OF KNEE: ICD-10-CM

## 2023-03-13 PROCEDURE — 20610 DRAIN/INJ JOINT/BURSA W/O US: CPT | Performed by: FAMILY MEDICINE

## 2023-03-13 NOTE — PROGRESS NOTES
Large Joint Arthrocentesis: L knee  Date/Time: 3/13/2023 12:54 PM  Consent given by: patient  Timeout: Immediately prior to procedure a time out was called to verify the correct patient, procedure, equipment, support staff and site/side marked as required   Supporting Documentation  Indications: pain   Procedure Details  Location: knee - L knee  Needle size: 22 G  Approach: anterolateral  Medications administered: 25 mg sodium hyaluronate 25 MG/2.5ML  Patient tolerance: patient tolerated the procedure well with no immediate complications    Large Joint Arthrocentesis: R knee  Date/Time: 3/13/2023 12:54 PM  Consent given by: patient  Timeout: Immediately prior to procedure a time out was called to verify the correct patient, procedure, equipment, support staff and site/side marked as required   Supporting Documentation  Indications: pain   Procedure Details  Location: knee - R knee  Needle size: 22 G  Approach: anterolateral  Medications administered: 25 mg sodium hyaluronate 25 MG/2.5ML  Patient tolerance: patient tolerated the procedure well with no immediate complications

## 2023-03-23 ENCOUNTER — OFFICE VISIT (OUTPATIENT)
Dept: ORTHOPEDIC SURGERY | Facility: CLINIC | Age: 83
End: 2023-03-23
Payer: MEDICARE

## 2023-03-23 VITALS — TEMPERATURE: 97.6 F | HEIGHT: 70 IN | WEIGHT: 164 LBS | BODY MASS INDEX: 23.48 KG/M2

## 2023-03-23 DIAGNOSIS — S82.65XD CLOSED NONDISPLACED FRACTURE OF LATERAL MALLEOLUS OF LEFT FIBULA WITH ROUTINE HEALING, SUBSEQUENT ENCOUNTER: ICD-10-CM

## 2023-03-23 DIAGNOSIS — S82.55XD CLOSED NONDISPLACED FRACTURE OF MEDIAL MALLEOLUS OF LEFT TIBIA WITH ROUTINE HEALING, SUBSEQUENT ENCOUNTER: ICD-10-CM

## 2023-03-23 DIAGNOSIS — R52 PAIN: Primary | ICD-10-CM

## 2023-03-23 PROCEDURE — 73610 X-RAY EXAM OF ANKLE: CPT | Performed by: ORTHOPAEDIC SURGERY

## 2023-03-23 PROCEDURE — 99213 OFFICE O/P EST LOW 20 MIN: CPT | Performed by: ORTHOPAEDIC SURGERY
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
## 2023-04-19 DIAGNOSIS — E03.9 ACQUIRED HYPOTHYROIDISM: ICD-10-CM

## 2023-04-19 RX ORDER — LEVOTHYROXINE SODIUM 0.12 MG/1
TABLET ORAL
Qty: 90 TABLET | Refills: 1 | Status: SHIPPED | OUTPATIENT
Start: 2023-04-19

## 2023-04-27 DIAGNOSIS — E03.9 ACQUIRED HYPOTHYROIDISM: ICD-10-CM

## 2023-04-28 ENCOUNTER — OFFICE VISIT (OUTPATIENT)
Dept: INTERNAL MEDICINE | Facility: CLINIC | Age: 83
End: 2023-04-28
Payer: MEDICARE

## 2023-04-28 VITALS
WEIGHT: 165.9 LBS | HEIGHT: 70 IN | DIASTOLIC BLOOD PRESSURE: 62 MMHG | OXYGEN SATURATION: 99 % | RESPIRATION RATE: 16 BRPM | BODY MASS INDEX: 23.75 KG/M2 | SYSTOLIC BLOOD PRESSURE: 98 MMHG | HEART RATE: 83 BPM

## 2023-04-28 DIAGNOSIS — Z00.00 MEDICARE ANNUAL WELLNESS VISIT, SUBSEQUENT: Primary | ICD-10-CM

## 2023-04-28 DIAGNOSIS — I10 PRIMARY HYPERTENSION: ICD-10-CM

## 2023-04-28 DIAGNOSIS — E03.9 ACQUIRED HYPOTHYROIDISM: ICD-10-CM

## 2023-04-28 DIAGNOSIS — F41.9 ANXIETY: ICD-10-CM

## 2023-04-28 DIAGNOSIS — M17.0 PRIMARY OSTEOARTHRITIS OF BOTH KNEES: ICD-10-CM

## 2023-04-28 RX ORDER — LEVOTHYROXINE SODIUM 0.12 MG/1
125 TABLET ORAL DAILY
Qty: 90 TABLET | Refills: 1 | Status: SHIPPED | OUTPATIENT
Start: 2023-04-28

## 2023-04-28 RX ORDER — SERTRALINE HYDROCHLORIDE 25 MG/1
25 TABLET, FILM COATED ORAL DAILY
Qty: 30 TABLET | Refills: 2 | Status: SHIPPED | OUTPATIENT
Start: 2023-04-28

## 2023-04-28 RX ORDER — DICLOFENAC SODIUM 30 MG/G
GEL TOPICAL 2 TIMES DAILY
Qty: 100 G | Refills: 0 | Status: SHIPPED | OUTPATIENT
Start: 2023-04-28

## 2023-04-28 NOTE — PROGRESS NOTES
"Chief Complaint  Anxiety, Hypothyroidism, and Hypertension    Subjective        Rosas Christianson presents to [facility]  History of Present Illness  Patient follows up for ongoing management of chronic medical problems arthritis bilateral knee pain failed Synvisc injection with orthopedics using Celebrex as anti-inflammatory  Also has underlying hypothyroidism needs laboratory monitoring for therapeutic maintenance  Hypertension has had some low blood pressure readings he does not check at home very often but is under increased stress he does not drink as much fluid as he should. his wife is worsening dementia.  He has increased anxiety increased stress he has never taken a anti-inflammatory in the past he is going to ask trial some medication to help with emotional responses and fluctuating mood  Objective   Vital Signs:  BP 98/62 (BP Location: Left arm, Patient Position: Sitting, Cuff Size: Adult)   Pulse 83   Resp 16   Ht 177.8 cm (70\")   Wt 75.3 kg (165 lb 14.4 oz)   SpO2 99%   BMI 23.80 kg/m²   Estimated body mass index is 23.8 kg/m² as calculated from the following:    Height as of this encounter: 177.8 cm (70\").    Weight as of this encounter: 75.3 kg (165 lb 14.4 oz).       BMI is within normal parameters. No other follow-up for BMI required.      Physical Exam  Vitals and nursing note reviewed.   Constitutional:       Appearance: Normal appearance.   HENT:      Head: Normocephalic and atraumatic.      Right Ear: External ear normal.      Left Ear: External ear normal.   Eyes:      General: No scleral icterus.  Cardiovascular:      Rate and Rhythm: Normal rate.      Pulses: Normal pulses.   Pulmonary:      Effort: Pulmonary effort is normal.   Abdominal:      Tenderness: There is no right CVA tenderness.   Musculoskeletal:      Right lower leg: Edema present.      Left lower leg: Edema present.      Comments: Trace   Neurological:      Mental Status: He is alert. "   Psychiatric:         Mood and Affect: Mood normal.         Behavior: Behavior normal.         Thought Content: Thought content normal.         Judgment: Judgment normal.        Result Review :    Common labs        10/27/2022    08:59 11/8/2022    14:07   Common Labs   Glucose 97      BUN 23      Creatinine 1.11      Sodium 133      Potassium 4.9      Chloride 102      Calcium 9.2      WBC  5.37     Hemoglobin  12.1     Hematocrit  35.9     Platelets  113                    Assessment and Plan   Diagnoses and all orders for this visit:        1. Acquired hypothyroidism  -     TSH    2. Primary hypertension  -     Comprehensive Metabolic Panel    3. Anxiety  -     sertraline (Zoloft) 25 MG tablet; Take 1 tablet by mouth Daily.  Dispense: 30 tablet; Refill: 2    4. Primary osteoarthritis of both knees  -     Diclofenac Sodium 3 % gel gel; Apply  topically to the appropriate area as directed 2 (Two) Times a Day. for 60 days. Knees  Dispense: 100 g; Refill: 0    Initiate sertraline  Initiate diclofenac gel         Follow Up   Return in about 1 month (around 5/28/2023), or if symptoms worsen or fail to improve, for Recheck.  Patient was given instructions and counseling regarding his condition or for health maintenance advice. Please see specific information pulled into the AVS if appropriate.

## 2023-04-28 NOTE — PROGRESS NOTES
The ABCs of the Annual Wellness Visit  Subsequent Medicare Wellness Visit    Chief Complaint   Patient presents with   • Medicare Wellness-subsequent      Subjective    History of Present Illness:  Rosas Christianson is a 83 y.o. male who presents for a Subsequent Medicare Wellness Visit.    The following portions of the patient's history were reviewed and   updated as appropriate: allergies, current medications, past family history, past medical history, past social history, past surgical history and problem list.     Compared to one year ago, the patient feels his physical   health is the same.    Compared to one year ago, the patient feels his mental   health is worse.    Recent Hospitalizations:  He was not admitted to the hospital during the last year.       Current Medical Providers:  Patient Care Team:  Rashid Klein MD as PCP - General (Family Medicine)  June Smiley MD as Consulting Physician (Hematology and Oncology)  Mann Bee MD as Referring Physician (Gastroenterology)  Garfield Santacruz MD as Consulting Physician (Dermatology)    Outpatient Medications Prior to Visit   Medication Sig Dispense Refill   • celecoxib (CeleBREX) 200 MG capsule Take 1 capsule by mouth Daily. 90 capsule 1   • Cholecalciferol (VITAMIN D3 PO) Take 1 each by mouth Daily.     • glucosamine sulfate 500 MG capsule capsule Take 4 capsules by mouth Daily.     • Multiple Vitamins-Minerals (MULTIVITAMIN ADULT PO) Take 1 tablet by mouth Daily.     • Omega-3 Fatty Acids (fish oil) 1000 MG capsule capsule Take 1 capsule by mouth Daily With Breakfast.     • tamsulosin (FLOMAX) 0.4 MG capsule 24 hr capsule Take 1 capsule by mouth Daily. 30 capsule 3   • levothyroxine (SYNTHROID, LEVOTHROID) 125 MCG tablet TAKE ONE TABLET BY MOUTH DAILY 90 tablet 1   • lisinopril (PRINIVIL,ZESTRIL) 5 MG tablet TAKE ONE TABLET BY MOUTH DAILY 90 tablet 1     No facility-administered medications prior to visit.       No opioid medication  "identified on active medication list. I have reviewed chart for other potential  high risk medication/s and harmful drug interactions in the elderly.          Aspirin is not on active medication list.  Aspirin use is not indicated based on review of current medical condition/s. Risk of harm outweighs potential benefits.  .    Patient Active Problem List   Diagnosis   • Post-traumatic osteoarthritis of multiple joints   • Esophageal reflux   • Hypertension   • Hypothyroidism   • Neuropathy   • Ulcerative colitis   • H/O colectomy   • Anemia of chronic disease   • Thrombocytopenia   • History of frostbite   • Arthritis   • Presence of colostomy   • Health care maintenance   • Bilateral foot pain   • ERRONEOUS ENCOUNTER--DISREGARD   • Medicare annual wellness visit, subsequent   • Primary osteoarthritis of both knees   • Lung nodule   • Closed fracture of multiple ribs of right side with delayed healing   • Anxiety     Advance Care Planning   Advance Directive is not on file.  ACP discussion was held with the patient during this visit. Patient does not have an advance directive, information provided.          Objective       Vitals:    23 0805   BP: 98/62   BP Location: Left arm   Patient Position: Sitting   Cuff Size: Adult   Pulse: 83   Resp: 16   SpO2: 99%   Weight: 75.3 kg (165 lb 14.4 oz)   Height: 177.8 cm (70\")     BMI Readings from Last 1 Encounters:   23 23.80 kg/m²   BMI is within normal parameters. No follow-up required.    Does the patient have evidence of cognitive impairment? No    Physical Exam            HEALTH RISK ASSESSMENT    Smoking Status:  Social History     Tobacco Use   Smoking Status Former   • Packs/day: 3.00   • Years: 33.00   • Pack years: 99.00   • Types: Cigarettes   • Start date: 1956   • Quit date: 1985   • Years since quittin.0   Smokeless Tobacco Never   Tobacco Comments    parents gave them to me     Alcohol Consumption:  Social History     Substance and " Sexual Activity   Alcohol Use Not Currently    Comment: rare     Fall Risk Screen:    LUPE Fall Risk Assessment has not been completed.    Depression Screenin/28/2023     8:08 AM   PHQ-2/PHQ-9 Depression Screening   Little Interest or Pleasure in Doing Things 0-->not at all   Feeling Down, Depressed or Hopeless 0-->not at all   Trouble Falling or Staying Asleep, or Sleeping Too Much 0-->not at all   Feeling Tired or Having Little Energy 0-->not at all   Poor Appetite or Overeating 0-->not at all   Feeling Bad about Yourself - or that You are a Failure or Have Let Yourself or Your Family Down 0-->not at all   Trouble Concentrating on Things, Such as Reading the Newspaper or Watching Television 0-->not at all   Moving or Speaking So Slowly that Other People Could Have Noticed? Or the Opposite - Being So Fidgety 0-->not at all   Thoughts that You Would be Better Off Dead or of Hurting Yourself in Some Way 0-->not at all   PHQ-9: Brief Depression Severity Measure Score 0   If You Checked Off Any Problems, How Difficult Have These Problems Made It For You to Do Your Work, Take Care of Things at Home, or Get Along with Other People? not difficult at all       Health Habits and Functional and Cognitive Screenin/28/2023     8:00 AM   Functional & Cognitive Status   Do you have difficulty preparing food and eating? No   Do you have difficulty bathing yourself, getting dressed or grooming yourself? No   Do you have difficulty using the toilet? No   Do you have difficulty moving around from place to place? Yes   Do you have trouble with steps or getting out of a bed or a chair? Yes   Current Diet Well Balanced Diet   Dental Exam Other        Dental Exam Comment has dentures   Eye Exam Up to date   Exercise (times per week) 0 times per week   Current Exercises Include No Regular Exercise   Do you need help using the phone?  No   Are you deaf or do you have serious difficulty hearing?  No   Do you need help  with transportation? No   Do you need help shopping? No   Do you need help preparing meals?  No   Do you need help with housework?  No   Do you need help with laundry? No   Do you need help taking your medications? No   Do you need help managing money? No   Do you ever drive or ride in a car without wearing a seat belt? No   Have you felt unusual stress, anger or loneliness in the last month? No   Who do you live with? Spouse   If you need help, do you have trouble finding someone available to you? No   Have you been bothered in the last four weeks by sexual problems? No   Do you have difficulty concentrating, remembering or making decisions? No       Age-appropriate Screening Schedule:  Refer to the list below for future screening recommendations based on patient's age, sex and/or medical conditions. Orders for these recommended tests are listed in the plan section. The patient has been provided with a written plan.    Health Maintenance   Topic Date Due   • ZOSTER VACCINE (2 of 2) 12/19/2016   • COVID-19 Vaccine (5 - Booster for Pfizer series) 09/19/2022   • ANNUAL WELLNESS VISIT  04/27/2023   • INFLUENZA VACCINE  08/01/2023   • TDAP/TD VACCINES (2 - Td or Tdap) 10/24/2026   • Pneumococcal Vaccine 65+  Completed              Assessment & Plan     CMS Preventative Services Quick Reference  Risk Factors Identified During Encounter  Chronic Pain: NSAIDs per medication orders.  ortho failed gel injection to knees  Immunizations Discussed/Encouraged: Shingrix  The above risks/problems have been discussed with the patient.  Follow up actions/plans if indicated are seen below in the Assessment/Plan Section.  Pertinent information has been shared with the patient in the After Visit Summary.    Diagnoses and all orders for this visit:    1. Medicare annual wellness visit, subsequent (Primary)      Follow Up:   Return in about 1 month (around 5/28/2023), or if symptoms worsen or fail to improve, for Recheck.     An After  Visit Summary and PPPS were given to the patient.  Ongoing  management of chronic medical problems.

## 2023-04-29 LAB
ALBUMIN SERPL-MCNC: 3.5 G/DL (ref 3.5–5.2)
ALBUMIN/GLOB SERPL: 1.1 G/DL
ALP SERPL-CCNC: 93 U/L (ref 39–117)
ALT SERPL-CCNC: 19 U/L (ref 1–41)
AST SERPL-CCNC: 25 U/L (ref 1–40)
BILIRUB SERPL-MCNC: 0.4 MG/DL (ref 0–1.2)
BUN SERPL-MCNC: 38 MG/DL (ref 8–23)
BUN/CREAT SERPL: 30.6 (ref 7–25)
CALCIUM SERPL-MCNC: 9.9 MG/DL (ref 8.6–10.5)
CHLORIDE SERPL-SCNC: 104 MMOL/L (ref 98–107)
CO2 SERPL-SCNC: 25.4 MMOL/L (ref 22–29)
CREAT SERPL-MCNC: 1.24 MG/DL (ref 0.76–1.27)
EGFRCR SERPLBLD CKD-EPI 2021: 57.7 ML/MIN/1.73
GLOBULIN SER CALC-MCNC: 3.1 GM/DL
GLUCOSE SERPL-MCNC: 85 MG/DL (ref 65–99)
POTASSIUM SERPL-SCNC: 5.9 MMOL/L (ref 3.5–5.2)
PROT SERPL-MCNC: 6.6 G/DL (ref 6–8.5)
SODIUM SERPL-SCNC: 137 MMOL/L (ref 136–145)
TSH SERPL DL<=0.005 MIU/L-ACNC: 3.16 UIU/ML (ref 0.27–4.2)

## 2023-05-03 RX ORDER — CELECOXIB 200 MG/1
200 CAPSULE ORAL DAILY
Qty: 90 CAPSULE | Refills: 1 | Status: SHIPPED | OUTPATIENT
Start: 2023-05-03

## 2023-05-08 ENCOUNTER — TELEPHONE (OUTPATIENT)
Dept: INTERNAL MEDICINE | Facility: CLINIC | Age: 83
End: 2023-05-08

## 2023-05-08 DIAGNOSIS — M17.0 PRIMARY OSTEOARTHRITIS OF BOTH KNEES: ICD-10-CM

## 2023-05-08 NOTE — TELEPHONE ENCOUNTER
Hub staff attempted to follow warm transfer process and was unsuccessful     Caller: Rosas Christianson    Relationship to patient: Self    Best call back number: [phone]    Patient is needing: PATIENT IS CONFUSED AS TO WHETHER HE IS TO MAKE APPOINTMENT FOR BLOOD WORK OR NOT. HE WAS LAST SEEN IN OFFICE ON 04/28/23, ADVISED TO MAKE FOLLOW UP APPOINTMENT, POSSIBLY FOR LABS.     PLEASE CALL TO ADVISE

## 2023-05-09 NOTE — TELEPHONE ENCOUNTER
No he does not, he just comes in and has the lab drawn.  I tried to call pt, no ans. Hub can relay.

## 2023-05-10 RX ORDER — DICLOFENAC SODIUM 30 MG/G
GEL TOPICAL 2 TIMES DAILY
Qty: 100 G | Refills: 0 | OUTPATIENT
Start: 2023-05-10

## 2023-05-11 RX ORDER — LISINOPRIL 5 MG/1
TABLET ORAL
Qty: 90 TABLET | Refills: 0 | Status: SHIPPED | OUTPATIENT
Start: 2023-05-11 | End: 2023-05-12 | Stop reason: SDUPTHER

## 2023-05-12 RX ORDER — LISINOPRIL 5 MG/1
5 TABLET ORAL DAILY
Qty: 90 TABLET | Refills: 1 | Status: SHIPPED | OUTPATIENT
Start: 2023-05-12

## 2023-05-15 RX ORDER — AMLODIPINE BESYLATE 2.5 MG/1
2.5 TABLET ORAL DAILY
Qty: 30 TABLET | Refills: 3 | Status: SHIPPED | OUTPATIENT
Start: 2023-05-15

## 2023-05-22 ENCOUNTER — TELEPHONE (OUTPATIENT)
Dept: INTERNAL MEDICINE | Facility: CLINIC | Age: 83
End: 2023-05-22
Payer: MEDICARE

## 2023-05-22 DIAGNOSIS — M17.0 PRIMARY OSTEOARTHRITIS OF BOTH KNEES: ICD-10-CM

## 2023-05-22 RX ORDER — DICLOFENAC SODIUM 30 MG/G
GEL TOPICAL
Qty: 100 G | Refills: 0 | Status: SHIPPED | OUTPATIENT
Start: 2023-05-22 | End: 2023-05-22 | Stop reason: SDUPTHER

## 2023-05-22 RX ORDER — DICLOFENAC SODIUM 30 MG/G
GEL TOPICAL 2 TIMES DAILY
Qty: 100 G | Refills: 0 | Status: SHIPPED | OUTPATIENT
Start: 2023-05-22 | End: 2023-05-24 | Stop reason: SDUPTHER

## 2023-05-22 NOTE — TELEPHONE ENCOUNTER
Caller: [id number] - [place], [place] - [address] AT [facility] [address] - [phone] [facility] [phone] FX    Relationship: Pharmacy    Best call back number: [phone]    What medications are you currently taking:   Current Outpatient Medications on File Prior to Visit   Medication Sig Dispense Refill   • amLODIPine (NORVASC) 2.5 MG tablet Take 1 tablet by mouth Daily. 30 tablet 3   • celecoxib (CeleBREX) 200 MG capsule Take 1 capsule by mouth Daily. 90 capsule 1   • Cholecalciferol (VITAMIN D3 PO) Take 1 each by mouth Daily.     • Diclofenac Sodium 3 % gel gel Apply to knees bilaterally twice per day 100 g 0   • glucosamine sulfate 500 MG capsule capsule Take 4 capsules by mouth Daily.     • levothyroxine (SYNTHROID, LEVOTHROID) 125 MCG tablet Take 1 tablet by mouth Daily. 90 tablet 1   • lisinopril (PRINIVIL,ZESTRIL) 5 MG tablet Take 1 tablet by mouth Daily. 90 tablet 1   • Multiple Vitamins-Minerals (MULTIVITAMIN ADULT PO) Take 1 tablet by mouth Daily.     • Omega-3 Fatty Acids (fish oil) 1000 MG capsule capsule Take 1 capsule by mouth Daily With Breakfast.     • sertraline (Zoloft) 25 MG tablet Take 1 tablet by mouth Daily. 30 tablet 2   • tamsulosin (FLOMAX) 0.4 MG capsule 24 hr capsule Take 1 capsule by mouth Daily. 30 capsule 3   • [DISCONTINUED] Diclofenac Sodium 3 % gel gel Apply  topically to the appropriate area as directed 2 (Two) Times a Day. for 60 days. Knees 100 g 0     No current facility-administered medications on file prior to visit.      Which medication are you concerned about: DICLOFENAC SODIUM 3%    What are your concerns: HOW MANY GRAMS IS THE PATIENT SUPPOSED TO APPLY TO HIS KNEES? PLEASE ADVISE [facility] ASAP SO THEY CAN FILL THIS FOR THE PATIENT. [facility] WOULD LIKE CLARIFICATION ON WHETHER THIS IS SUPPOSED TO BE A 3% GEL OR A 1% GEL. PHARMACY STATES THAT THE 3% GEL IS TYPICALLY FOR THE SKIN AND THE 1% IS FOR JOINT PAIN. PLEASE  ADVISE ASAP.

## 2023-05-22 NOTE — TELEPHONE ENCOUNTER
This prescription has been written and re-written several times.  Perhaps they can be more clear on what the script should say, since no matter how the physician writes it they refuse to fill it.

## 2023-05-24 ENCOUNTER — TELEPHONE (OUTPATIENT)
Dept: INTERNAL MEDICINE | Facility: CLINIC | Age: 83
End: 2023-05-24
Payer: MEDICARE

## 2023-05-24 DIAGNOSIS — M17.0 PRIMARY OSTEOARTHRITIS OF BOTH KNEES: ICD-10-CM

## 2023-05-24 RX ORDER — DICLOFENAC SODIUM 30 MG/G
GEL TOPICAL 2 TIMES DAILY
Qty: 100 G | Refills: 0 | Status: SHIPPED | OUTPATIENT
Start: 2023-05-24

## 2023-05-24 NOTE — TELEPHONE ENCOUNTER
Caller: [facility] [id number] - [facility] [address] AT [address] [facility] [address] - [phone]  - [phone]     Relationship: Pharmacy    Best call back number: [phone]    What medications are you currently taking:   Current Outpatient Medications on File Prior to Visit   Medication Sig Dispense Refill   • amLODIPine (NORVASC) 2.5 MG tablet Take 1 tablet by mouth Daily. 30 tablet 3   • celecoxib (CeleBREX) 200 MG capsule Take 1 capsule by mouth Daily. 90 capsule 1   • Cholecalciferol (VITAMIN D3 PO) Take 1 each by mouth Daily.     • Diclofenac Sodium 3 % gel gel Apply  topically to the appropriate area as directed 2 (Two) Times a Day. Apply to knees bilaterally twice per day. Pt should be using 1 pump per knee. 100 g 0   • glucosamine sulfate 500 MG capsule capsule Take 4 capsules by mouth Daily.     • levothyroxine (SYNTHROID, LEVOTHROID) 125 MCG tablet Take 1 tablet by mouth Daily. 90 tablet 1   • lisinopril (PRINIVIL,ZESTRIL) 5 MG tablet Take 1 tablet by mouth Daily. 90 tablet 1   • Multiple Vitamins-Minerals (MULTIVITAMIN ADULT PO) Take 1 tablet by mouth Daily.     • Omega-3 Fatty Acids (fish oil) 1000 MG capsule capsule Take 1 capsule by mouth Daily With Breakfast.     • sertraline (Zoloft) 25 MG tablet Take 1 tablet by mouth Daily. 30 tablet 2   • tamsulosin (FLOMAX) 0.4 MG capsule 24 hr capsule Take 1 capsule by mouth Daily. 30 capsule 3     No current facility-administered medications on file prior to visit.          Which medication are you concerned about: DICLOFENAC GEL      What are your concerns: PATIENT'S PHARMACY CALLED AND STATED THAT THEY NEED TO KNOW THE MAX DAILY DOSE AND NUMBER OF GRAMS PER APPLICATION .

## 2023-05-30 ENCOUNTER — OFFICE VISIT (OUTPATIENT)
Dept: INTERNAL MEDICINE | Facility: CLINIC | Age: 83
End: 2023-05-30

## 2023-05-30 VITALS
WEIGHT: 168.5 LBS | HEART RATE: 62 BPM | SYSTOLIC BLOOD PRESSURE: 134 MMHG | BODY MASS INDEX: 24.18 KG/M2 | DIASTOLIC BLOOD PRESSURE: 76 MMHG | OXYGEN SATURATION: 100 %

## 2023-05-30 DIAGNOSIS — Z93.3 PRESENCE OF COLOSTOMY: ICD-10-CM

## 2023-05-30 DIAGNOSIS — I10 PRIMARY HYPERTENSION: Primary | ICD-10-CM

## 2023-05-30 DIAGNOSIS — E03.9 ACQUIRED HYPOTHYROIDISM: ICD-10-CM

## 2023-05-30 PROCEDURE — 99214 OFFICE O/P EST MOD 30 MIN: CPT | Performed by: FAMILY MEDICINE

## 2023-05-30 PROCEDURE — 3075F SYST BP GE 130 - 139MM HG: CPT | Performed by: FAMILY MEDICINE

## 2023-05-30 PROCEDURE — 3078F DIAST BP <80 MM HG: CPT | Performed by: FAMILY MEDICINE

## 2023-05-30 RX ORDER — AMLODIPINE BESYLATE 2.5 MG/1
2.5 TABLET ORAL DAILY
Qty: 90 TABLET | Refills: 2 | Status: SHIPPED | OUTPATIENT
Start: 2023-05-30

## 2023-05-30 NOTE — PROGRESS NOTES
"Chief Complaint  Hypothyroidism, Anxiety, and Hypertension    Subjective        Rosas Christianson presents to [facility]  History of Present Illness  Patient follows up for ongoing management of hypothyroidism anxiety and hypertension he did not do well with sertraline he does not want to take the medication he has no chest pain no shortness of breath no increased fatigue.  Lisinopril was discontinued because of rising creatinine started transition to amlodipine  Needs follow-up BMP   Home blood pressure readings are doing well in the 1 20-1 30 with amlodipine 2.5 mg thyroid is therapeutic continue levothyroxine 100 Micrograms daily   range  Objective   Vital Signs:  /76   Pulse 62   Wt 76.4 kg (168 lb 8 oz)   SpO2 100%   BMI 24.18 kg/m²   Estimated body mass index is 24.18 kg/m² as calculated from the following:    Height as of 4/28/23: 177.8 cm (70\").    Weight as of this encounter: 76.4 kg (168 lb 8 oz).       BMI is within normal parameters. No other follow-up for BMI required.      Physical Exam  Vitals and nursing note reviewed.   HENT:      Head: Normocephalic and atraumatic.   Cardiovascular:      Rate and Rhythm: Normal rate and regular rhythm.      Pulses: Normal pulses.      Heart sounds: Normal heart sounds.   Pulmonary:      Effort: Pulmonary effort is normal.      Breath sounds: Normal breath sounds.   Abdominal:      Comments: Colostomy   Skin:     General: Skin is warm and dry.   Neurological:      Mental Status: He is alert.   Psychiatric:         Mood and Affect: Mood normal.         Behavior: Behavior normal.         Thought Content: Thought content normal.         Judgment: Judgment normal.        Result Review :    Common labs        11/8/2022    14:07 4/28/2023    08:50 5/12/2023    10:20   Common Labs   Glucose  85   67     BUN  38   32     Creatinine  1.24   1.33     Sodium  137   133     Potassium  5.9   5.8     Chloride  104   101     Calcium  9.9   9.6 "     Total Protein  6.6      Albumin  3.5      Total Bilirubin  0.4      Alkaline Phosphatase  93      AST (SGOT)  25      ALT (SGPT)  19      WBC 5.37       Hemoglobin 12.1       Hematocrit 35.9       Platelets 113                      Assessment and Plan   Diagnoses and all orders for this visit:    1. Primary hypertension (Primary)  -     Cancel: Basic Metabolic Panel  -     Basic Metabolic Panel    2. Presence of colostomy    3. Acquired hypothyroidism    Other orders  -     amLODIPine (NORVASC) 2.5 MG tablet; Take 1 tablet by mouth Daily.  Dispense: 90 tablet; Refill: 2    Ostomy monitor output and hydrate as appropriate  Hypothyroidism continue levothyroxine  Results of testing otherwise as needed or         Follow Up   No follow-ups on file.  Patient was given instructions and counseling regarding his condition or for health maintenance advice. Please see specific information pulled into the AVS if appropriate.

## 2023-05-31 LAB
BUN SERPL-MCNC: 28 MG/DL (ref 8–27)
BUN/CREAT SERPL: 22 (ref 10–24)
CALCIUM SERPL-MCNC: 9.5 MG/DL (ref 8.6–10.2)
CHLORIDE SERPL-SCNC: 106 MMOL/L (ref 96–106)
CO2 SERPL-SCNC: 25 MMOL/L (ref 20–29)
CREAT SERPL-MCNC: 1.29 MG/DL (ref 0.76–1.27)
EGFRCR SERPLBLD CKD-EPI 2021: 55 ML/MIN/1.73
GLUCOSE SERPL-MCNC: 92 MG/DL (ref 70–99)
POTASSIUM SERPL-SCNC: 5.2 MMOL/L (ref 3.5–5.2)
SODIUM SERPL-SCNC: 142 MMOL/L (ref 134–144)

## 2023-06-01 ENCOUNTER — OFFICE VISIT (OUTPATIENT)
Dept: ONCOLOGY | Facility: CLINIC | Age: 83
End: 2023-06-01

## 2023-06-01 ENCOUNTER — LAB (OUTPATIENT)
Dept: LAB | Facility: HOSPITAL | Age: 83
End: 2023-06-01
Payer: MEDICARE

## 2023-06-01 VITALS
DIASTOLIC BLOOD PRESSURE: 83 MMHG | HEART RATE: 53 BPM | OXYGEN SATURATION: 100 % | WEIGHT: 163.1 LBS | TEMPERATURE: 98.2 F | HEIGHT: 70 IN | SYSTOLIC BLOOD PRESSURE: 147 MMHG | RESPIRATION RATE: 18 BRPM | BODY MASS INDEX: 23.35 KG/M2

## 2023-06-01 DIAGNOSIS — R79.89 ELEVATED FERRITIN LEVEL: ICD-10-CM

## 2023-06-01 DIAGNOSIS — D69.6 THROMBOCYTOPENIA: ICD-10-CM

## 2023-06-01 DIAGNOSIS — D53.9 MACROCYTIC ANEMIA: ICD-10-CM

## 2023-06-01 DIAGNOSIS — R51.9 PERSISTENT HEADACHES: ICD-10-CM

## 2023-06-01 DIAGNOSIS — D53.9 MACROCYTIC ANEMIA: Primary | ICD-10-CM

## 2023-06-01 LAB
BASOPHILS # BLD AUTO: 0.05 10*3/MM3 (ref 0–0.2)
BASOPHILS NFR BLD AUTO: 0.9 % (ref 0–1.5)
DEPRECATED RDW RBC AUTO: 46.5 FL (ref 37–54)
EOSINOPHIL # BLD AUTO: 0.19 10*3/MM3 (ref 0–0.4)
EOSINOPHIL NFR BLD AUTO: 3.4 % (ref 0.3–6.2)
ERYTHROCYTE [DISTWIDTH] IN BLOOD BY AUTOMATED COUNT: 12.8 % (ref 12.3–15.4)
FERRITIN SERPL-MCNC: 365.3 NG/ML (ref 30–400)
FOLATE SERPL-MCNC: >20 NG/ML (ref 4.78–24.2)
HCT VFR BLD AUTO: 35.7 % (ref 37.5–51)
HGB BLD-MCNC: 11.7 G/DL (ref 13–17.7)
IMM GRANULOCYTES # BLD AUTO: 0.01 10*3/MM3 (ref 0–0.05)
IMM GRANULOCYTES NFR BLD AUTO: 0.2 % (ref 0–0.5)
IRON 24H UR-MRATE: 83 MCG/DL (ref 59–158)
IRON SATN MFR SERPL: 32 % (ref 14–48)
LYMPHOCYTES # BLD AUTO: 1.08 10*3/MM3 (ref 0.7–3.1)
LYMPHOCYTES NFR BLD AUTO: 19.1 % (ref 19.6–45.3)
MCH RBC QN AUTO: 32.6 PG (ref 26.6–33)
MCHC RBC AUTO-ENTMCNC: 32.8 G/DL (ref 31.5–35.7)
MCV RBC AUTO: 99.4 FL (ref 79–97)
MONOCYTES # BLD AUTO: 0.74 10*3/MM3 (ref 0.1–0.9)
MONOCYTES NFR BLD AUTO: 13.1 % (ref 5–12)
NEUTROPHILS NFR BLD AUTO: 3.57 10*3/MM3 (ref 1.7–7)
NEUTROPHILS NFR BLD AUTO: 63.3 % (ref 42.7–76)
NRBC BLD AUTO-RTO: 0 /100 WBC (ref 0–0.2)
PLATELET # BLD AUTO: 96 10*3/MM3 (ref 140–450)
PMV BLD AUTO: 9.8 FL (ref 6–12)
RBC # BLD AUTO: 3.59 10*6/MM3 (ref 4.14–5.8)
TIBC SERPL-MCNC: 263 MCG/DL (ref 249–505)
TRANSFERRIN SERPL-MCNC: 188 MG/DL (ref 200–360)
VIT B12 BLD-MCNC: 786 PG/ML (ref 211–946)
WBC NRBC COR # BLD: 5.64 10*3/MM3 (ref 3.4–10.8)

## 2023-06-01 PROCEDURE — 1160F RVW MEDS BY RX/DR IN RCRD: CPT | Performed by: INTERNAL MEDICINE

## 2023-06-01 PROCEDURE — 1126F AMNT PAIN NOTED NONE PRSNT: CPT | Performed by: INTERNAL MEDICINE

## 2023-06-01 PROCEDURE — 36415 COLL VENOUS BLD VENIPUNCTURE: CPT

## 2023-06-01 PROCEDURE — 85025 COMPLETE CBC W/AUTO DIFF WBC: CPT

## 2023-06-01 PROCEDURE — 3077F SYST BP >= 140 MM HG: CPT | Performed by: INTERNAL MEDICINE

## 2023-06-01 PROCEDURE — 83540 ASSAY OF IRON: CPT

## 2023-06-01 PROCEDURE — 84466 ASSAY OF TRANSFERRIN: CPT

## 2023-06-01 PROCEDURE — 82607 VITAMIN B-12: CPT | Performed by: INTERNAL MEDICINE

## 2023-06-01 PROCEDURE — 99214 OFFICE O/P EST MOD 30 MIN: CPT | Performed by: INTERNAL MEDICINE

## 2023-06-01 PROCEDURE — 82746 ASSAY OF FOLIC ACID SERUM: CPT | Performed by: INTERNAL MEDICINE

## 2023-06-01 PROCEDURE — 82728 ASSAY OF FERRITIN: CPT

## 2023-06-01 PROCEDURE — 3079F DIAST BP 80-89 MM HG: CPT | Performed by: INTERNAL MEDICINE

## 2023-06-01 PROCEDURE — 1159F MED LIST DOCD IN RCRD: CPT | Performed by: INTERNAL MEDICINE

## 2023-06-01 NOTE — PROGRESS NOTES
"Subjective     CHIEF COMPLAINT:      Chief Complaint   Patient presents with   • Follow-up     No concerns     HISTORY OF PRESENT ILLNESS:     Rosas Christianson is a 83 y.o. male patient who returns today for follow up on his anemia.  He returns today for follow-up and reports having headaches.  His blood pressure was normal this morning but became elevated.  He reports having significant stress related to his wife having dementia and having problems with stool incontinence.  She was hospitalized 5 times since early this year.    Patient is taking vitamin B12 daily.  As a result of him being very busy, he has not been drinking fluids regularly.    ROS:  Pertinent ROS is in the HPI.     Past medical, surgical, social and family history were reviewed.     MEDICATIONS:    Current Outpatient Medications:   •  amLODIPine (NORVASC) 2.5 MG tablet, Take 1 tablet by mouth Daily., Disp: 90 tablet, Rfl: 2  •  celecoxib (CeleBREX) 200 MG capsule, Take 1 capsule by mouth Daily., Disp: 90 capsule, Rfl: 1  •  Cholecalciferol (VITAMIN D3 PO), Take 1 each by mouth Daily., Disp: , Rfl:   •  glucosamine sulfate 500 MG capsule capsule, Take 4 capsules by mouth Daily., Disp: , Rfl:   •  levothyroxine (SYNTHROID, LEVOTHROID) 125 MCG tablet, Take 1 tablet by mouth Daily., Disp: 90 tablet, Rfl: 1  •  Multiple Vitamins-Minerals (MULTIVITAMIN ADULT PO), Take 1 tablet by mouth Daily., Disp: , Rfl:   •  Omega-3 Fatty Acids (fish oil) 1000 MG capsule capsule, Take 1 capsule by mouth Daily With Breakfast., Disp: , Rfl:   •  tamsulosin (FLOMAX) 0.4 MG capsule 24 hr capsule, Take 1 capsule by mouth Daily., Disp: 30 capsule, Rfl: 3  Objective     VITAL SIGNS:     Vitals:    06/01/23 1302   BP: 147/83   Pulse: 53   Resp: 18   Temp: 98.2 °F (36.8 °C)   TempSrc: Temporal   SpO2: 100%   Weight: 74 kg (163 lb 1.6 oz)   Height: 177.8 cm (70\")   PainSc: 0-No pain     Body mass index is 23.4 kg/m².     Wt Readings from Last 5 Encounters:   06/01/23 74 kg (163 " lb 1.6 oz)   05/30/23 76.4 kg (168 lb 8 oz)   04/28/23 75.3 kg (165 lb 14.4 oz)   03/23/23 74.4 kg (164 lb)   03/13/23 68 kg (150 lb)     PHYSICAL EXAMINATION:   GENERAL: The patient appears in fair general condition, not in acute distress.   SKIN: No ecchymosis.  EYES: No jaundice. Pallor.  LYMPHATICS: No cervical lymphadenopathy.  CHEST: Normal respiratory effort.  Lungs clear bilaterally.  No added sounds.  CVS: Normal S1-S2.  No murmurs.  ABDOMEN: Soft.  Patient has colostomy in the right lower quadrant. No tenderness. No Hepatomegaly. No Splenomegaly. No masses.  EXTREMITIES: No noted deformity.     DIAGNOSTIC DATA:     Results from last 7 days   Lab Units 06/01/23  1205   WBC 10*3/mm3 5.64   NEUTROS ABS 10*3/mm3 3.57   HEMOGLOBIN g/dL 11.7*   HEMATOCRIT % 35.7*   PLATELETS 10*3/mm3 96*     Results from last 7 days   Lab Units 05/30/23  1318   SODIUM mmol/L 142   POTASSIUM mmol/L 5.2   CHLORIDE mmol/L 106   CO2 mmol/L 25   BUN mg/dL 28*   CREATININE mg/dL 1.29*   CALCIUM mg/dL 9.5   GLUCOSE mg/dL 92         Lab 06/01/23  1205   IRON 83   IRON SATURATION 32   TIBC 263   TRANSFERRIN 188*   FERRITIN 365.30   FOLATE >20.00   VITAMIN B 12 786        Assessment & Plan    *Macrocytic anemia.    · It is attributed to anemia of chronic disease.    · Hemoglobin decreased to 12.6 on 10/29/2020.  · With the patient's ulcerative colitis and prior colon surgery, he is at increased risk for decreased absorption of vitamin B12.  · Patient was started on vitamin B12 1000 mcg daily on 10/29/2020.  · Hemoglobin improved to 13.0 on 10/28/2021. MCV improved to 97.1.  · Vitamin B12 was 1260 and folate was >20.  · Hemoglobin decreased to 12.1 on 11/8/2022.  MCV was 100.3.  · He was continued on vitamin B12 1000 mcg daily.  · Hemoglobin decreased to 11.7 today.  · MCV is 99.4.  · Vitamin B12 is 786.  · The worsening of his anemia today is attributed to anemia of chronic kidney disease.  · His creatinine was 1.29 on 5/30/2023.  · He  reports that he has not been drinking adequate amount of fluids regularly.    *Chronic thrombocytopenia.  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  · He had a mildly elevated IPF.  · Celebrex may be contributing to his thrombocytopenia.  However, he needs the medicine due to his significant arthritis.  He usually develops significant worsening of his arthritis about 3 days after stopping Celebrex.  · Platelet count was 113,000 on 11/8/2022.  · Platelet count decreased to 96,000 today.  · He is not having problem with bleeding or bruising.    *Elevated ferritin level.    · He had ferritin up to 450 in the past.    · However, transferrin saturation was less than 50%.    · This was attributed to acute phase reaction.  Hemochromatosis was considered unlikely.   · Ferritin was 630 and transferrin saturation was 45% on 10/28/2021.  · Ferritin decreased to 445 on 11/8/2022.  Transferrin saturation was 39%.   · Ferritin decreased to 365 today.  Transferrin saturation is 32%.    *Headaches.  · Patient had a mildly elevated blood pressure today at 147/83.  · Blood pressure was normal at home this morning.  · He is under a lot of stress.    PLAN:    1.  Continue vitamin B12 1000 mcg daily.  2.  Increase fluid intake.  3.  If the headaches continue to recur, I asked him to contact us or his PCP since he may need to have imaging studies to evaluate the headaches.    4.  Follow-up in 6 months with CBC ferritin iron panel vitamin B12 and folate levels.      June Smiley MD  06/01/23

## 2023-06-13 RX ORDER — AMLODIPINE BESYLATE 2.5 MG/1
2.5 TABLET ORAL DAILY
Qty: 90 TABLET | Refills: 2 | OUTPATIENT
Start: 2023-06-13

## 2023-06-14 ENCOUNTER — TELEPHONE (OUTPATIENT)
Dept: INTERNAL MEDICINE | Facility: CLINIC | Age: 83
End: 2023-06-14
Payer: MEDICARE

## 2023-06-14 DIAGNOSIS — M17.0 PRIMARY OSTEOARTHRITIS OF BOTH KNEES: Primary | ICD-10-CM

## 2023-06-14 NOTE — TELEPHONE ENCOUNTER
[id number] [place] - [address] [address] AT [address] - [phone]  - [phone]      Pharmacy representative name: ASAF    Pharmacy representative phone number: [phone]     What medication are you calling in regards to: DICLOFENAC GEL 3%    What question does the pharmacy have: 3% GEL IS NOT FOR JOINTS. A NEW PRESCRIPTION FOR THE 1% GEL NEEDS TO BE PRESCRIBED, ALSO INCLUDE DIRECTIONS. NORMAL DIRECTIONS FOR  USE OF THE GEL ON KNEES IS 4 GRAMS.       Who is the provider that prescribed the medication: DR. TRISTAN

## 2023-07-27 ENCOUNTER — OFFICE VISIT (OUTPATIENT)
Dept: INTERNAL MEDICINE | Facility: CLINIC | Age: 83
End: 2023-07-27
Payer: MEDICARE

## 2023-07-27 VITALS
SYSTOLIC BLOOD PRESSURE: 134 MMHG | DIASTOLIC BLOOD PRESSURE: 62 MMHG | OXYGEN SATURATION: 99 % | WEIGHT: 160.7 LBS | BODY MASS INDEX: 23.01 KG/M2 | HEIGHT: 70 IN | HEART RATE: 58 BPM

## 2023-07-27 DIAGNOSIS — I10 PRIMARY HYPERTENSION: Primary | ICD-10-CM

## 2023-07-27 DIAGNOSIS — M25.511 CHRONIC PAIN OF BOTH SHOULDERS: ICD-10-CM

## 2023-07-27 DIAGNOSIS — G89.29 CHRONIC PAIN OF BOTH SHOULDERS: ICD-10-CM

## 2023-07-27 DIAGNOSIS — M17.0 PRIMARY OSTEOARTHRITIS OF BOTH KNEES: ICD-10-CM

## 2023-07-27 DIAGNOSIS — M25.512 CHRONIC PAIN OF BOTH SHOULDERS: ICD-10-CM

## 2023-07-27 DIAGNOSIS — R60.0 LOCALIZED EDEMA: ICD-10-CM

## 2023-07-27 PROBLEM — R60.9 EDEMA: Status: ACTIVE | Noted: 2023-07-27

## 2023-07-27 PROCEDURE — 3078F DIAST BP <80 MM HG: CPT | Performed by: FAMILY MEDICINE

## 2023-07-27 PROCEDURE — 99214 OFFICE O/P EST MOD 30 MIN: CPT | Performed by: FAMILY MEDICINE

## 2023-07-27 PROCEDURE — 3075F SYST BP GE 130 - 139MM HG: CPT | Performed by: FAMILY MEDICINE

## 2023-07-27 RX ORDER — TORSEMIDE 20 MG/1
20 TABLET ORAL DAILY
Qty: 60 TABLET | Refills: 0 | Status: SHIPPED | OUTPATIENT
Start: 2023-07-27

## 2023-07-27 RX ORDER — PROCHLORPERAZINE MALEATE 10 MG
10 TABLET ORAL DAILY
Qty: 30 TABLET | Refills: 2 | Status: SHIPPED | OUTPATIENT
Start: 2023-07-27

## 2023-07-27 NOTE — PROGRESS NOTES
"Chief Complaint  Leg Swelling    Subjective        Rosas Christianson presents to [facility]  History of Present Illness  Follow-up appointment for hypertension and edema renal insufficiency lower extremity edema he did not get much improvement in the reduction of amlodipine  Blood pressures well controlled he has Follow-up appointment with gastroenterology for evaluation of anemia with colostomy  He has no chest pain or shortness of breath   With some worsening renal function takes anti-inflammatories regularly that can also contribute to elevated blood pressure  He is taking some samples of hydrocodone that he has had bilateral [place] nauseous and like prescription for Compazine to help with nausea that he can take with his hydrocodone as needed      Objective   Vital Signs:  /62   Pulse 58   Ht 177.8 cm (70\")   Wt 72.9 kg (160 lb 11.2 oz)   SpO2 99%   BMI 23.06 kg/m²   Estimated body mass index is 23.06 kg/m² as calculated from the following:    Height as of this encounter: 177.8 cm (70\").    Weight as of this encounter: 72.9 kg (160 lb 11.2 oz).       BMI is within normal parameters. No other follow-up for BMI required.      Physical Exam  Vitals and nursing note reviewed.   HENT:      Head: Normocephalic and atraumatic.   Cardiovascular:      Rate and Rhythm: Normal rate and regular rhythm.      Pulses: Normal pulses.      Heart sounds: Normal heart sounds.   Pulmonary:      Effort: Pulmonary effort is normal.      Breath sounds: Normal breath sounds.   Abdominal:      Comments: Colostomy   Musculoskeletal:      Right lower leg: Edema present.      Left lower leg: Edema present.   Skin:     General: Skin is warm and dry.   Neurological:      Mental Status: He is alert.   Psychiatric:         Mood and Affect: Mood normal.         Behavior: Behavior normal.         Thought Content: Thought content normal.         Judgment: Judgment normal.      Result Review :    Common " labs          5/12/2023    10:20 5/30/2023    13:18 6/1/2023    12:05   Common Labs   Glucose 67  92     BUN 32  28     Creatinine 1.33  1.29     Sodium 133  142     Potassium 5.8  5.2     Chloride 101  106     Calcium 9.6  9.5     WBC   5.64    Hemoglobin   11.7    Hematocrit   35.7    Platelets   96                   Assessment and Plan   Diagnoses and all orders for this visit:    1. Primary hypertension (Primary)  -     torsemide (DEMADEX) 20 MG tablet; Take 1 tablet by mouth Daily.  Dispense: 60 tablet; Refill: 0  -     Basic Metabolic Panel    2. Primary osteoarthritis of both knees    3. Chronic pain of both shoulders  -     prochlorperazine (COMPAZINE) 10 MG tablet; Take 1 tablet by mouth Daily.  Dispense: 30 tablet; Refill: 2    4. Localized edema  -     Basic Metabolic Panel    Continue amlodipine 2.5 mg  Add torsemide 20 mg daily  BMP in 1 month  Compazine to be taken as needed with hydrocodone for chronic bilateral shoulder pain  Discontinue Celebrex potential contribution to hypertension and renal insufficiency  Follow-up consultation with Dr. Keenan for bilateral shoulder pain       Follow Up   Return in about 1 month (around 8/27/2023), or if symptoms worsen or fail to improve, for Recheck.  Patient was given instructions and counseling regarding his condition or for health maintenance advice. Please see specific information pulled into the AVS if appropriate.

## 2023-08-08 RX ORDER — AMLODIPINE BESYLATE 2.5 MG/1
2.5 TABLET ORAL DAILY
Qty: 90 TABLET | Refills: 2 | Status: SHIPPED | OUTPATIENT
Start: 2023-08-08

## 2023-08-19 DIAGNOSIS — R35.1 NOCTURIA: ICD-10-CM

## 2023-08-21 RX ORDER — TAMSULOSIN HYDROCHLORIDE 0.4 MG/1
1 CAPSULE ORAL DAILY
Qty: 30 CAPSULE | Refills: 3 | Status: SHIPPED | OUTPATIENT
Start: 2023-08-21

## 2023-08-23 LAB
BUN SERPL-MCNC: 36 MG/DL (ref 8–27)
BUN/CREAT SERPL: 23 (ref 10–24)
CALCIUM SERPL-MCNC: 9.4 MG/DL (ref 8.6–10.2)
CHLORIDE SERPL-SCNC: 102 MMOL/L (ref 96–106)
CO2 SERPL-SCNC: 27 MMOL/L (ref 20–29)
CREAT SERPL-MCNC: 1.55 MG/DL (ref 0.76–1.27)
EGFRCR SERPLBLD CKD-EPI 2021: 44 ML/MIN/1.73
GLUCOSE SERPL-MCNC: 77 MG/DL (ref 70–99)
POTASSIUM SERPL-SCNC: 4.6 MMOL/L (ref 3.5–5.2)
SODIUM SERPL-SCNC: 142 MMOL/L (ref 134–144)

## 2023-08-28 ENCOUNTER — OFFICE VISIT (OUTPATIENT)
Dept: INTERNAL MEDICINE | Facility: CLINIC | Age: 83
End: 2023-08-28
Payer: MEDICARE

## 2023-08-28 VITALS
DIASTOLIC BLOOD PRESSURE: 62 MMHG | HEART RATE: 71 BPM | BODY MASS INDEX: 22.41 KG/M2 | SYSTOLIC BLOOD PRESSURE: 116 MMHG | TEMPERATURE: 97 F | OXYGEN SATURATION: 100 % | HEIGHT: 70 IN | WEIGHT: 156.5 LBS

## 2023-08-28 DIAGNOSIS — I10 PRIMARY HYPERTENSION: Primary | ICD-10-CM

## 2023-08-28 PROCEDURE — 3078F DIAST BP <80 MM HG: CPT | Performed by: FAMILY MEDICINE

## 2023-08-28 PROCEDURE — 99213 OFFICE O/P EST LOW 20 MIN: CPT | Performed by: FAMILY MEDICINE

## 2023-08-28 PROCEDURE — 3074F SYST BP LT 130 MM HG: CPT | Performed by: FAMILY MEDICINE

## 2023-08-28 RX ORDER — VALSARTAN 80 MG/1
80 TABLET ORAL DAILY
Qty: 30 TABLET | Refills: 3 | Status: SHIPPED | OUTPATIENT
Start: 2023-08-28

## 2023-08-28 NOTE — PROGRESS NOTES
"Chief Complaint  Hypertension    Subjective        Rosas Christianson presents to [facility]  History of Present Illness  Follow-up appointment for ongoing management of hypertension edema renal insufficiency  Or so I was started at the last visit due to edema which is improved blood pressures well controlled however creatinine increased.  He had been taking Celebrex regularly and now has stopped that  He is here for follow-up management of hypertension with need to change medication based on lab results  Objective   Vital Signs:  /62   Pulse 71   Temp 97 øF (36.1 øC)   Ht 177.8 cm (70\")   Wt 71 kg (156 lb 8 oz)   SpO2 100%   BMI 22.46 kg/mý   Estimated body mass index is 22.46 kg/mý as calculated from the following:    Height as of this encounter: 177.8 cm (70\").    Weight as of this encounter: 71 kg (156 lb 8 oz).       BMI is within normal parameters. No other follow-up for BMI required.      Physical Exam  Vitals and nursing note reviewed.   Constitutional:       Appearance: Normal appearance. He is well-developed. He is not diaphoretic.   HENT:      Head: Normocephalic and atraumatic.   Eyes:      General: Lids are normal. No scleral icterus.  Neck:      Thyroid: No thyroid mass or thyromegaly.      Vascular: No carotid bruit or JVD.   Cardiovascular:      Rate and Rhythm: Normal rate and regular rhythm.      Pulses: Normal pulses.           Radial pulses are 2+ on the right side and 2+ on the left side.      Heart sounds: Normal heart sounds. No murmur heard.  Pulmonary:      Effort: Pulmonary effort is normal. No respiratory distress.      Breath sounds: Normal breath sounds.   Musculoskeletal:      Cervical back: Normal range of motion.      Right lower leg: Edema present.      Left lower leg: Edema present.      Comments: Ankle bilaterally   Skin:     Coloration: Skin is not pale.      Findings: No erythema or rash.   Neurological:      General: No focal deficit " present.      Mental Status: He is alert and oriented to person, place, and time.      Sensory: No sensory deficit.      Deep Tendon Reflexes: Reflexes are normal and symmetric.   Psychiatric:         Mood and Affect: Mood normal.         Behavior: Behavior normal. Behavior is cooperative.         Thought Content: Thought content normal.         Judgment: Judgment normal.      Result Review :    Common labs          5/30/2023    13:18 6/1/2023    12:05 8/22/2023    14:17   Common Labs   Glucose 92   77    BUN 28   36    Creatinine 1.29   1.55    Sodium 142   142    Potassium 5.2   4.6    Chloride 106   102    Calcium 9.5   9.4    WBC  5.64     Hemoglobin  11.7     Hematocrit  35.7     Platelets  96                    Assessment and Plan   Diagnoses and all orders for this visit:    1. Primary hypertension (Primary)    Other orders  -     valsartan (Diovan) 80 MG tablet; Take 1 tablet by mouth Daily.  Dispense: 30 tablet; Refill: 3    Discontinue amlodipine discontinue torsemide initiate valsartan         Follow Up   Return in about 1 month (around 9/28/2023), or if symptoms worsen or fail to improve, for Recheck.  Patient was given instructions and counseling regarding his condition or for health maintenance advice. Please see specific information pulled into the AVS if appropriate.

## 2023-09-21 DIAGNOSIS — I10 PRIMARY HYPERTENSION: ICD-10-CM

## 2023-09-21 RX ORDER — TORSEMIDE 20 MG/1
20 TABLET ORAL DAILY
Qty: 60 TABLET | Refills: 0 | OUTPATIENT
Start: 2023-09-21

## 2023-09-29 ENCOUNTER — OFFICE VISIT (OUTPATIENT)
Dept: INTERNAL MEDICINE | Facility: CLINIC | Age: 83
End: 2023-09-29
Payer: MEDICARE

## 2023-09-29 VITALS
HEIGHT: 70 IN | OXYGEN SATURATION: 99 % | HEART RATE: 51 BPM | BODY MASS INDEX: 22.95 KG/M2 | TEMPERATURE: 97.3 F | DIASTOLIC BLOOD PRESSURE: 66 MMHG | WEIGHT: 160.3 LBS | SYSTOLIC BLOOD PRESSURE: 120 MMHG

## 2023-09-29 DIAGNOSIS — M79.671 BILATERAL FOOT PAIN: ICD-10-CM

## 2023-09-29 DIAGNOSIS — Z93.3 PRESENCE OF COLOSTOMY: ICD-10-CM

## 2023-09-29 DIAGNOSIS — G89.29 CHRONIC PAIN OF BOTH SHOULDERS: ICD-10-CM

## 2023-09-29 DIAGNOSIS — M79.672 BILATERAL FOOT PAIN: ICD-10-CM

## 2023-09-29 DIAGNOSIS — M25.511 CHRONIC PAIN OF BOTH SHOULDERS: ICD-10-CM

## 2023-09-29 DIAGNOSIS — M19.90 ARTHRITIS: ICD-10-CM

## 2023-09-29 DIAGNOSIS — I10 PRIMARY HYPERTENSION: Primary | ICD-10-CM

## 2023-09-29 DIAGNOSIS — M25.512 CHRONIC PAIN OF BOTH SHOULDERS: ICD-10-CM

## 2023-09-29 PROBLEM — S22.41XG CLOSED FRACTURE OF MULTIPLE RIBS OF RIGHT SIDE WITH DELAYED HEALING: Status: RESOLVED | Noted: 2022-06-02 | Resolved: 2023-09-29

## 2023-09-29 RX ORDER — METHADONE HYDROCHLORIDE 5 MG/1
5 TABLET ORAL EVERY 6 HOURS PRN
COMMUNITY

## 2023-09-29 RX ORDER — PROCHLORPERAZINE MALEATE 10 MG
10 TABLET ORAL DAILY
Qty: 30 TABLET | Refills: 1 | Status: SHIPPED | OUTPATIENT
Start: 2023-09-29

## 2023-09-29 NOTE — PROGRESS NOTES
"Chief Complaint  Hypertension    Subjective        Rosas Christianson presents to [facility]  History of Present Illness  Patient follows up 1 for hypertension medications were changed he initiated valsartan blood pressure is much better controlled leg swelling is no change he has chronic lymphedema  He would like refill of his methadone which was prescribed in 2018 that he takes very intermittently and then when he takes that he needs to have some nausea medicine he has had multiple types of pain medication in the past and settled on this with pain management service Dr. Westley Bell    Objective   Vital Signs:  /66   Pulse 51   Temp 97.3 °F (36.3 °C)   Ht 177.8 cm (70\")   Wt 72.7 kg (160 lb 4.8 oz)   SpO2 99%   BMI 23.00 kg/m²   Estimated body mass index is 23 kg/m² as calculated from the following:    Height as of this encounter: 177.8 cm (70\").    Weight as of this encounter: 72.7 kg (160 lb 4.8 oz).       BMI is within normal parameters. No other follow-up for BMI required.      Physical Exam  Vitals and nursing note reviewed.   Constitutional:       Appearance: Normal appearance.   HENT:      Head: Normocephalic and atraumatic.   Cardiovascular:      Rate and Rhythm: Normal rate and regular rhythm.      Pulses: Normal pulses.      Heart sounds: Normal heart sounds.   Pulmonary:      Effort: Pulmonary effort is normal.      Breath sounds: Normal breath sounds.   Musculoskeletal:      Right lower leg: Edema present.      Left lower leg: Edema present.   Skin:     General: Skin is warm and dry.   Neurological:      Mental Status: He is alert.   Psychiatric:         Mood and Affect: Mood normal.         Behavior: Behavior normal.         Thought Content: Thought content normal.         Judgment: Judgment normal.      Result Review :    Common labs          5/30/2023    13:18 6/1/2023    12:05 8/22/2023    14:17   Common Labs   Glucose 92   77    BUN 28   36    Creatinine " 1.29   1.55    Sodium 142   142    Potassium 5.2   4.6    Chloride 106   102    Calcium 9.5   9.4    WBC  5.64     Hemoglobin  11.7     Hematocrit  35.7     Platelets  96                    Assessment and Plan   Diagnoses and all orders for this visit:    1. Primary hypertension (Primary)  -     Basic Metabolic Panel    2. Chronic pain of both shoulders  -     prochlorperazine (COMPAZINE) 10 MG tablet; Take 1 tablet by mouth Daily.  Dispense: 30 tablet; Refill: 1  -     Ambulatory Referral to Pain Management    3. Presence of colostomy    4. Bilateral foot pain  -     Ambulatory Referral to Pain Management    5. Arthritis  -     Ambulatory Referral to Pain Management    Other orders  -     Fluzone High-Dose 65+yrs      Continue valsartan monitor blood pressure goals less than 150/90  Follow-up results of BMP he has had recent episode of elevated creatinine  Follow-up otherwise as needed or       Follow Up   Return in about 3 months (around 12/29/2023), or if symptoms worsen or fail to improve, for Recheck.  Patient was given instructions and counseling regarding his condition or for health maintenance advice. Please see specific information pulled into the AVS if appropriate.       Answers submitted by the patient for this visit:  Primary Reason for Visit (Submitted on 9/22/2023)  What is the primary reason for your visit?: High Blood Pressure

## 2023-09-30 LAB
BUN SERPL-MCNC: 25 MG/DL (ref 8–27)
BUN/CREAT SERPL: 21 (ref 10–24)
CALCIUM SERPL-MCNC: 9.5 MG/DL (ref 8.6–10.2)
CHLORIDE SERPL-SCNC: 98 MMOL/L (ref 96–106)
CO2 SERPL-SCNC: 24 MMOL/L (ref 20–29)
CREAT SERPL-MCNC: 1.2 MG/DL (ref 0.76–1.27)
EGFRCR SERPLBLD CKD-EPI 2021: 60 ML/MIN/1.73
GLUCOSE SERPL-MCNC: 69 MG/DL (ref 70–99)
POTASSIUM SERPL-SCNC: 5.3 MMOL/L (ref 3.5–5.2)
SODIUM SERPL-SCNC: 133 MMOL/L (ref 134–144)

## 2023-10-04 ENCOUNTER — OFFICE VISIT (OUTPATIENT)
Dept: SPORTS MEDICINE | Facility: CLINIC | Age: 83
End: 2023-10-04
Payer: MEDICARE

## 2023-10-04 VITALS
WEIGHT: 160 LBS | OXYGEN SATURATION: 99 % | DIASTOLIC BLOOD PRESSURE: 60 MMHG | HEART RATE: 64 BPM | RESPIRATION RATE: 16 BRPM | HEIGHT: 70 IN | BODY MASS INDEX: 22.9 KG/M2 | SYSTOLIC BLOOD PRESSURE: 102 MMHG

## 2023-10-04 DIAGNOSIS — M75.51 SUBACROMIAL BURSITIS OF RIGHT SHOULDER JOINT: ICD-10-CM

## 2023-10-04 DIAGNOSIS — M25.511 ACUTE PAIN OF RIGHT SHOULDER: Primary | ICD-10-CM

## 2023-10-04 DIAGNOSIS — M17.0 PRIMARY OSTEOARTHRITIS OF BOTH KNEES: ICD-10-CM

## 2023-10-04 RX ORDER — AMLODIPINE BESYLATE 2.5 MG/1
2.5 TABLET ORAL DAILY
COMMUNITY
Start: 2023-08-01

## 2023-10-04 RX ORDER — CALCIUM CARBONATE 500 MG/1
1 TABLET, CHEWABLE ORAL 2 TIMES DAILY
COMMUNITY
Start: 2023-08-01

## 2023-10-04 RX ORDER — TRIAMCINOLONE ACETONIDE 40 MG/ML
40 INJECTION, SUSPENSION INTRA-ARTICULAR; INTRAMUSCULAR
Status: DISCONTINUED | OUTPATIENT
Start: 2023-10-04 | End: 2023-10-04 | Stop reason: HOSPADM

## 2023-10-04 RX ORDER — FUROSEMIDE 20 MG/1
20 TABLET ORAL DAILY
COMMUNITY
Start: 2023-08-01

## 2023-10-04 RX ORDER — CELECOXIB 200 MG/1
200 CAPSULE ORAL DAILY
COMMUNITY

## 2023-10-04 RX ADMIN — TRIAMCINOLONE ACETONIDE 40 MG: 40 INJECTION, SUSPENSION INTRA-ARTICULAR; INTRAMUSCULAR at 14:28

## 2023-10-04 NOTE — PROGRESS NOTES
"Rosas is a 83 y.o. year old male presents to [facility]    Chief Complaint   Patient presents with    Shoulder Pain     New eval for b/l shoulder pain that's been chronic, reports having a possible injury a few years ago - also reports having a large blood blister or hematoma on the RT upper arm - no neuro sxs - here for further evaluation and treatment        History of Present Illness  New problem.  Right greater than left shoulder pain.  History of a rotator cuff tear years ago.  Nonoperative management.  He states that over the past few weeks, he has had worsening shoulder pain.  Pain disrupts sleep.  He has been applying Lidoderm patch.  Does have old prescription for methadone, pending repeat evaluation for this with pain management.  He has chronically assisted his arm to move though he is having pain when he does move this.    I have reviewed the patient's medical, family, and social history in detail and updated the computerized patient record.    /60 (BP Location: Left arm, Patient Position: Sitting, Cuff Size: Adult)   Pulse 64   Resp 16   Ht 177.8 cm (70\")   Wt 72.6 kg (160 lb)   SpO2 99%   BMI 22.96 kg/m²      Physical Exam    Vital signs reviewed.   General: No acute distress.  Eyes: conjunctiva clear; pupils equally round and reactive  ENT: external ears atraumatic  CV: no peripheral edema  Resp: normal respiratory effort, no use of accessory muscles  Skin: no rashes or wounds; normal turgor; large ecchymosis along the right upper arm though this is distal to the shoulder joint  Psych: mood and affect appropriate; recent and remote memory intact  Neuro: sensation to light touch intact    MSK Exam  R shoulder: Positive drop arm.  Positive Pressley, positive Neer sign.            Large Joint Arthrocentesis: R subacromial bursa  Date/Time: 10/4/2023 2:28 PM  Consent given by: patient  Site marked: site marked  Timeout: Immediately prior to procedure a time " out was called to verify the correct patient, procedure, equipment, support staff and site/side marked as required   Supporting Documentation  Indications: pain   Procedure Details  Location: shoulder - R subacromial bursa  Needle size: 25 G  Approach: posterior  Medications administered: 40 mg triamcinolone acetonide 40 MG/ML  Patient tolerance: patient tolerated the procedure well with no immediate complications        Diagnoses and all orders for this visit:    Acute pain of right shoulder    Subacromial bursitis of right shoulder joint  -     Large Joint Arthrocentesis: R subacromial bursa    Primary osteoarthritis of both knees    Other orders  -     calcium carbonate (TUMS) 500 MG chewable tablet; Chew 1 tablet 2 (Two) Times a Day.  -     celecoxib (CeleBREX) 200 MG capsule; Take 1 capsule by mouth Daily.  -     furosemide (LASIX) 20 MG tablet; Take 1 tablet by mouth Daily.  -     amLODIPine (NORVASC) 2.5 MG tablet; Take 1 tablet by mouth Daily.      History of rotator cuff tear though he is having acute pain.  Symptoms are consistent with subacromial bursitis.  Injection as documented above for pain relief.    Minimal efficacy regarding previous gel series.  Continue to monitor.  Ambulating with cane.      Follow Up   No follow-ups on file.  Patient was given instructions and counseling regarding his condition or for health maintenance advice. Please see specific information pulled into the AVS if appropriate.     EMR Dragon/Transcription disclaimer:    Much of this encounter note is an electronic transcription/translation of spoken language to printed text.  The electronic translation of spoken language may permit erroneous, or at times, nonsensical words or phrases to be inadvertently transcribed.  Although I have reviewed the note for such errors some may still exist.

## 2023-10-08 DIAGNOSIS — G89.29 CHRONIC PAIN OF BOTH SHOULDERS: ICD-10-CM

## 2023-10-08 DIAGNOSIS — M25.511 CHRONIC PAIN OF BOTH SHOULDERS: ICD-10-CM

## 2023-10-08 DIAGNOSIS — M79.672 BILATERAL FOOT PAIN: ICD-10-CM

## 2023-10-08 DIAGNOSIS — M79.671 BILATERAL FOOT PAIN: ICD-10-CM

## 2023-10-08 DIAGNOSIS — M17.0 PRIMARY OSTEOARTHRITIS OF BOTH KNEES: Primary | ICD-10-CM

## 2023-10-08 DIAGNOSIS — M25.512 CHRONIC PAIN OF BOTH SHOULDERS: ICD-10-CM

## 2023-10-10 NOTE — TELEPHONE ENCOUNTER
Rx Refill Note  Requested Prescriptions     Pending Prescriptions Disp Refills    methadone (DOLOPHINE) 5 MG tablet       Sig: Take 1 tablet by mouth Every 6 (Six) Hours As Needed for Moderate Pain.      Last office visit with prescribing clinician: 9/29/2023   Last telemedicine visit with prescribing clinician: Visit date not found   Next office visit with prescribing clinician: 3/29/2024

## 2023-10-13 RX ORDER — METHADONE HYDROCHLORIDE 5 MG/1
TABLET ORAL
Qty: 10 TABLET | Refills: 0 | Status: SHIPPED | OUTPATIENT
Start: 2023-10-13

## 2023-10-21 DIAGNOSIS — E03.9 ACQUIRED HYPOTHYROIDISM: ICD-10-CM

## 2023-10-23 RX ORDER — LEVOTHYROXINE SODIUM 0.12 MG/1
125 TABLET ORAL DAILY
Qty: 90 TABLET | Refills: 1 | Status: SHIPPED | OUTPATIENT
Start: 2023-10-23

## 2023-11-13 ENCOUNTER — OFFICE VISIT (OUTPATIENT)
Dept: PAIN MEDICINE | Facility: CLINIC | Age: 83
End: 2023-11-13
Payer: MEDICARE

## 2023-11-13 VITALS
HEART RATE: 69 BPM | WEIGHT: 160.8 LBS | SYSTOLIC BLOOD PRESSURE: 164 MMHG | RESPIRATION RATE: 18 BRPM | BODY MASS INDEX: 23.02 KG/M2 | TEMPERATURE: 97.8 F | OXYGEN SATURATION: 99 % | DIASTOLIC BLOOD PRESSURE: 84 MMHG | HEIGHT: 70 IN

## 2023-11-13 DIAGNOSIS — M25.511 CHRONIC PAIN OF BOTH SHOULDERS: ICD-10-CM

## 2023-11-13 DIAGNOSIS — G89.29 CHRONIC BILATERAL LOW BACK PAIN, UNSPECIFIED WHETHER SCIATICA PRESENT: ICD-10-CM

## 2023-11-13 DIAGNOSIS — M17.0 PRIMARY OSTEOARTHRITIS OF BOTH KNEES: ICD-10-CM

## 2023-11-13 DIAGNOSIS — M79.672 BILATERAL FOOT PAIN: ICD-10-CM

## 2023-11-13 DIAGNOSIS — G89.29 CHRONIC PAIN OF BOTH SHOULDERS: ICD-10-CM

## 2023-11-13 DIAGNOSIS — M25.512 CHRONIC PAIN OF BOTH SHOULDERS: ICD-10-CM

## 2023-11-13 DIAGNOSIS — M54.50 CHRONIC BILATERAL LOW BACK PAIN, UNSPECIFIED WHETHER SCIATICA PRESENT: ICD-10-CM

## 2023-11-13 DIAGNOSIS — M79.671 BILATERAL FOOT PAIN: ICD-10-CM

## 2023-11-13 DIAGNOSIS — M15.3 POST-TRAUMATIC OSTEOARTHRITIS OF MULTIPLE JOINTS: Primary | ICD-10-CM

## 2023-11-13 LAB
POC AMPHETAMINES: NEGATIVE
POC BARBITURATES: NEGATIVE
POC BENZODIAZEPHINES: NEGATIVE
POC COCAINE: NEGATIVE
POC METHADONE: POSITIVE
POC METHAMPHETAMINE SCREEN URINE: NEGATIVE
POC OPIATES: POSITIVE
POC OXYCODONE: NEGATIVE
POC PHENCYCLIDINE: NEGATIVE
POC PROPOXYPHENE: NEGATIVE
POC THC: NEGATIVE
POC TRICYCLIC ANTIDEPRESSANTS: NEGATIVE

## 2023-11-13 RX ORDER — METHADONE HYDROCHLORIDE 5 MG/1
5 TABLET ORAL 2 TIMES DAILY PRN
Qty: 60 TABLET | Refills: 0 | Status: SHIPPED | OUTPATIENT
Start: 2023-11-13
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
## 2023-11-24 DIAGNOSIS — R35.1 NOCTURIA: ICD-10-CM

## 2023-11-27 RX ORDER — TAMSULOSIN HYDROCHLORIDE 0.4 MG/1
1 CAPSULE ORAL DAILY
Qty: 30 CAPSULE | Refills: 3 | Status: SHIPPED | OUTPATIENT
Start: 2023-11-27

## 2023-12-01 RX ORDER — VALSARTAN 40 MG/1
20 TABLET ORAL DAILY
Qty: 30 TABLET | Refills: 3 | Status: SHIPPED | OUTPATIENT
Start: 2023-12-01

## 2023-12-05 ENCOUNTER — TELEPHONE (OUTPATIENT)
Dept: ONCOLOGY | Facility: CLINIC | Age: 83
End: 2023-12-05
Payer: MEDICARE

## 2023-12-07 ENCOUNTER — OFFICE VISIT (OUTPATIENT)
Dept: BEHAVIORAL HEALTH | Facility: CLINIC | Age: 83
End: 2023-12-07
Payer: MEDICARE

## 2023-12-07 DIAGNOSIS — F41.9 ANXIETY: Primary | ICD-10-CM

## 2023-12-07 PROCEDURE — 1125F AMNT PAIN NOTED PAIN PRSNT: CPT | Performed by: PSYCHOLOGIST

## 2023-12-07 PROCEDURE — 96137 PSYCL/NRPSYC TST PHY/QHP EA: CPT | Performed by: PSYCHOLOGIST

## 2023-12-07 PROCEDURE — 96131 PSYCL TST EVAL PHYS/QHP EA: CPT | Performed by: PSYCHOLOGIST

## 2023-12-07 PROCEDURE — 1159F MED LIST DOCD IN RCRD: CPT | Performed by: PSYCHOLOGIST

## 2023-12-07 PROCEDURE — 96130 PSYCL TST EVAL PHYS/QHP 1ST: CPT | Performed by: PSYCHOLOGIST

## 2023-12-07 PROCEDURE — 96136 PSYCL/NRPSYC TST PHY/QHP 1ST: CPT | Performed by: PSYCHOLOGIST

## 2023-12-07 PROCEDURE — 1160F RVW MEDS BY RX/DR IN RCRD: CPT | Performed by: PSYCHOLOGIST

## 2023-12-07 NOTE — LETTER
2023     London Bell MD  [address]  [address]  [place] [id number]    Patient: Rosas Christianson   YOB: 1940   Date of Visit: 2023     Dear London Bell MD:       Thank you for referring Rosas Christianson to me for evaluation. Below are the relevant portions of my assessment and plan of care.    If you have questions, please do not hesitate to call me. I look forward to following Rosas along with you.         Sincerely,        Garfield Hawkins EdD        CC: No Recipients    Garfield Hawkins EdD  23 1021  Sign when Signing Visit      BEHAVIORAL HEALTH/OPIOID RISK ASSESSMENT  PATIENT NAME:                  Rosas Christianson            :                                   1940            CHRONOLOGICAL AGE:    83 y.o.            REFERRING PHYSICIAN:   London Bell MD    Diagnoses and all orders for this visit:    1. Anxiety (Primary)          DATE OF ASSESSMENT:   2023            DATE OF REPORT:             2023       TIME OF SERVICE: 4 total hours.    3 hours for integration of patient data, interpretation of standardized test results and clinical data, clinical decision making, treatment planning, and report writing and communication.  1 hours for psychological test administration and scoring.           EXAMINER:             Garfield Hawkins EdD.    REASON FOR REFERRAL:  Rosas Christianson  is a 83 y.o.  male  with a history of persisting low back and right foot pain.  He is a patient at [facility] and is under consideration for continued opioid therapy.  His pain history is complicated by psychological factors including anxiety.  Dr Bell  ordered a behavioral health assessment to clarify the influence of psychosocial factors on the patient's pain experience, and to assist with the development of opioid risk stratification.  Results from this assessment will inform and assist medical decision-making and treatment planning  by providing a description of the patient's current emotional status, estimating the risk for misuse/abuse of opioids, and by making recommendations to improve treatment safety and efficacy.    SERVICES PERFORMED/TESTS ADMINISTERED:  Clinical Interview, Medical Record Review, Pain History, Mental Status Exam, Center for Epidemiological Studies-Depression Scale (MARIANNE-D), Geriatric Anxiety Scale (GAS), Pain Catastrophizing Scale (PCS), Pain Patient Profile (P-3), and Opioid Risk Tool (ORT).    PAIN HISTORY AND BACKGROUND:  Background information for this assessment was obtained directly from the patient, who appeared to be an adequate historian.  Additional information was taken from the available medical record.  According to the patient, his low back pain began many years ago with no specific precipitating injury, accident, or trauma.  In 2001 or 2002, the patient underwent lumbar epidural steroid injection but had an allergic reaction to it.  From August 2013 and 2017, the patient attended [facility] with a complaint of low back and bilateral foot pain and was under the care of Maikel Bell MD who performed L2-L5 lumbar facet nerve blocks which provided short-term relief, and prescribed Dolophine 10 mg daily.  The patient returned to [facility] in November 2023 with a primary complaint of bilateral foot pain, and is now prescribed pain 5 mg twice daily.    CURRENT PAIN STATUS: At present, the patient complains of intermittent dull, aching pain in his low back, as well as constant dull, aching, burning pain and occasional sharp, stabbing pain in his bilateral feet.  His low back pain is increased by prolonged walking and by pushing/pulling movements, and foot pain is increased by wearing socks.  Relief is achieved by rest and changing position.  On a scale of 1-10, the patient rated his pain at worst as 5, at least a 0, and indicated that it is usually present at a level of  2.  Current medications for pain include diclofenac gel, and Dolophine 5 mg twice daily. However, Mr. Christianson stated that he discontinued the Dolophine on his own 2 weeks ago, 1 week after his wife's death due to dementia, and the man declared that he did not feel like he needed the pain medication any longer.    In terms of the debilitating effects of pain, this individual is neither bedridden nor housebound, but he reported a reduction in physical activity due to pain.  This gentleman retired in  but continues to perform all domestic tasks and chores, albeit less often and efficiently than in the past.  Leisure activities include watching television, and working with electronic design on a computer.  Social functioning remains intact with family.  Mr. Christianson stated that he does not believe that pain affects his mood or emotions.    FAMILY AND SOCIAL BACKGROUND: This man  at age 30 to his spouse of 54 years and was  in 2023 when his wife  of dementia.  The couple produced 2 children, a 48-year-old daughter and a son who was  by suicide in .  Biographically, this individual was born and raised in [place] by parents who never  or  and was the younger of 2 children.  His father worked as an  and his mother is a homemaker.  This man recalled having good relationships with all family members during youth, and the environment of his childhood home was characterized as stable and loving.  Abuse or mistreatment in any form was denied.  Rosas left the parental home at age 20 to emancipate.  Educationally, this person earned a Bachelor of Science degree in Electrical Engineering.  Occupationally, he worked as an  for 50+ years.  Otherwise, this man reported no legal or arrest history.    MEDICAL HISTORY: Medical history was said to be positive for the following surgeries: Bilateral cataract extraction, colectomy with colostomy,  "skin cancer removal from his head, colonoscopy, and left wrist surgery.  Other medical history was said to be positive for benign prostatic hypertrophy, edema, GERD, hypertension, hypothyroidism, osteoarthritis, peripheral neuropathy, peptic ulcer disease, thrombocytopenia, and ulcerative colitis.  Current medications were said to include calcium carbonate, cholecalciferol, diclofenac gel, Dolophine, glucosamine, levothyroxine, multivitamin, omega 3 fatty acids, tamsulosin, and valsartan.  This man declared that he takes prescription medications as directed, although as mentioned above Mr. Christianson discontinued delving 2 weeks ago on his own.  He denied visiting the emergency room or otherwise attempting to obtain additional medication relief or pain.  Regarding personal habits, this man reported no use of alcohol or recreational drugs or tobacco products, but he consumes moderate amounts of caffeine on a routine basis.    PSYCHOLOGICAL HISTORY: The patient reported that his only prior contact with a mental health provider was for an opioid risk assessment in 2013 when he was started on golfing.  Mr. 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  Rosas stated that he has experienced no symptoms of anxiety or depression since her death in November.  Family psychiatric history was said to be positive for drug addiction on the part of the patient's son.    Regarding his current emotional state, Rosas does not think of himself as nervous, tense, or anxious in general, and he reported no history of panic symptoms.  This man feels that he gets along well with others but described himself as a \"loner.\"  He reported no difficulties controlling anger or temper.  Recent spirits were described as \"pretty good,\" and feelings of depression were " denied.  Suicidal ideation both past and present was denied.  Both sleep and appetite were described as normal, and no recent significant changes in weight was reported.    MENTAL STATUS EXAM AND BEHAVIORAL OBSERVATIONS:  This patient was alert, oriented in all spheres and in phase with the interview.  General appearance was adequate with regard to dress, hygiene and grooming.  No anomalies were noted in motor activity or speech.  Flow of thought was coherent, while thought content included no unusual or bizarre themes.  Mood appeared neither depressed nor elevated, and affect was constricted.  General intellectual ability appeared above average.  Behavior was pleasant and cooperative.  This individual completed the clinical interview and all psychological screening instruments in a timely and appropriate manner, appearing to put forth his best effort throughout.  Given the overall levels of cooperation and effort, these results are believed to be valid estimates of current levels of emotional and behavioral functioning.    TEST RESULTS:  The score on the MARIANNE-D revealed symptoms of minimal depression for the week prior to assessment.  On the GAS, the score suggested a minimal level of anxiety.  On the Pain Catastrophizing Scale (PCS), the total score of 1 represents the 4th percentile and indicates low risk for pain catastrophizing, with subscale scores indicating low risk for pain-related rumination (3rd percentile ), magnification (14th percentile ), and feelings of helplessness (10th percentile ).  On the Pain Patient Profile (P-3), a psychological instrument that assesses emotional distress associated with pain complaints, scores on measures of depression, anxiety, and somatization were all lower than average compared to other patients with primary pain complaints..  On the ORT, the score produced by Mr. Christianson suggested low risk for misuse/abuse of opioid pain medication.    SUMMARY AND DISCUSSION:  Results from  this behavioral health assessment estimate that the patient is a low risk for misuse/abuse of opioid pain medication, based on his  age, gender, and self-reported personal and family histories of alcoholism/substance abuse and/or mental health problems.  No alcoholism was reported on the part of family members but a drug addiction was reported on the part of the patient's son.  Mr. Christianson reported no personal history of mental health problems, but acknowledged feeling physically and emotionally distressed as a primary caretaker for his wife degenerating dementia earlier this year.    Regarding the efficacy of opioid analgesics, patient reported that his prescription of golfing 5 mg twice daily provides satisfactory pain relief and improved physical/social functioning, with the only adverse side effect being nausea.  No aberrant drug behaviors were identified relative to the patient's use of pain medication.  The only clinically relevant psychological and/or behavioral factors concurrent with the patient's use of pain medication his grief following his wife's recent death, however, Mr. Christianson appears to be coping satisfactorily with this event.    In summary, based on the available medical record and accepting his history as given, Mr. Christianson does not meet the DSM-V diagnostic criteria for opioid use disorder and appears to be a low risk for misuse/abuse of opioids based on demographic and historical data.  A person's basic level of opioid risk can be increased by poorly controlled emotional distress, but at present Rosas does not report or demonstrate symptoms of significant depression, anxiety, or emotional distress associated with his pain complaint.  If opioid therapy is clearly indicated by the medical data and continued on that basis, I recommend a cautious and conservative approach when determining strength and frequency of dosing, and I recommend that compliance be monitored by random urine drug screens and  pill counts, as well as by electronic prescription surveillance.  Psychologically, the patient does not report or demonstrate symptoms of significant depression, anxiety or emotional distress associated with his pain complaint, and no further behavioral health intervention is indicated at this time.                                        Garfield Hawkins EdD.  Clinical Health Psychologist    Cc: London Bell MD       Dictated utilizing Dragon dictation.              BEHAVIORAL HEALTH/OPIOID RISK ASSESSMENT  PATIENT NAME:                  Rosas Christianson            :                                    1940            CHRONOLOGICAL AGE:    83 y.o.          REFERRING PHYSICIAN:   London Bell MD    Diagnoses and all orders for this visit:    1. Anxiety (Primary)          DATE OF ASSESSMENT:   2023            DATE OF REPORT:           TIME OF SERVICE: *** total hours.    *** hours for integration of patient data, interpretation of standardized test results and clinical data, clinical decision making, treatment planning, and report writing and communication.  *** hours for psychological test administration and scoring.           EXAMINER:             Garfield Hawkins EdD.    REASON FOR REFERRAL:  Rosas Christianson  is a 83 y.o.  male  with a history of ***.  he is a patient at [facility] and is under consideration for ***.  his pain history is complicated by psychological factors including ***.  Dr Bell  ordered a behavioral health assessment to clarify the influence of psychosocial factors on the patient's pain experience, and to assist with the development of opioid risk stratification.  Results from this assessment will inform and assist medical decision-making and treatment planning by providing a description of the patient's current emotional status, estimating the risk for misuse/abuse of opioids, and by making recommendations to improve treatment safety and  efficacy.    SERVICES PERFORMED/TESTS ADMINISTERED:  Clinical Interview, Medical Record Review, Pain History, Mental Status Exam, Center for Epidemiological Studies-Depression Scale (MARIANNE-D), State-Trait Anxiety Inventory (STAI)/Paredes Anxiety Inventory (FRANK), Pain Patient Profile (P-3), and Opioid Risk Tool (ORT).    PAIN HISTORY AND BACKGROUND:  Background information for this assessment was obtained directly from the patient, who appeared to be an adequate historian.  Additional information was taken from the available medical record.  According to the patient,                            CURRENT PAIN STATUS:          FAMILY AND SOCIAL BACKGROUND:                            MEDICAL HISTORY:                                      PSYCHOLOGICAL HISTORY:                              MENTAL STATUS EXAM AND BEHAVIORAL OBSERVATIONS:  This patient was alert, oriented in all spheres and in phase with the interview.  General appearance was adequate with regard to dress, hygiene and grooming.  No anomalies were noted in motor activity or speech.  Flow of thought was coherent, while thought content included no unusual or bizarre themes.  Mood appeared depressed, with appropriate affect.  General intellectual ability appeared average.  Behavior was cooperative.  This individual completed the clinical interview and all psychological screening instruments in a timely and appropriate manner, appearing to put forth his best effort throughout.  Given the overall levels of cooperation and effort, these results are believed to be valid estimates of current levels of emotional and behavioral functioning.    TEST RESULTS:  The score on the MARIANNE-D revealed symptoms of moderate depression for the week prior to assessment.  On the STAI, the score suggested a higher than average level of generalized anxiety.  On the Pain Patient Profile (P-3), a psychological instrument that assesses emotional distress associated with pain complaints, scores on  measures of depression and anxiety were lower than average compared to other patients with primary pain complaints, while the score on a measure of somatization was within the average range compared to those with chronic pain.  On the ORT, the score produced by Mr. Patient suggested high risk for misuse/abuse of opioid pain medication.    SUMMARY AND DISCUSSION:  Results from this behavioral health assessment estimate that the patient is a *** risk for misuse/abuse of opioid pain medication, based on his  age, gender, and self-reported personal and family histories of alcoholism/substance abuse and/or mental health problems.                                        Garfield Hawkins EdD.  Clinical Health Psychologist    Cc: London Bell MD       Dictated utilizing Dragon dictation.

## 2023-12-07 NOTE — LETTER
2023     London Bell MD  [address]  [address]  [place] [id number]    Patient: Rosas Christianson   YOB: 1940   Date of Visit: 2023     Dear London Bell MD:       Thank you for referring Rosas Christianson to me for evaluation. Below are the relevant portions of my assessment and plan of care.    If you have questions, please do not hesitate to call me. I look forward to following Rosas along with you.         Sincerely,        Garfield Hawkins EdD        CC: No Recipients    Garfield Hawkins EdD  23 1030  Sign when Signing Visit      BEHAVIORAL HEALTH/OPIOID RISK ASSESSMENT  PATIENT NAME:                  Rosas Christianson            :                                   1940            CHRONOLOGICAL AGE:    83 y.o.            REFERRING PHYSICIAN:   London Bell MD    Diagnoses and all orders for this visit:    1. Anxiety (Primary)          DATE OF ASSESSMENT:   2023            DATE OF REPORT:             2023       TIME OF SERVICE: 4 total hours.    3 hours for integration of patient data, interpretation of standardized test results and clinical data, clinical decision making, treatment planning, and report writing and communication.  1 hours for psychological test administration and scoring.           EXAMINER:             Garfield Hawkins EdD.    REASON FOR REFERRAL:  Rosas Christianson  is a 83 y.o.  male  with a history of persisting low back and right foot pain.  He is a patient at [facility] and is under consideration for continued opioid therapy.  His pain history is complicated by psychological factors including anxiety.  Dr Bell  ordered a behavioral health assessment to clarify the influence of psychosocial factors on the patient's pain experience, and to assist with the development of opioid risk stratification.  Results from this assessment will inform and assist medical decision-making and treatment planning  by providing a description of the patient's current emotional status, estimating the risk for misuse/abuse of opioids, and by making recommendations to improve treatment safety and efficacy.    SERVICES PERFORMED/TESTS ADMINISTERED:  Clinical Interview, Medical Record Review, Pain History, Mental Status Exam, Center for Epidemiological Studies-Depression Scale (MARIANNE-D), Geriatric Anxiety Scale (GAS), Pain Catastrophizing Scale (PCS), Pain Patient Profile (P-3), and Opioid Risk Tool (ORT).    PAIN HISTORY AND BACKGROUND:  Background information for this assessment was obtained directly from the patient, who appeared to be an adequate historian.  Additional information was taken from the available medical record.  According to the patient, his low back pain began many years ago with no specific precipitating injury, accident, or trauma.  In 2001 or 2002, the patient underwent lumbar epidural steroid injection but had an allergic reaction to it.  From August 2013 and 2017, the patient attended [facility] with a complaint of low back and bilateral foot pain and was under the care of Maikel Bell MD who performed L2-L5 lumbar facet nerve blocks which provided short-term relief, and prescribed Dolophine 10 mg daily.  The patient returned to [facility] in November 2023 with a primary complaint of bilateral foot pain, and is now prescribed pain 5 mg twice daily.    CURRENT PAIN STATUS: At present, the patient complains of intermittent dull, aching pain in his low back, as well as constant dull, aching, burning pain and occasional sharp, stabbing pain in his bilateral feet.  His low back pain is increased by prolonged walking and by pushing/pulling movements, and foot pain is increased by wearing socks.  Relief is achieved by rest and changing position.  On a scale of 1-10, the patient rated his pain at worst as 5, at least a 0, and indicated that it is usually present at a level of  2.  Current medications for pain include diclofenac gel, and Dolophine 5 mg twice daily. However, Mr. Christianson stated that he discontinued the Dolophine on his own 2 weeks ago, 1 week after his wife's death due to dementia, and the man declared that he did not feel like he needed the pain medication any longer.    In terms of the debilitating effects of pain, this individual is neither bedridden nor housebound, but he reported a reduction in physical activity due to pain.  This gentleman retired in  but continues to perform all domestic tasks and chores, albeit less often and efficiently than in the past.  Leisure activities include watching television, and working with electronic design on a computer.  Social functioning remains intact with family.  Mr. Christianson stated that he does not believe that pain affects his mood or emotions.    FAMILY AND SOCIAL BACKGROUND: This man  at age 30 to his spouse of 54 years and was  in 2023 when his wife  of dementia.  The couple produced 2 children, a 48-year-old daughter and a son who was  by suicide in .  Biographically, this individual was born and raised in [place] by parents who never  or  and was the younger of 2 children.  His father worked as an  and his mother is a homemaker.  This man recalled having good relationships with all family members during youth, and the environment of his childhood home was characterized as stable and loving.  Abuse or mistreatment in any form was denied.  Rosas left the parental home at age 20 to emancipate.  Educationally, this person earned a Bachelor of Science degree in Electrical Engineering.  Occupationally, he worked as an  for 50+ years.  Otherwise, this man reported no legal or arrest history.    MEDICAL HISTORY: Medical history was said to be positive for the following surgeries: Bilateral cataract extraction, colectomy with colostomy,  "skin cancer removal from his head, colonoscopy, and left wrist surgery.  Other medical history was said to be positive for benign prostatic hypertrophy, edema, GERD, hypertension, hypothyroidism, osteoarthritis, peripheral neuropathy, peptic ulcer disease, thrombocytopenia, and ulcerative colitis.  Current medications were said to include calcium carbonate, cholecalciferol, diclofenac gel, Dolophine, glucosamine, levothyroxine, multivitamin, omega 3 fatty acids, tamsulosin, and valsartan.  This man declared that he takes prescription medications as directed, although as mentioned above Mr. Christianson discontinued delving 2 weeks ago on his own.  He denied visiting the emergency room or otherwise attempting to obtain additional medication relief or pain.  Regarding personal habits, this man reported no use of alcohol or recreational drugs or tobacco products, but he consumes moderate amounts of caffeine on a routine basis.    PSYCHOLOGICAL HISTORY: The patient reported that his only prior contact with a mental health provider was for an opioid risk assessment in 2013 when he was started on golfing.  Mr. 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  Rosas stated that he has experienced no symptoms of anxiety or depression since her death in November.  Family psychiatric history was said to be positive for drug addiction on the part of the patient's son.    Regarding his current emotional state, Rosas does not think of himself as nervous, tense, or anxious in general, and he reported no history of panic symptoms.  This man feels that he gets along well with others but described himself as a \"loner.\"  He reported no difficulties controlling anger or temper.  Recent spirits were described as \"pretty good,\" and feelings of depression were " denied.  Suicidal ideation both past and present was denied.  Both sleep and appetite were described as normal, and no recent significant changes in weight was reported.    MENTAL STATUS EXAM AND BEHAVIORAL OBSERVATIONS:  This patient was alert, oriented in all spheres and in phase with the interview.  General appearance was adequate with regard to dress, hygiene and grooming.  No anomalies were noted in motor activity or speech.  Flow of thought was coherent, while thought content included no unusual or bizarre themes.  Mood appeared neither depressed nor elevated, and affect was constricted.  General intellectual ability appeared above average.  Behavior was pleasant and cooperative.  This individual completed the clinical interview and all psychological screening instruments in a timely and appropriate manner, appearing to put forth his best effort throughout.  Given the overall levels of cooperation and effort, these results are believed to be valid estimates of current levels of emotional and behavioral functioning.    TEST RESULTS:  The score on the MARIANNE-D revealed symptoms of minimal depression for the week prior to assessment.  On the GAS, the score suggested a minimal level of anxiety.  On the Pain Catastrophizing Scale (PCS), the total score of 1 represents the 4th percentile and indicates low risk for pain catastrophizing, with subscale scores indicating low risk for pain-related rumination (3rd percentile ), magnification (14th percentile ), and feelings of helplessness (10th percentile ).  On the Pain Patient Profile (P-3), a psychological instrument that assesses emotional distress associated with pain complaints, scores on measures of depression, anxiety, and somatization were all lower than average compared to other patients with primary pain complaints..  On the ORT, the score produced by Mr. Christianson suggested low risk for misuse/abuse of opioid pain medication.    SUMMARY AND DISCUSSION:  Results from  this behavioral health assessment estimate that the patient is a low risk for misuse/abuse of opioid pain medication, based on his  age, gender, and self-reported personal and family histories of alcoholism/substance abuse and/or mental health problems.  No alcoholism was reported on the part of family members but a drug addiction was reported on the part of the patient's son.  Mr. Christianson reported no personal history of mental health problems, but acknowledged feeling physically and emotionally distressed as a primary caretaker for his wife degenerating dementia earlier this year.    Regarding the efficacy of opioid analgesics, patient reported that his prescription of golfing 5 mg twice daily provides satisfactory pain relief and improved physical/social functioning, with the only adverse side effect being nausea.  No aberrant drug behaviors were identified relative to the patient's use of pain medication.  The only clinically relevant psychological and/or behavioral factors concurrent with the patient's use of pain medication his grief following his wife's recent death, however, Mr. Christianson appears to be coping satisfactorily with this event.    In summary, based on the available medical record and accepting his history as given, Mr. Christianson does not meet the DSM-V diagnostic criteria for opioid use disorder and appears to be a low risk for misuse/abuse of opioids based on demographic and historical data.  A person's basic level of opioid risk can be increased by poorly controlled emotional distress, but at present Rosas does not report or demonstrate symptoms of significant depression, anxiety, or emotional distress associated with his pain complaint.  If opioid therapy is clearly indicated by the medical data and continued on that basis, I recommend a cautious and conservative approach when determining strength and frequency of dosing, and I recommend that compliance be monitored by random urine drug screens and  pill counts, as well as by electronic prescription surveillance.  Psychologically, the patient does not report or demonstrate symptoms of significant depression, anxiety or emotional distress associated with his pain complaint, and no further behavioral health intervention is indicated at this time.                                        Garfield Hawkins EdD.  Clinical Health Psychologist    Cc: London Bell MD       Dictated utilizing Dragon dictation.

## 2023-12-12 NOTE — PROGRESS NOTES
BEHAVIORAL HEALTH/OPIOID RISK ASSESSMENT  PATIENT NAME:                  Rosas Christianson            :                                   1940            CHRONOLOGICAL AGE:    83 y.o.            REFERRING PHYSICIAN:   London Bell MD    Diagnoses and all orders for this visit:    1. Anxiety (Primary)          DATE OF ASSESSMENT:   2023            DATE OF REPORT:             2023       TIME OF SERVICE: 4 total hours.    3 hours for integration of patient data, interpretation of standardized test results and clinical data, clinical decision making, treatment planning, and report writing and communication.  1 hours for psychological test administration and scoring.           EXAMINER:             Garfield Hawkins EdD.    REASON FOR REFERRAL:  Rosas Christianson  is a 83 y.o.  male  with a history of persisting low back and right foot pain.  He is a patient at [facility] and is under consideration for continued opioid therapy.  His pain history is complicated by psychological factors including anxiety.  Dr Bell  ordered a behavioral health assessment to clarify the influence of psychosocial factors on the patient's pain experience, and to assist with the development of opioid risk stratification.  Results from this assessment will inform and assist medical decision-making and treatment planning by providing a description of the patient's current emotional status, estimating the risk for misuse/abuse of opioids, and by making recommendations to improve treatment safety and efficacy.    SERVICES PERFORMED/TESTS ADMINISTERED:  Clinical Interview, Medical Record Review, Pain History, Mental Status Exam, Center for Epidemiological Studies-Depression Scale (MARIANNE-D), Geriatric Anxiety Scale (GAS), Pain Catastrophizing Scale (PCS), Pain Patient Profile (P-3), and Opioid Risk Tool (ORT).    PAIN HISTORY AND BACKGROUND:  Background information for this assessment was obtained  directly from the patient, who appeared to be an adequate historian.  Additional information was taken from the available medical record.  According to the patient, his low back pain began many years ago with no specific precipitating injury, accident, or trauma.  In 2001 or 2002, the patient underwent lumbar epidural steroid injection but had an allergic reaction to it.  From August 2013 and 2017, the patient attended [facility] with a complaint of low back and bilateral foot pain and was under the care of Maikel Bell MD who performed L2-L5 lumbar facet nerve blocks which provided short-term relief, and prescribed Dolophine 10 mg daily.  The patient returned to [facility] in November 2023 with a primary complaint of bilateral foot pain, and is now prescribed pain 5 mg twice daily.    CURRENT PAIN STATUS: At present, the patient complains of intermittent dull, aching pain in his low back, as well as constant dull, aching, burning pain and occasional sharp, stabbing pain in his bilateral feet.  His low back pain is increased by prolonged walking and by pushing/pulling movements, and foot pain is increased by wearing socks.  Relief is achieved by rest and changing position.  On a scale of 1-10, the patient rated his pain at worst as 5, at least a 0, and indicated that it is usually present at a level of 2.  Current medications for pain include diclofenac gel, and Dolophine 5 mg twice daily. However, Mr. Christianson stated that he discontinued the Dolophine on his own 2 weeks ago, 1 week after his wife's death due to dementia, and the man declared that he did not feel like he needed the pain medication any longer.    In terms of the debilitating effects of pain, this individual is neither bedridden nor housebound, but he reported a reduction in physical activity due to pain.  This gentleman retired in 2016 but continues to perform all domestic tasks and chores, albeit less often  and efficiently than in the past.  Leisure activities include watching television, and working with electronic design on a computer.  Social functioning remains intact with family.  Mr. Christianson stated that he does not believe that pain affects his mood or emotions.    FAMILY AND SOCIAL BACKGROUND: This man  at age 30 to his spouse of 54 years and was  in 2023 when his wife  of dementia.  The couple produced 2 children, a 48-year-old daughter and a son who was  by suicide in .  Biographically, this individual was born and raised in [place] by parents who never  or  and was the younger of 2 children.  His father worked as an  and his mother is a homemaker.  This man recalled having good relationships with all family members during youth, and the environment of his childhood home was characterized as stable and loving.  Abuse or mistreatment in any form was denied.  Rosas left the parental home at age 20 to emancipate.  Educationally, this person earned a Bachelor of Science degree in Electrical Engineering.  Occupationally, he worked as an  for 50+ years.  Otherwise, this man reported no legal or arrest history.    MEDICAL HISTORY: Medical history was said to be positive for the following surgeries: Bilateral cataract extraction, colectomy with colostomy, skin cancer removal from his head, colonoscopy, and left wrist surgery.  Other medical history was said to be positive for benign prostatic hypertrophy, edema, GERD, hypertension, hypothyroidism, osteoarthritis, peripheral neuropathy, peptic ulcer disease, thrombocytopenia, and ulcerative colitis.  Current medications were said to include calcium carbonate, cholecalciferol, diclofenac gel, Dolophine, glucosamine, levothyroxine, multivitamin, omega 3 fatty acids, tamsulosin, and valsartan.  This man declared that he takes prescription medications as directed, although as  "mentioned above Mr. Christianson discontinued delving 2 weeks ago on his own.  He denied visiting the emergency room or otherwise attempting to obtain additional medication relief or pain.  Regarding personal habits, this man reported no use of alcohol or recreational drugs or tobacco products, but he consumes moderate amounts of caffeine on a routine basis.    PSYCHOLOGICAL HISTORY: The patient reported that his only prior contact with a mental health provider was for an opioid risk assessment in 2013 when he was started on golfing.  Mr. 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  Rosas stated that he has experienced no symptoms of anxiety or depression since her death in November.  Family psychiatric history was said to be positive for drug addiction on the part of the patient's son.    Regarding his current emotional state, Rosas does not think of himself as nervous, tense, or anxious in general, and he reported no history of panic symptoms.  This man feels that he gets along well with others but described himself as a \"loner.\"  He reported no difficulties controlling anger or temper.  Recent spirits were described as \"pretty good,\" and feelings of depression were denied.  Suicidal ideation both past and present was denied.  Both sleep and appetite were described as normal, and no recent significant changes in weight was reported.    MENTAL STATUS EXAM AND BEHAVIORAL OBSERVATIONS:  This patient was alert, oriented in all spheres and in phase with the interview.  General appearance was adequate with regard to dress, hygiene and grooming.  No anomalies were noted in motor activity or speech.  Flow of thought was coherent, while thought content included no unusual or bizarre themes.  Mood appeared neither depressed nor elevated, and " affect was constricted.  General intellectual ability appeared above average.  Behavior was pleasant and cooperative.  This individual completed the clinical interview and all psychological screening instruments in a timely and appropriate manner, appearing to put forth his best effort throughout.  Given the overall levels of cooperation and effort, these results are believed to be valid estimates of current levels of emotional and behavioral functioning.    TEST RESULTS:  The score on the MARIANNE-D revealed symptoms of minimal depression for the week prior to assessment.  On the GAS, the score suggested a minimal level of anxiety.  On the Pain Catastrophizing Scale (PCS), the total score of 1 represents the 4th percentile and indicates low risk for pain catastrophizing, with subscale scores indicating low risk for pain-related rumination (3rd percentile ), magnification (14th percentile ), and feelings of helplessness (10th percentile ).  On the Pain Patient Profile (P-3), a psychological instrument that assesses emotional distress associated with pain complaints, scores on measures of depression, anxiety, and somatization were all lower than average compared to other patients with primary pain complaints..  On the ORT, the score produced by Mr. Christianson suggested low risk for misuse/abuse of opioid pain medication.    SUMMARY AND DISCUSSION:  Results from this behavioral health assessment estimate that the patient is a low risk for misuse/abuse of opioid pain medication, based on his  age, gender, and self-reported personal and family histories of alcoholism/substance abuse and/or mental health problems.  No alcoholism was reported on the part of family members but a drug addiction was reported on the part of the patient's son.  Mr. Christianson reported no personal history of mental health problems, but acknowledged feeling physically and emotionally distressed as a primary caretaker for his wife degenerating dementia earlier  this year.    Regarding the efficacy of opioid analgesics, patient reported that his prescription of golfing 5 mg twice daily provides satisfactory pain relief and improved physical/social functioning, with the only adverse side effect being nausea.  No aberrant drug behaviors were identified relative to the patient's use of pain medication.  The only clinically relevant psychological and/or behavioral factors concurrent with the patient's use of pain medication his grief following his wife's recent death, however, Mr. Christianson appears to be coping satisfactorily with this event.    In summary, based on the available medical record and accepting his history as given, Mr. Christianson does not meet the DSM-V diagnostic criteria for opioid use disorder and appears to be a low risk for misuse/abuse of opioids based on demographic and historical data.  A person's basic level of opioid risk can be increased by poorly controlled emotional distress, but at present Rosas does not report or demonstrate symptoms of significant depression, anxiety, or emotional distress associated with his pain complaint.  If opioid therapy is clearly indicated by the medical data and continued on that basis, I recommend a cautious and conservative approach when determining strength and frequency of dosing, and I recommend that compliance be monitored by random urine drug screens and pill counts, as well as by electronic prescription surveillance.  Psychologically, the patient does not report or demonstrate symptoms of significant depression, anxiety or emotional distress associated with his pain complaint, and no further behavioral health intervention is indicated at this time.                                        Garfield Hawkins EdD.  Clinical Health Psychologist    Cc: London Bell MD       Dictated utilizing Dragon dictation.

## 2024-01-02 ENCOUNTER — LAB (OUTPATIENT)
Dept: LAB | Facility: HOSPITAL | Age: 84
End: 2024-01-02
Payer: MEDICARE

## 2024-01-02 ENCOUNTER — OFFICE VISIT (OUTPATIENT)
Dept: ONCOLOGY | Facility: CLINIC | Age: 84
End: 2024-01-02
Payer: MEDICARE

## 2024-01-02 VITALS
HEIGHT: 70 IN | TEMPERATURE: 97.1 F | OXYGEN SATURATION: 100 % | WEIGHT: 160.3 LBS | DIASTOLIC BLOOD PRESSURE: 88 MMHG | HEART RATE: 67 BPM | SYSTOLIC BLOOD PRESSURE: 170 MMHG | RESPIRATION RATE: 16 BRPM | BODY MASS INDEX: 22.95 KG/M2

## 2024-01-02 DIAGNOSIS — R51.9 PERSISTENT HEADACHES: ICD-10-CM

## 2024-01-02 DIAGNOSIS — R79.89 ELEVATED FERRITIN LEVEL: ICD-10-CM

## 2024-01-02 DIAGNOSIS — D69.6 THROMBOCYTOPENIA: ICD-10-CM

## 2024-01-02 DIAGNOSIS — D53.9 MACROCYTIC ANEMIA: Primary | ICD-10-CM

## 2024-01-02 DIAGNOSIS — D53.9 MACROCYTIC ANEMIA: ICD-10-CM

## 2024-01-02 LAB
BASOPHILS # BLD AUTO: 0.07 10*3/MM3 (ref 0–0.2)
BASOPHILS NFR BLD AUTO: 1.1 % (ref 0–1.5)
DEPRECATED RDW RBC AUTO: 50.4 FL (ref 37–54)
EOSINOPHIL # BLD AUTO: 0.22 10*3/MM3 (ref 0–0.4)
EOSINOPHIL NFR BLD AUTO: 3.6 % (ref 0.3–6.2)
ERYTHROCYTE [DISTWIDTH] IN BLOOD BY AUTOMATED COUNT: 13.9 % (ref 12.3–15.4)
FERRITIN SERPL-MCNC: 341 NG/ML (ref 30–400)
FOLATE SERPL-MCNC: >20 NG/ML (ref 4.78–24.2)
HCT VFR BLD AUTO: 38.6 % (ref 37.5–51)
HGB BLD-MCNC: 13 G/DL (ref 13–17.7)
IMM GRANULOCYTES # BLD AUTO: 0.02 10*3/MM3 (ref 0–0.05)
IMM GRANULOCYTES NFR BLD AUTO: 0.3 % (ref 0–0.5)
IRON 24H UR-MRATE: 92 MCG/DL (ref 59–158)
IRON SATN MFR SERPL: 36 % (ref 20–50)
LYMPHOCYTES # BLD AUTO: 1 10*3/MM3 (ref 0.7–3.1)
LYMPHOCYTES NFR BLD AUTO: 16.2 % (ref 19.6–45.3)
MCH RBC QN AUTO: 33.2 PG (ref 26.6–33)
MCHC RBC AUTO-ENTMCNC: 33.7 G/DL (ref 31.5–35.7)
MCV RBC AUTO: 98.5 FL (ref 79–97)
MONOCYTES # BLD AUTO: 0.79 10*3/MM3 (ref 0.1–0.9)
MONOCYTES NFR BLD AUTO: 12.8 % (ref 5–12)
NEUTROPHILS NFR BLD AUTO: 4.06 10*3/MM3 (ref 1.7–7)
NEUTROPHILS NFR BLD AUTO: 66 % (ref 42.7–76)
NRBC BLD AUTO-RTO: 0 /100 WBC (ref 0–0.2)
PLATELET # BLD AUTO: 111 10*3/MM3 (ref 140–450)
PMV BLD AUTO: 9.6 FL (ref 6–12)
RBC # BLD AUTO: 3.92 10*6/MM3 (ref 4.14–5.8)
TIBC SERPL-MCNC: 256 MCG/DL (ref 298–536)
TRANSFERRIN SERPL-MCNC: 183 MG/DL (ref 200–360)
VIT B12 BLD-MCNC: 989 PG/ML (ref 211–946)
WBC NRBC COR # BLD AUTO: 6.16 10*3/MM3 (ref 3.4–10.8)

## 2024-01-02 PROCEDURE — 82607 VITAMIN B-12: CPT | Performed by: INTERNAL MEDICINE

## 2024-01-02 PROCEDURE — 82728 ASSAY OF FERRITIN: CPT

## 2024-01-02 PROCEDURE — 36415 COLL VENOUS BLD VENIPUNCTURE: CPT

## 2024-01-02 PROCEDURE — 83540 ASSAY OF IRON: CPT

## 2024-01-02 PROCEDURE — 85025 COMPLETE CBC W/AUTO DIFF WBC: CPT

## 2024-01-02 PROCEDURE — 82746 ASSAY OF FOLIC ACID SERUM: CPT | Performed by: INTERNAL MEDICINE

## 2024-01-02 PROCEDURE — 99214 OFFICE O/P EST MOD 30 MIN: CPT | Performed by: INTERNAL MEDICINE

## 2024-01-02 PROCEDURE — 84466 ASSAY OF TRANSFERRIN: CPT

## 2024-01-02 NOTE — PROGRESS NOTES
Subjective     CHIEF COMPLAINT:      Chief Complaint   Patient presents with    Follow-up     No concerns      HISTORY OF PRESENT ILLNESS:     Rosas Christianson is a 83 y.o. male patient who returns today for follow up on his anemia and thrombocytopenia.  His wife passed away 2 months ago.  She had dementia and he was very busy taking care of her.  This resulted in him not being able to take care of himself.  He was not eating right and was not active.  He has increased his activity.  He is taking his medicines regularly.  No problem with fatigue.    Patient reports intermittent bleeding from the ostomy.  This is chronic and unchanged.    ROS:  Pertinent ROS is in the HPI.     Past medical, surgical, social and family history were reviewed.     MEDICATIONS:    Current Outpatient Medications:     calcium carbonate (TUMS) 500 MG chewable tablet, Chew 1 tablet 2 (Two) Times a Day., Disp: , Rfl:     Cholecalciferol (VITAMIN D3 PO), Take 1 each by mouth Daily., Disp: , Rfl:     Diclofenac Sodium (VOLTAREN) 1 % gel gel, Apply 4 g topically to the appropriate area as directed 3 (Three) Times a Day., Disp: 150 g, Rfl: 3    glucosamine sulfate 500 MG capsule capsule, Take 4 capsules by mouth Daily., Disp: , Rfl:     levothyroxine (SYNTHROID, LEVOTHROID) 125 MCG tablet, TAKE ONE TABLET BY MOUTH DAILY, Disp: 90 tablet, Rfl: 1    Multiple Vitamins-Minerals (MULTIVITAMIN ADULT PO), Take 1 tablet by mouth Daily., Disp: , Rfl:     Omega-3 Fatty Acids (fish oil) 1000 MG capsule capsule, Take 1 capsule by mouth Daily With Breakfast., Disp: , Rfl:     tamsulosin (FLOMAX) 0.4 MG capsule 24 hr capsule, TAKE ONE CAPSULE BY MOUTH DAILY, Disp: 30 capsule, Rfl: 3    valsartan (Diovan) 40 MG tablet, Take 0.5 tablets by mouth Daily., Disp: 30 tablet, Rfl: 3    methadone (DOLOPHINE) 5 MG tablet, Take 1 tablet by mouth 2 (Two) Times a Day As Needed for Severe Pain., Disp: 60 tablet, Rfl: 0    prochlorperazine (COMPAZINE) 10 MG tablet, Take 1  "tablet by mouth Daily., Disp: 30 tablet, Rfl: 1  Objective     VITAL SIGNS:     Vitals:    01/02/24 1546   BP: 170/88   Pulse: 67   Resp: 16   Temp: 97.1 °F (36.2 °C)   TempSrc: Temporal   SpO2: 100%   Weight: 72.7 kg (160 lb 4.8 oz)   Height: 177.8 cm (70\")   PainSc: 0-No pain     Body mass index is 23 kg/m².     Wt Readings from Last 5 Encounters:   01/02/24 72.7 kg (160 lb 4.8 oz)   11/13/23 72.9 kg (160 lb 12.8 oz)   10/04/23 72.6 kg (160 lb)   09/29/23 72.7 kg (160 lb 4.8 oz)   08/28/23 71 kg (156 lb 8 oz)     PHYSICAL EXAMINATION:   GENERAL: The patient appears in fair general condition, not in acute distress.   SKIN: No ecchymosis.  EYES: No jaundice. No Pallor.  CHEST: Normal respiratory effort.   CVS: No edema.  ABDOMEN: Soft. No tenderness. No Hepatomegaly. No Splenomegaly. No masses.  EXTREMITIES: No noted deformity.     DIAGNOSTIC DATA:     Results from last 7 days   Lab Units 01/02/24  1507   WBC 10*3/mm3 6.16   NEUTROS ABS 10*3/mm3 4.06   HEMOGLOBIN g/dL 13.0   HEMATOCRIT % 38.6   PLATELETS 10*3/mm3 111*         Lab 01/02/24  1507   IRON 92   IRON SATURATION (TSAT) 36   TIBC 256*   TRANSFERRIN 183*   FERRITIN 341.00   FOLATE >20.00   VITAMIN B 12 989*      Assessment & Plan    *Macrocytic anemia.    It is attributed to anemia of chronic disease.    Hemoglobin decreased to 12.6 on 10/29/2020.  With the patient's ulcerative colitis and prior colon surgery, he is at increased risk for decreased absorption of vitamin B12.  Patient was started on vitamin B12 1000 mcg daily on 10/29/2020.  Hemoglobin improved to 13.0 on 10/28/2021. MCV improved to 97.1.  Vitamin B12 was 1260 and folate was >20.  Hemoglobin decreased to 12.1 on 11/8/2022.  MCV was 100.3.  He was continued on vitamin B12 1000 mcg daily.  Hemoglobin decreased to 11.7  Vitamin B12 786.  The worsening of his anemia was attributed to anemia of chronic kidney disease.  He reports that he has not been drinking adequate amount of fluids " regularly.  1/2/2024: Hemoglobin improved to 13.0.  Vitamin B12 improved to 989.    *Chronic thrombocytopenia.  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  He had a mildly elevated IPF.  Celebrex may be contributing to his thrombocytopenia.  However, he needs the medicine due to his significant arthritis.  He usually develops significant worsening of his arthritis about 3 days after stopping Celebrex.  Platelet count was 113,000 on 11/8/2022.  Platelet count decreased to 96,000 on 6/1/2023.  1/2/2024: Platelet count improved to 111,000.    *Elevated ferritin level.    He had ferritin up to 450 in the past.    However, transferrin saturation was less than 50%.    This was attributed to acute phase reaction.  Hemochromatosis was considered unlikely.   Ferritin was 630 and transferrin saturation was 45% on 10/28/2021.  Ferritin decreased to 445 on 11/8/2022.  Transferrin saturation was 39%.   Ferritin decreased to 365 on 6/1/2023.  Transferrin saturation is 32%.  1/2/2024: Ferritin decreased to 341.  Transferrin saturation 36%.  The improvement in the iron level indicates that he does not have evidence of hemochromatosis.    PLAN:    1.  Continue vitamin B12 1000 mcg daily.  2.  Since his labs have improved, we will see him in follow-up in 1 year.  Will obtain CBC ferritin iron panel vitamin B12 and folate levels.  We will see him sooner if there are any changes.       June Smiley MD  01/02/24

## 2024-01-05 ENCOUNTER — TELEPHONE (OUTPATIENT)
Dept: SPORTS MEDICINE | Facility: CLINIC | Age: 84
End: 2024-01-05

## 2024-01-05 ENCOUNTER — OFFICE VISIT (OUTPATIENT)
Dept: SPORTS MEDICINE | Facility: CLINIC | Age: 84
End: 2024-01-05
Payer: MEDICARE

## 2024-01-05 VITALS
RESPIRATION RATE: 16 BRPM | WEIGHT: 160 LBS | HEART RATE: 63 BPM | OXYGEN SATURATION: 99 % | HEIGHT: 70 IN | BODY MASS INDEX: 22.9 KG/M2 | DIASTOLIC BLOOD PRESSURE: 90 MMHG | SYSTOLIC BLOOD PRESSURE: 154 MMHG

## 2024-01-05 DIAGNOSIS — G89.29 CHRONIC PAIN OF BOTH SHOULDERS: ICD-10-CM

## 2024-01-05 DIAGNOSIS — M17.0 PRIMARY OSTEOARTHRITIS OF BOTH KNEES: Primary | ICD-10-CM

## 2024-01-05 DIAGNOSIS — M25.511 CHRONIC PAIN OF BOTH SHOULDERS: ICD-10-CM

## 2024-01-05 DIAGNOSIS — M25.512 CHRONIC PAIN OF BOTH SHOULDERS: ICD-10-CM

## 2024-01-05 DIAGNOSIS — M75.121 NONTRAUMATIC COMPLETE TEAR OF RIGHT ROTATOR CUFF: ICD-10-CM

## 2024-01-05 RX ORDER — TRIAMCINOLONE ACETONIDE 40 MG/ML
40 INJECTION, SUSPENSION INTRA-ARTICULAR; INTRAMUSCULAR
Status: DISCONTINUED | OUTPATIENT
Start: 2024-01-05 | End: 2024-01-05 | Stop reason: HOSPADM

## 2024-01-05 RX ADMIN — TRIAMCINOLONE ACETONIDE 40 MG: 40 INJECTION, SUSPENSION INTRA-ARTICULAR; INTRAMUSCULAR at 14:17

## 2024-01-05 NOTE — TELEPHONE ENCOUNTER
Per hub patient states [facility] after first of the year. Requested card from patient and he presented [employer] [facility]. Says they merged with [facility].     Called [employer], plan is inactive with term date of 24. Asked insurance rep about merge with [facility]; she confirmed they did merge. Asked her to search for patient by SSN and ; she located ID #[id number] but stated patient had prescription coverage only, no medical.     Relayed this information to patient. Suggested he follow up with insurance for clarification. Patient states he went to primary care earlier this week and they were able to verify. He will reach out to them.

## 2024-01-05 NOTE — PROGRESS NOTES
"Rosas is a 83 y.o. year old male presents to [facility]    Chief Complaint   Patient presents with    Knee Pain     F/u eval for B/L knee pain with OA - reports pain is worse than his shoulders and knee pain has not improved - here for further evaluation and treatment        History of Present Illness  Presents today for follow-up on bilateral shoulder, bilateral knee pain.  Last gel series completed March 2023.  States that since October, his knee pain has been gradually worsening.  He did find good relief from gel series and would like to proceed however he is having considerable pain today.  He has been using topical anti-inflammatory.  Has tried to increase his physical fitness as his wife who he was primary caregiver for passed away in November.  He has been riding stationary bike which has been helpful.  Regarding his shoulders, his pain is minimal.    I have reviewed the patient's medical, family, and social history in detail and updated the computerized patient record.    /90 (BP Location: Right arm, Patient Position: Sitting, Cuff Size: Adult)   Pulse 63   Resp 16   Ht 177.8 cm (70\")   Wt 72.6 kg (160 lb)   SpO2 99%   BMI 22.96 kg/m²      Physical Exam    Vital signs reviewed.   General: No acute distress.  Eyes: conjunctiva clear; pupils equally round and reactive  ENT: external ears atraumatic  CV: no peripheral edema  Resp: normal respiratory effort, no use of accessory muscles  Skin: no rashes or wounds; normal turgor  Psych: mood and affect appropriate; recent and remote memory intact  Neuro: sensation to light touch intact    MSK Exam  Bilateral knee: No effusion.  Full range of motion.  Positive retropatellar crepitus.              Large Joint Arthrocentesis: L knee  Date/Time: 1/5/2024 2:17 PM  Consent given by: patient  Timeout: Immediately prior to procedure a time out was called to verify the correct patient, procedure, equipment, support staff and " site/side marked as required   Supporting Documentation  Indications: pain   Procedure Details  Location: knee - L knee  Needle size: 25 G  Approach: anterolateral  Medications administered: 40 mg triamcinolone acetonide 40 MG/ML  Patient tolerance: patient tolerated the procedure well with no immediate complications      Large Joint Arthrocentesis: R knee  Date/Time: 1/5/2024 2:17 PM  Consent given by: patient  Timeout: Immediately prior to procedure a time out was called to verify the correct patient, procedure, equipment, support staff and site/side marked as required   Supporting Documentation  Indications: pain   Procedure Details  Location: knee - R knee  Needle size: 25 G  Approach: anterolateral  Medications administered: 40 mg triamcinolone acetonide 40 MG/ML  Patient tolerance: patient tolerated the procedure well with no immediate complications          Diagnoses and all orders for this visit:    Primary osteoarthritis of both knees  -     Visco Treatment; Future  -     Large Joint Arthrocentesis: L knee  -     Large Joint Arthrocentesis: R knee    Chronic pain of both shoulders    Nontraumatic complete tear of right rotator cuff      1.  Acute exacerbation knee OA.  Offered cortisone injection today for his acute pain.  He has a good response to gel series in past.  Complete these once they arrive.  2, 3.  Stable.  No acute complaints.        Follow Up     No follow-ups on file.    Patient was given instructions and counseling regarding his condition or for health maintenance advice. Please see specific information pulled into the AVS if appropriate.     EMR Dragon/Transcription disclaimer:    Much of this encounter note is an electronic transcription/translation of spoken language to printed text.  The electronic translation of spoken language may permit erroneous, or at times, nonsensical words or phrases to be inadvertently transcribed.  Although I have reviewed the note for such errors some may still  exist.

## 2024-01-05 NOTE — TELEPHONE ENCOUNTER
Patient called back. Says thinks he may have given incorrect ID#, but it was the same we attempted initially. Patient feels may have been mistaken and probably doesn't have any secondary coverage currently then.

## 2024-01-11 ENCOUNTER — PROCEDURE VISIT (OUTPATIENT)
Dept: SPORTS MEDICINE | Facility: CLINIC | Age: 84
End: 2024-01-11
Payer: MEDICARE

## 2024-01-11 VITALS
DIASTOLIC BLOOD PRESSURE: 80 MMHG | OXYGEN SATURATION: 99 % | WEIGHT: 160 LBS | BODY MASS INDEX: 22.9 KG/M2 | HEIGHT: 70 IN | HEART RATE: 61 BPM | RESPIRATION RATE: 16 BRPM | SYSTOLIC BLOOD PRESSURE: 130 MMHG

## 2024-01-11 DIAGNOSIS — M17.0 PRIMARY OSTEOARTHRITIS OF BOTH KNEES: Primary | ICD-10-CM

## 2024-01-15 RX ORDER — VALSARTAN 40 MG/1
20 TABLET ORAL DAILY
Qty: 60 TABLET | Refills: 1 | Status: SHIPPED | OUTPATIENT
Start: 2024-01-15 | End: 2024-01-16 | Stop reason: SDUPTHER

## 2024-01-15 NOTE — TELEPHONE ENCOUNTER
Rx Refill Note  Requested Prescriptions     Pending Prescriptions Disp Refills    valsartan (Diovan) 40 MG tablet 60 tablet 1     Sig: Take 0.5 tablets by mouth Daily.      Last office visit with prescribing clinician: 9/29/2023   Last telemedicine visit with prescribing clinician: Visit date not found   Next office visit with prescribing clinician: 1/16/2024                         Would you like a call back once the refill request has been completed: [] Yes [] No    If the office needs to give you a call back, can they leave a voicemail: [] Yes [] No    Mariaelena Fontana MA  01/15/24, 09:27 EST

## 2024-01-16 ENCOUNTER — HOSPITAL ENCOUNTER (OUTPATIENT)
Dept: GENERAL RADIOLOGY | Facility: HOSPITAL | Age: 84
Discharge: HOME | End: 2024-01-16
Admitting: FAMILY MEDICINE
Payer: MEDICARE

## 2024-01-16 ENCOUNTER — OFFICE VISIT (OUTPATIENT)
Dept: INTERNAL MEDICINE | Facility: CLINIC | Age: 84
End: 2024-01-16
Payer: MEDICARE

## 2024-01-16 VITALS
BODY MASS INDEX: 23.43 KG/M2 | DIASTOLIC BLOOD PRESSURE: 70 MMHG | HEIGHT: 70 IN | OXYGEN SATURATION: 97 % | HEART RATE: 62 BPM | TEMPERATURE: 98 F | SYSTOLIC BLOOD PRESSURE: 150 MMHG | WEIGHT: 163.7 LBS

## 2024-01-16 DIAGNOSIS — M54.50 ACUTE BILATERAL LOW BACK PAIN WITHOUT SCIATICA: ICD-10-CM

## 2024-01-16 DIAGNOSIS — R52 PAIN: ICD-10-CM

## 2024-01-16 DIAGNOSIS — I10 PRIMARY HYPERTENSION: Primary | ICD-10-CM

## 2024-01-16 PROBLEM — G89.29 CHRONIC RIGHT SI JOINT PAIN: Status: ACTIVE | Noted: 2024-01-16

## 2024-01-16 PROBLEM — G89.29 CHRONIC LEFT SI JOINT PAIN: Status: ACTIVE | Noted: 2024-01-16

## 2024-01-16 PROBLEM — M53.3 CHRONIC LEFT SI JOINT PAIN: Status: ACTIVE | Noted: 2024-01-16

## 2024-01-16 PROBLEM — M53.3 CHRONIC RIGHT SI JOINT PAIN: Status: ACTIVE | Noted: 2024-01-16

## 2024-01-16 PROCEDURE — 72170 X-RAY EXAM OF PELVIS: CPT

## 2024-01-16 PROCEDURE — 3078F DIAST BP <80 MM HG: CPT | Performed by: FAMILY MEDICINE

## 2024-01-16 PROCEDURE — 3077F SYST BP >= 140 MM HG: CPT | Performed by: FAMILY MEDICINE

## 2024-01-16 PROCEDURE — 99214 OFFICE O/P EST MOD 30 MIN: CPT | Performed by: FAMILY MEDICINE

## 2024-01-16 RX ORDER — TRAMADOL HYDROCHLORIDE 50 MG/1
50 TABLET ORAL EVERY 6 HOURS PRN
Qty: 28 TABLET | Refills: 0 | Status: CANCELLED | OUTPATIENT
Start: 2024-01-16

## 2024-01-16 RX ORDER — TRAMADOL HYDROCHLORIDE 50 MG/1
50 TABLET ORAL EVERY 6 HOURS PRN
Qty: 28 TABLET | Refills: 0 | Status: ON HOLD | OUTPATIENT
Start: 2024-01-16

## 2024-01-16 RX ORDER — VALSARTAN 40 MG/1
40 TABLET ORAL DAILY
Qty: 90 TABLET | Refills: 1 | Status: SHIPPED | OUTPATIENT
Start: 2024-01-16 | End: 2024-01-17 | Stop reason: SDUPTHER

## 2024-01-16 RX ORDER — TIZANIDINE 2 MG/1
2 TABLET ORAL EVERY 8 HOURS PRN
Qty: 30 TABLET | Refills: 0 | Status: ON HOLD | OUTPATIENT
Start: 2024-01-16

## 2024-01-16 NOTE — PROGRESS NOTES
"Chief Complaint  Fall (2 weeks; Buttocks pain ), Hypertension, and Hip Pain (Bilateral)    Subjective        Rosas Christianson presents to [facility]  History of Present Illness  Patient appointment for low back pain 2 weeks after fall hurt immediately that subsided then recurrence points to sacral area bilaterally no radiating pain down the leg he is using a walker as well as a wheelchair seem to more comfortable standing and sitting  Hypertension well-controlled with recent rise with acute pain no chest pain shortness of breath or increased fatigue  Objective   Vital Signs:  /70   Pulse 62   Temp 98 °F (36.7 °C)   Ht 177.8 cm (70\")   Wt 74.3 kg (163 lb 11.2 oz)   SpO2 97%   BMI 23.49 kg/m²   Estimated body mass index is 23.49 kg/m² as calculated from the following:    Height as of this encounter: 177.8 cm (70\").    Weight as of this encounter: 74.3 kg (163 lb 11.2 oz).       BMI is within normal parameters. No other follow-up for BMI required.      Physical Exam  Nursing note reviewed.   Cardiovascular:      Rate and Rhythm: Normal rate and regular rhythm.      Pulses: Normal pulses.      Heart sounds: Normal heart sounds.   Musculoskeletal:      Right lower leg: Edema present.      Left lower leg: Edema present.        Legs:       Comments: SI areas tenderness  Neurovascularly grossly intact   Neurological:      Mental Status: He is alert.        Result Review :      Common labs          8/22/2023    14:17 9/29/2023    14:53 1/2/2024    15:07   Common Labs   Glucose 77  69     BUN 36  25     Creatinine 1.55  1.20     Sodium 142  133     Potassium 4.6  5.3     Chloride 102  98     Calcium 9.4  9.5     WBC   6.16    Hemoglobin   13.0    Hematocrit   38.6    Platelets   111                   Assessment and Plan     Diagnoses and all orders for this visit:    1. Primary hypertension (Primary)    2. Pain  -     XR Pelvis 1 or 2 View; Future  -     traMADol (ULTRAM) 50 MG tablet; " Take 1 tablet by mouth Every 6 (Six) Hours As Needed for Moderate Pain.  Dispense: 28 tablet; Refill: 0    3. Acute bilateral low back pain without sciatica    Other orders  -     valsartan (Diovan) 40 MG tablet; Take 1 tablet by mouth Daily.  Dispense: 90 tablet; Refill: 1  -     tiZANidine (ZANAFLEX) 2 MG tablet; Take 1 tablet by mouth Every 8 (Eight) Hours As Needed for Muscle Spasms.  Dispense: 30 tablet; Refill: 0    Results of testing         Follow Up     Return in about 1 month (around 2/16/2024), or if symptoms worsen or fail to improve, for Recheck.  Patient was given instructions and counseling regarding his condition or for health maintenance advice. Please see specific information pulled into the AVS if appropriate.         Answers submitted by the patient for this visit:  Primary Reason for Visit (Submitted on 1/13/2024)  What is the primary reason for your visit?: Back Pain

## 2024-01-19 ENCOUNTER — TELEPHONE (OUTPATIENT)
Dept: INTERNAL MEDICINE | Facility: CLINIC | Age: 84
End: 2024-01-19

## 2024-01-19 DIAGNOSIS — M54.50 ACUTE BILATERAL LOW BACK PAIN WITHOUT SCIATICA: Primary | ICD-10-CM

## 2024-01-19 RX ORDER — VALSARTAN 40 MG/1
40 TABLET ORAL DAILY
Qty: 90 TABLET | Refills: 1 | Status: ON HOLD | OUTPATIENT
Start: 2024-01-19

## 2024-01-19 NOTE — TELEPHONE ENCOUNTER
PATIENT CALLED AND STATES HE WAS SEEN ON 1/16/24. HE STATES THE PAIN IS GETTING WORSE FROM THE FALL AND UNABLE TO TAKE CARE OF HIMSELF. THE PAIN MEDICATION IS NOT HELPING.     PLEASE CALL AND ADVISE [phone]

## 2024-01-19 NOTE — TELEPHONE ENCOUNTER
Pt declines PT he said he can barely move.  He said he has to sit on a pillow.   He said he can not get in or out of his car.  He wants to know what else you recommend?

## 2024-01-20 ENCOUNTER — HOSPITAL ENCOUNTER (INPATIENT)
Facility: HOSPITAL | Age: 84
LOS: 3 days | Discharge: NURSING, REHAB OR LONG-TERM CARE | End: 2024-01-24
Attending: EMERGENCY MEDICINE | Admitting: INTERNAL MEDICINE
Payer: MEDICARE

## 2024-01-20 ENCOUNTER — APPOINTMENT (OUTPATIENT)
Dept: CT IMAGING | Facility: HOSPITAL | Age: 84
End: 2024-01-20
Payer: MEDICARE

## 2024-01-20 DIAGNOSIS — M54.50 ACUTE LEFT-SIDED LOW BACK PAIN WITHOUT SCIATICA: Primary | ICD-10-CM

## 2024-01-20 DIAGNOSIS — Z74.09 IMPAIRED MOBILITY AND ADLS: ICD-10-CM

## 2024-01-20 DIAGNOSIS — I10 HYPERTENSION NOT AT GOAL: ICD-10-CM

## 2024-01-20 DIAGNOSIS — S32.10XA CLOSED FRACTURE OF SACRUM, UNSPECIFIED PORTION OF SACRUM, INITIAL ENCOUNTER: ICD-10-CM

## 2024-01-20 DIAGNOSIS — Z78.9 IMPAIRED MOBILITY AND ADLS: ICD-10-CM

## 2024-01-20 DIAGNOSIS — R52 PAIN: ICD-10-CM

## 2024-01-20 PROBLEM — N40.0 BPH (BENIGN PROSTATIC HYPERPLASIA): Status: ACTIVE | Noted: 2024-01-20

## 2024-01-20 LAB
ANION GAP SERPL CALCULATED.3IONS-SCNC: 11.1 MMOL/L (ref 5–15)
BASOPHILS # BLD AUTO: 0.05 10*3/MM3 (ref 0–0.2)
BASOPHILS NFR BLD AUTO: 0.7 % (ref 0–1.5)
BILIRUB UR QL STRIP: NEGATIVE
BUN SERPL-MCNC: 19 MG/DL (ref 8–23)
BUN/CREAT SERPL: 19.6 (ref 7–25)
CALCIUM SPEC-SCNC: 9.1 MG/DL (ref 8.6–10.5)
CHLORIDE SERPL-SCNC: 97 MMOL/L (ref 98–107)
CLARITY UR: CLEAR
CO2 SERPL-SCNC: 23.9 MMOL/L (ref 22–29)
COLOR UR: YELLOW
CREAT SERPL-MCNC: 0.97 MG/DL (ref 0.76–1.27)
DEPRECATED RDW RBC AUTO: 44 FL (ref 37–54)
EGFRCR SERPLBLD CKD-EPI 2021: 77.5 ML/MIN/1.73
EOSINOPHIL # BLD AUTO: 0.11 10*3/MM3 (ref 0–0.4)
EOSINOPHIL NFR BLD AUTO: 1.6 % (ref 0.3–6.2)
ERYTHROCYTE [DISTWIDTH] IN BLOOD BY AUTOMATED COUNT: 12.5 % (ref 12.3–15.4)
GLUCOSE SERPL-MCNC: 107 MG/DL (ref 65–99)
GLUCOSE UR STRIP-MCNC: NEGATIVE MG/DL
HCT VFR BLD AUTO: 36.8 % (ref 37.5–51)
HGB BLD-MCNC: 12.5 G/DL (ref 13–17.7)
HGB UR QL STRIP.AUTO: NEGATIVE
KETONES UR QL STRIP: NEGATIVE
LEUKOCYTE ESTERASE UR QL STRIP.AUTO: NEGATIVE
LYMPHOCYTES # BLD AUTO: 0.71 10*3/MM3 (ref 0.7–3.1)
LYMPHOCYTES NFR BLD AUTO: 10 % (ref 19.6–45.3)
MCH RBC QN AUTO: 32.7 PG (ref 26.6–33)
MCHC RBC AUTO-ENTMCNC: 34 G/DL (ref 31.5–35.7)
MCV RBC AUTO: 96.3 FL (ref 79–97)
MONOCYTES # BLD AUTO: 0.76 10*3/MM3 (ref 0.1–0.9)
MONOCYTES NFR BLD AUTO: 10.7 % (ref 5–12)
NEUTROPHILS NFR BLD AUTO: 5.44 10*3/MM3 (ref 1.7–7)
NEUTROPHILS NFR BLD AUTO: 76.7 % (ref 42.7–76)
NITRITE UR QL STRIP: NEGATIVE
PH UR STRIP.AUTO: 6 [PH] (ref 5–8)
PLATELET # BLD AUTO: 139 10*3/MM3 (ref 140–450)
PMV BLD AUTO: 9.5 FL (ref 6–12)
POTASSIUM SERPL-SCNC: 3.9 MMOL/L (ref 3.5–5.2)
PROT UR QL STRIP: NEGATIVE
RBC # BLD AUTO: 3.82 10*6/MM3 (ref 4.14–5.8)
SODIUM SERPL-SCNC: 132 MMOL/L (ref 136–145)
SP GR UR STRIP: 1.01 (ref 1–1.03)
UROBILINOGEN UR QL STRIP: NORMAL
WBC NRBC COR # BLD AUTO: 7.09 10*3/MM3 (ref 3.4–10.8)

## 2024-01-20 PROCEDURE — G0378 HOSPITAL OBSERVATION PER HR: HCPCS

## 2024-01-20 PROCEDURE — 72192 CT PELVIS W/O DYE: CPT

## 2024-01-20 PROCEDURE — 25010000002 ENOXAPARIN PER 10 MG: Performed by: INTERNAL MEDICINE

## 2024-01-20 PROCEDURE — 85025 COMPLETE CBC W/AUTO DIFF WBC: CPT | Performed by: EMERGENCY MEDICINE

## 2024-01-20 PROCEDURE — 72131 CT LUMBAR SPINE W/O DYE: CPT

## 2024-01-20 PROCEDURE — 81003 URINALYSIS AUTO W/O SCOPE: CPT | Performed by: EMERGENCY MEDICINE

## 2024-01-20 PROCEDURE — 99285 EMERGENCY DEPT VISIT HI MDM: CPT

## 2024-01-20 PROCEDURE — 80048 BASIC METABOLIC PNL TOTAL CA: CPT | Performed by: EMERGENCY MEDICINE

## 2024-01-20 RX ORDER — ONDANSETRON 2 MG/ML
4 INJECTION INTRAMUSCULAR; INTRAVENOUS EVERY 6 HOURS PRN
Status: DISCONTINUED | OUTPATIENT
Start: 2024-01-20 | End: 2024-01-24 | Stop reason: HOSPADM

## 2024-01-20 RX ORDER — SODIUM CHLORIDE 0.9 % (FLUSH) 0.9 %
10 SYRINGE (ML) INJECTION AS NEEDED
Status: DISCONTINUED | OUTPATIENT
Start: 2024-01-20 | End: 2024-01-24 | Stop reason: HOSPADM

## 2024-01-20 RX ORDER — ACETAMINOPHEN 650 MG/1
650 SUPPOSITORY RECTAL EVERY 4 HOURS PRN
Status: DISCONTINUED | OUTPATIENT
Start: 2024-01-20 | End: 2024-01-24 | Stop reason: HOSPADM

## 2024-01-20 RX ORDER — TRAMADOL HYDROCHLORIDE 50 MG/1
50 TABLET ORAL EVERY 6 HOURS PRN
Status: DISCONTINUED | OUTPATIENT
Start: 2024-01-20 | End: 2024-01-24 | Stop reason: HOSPADM

## 2024-01-20 RX ORDER — CHOLECALCIFEROL (VITAMIN D3) 125 MCG
5 CAPSULE ORAL NIGHTLY PRN
Status: DISCONTINUED | OUTPATIENT
Start: 2024-01-20 | End: 2024-01-24 | Stop reason: HOSPADM

## 2024-01-20 RX ORDER — NALOXONE HCL 0.4 MG/ML
0.4 VIAL (ML) INJECTION
Status: DISCONTINUED | OUTPATIENT
Start: 2024-01-20 | End: 2024-01-24 | Stop reason: HOSPADM

## 2024-01-20 RX ORDER — AMOXICILLIN 250 MG
2 CAPSULE ORAL 2 TIMES DAILY
Status: DISCONTINUED | OUTPATIENT
Start: 2024-01-20 | End: 2024-01-23

## 2024-01-20 RX ORDER — ACETAMINOPHEN 160 MG/5ML
650 SOLUTION ORAL EVERY 4 HOURS PRN
Status: DISCONTINUED | OUTPATIENT
Start: 2024-01-20 | End: 2024-01-24 | Stop reason: HOSPADM

## 2024-01-20 RX ORDER — LIDOCAINE 4 G/G
2 PATCH TOPICAL
Status: DISCONTINUED | OUTPATIENT
Start: 2024-01-21 | End: 2024-01-23

## 2024-01-20 RX ORDER — SODIUM CHLORIDE 0.9 % (FLUSH) 0.9 %
10 SYRINGE (ML) INJECTION EVERY 12 HOURS SCHEDULED
Status: DISCONTINUED | OUTPATIENT
Start: 2024-01-20 | End: 2024-01-24 | Stop reason: HOSPADM

## 2024-01-20 RX ORDER — HYDROMORPHONE HYDROCHLORIDE 1 MG/ML
0.5 INJECTION, SOLUTION INTRAMUSCULAR; INTRAVENOUS; SUBCUTANEOUS
Status: DISCONTINUED | OUTPATIENT
Start: 2024-01-20 | End: 2024-01-24 | Stop reason: HOSPADM

## 2024-01-20 RX ORDER — SODIUM CHLORIDE 9 MG/ML
40 INJECTION, SOLUTION INTRAVENOUS AS NEEDED
Status: DISCONTINUED | OUTPATIENT
Start: 2024-01-20 | End: 2024-01-24 | Stop reason: HOSPADM

## 2024-01-20 RX ORDER — ENOXAPARIN SODIUM 100 MG/ML
40 INJECTION SUBCUTANEOUS DAILY
Status: DISCONTINUED | OUTPATIENT
Start: 2024-01-20 | End: 2024-01-24 | Stop reason: HOSPADM

## 2024-01-20 RX ORDER — OXYCODONE HYDROCHLORIDE AND ACETAMINOPHEN 5; 325 MG/1; MG/1
1 TABLET ORAL EVERY 4 HOURS PRN
Status: DISCONTINUED | OUTPATIENT
Start: 2024-01-20 | End: 2024-01-24 | Stop reason: HOSPADM

## 2024-01-20 RX ORDER — LEVOTHYROXINE SODIUM 0.12 MG/1
125 TABLET ORAL
Status: DISCONTINUED | OUTPATIENT
Start: 2024-01-21 | End: 2024-01-24 | Stop reason: HOSPADM

## 2024-01-20 RX ORDER — ONDANSETRON 4 MG/1
4 TABLET, ORALLY DISINTEGRATING ORAL EVERY 6 HOURS PRN
Status: DISCONTINUED | OUTPATIENT
Start: 2024-01-20 | End: 2024-01-24 | Stop reason: HOSPADM

## 2024-01-20 RX ORDER — CALCIUM CARBONATE 500 MG/1
2 TABLET, CHEWABLE ORAL 3 TIMES DAILY PRN
Status: DISCONTINUED | OUTPATIENT
Start: 2024-01-20 | End: 2024-01-24 | Stop reason: HOSPADM

## 2024-01-20 RX ORDER — POLYETHYLENE GLYCOL 3350 17 G/17G
17 POWDER, FOR SOLUTION ORAL DAILY PRN
Status: DISCONTINUED | OUTPATIENT
Start: 2024-01-20 | End: 2024-01-23

## 2024-01-20 RX ORDER — ACETAMINOPHEN 325 MG/1
650 TABLET ORAL EVERY 4 HOURS PRN
Status: DISCONTINUED | OUTPATIENT
Start: 2024-01-20 | End: 2024-01-24 | Stop reason: HOSPADM

## 2024-01-20 RX ORDER — BISACODYL 10 MG
10 SUPPOSITORY, RECTAL RECTAL DAILY PRN
Status: DISCONTINUED | OUTPATIENT
Start: 2024-01-20 | End: 2024-01-23

## 2024-01-20 RX ORDER — TIZANIDINE 4 MG/1
2 TABLET ORAL EVERY 8 HOURS PRN
Status: DISCONTINUED | OUTPATIENT
Start: 2024-01-20 | End: 2024-01-24 | Stop reason: HOSPADM

## 2024-01-20 RX ORDER — TAMSULOSIN HYDROCHLORIDE 0.4 MG/1
0.4 CAPSULE ORAL DAILY
Status: DISCONTINUED | OUTPATIENT
Start: 2024-01-21 | End: 2024-01-24 | Stop reason: HOSPADM

## 2024-01-20 RX ORDER — BISACODYL 5 MG/1
5 TABLET, DELAYED RELEASE ORAL DAILY PRN
Status: DISCONTINUED | OUTPATIENT
Start: 2024-01-20 | End: 2024-01-23

## 2024-01-20 RX ORDER — VALSARTAN 40 MG/1
40 TABLET ORAL DAILY
Status: DISCONTINUED | OUTPATIENT
Start: 2024-01-21 | End: 2024-01-24 | Stop reason: HOSPADM

## 2024-01-20 RX ORDER — MULTIPLE VITAMINS W/ MINERALS TAB 9MG-400MCG
1 TAB ORAL DAILY
Status: DISCONTINUED | OUTPATIENT
Start: 2024-01-21 | End: 2024-01-24 | Stop reason: HOSPADM

## 2024-01-20 RX ADMIN — ENOXAPARIN SODIUM 40 MG: 100 INJECTION SUBCUTANEOUS at 20:20

## 2024-01-20 RX ADMIN — Medication 10 ML: at 20:19

## 2024-01-20 RX ADMIN — OXYCODONE AND ACETAMINOPHEN 1 TABLET: 5; 325 TABLET ORAL at 20:19

## 2024-01-20 RX ADMIN — Medication 5 MG: at 20:19

## 2024-01-20 NOTE — ED PROVIDER NOTES
EMERGENCY DEPARTMENT ENCOUNTER  Room Number:  17/17  PCP: Rashid Klein MD  Independent Historians: Patient and EMS      HPI:  Chief Complaint: had concerns including Back Pain.     A complete HPI/ROS/PMH/PSH/SH/FH are unobtainable due to: None    Chronic or social conditions impacting patient care (Social Determinants of Health): Lack of social support      Context: Rosas Christianson is a 83 y.o. male with a medical history of hypertension, neuropathy, ulcerative colitis and arthritis who presents to the ED c/o acute low back pain x 2 weeks that has been progressively worsening and is now significantly impairing his ability to take care of his ADLs.  He does live at home alone.  He reports a fall about 3 weeks ago but the pain did not start until a week later.  He has been seen by his primary care provider who got x-rays and started him on some medication for discomfort but he continues to have worsening discomfort.  He reports its mostly in the right low back/pelvis area.  Patient does have a colostomy and has not noticed any change in his bowel output.  Normal appetite with no cough, fevers, dyspnea reported.  No dysuria hematuria.  Pain is significant worse when he is up trying to move around and more tolerable when he is lying flat and still.      Review of prior external notes (non-ED) -and- Review of prior external test results outside of this encounter: EP office note 1/16/2024 reviewed.  Patient complaining of acute bilateral low back pain without sciatica.  Plain film of the pelvis was unremarkable for acute pathology.  Patient was prescribed Zanaflex.    Prescription drug monitoring program review:     N/A    PAST MEDICAL HISTORY  Active Ambulatory Problems     Diagnosis Date Noted    Post-traumatic osteoarthritis of multiple joints     Esophageal reflux     Hypertension     Hypothyroidism     Neuropathy     Ulcerative colitis     H/O colectomy 10/24/2016    Anemia of chronic disease 01/18/2017     Thrombocytopenia 01/18/2017    History of frostbite 10/04/2017    Arthritis 11/03/2017    Presence of colostomy 11/03/2017    Health care maintenance 11/03/2017    Bilateral foot pain 11/06/2017    ERRONEOUS ENCOUNTER--DISREGARD 12/03/2018    Medicare annual wellness visit, subsequent 09/06/2019    Primary osteoarthritis of both knees 04/27/2021    Lung nodule 06/02/2022    Anxiety 04/28/2023    Chronic pain of both shoulders 07/27/2023    Edema 07/27/2023    Chronic left SI joint pain 01/16/2024    Chronic right SI joint pain 01/16/2024    Acute bilateral low back pain without sciatica 01/16/2024     Resolved Ambulatory Problems     Diagnosis Date Noted    Long term current use of methadone for pain control 11/06/2017    Closed fracture of multiple ribs of right side with delayed healing 06/02/2022     Past Medical History:   Diagnosis Date    Acromioclavicular separation 2-3 years ago    Allergic 80's    Ankle sprain broken-3 places    Benign prostatic hyperplasia     Brain concussion 89    Cancer     Cataract surgery-?    Clotting disorder 2000    Colon polyp 2000    Eye exam, routine 2011    Fracture of ankle 75    Frozen shoulder     H/O ulcer disease     Heart murmur 10 years old to now    Herpes zoster     Injury of back dislocated  in 80's    Kidney stone 70's    Low back pain 1997    Macrocytic anemia     Peptic ulceration     Periarthritis of shoulder     Pneumonia 80's    Rash thin skin- now    Rotator cuff syndrome 2019 and Nov 2023    Scoliosis 80's to now    Tear of meniscus of knee 1964    Torn rotator cuff     Torn rotator cuff     Visual impairment glasses         PAST SURGICAL HISTORY  Past Surgical History:   Procedure Laterality Date    APPENDECTOMY  with colon removal    CATARACT EXTRACTION Bilateral     CATARACT EXTRACTION, BILATERAL      COLON SURGERY  2000    removed colon    COLONOSCOPY  2005    has a colostomy    DENTAL PROCEDURE      HAND SURGERY Left     SKIN CANCER DESTRUCTION  01/2016     on head    TRIGGER POINT INJECTION  knees many times    WRIST SURGERY  wrist and hand 73         FAMILY HISTORY  Family History   Problem Relation Age of Onset    Heart disease Mother     Arthritis Mother     Osteoporosis Mother     Heart disease Father     Arthritis Father     Osteoporosis Father     Asthma Brother          SOCIAL HISTORY  Social History     Socioeconomic History    Marital status:      Spouse name: Cynthia    Number of children: 2    Years of education: College   Tobacco Use    Smoking status: Former     Packs/day: 3.00     Years: 33.00     Additional pack years: 0.00     Total pack years: 99.00     Types: Cigarettes     Start date: 1956     Quit date: 1985     Years since quittin.7    Smokeless tobacco: Never    Tobacco comments:     parents gave them to me   Vaping Use    Vaping Use: Never used   Substance and Sexual Activity    Alcohol use: Not Currently     Comment: rare    Drug use: No    Sexual activity: Not Currently     Partners: Female     Birth control/protection: None         ALLERGIES  Aspirin, Nsaids, and Prednisone      REVIEW OF SYSTEMS  Review of Systems  Included in HPI  All systems reviewed and negative except for those discussed in HPI.      PHYSICAL EXAM    I have reviewed the triage vital signs and nursing notes.    ED Triage Vitals [24 1547]   Temp Heart Rate Resp BP SpO2   98.3 °F (36.8 °C) 86 18 (!) 181/94 98 %      Temp src Heart Rate Source Patient Position BP Location FiO2 (%)   -- -- -- -- --       Physical Exam    Physical Exam   Constitutional: No distress.  Nontoxic.  Elderly and frail but otherwise appropriate  HENT:  Head: Normocephalic and atraumatic.   Oropharynx: Mucous membranes are moist.   Eyes: . No scleral icterus. No conjunctival pallor.  Neck: Normal range of motion. Neck supple.   Cardiovascular: Pink warm and well perfused throughout.    Pulmonary/Chest: No respiratory distress.  No tachypnea or increased work of breathing  appreciated.    Abdominal: Soft. There is no tenderness. There is no rebound and no guarding.  Benign abdominal exam  Musculoskeletal: Moves all extremities equally.  Atraumatic exam of the back with some mild tenderness over the left SI joint area but no midline tenderness to percussion of the lumbar spine.  Neurological: Alert and oriented.  No acute focal deficit appreciated.  Skin: Skin is pink, warm, and dry.   Psychiatric: Mood and affect normal.   Nursing note and vitals reviewed.               LAB RESULTS  Recent Results (from the past 24 hour(s))   Basic Metabolic Panel    Collection Time: 01/20/24  4:05 PM    Specimen: Blood   Result Value Ref Range    Glucose 107 (H) 65 - 99 mg/dL    BUN 19 8 - 23 mg/dL    Creatinine 0.97 0.76 - 1.27 mg/dL    Sodium 132 (L) 136 - 145 mmol/L    Potassium 3.9 3.5 - 5.2 mmol/L    Chloride 97 (L) 98 - 107 mmol/L    CO2 23.9 22.0 - 29.0 mmol/L    Calcium 9.1 8.6 - 10.5 mg/dL    BUN/Creatinine Ratio 19.6 7.0 - 25.0    Anion Gap 11.1 5.0 - 15.0 mmol/L    eGFR 77.5 >60.0 mL/min/1.73   CBC Auto Differential    Collection Time: 01/20/24  4:05 PM    Specimen: Blood   Result Value Ref Range    WBC 7.09 3.40 - 10.80 10*3/mm3    RBC 3.82 (L) 4.14 - 5.80 10*6/mm3    Hemoglobin 12.5 (L) 13.0 - 17.7 g/dL    Hematocrit 36.8 (L) 37.5 - 51.0 %    MCV 96.3 79.0 - 97.0 fL    MCH 32.7 26.6 - 33.0 pg    MCHC 34.0 31.5 - 35.7 g/dL    RDW 12.5 12.3 - 15.4 %    RDW-SD 44.0 37.0 - 54.0 fl    MPV 9.5 6.0 - 12.0 fL    Platelets 139 (L) 140 - 450 10*3/mm3    Neutrophil % 76.7 (H) 42.7 - 76.0 %    Lymphocyte % 10.0 (L) 19.6 - 45.3 %    Monocyte % 10.7 5.0 - 12.0 %    Eosinophil % 1.6 0.3 - 6.2 %    Basophil % 0.7 0.0 - 1.5 %    Neutrophils, Absolute 5.44 1.70 - 7.00 10*3/mm3    Lymphocytes, Absolute 0.71 0.70 - 3.10 10*3/mm3    Monocytes, Absolute 0.76 0.10 - 0.90 10*3/mm3    Eosinophils, Absolute 0.11 0.00 - 0.40 10*3/mm3    Basophils, Absolute 0.05 0.00 - 0.20 10*3/mm3   Urinalysis With Microscopic If  Indicated (No Culture) - Urine, Clean Catch    Collection Time: 01/20/24  4:23 PM    Specimen: Urine, Clean Catch   Result Value Ref Range    Color, UA Yellow Yellow, Straw    Appearance, UA Clear Clear    pH, UA 6.0 5.0 - 8.0    Specific Gravity, UA 1.011 1.005 - 1.030    Glucose, UA Negative Negative    Ketones, UA Negative Negative    Bilirubin, UA Negative Negative    Blood, UA Negative Negative    Protein, UA Negative Negative    Leuk Esterase, UA Negative Negative    Nitrite, UA Negative Negative    Urobilinogen, UA 0.2 E.U./dL 0.2 - 1.0 E.U./dL         RADIOLOGY  CT Lumbar Spine Without Contrast    Result Date: 1/20/2024  CT SCAN OF THE LUMBAR SPINE  HISTORY: Fell 3 weeks ago. Worsening low back pain.  The CT scan was performed as an emergency procedure through the lumbar spine without contrast. The following findings are present:  1. There is aneurysmal enlargement of the partially visualized abdominal aorta measuring up to 3.4 cm.  2. There is mild to moderate diffuse osteoporosis but there is no evidence of acute compression fracture in the lumbar spine extending to the level of the bottom of T11.  3. There is extensive degenerative change at all levels of the lumbar spine and there is asymmetric disc space narrowing extending more to the right at L2-3 and L3-4 producing a mild lumbar scoliosis convex to the left.  4. At T11-12 there is slight facet spurring, left greater than right. There is no central stenosis but there is slight left foraminal encroachment. At T12-L1 there is slight annular disc bulge extending more to the left combined with some facet spurring, left greater than right. This also produces mild to moderate left foraminal encroachment.  5. At L1-2 there is prominent annular disc bulge and facet hypertrophy and some facet spurring resulting in mild central canal stenosis. There is slight right foraminal encroachment.  6. At L2-3 there is prominent disc space narrowing extending more to the  right with right lateral spurring. There is moderate annular disc bulge and severe facet spurring, right greater than left producing mild central canal stenosis and mild to moderate right foraminal encroachment.  7. At L3-4 there is asymmetric disc space narrowing extending more to the right with right lateral spurring. There is moderate annular disc bulge and some annular bony spurring extending more to the right combined with facet spurring and resulting in moderate central canal stenosis as well as severe right foraminal encroachment.  8. At L4-5 there is prominent disc degeneration extending slightly more to the right and there is also left lateral subluxation of L4 relative to L5 measuring 8 mm. There is annular disc bulge extending more to the right and into the right neural foramen combined with severe facet spurring and resulting in moderate central canal stenosis. There is severe bilateral foraminal encroachment as well.  9. At L5-S1 there is prominent disc degeneration with disc space narrowing and a vacuum phenomenon that extends slightly more to the left. There is moderate annular disc bulge extending more to the left combined with severe facet spurring and resulting in severe left and mild right foraminal encroachment.      Radiation dose reduction techniques were utilized, including automated exposure control and exposure modulation based on body size.   This report was finalized on 1/20/2024 6:01 PM by Dr. Rizwan Sena M.D on Workstation: [id number]      CT Pelvis Without Contrast    Result Date: 1/20/2024  PELVIS CT WITHOUT CONTRAST  HISTORY: Difficulty walking after a fall several weeks ago.  TECHNIQUE: Noncontrast pelvis CT is provided. Lumbar CT performed today is reported separately.  FINDINGS: There is a typical configuration of sacral fractures with sagittally oriented fractures extending across the lateral margins of the sacrum at the S1 through S3 levels and a nondisplaced, transverse  fracture extending across the midline at the S2-S3 level, demonstrated only on the sagittal reformatted images. There is no canal or foraminal stenosis. Mild adjacent soft tissue edema.  The ischiopubic rami and the other bones of the pelvis are intact. The proximal femurs are intact. No hip joint effusion.  Intrapelvic structures appear normal. Extensive atheromatous calcification along the pelvic arteries. Right mid abdominal ostomy with parastomal hernia. Partially visualized cholelithiasis.  Musculature around the hips and pelvis appears normal. There is no hematoma.      Acute nondisplaced sacral fractures sagittally oriented along the lateral margins of the sacrum S1 through S3 and extending transversely across the midline at the S2-S3 level. Incidentally noted cholelithiasis.   Radiation dose reduction techniques were utilized, including automated exposure control and exposure modulation based on body size.          MEDICATIONS GIVEN IN ER  Medications   sodium chloride 0.9 % flush 10 mL (has no administration in time range)         ORDERS PLACED DURING THIS VISIT:  Orders Placed This Encounter   Procedures    CT Pelvis Without Contrast    CT Lumbar Spine Without Contrast    Basic Metabolic Panel    CBC Auto Differential    Urinalysis With Microscopic If Indicated (No Culture) - Urine, Clean Catch    [facility] (on-call MD unless specified) Details    Insert Peripheral IV    Initiate Observation Status    CBC & Differential         OUTPATIENT MEDICATION MANAGEMENT:  Current Facility-Administered Medications Ordered in Epic   Medication Dose Route Frequency Provider Last Rate Last Admin    sodium chloride 0.9 % flush 10 mL  10 mL Intravenous PRN Nemesio Miller MD         Current Outpatient Medications Ordered in Epic   Medication Sig Dispense Refill    calcium carbonate (TUMS) 500 MG chewable tablet Chew 1 tablet 2 (Two) Times a Day.      Cholecalciferol (VITAMIN D3 PO) Take 1 each by mouth Daily.      Diclofenac  Sodium (VOLTAREN) 1 % gel gel Apply 4 g topically to the appropriate area as directed 3 (Three) Times a Day. 150 g 3    glucosamine sulfate 500 MG capsule capsule Take 4 capsules by mouth Daily.      levothyroxine (SYNTHROID, LEVOTHROID) 125 MCG tablet TAKE ONE TABLET BY MOUTH DAILY 90 tablet 1    methadone (DOLOPHINE) 5 MG tablet Take 1 tablet by mouth 2 (Two) Times a Day As Needed for Severe Pain. 60 tablet 0    Multiple Vitamins-Minerals (MULTIVITAMIN ADULT PO) Take 1 tablet by mouth Daily.      Omega-3 Fatty Acids (fish oil) 1000 MG capsule capsule Take 1 capsule by mouth Daily With Breakfast.      prochlorperazine (COMPAZINE) 10 MG tablet Take 1 tablet by mouth Daily. 30 tablet 1    tamsulosin (FLOMAX) 0.4 MG capsule 24 hr capsule TAKE ONE CAPSULE BY MOUTH DAILY 30 capsule 3    tiZANidine (ZANAFLEX) 2 MG tablet Take 1 tablet by mouth Every 8 (Eight) Hours As Needed for Muscle Spasms. 30 tablet 0    traMADol (ULTRAM) 50 MG tablet Take 1 tablet by mouth Every 6 (Six) Hours As Needed for Moderate Pain. 28 tablet 0    valsartan (Diovan) 40 MG tablet Take 1 tablet by mouth Daily. 90 tablet 1         PROCEDURES  Procedures            PROGRESS, DATA ANALYSIS, CONSULTS, AND MEDICAL DECISION MAKING  All labs have been independently interpreted by me.  All radiology studies have been reviewed by me. All EKG's have been independently viewed and interpreted by me.  Discussion below represents my analysis of pertinent findings related to patient's condition, differential diagnosis, treatment plan and final disposition.    My differential diagnosis for back pain includes but is not limited to:  Musculoskeletal strain, contusion, retroperitoneal hematoma, disc protrusion, vertebral fracture, transverse process fracture, rib fracture, facet syndrome, sacroiliac joint strain, sciatica, renal injury, splenic injury, pancreatic injury, osteoarthritis, lumbar spondylosis, spinal stenosis, ankylosing spondylitis, sacroiliac joint  inflammation, pancreatitis, perforated peptic ulcer, diverticulitis, endometriosis, chronic PID, epidural abscess, osteomyelitis, retroperitoneal abscess, pyelonephritis, pneumonia, subphrenic abscess, tuberculosis, neurofibroma, meningioma, multiple myeloma, lymphoma, metastatic cancer, primary cancer, AAA, aortic dissection, spinal ischemia, referred pain, ureterolithiasis      Clinical Scores:                  ED Course as of 01/20/24 1821   Sat Jan 20, 2024   1603 Patient struggling with ADLs and he lives at home alone.  He has advanced age.  He is not a good candidate to return home.  We will chain labs and further radiologic testing but the patient will ultimately require hospitalization for PT eval and disposition planning. [RS]   1626 WBC: 7.09 [RS]   1626 Hemoglobin(!): 12.5 [RS]   1626 Neutrophils Absolute: 5.44 [RS]   1800 Glucose(!): 107 [RS]   1801 BUN: 19 [RS]   1801 Creatinine: 0.97 [RS]   1801 Sodium(!): 132 [RS]   1801 Potassium: 3.9 [RS]   1801 CONSULT        Provider: Dr. Quevedo -radiologist    Discussion: Reviewed patient history, ED presentation evaluation.  Significant lumbar degenerative change with no clear fracture or sublux identified.    Agreeable c treatment and planned disposition.         [RS]   1803 Patient resting comfortably at this time and agreeable with plan for admission. [RS]   1821 CONSULT        Provider: Dr. Johnson-[facility]    Discussion: Reviewed patient history, ED presentation and evaluation.  Agreeable to accept patient for admission.    Agreeable c treatment and planned disposition.         [RS]      ED Course User Index  [RS] Nemesio Miller MD             AS OF 18:21 EST VITALS:    BP - 163/90  HR - 73  TEMP - 98.3 °F (36.8 °C)  O2 SATS - 97%    COMPLEXITY OF CARE  The patient requires admission.      DIAGNOSIS  Final diagnoses:   Acute left-sided low back pain without sciatica   Hypertension not at goal   Impaired mobility and ADLs   Closed fracture of sacrum,  unspecified portion of sacrum, initial encounter         DISPOSITION  ED Disposition       ED Disposition   Decision to Admit    Condition   --    Comment   Level of Care: Med/Surg [1]   Diagnosis: Sacral fracture [300155]   Admitting Physician: DARRYL BERKOWITZ [[id number]]                  Please note that portions of this document were completed with a voice recognition program.    Note Disclaimer: At [facility], we believe that sharing information builds trust and better relationships. You are receiving this note because you recently visited [facility]. It is possible you will see health information before a provider has talked with you about it. This kind of information can be easy to misunderstand. To help you fully understand what it means for your health, we urge you to discuss this note with your provider.         Nemesio Miller MD  01/20/24 1605

## 2024-01-21 LAB
ANION GAP SERPL CALCULATED.3IONS-SCNC: 7 MMOL/L (ref 5–15)
BASOPHILS # BLD AUTO: 0.08 10*3/MM3 (ref 0–0.2)
BASOPHILS NFR BLD AUTO: 1.2 % (ref 0–1.5)
BUN SERPL-MCNC: 16 MG/DL (ref 8–23)
BUN/CREAT SERPL: 16.2 (ref 7–25)
CALCIUM SPEC-SCNC: 9 MG/DL (ref 8.6–10.5)
CHLORIDE SERPL-SCNC: 100 MMOL/L (ref 98–107)
CO2 SERPL-SCNC: 27 MMOL/L (ref 22–29)
CREAT SERPL-MCNC: 0.99 MG/DL (ref 0.76–1.27)
DEPRECATED RDW RBC AUTO: 43.5 FL (ref 37–54)
EGFRCR SERPLBLD CKD-EPI 2021: 75.6 ML/MIN/1.73
EOSINOPHIL # BLD AUTO: 0.2 10*3/MM3 (ref 0–0.4)
EOSINOPHIL NFR BLD AUTO: 3 % (ref 0.3–6.2)
ERYTHROCYTE [DISTWIDTH] IN BLOOD BY AUTOMATED COUNT: 12.5 % (ref 12.3–15.4)
GLUCOSE BLDC GLUCOMTR-MCNC: 86 MG/DL (ref 70–130)
GLUCOSE SERPL-MCNC: 103 MG/DL (ref 65–99)
HCT VFR BLD AUTO: 34.9 % (ref 37.5–51)
HGB BLD-MCNC: 11.8 G/DL (ref 13–17.7)
LYMPHOCYTES # BLD AUTO: 0.84 10*3/MM3 (ref 0.7–3.1)
LYMPHOCYTES NFR BLD AUTO: 12.5 % (ref 19.6–45.3)
MCH RBC QN AUTO: 32.4 PG (ref 26.6–33)
MCHC RBC AUTO-ENTMCNC: 33.8 G/DL (ref 31.5–35.7)
MCV RBC AUTO: 95.9 FL (ref 79–97)
MONOCYTES # BLD AUTO: 0.94 10*3/MM3 (ref 0.1–0.9)
MONOCYTES NFR BLD AUTO: 14 % (ref 5–12)
NEUTROPHILS NFR BLD AUTO: 4.64 10*3/MM3 (ref 1.7–7)
NEUTROPHILS NFR BLD AUTO: 69 % (ref 42.7–76)
PLATELET # BLD AUTO: 138 10*3/MM3 (ref 140–450)
PMV BLD AUTO: 10 FL (ref 6–12)
POTASSIUM SERPL-SCNC: 4.3 MMOL/L (ref 3.5–5.2)
RBC # BLD AUTO: 3.64 10*6/MM3 (ref 4.14–5.8)
SODIUM SERPL-SCNC: 134 MMOL/L (ref 136–145)
WBC NRBC COR # BLD AUTO: 6.72 10*3/MM3 (ref 3.4–10.8)

## 2024-01-21 PROCEDURE — 25010000002 ENOXAPARIN PER 10 MG: Performed by: INTERNAL MEDICINE

## 2024-01-21 PROCEDURE — 82948 REAGENT STRIP/BLOOD GLUCOSE: CPT

## 2024-01-21 PROCEDURE — 80048 BASIC METABOLIC PNL TOTAL CA: CPT | Performed by: INTERNAL MEDICINE

## 2024-01-21 PROCEDURE — 85025 COMPLETE CBC W/AUTO DIFF WBC: CPT | Performed by: INTERNAL MEDICINE

## 2024-01-21 RX ADMIN — OXYCODONE AND ACETAMINOPHEN 1 TABLET: 5; 325 TABLET ORAL at 15:18

## 2024-01-21 RX ADMIN — ENOXAPARIN SODIUM 40 MG: 100 INJECTION SUBCUTANEOUS at 08:20

## 2024-01-21 RX ADMIN — TAMSULOSIN HYDROCHLORIDE 0.4 MG: 0.4 CAPSULE ORAL at 08:20

## 2024-01-21 RX ADMIN — LEVOTHYROXINE SODIUM 125 MCG: 125 TABLET ORAL at 08:20

## 2024-01-21 RX ADMIN — Medication 1 TABLET: at 08:20

## 2024-01-21 RX ADMIN — OXYCODONE AND ACETAMINOPHEN 1 TABLET: 5; 325 TABLET ORAL at 19:27

## 2024-01-21 RX ADMIN — OXYCODONE AND ACETAMINOPHEN 1 TABLET: 5; 325 TABLET ORAL at 02:29

## 2024-01-21 RX ADMIN — OXYCODONE AND ACETAMINOPHEN 1 TABLET: 5; 325 TABLET ORAL at 08:20

## 2024-01-21 RX ADMIN — Medication 10 ML: at 08:20

## 2024-01-21 RX ADMIN — Medication 5 MG: at 21:20

## 2024-01-21 RX ADMIN — VALSARTAN 40 MG: 40 TABLET, COATED ORAL at 08:20

## 2024-01-21 RX ADMIN — Medication 10 ML: at 21:17

## 2024-01-21 NOTE — PLAN OF CARE
Goal Outcome Evaluation:  Plan of Care Reviewed With: patient        Progress: no change  Outcome Evaluation: Pt is alert and oriented x4. x2 oxycodone given for pain. Pt slept between care, using urianal. Continuous pulse ox. Pt moves around in bed but is in too much pain to get up at this time. Refused lidocaine patch. Takes pills whole. Will cont plan of care.

## 2024-01-21 NOTE — PLAN OF CARE
Problem: Adult Inpatient Plan of Care  Goal: Plan of Care Review  Outcome: Ongoing, Progressing  Flowsheets (Taken 1/21/2024 0709)  Progress: no change  Plan of Care Reviewed With: patient  Outcome Evaluation: Patient was admitted from ER last night. PRN Percocet given x 2 this shift for back pain. Patient slept between care. Urinal at bedside. Patient refuses to get out of bed due to back pain. Bed alarm on. Continuous pulse ox on.  Patient takes pills with water. Continue with plan of care.   Goal Outcome Evaluation:  Plan of Care Reviewed With: patient        Progress: no change  Outcome Evaluation: Patient was admitted from ER last night. PRN Percocet given x 2 this shift for back pain. Patient slept between care. Urinal at bedside. Patient refuses to get out of bed due to back pain. Bed alarm on. Continuous pulse ox on.  Patient takes pills with water. Continue with plan of care.
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
## 2024-01-21 NOTE — SIGNIFICANT NOTE
01/21/24 1228   OTHER   Discipline physical therapist   Rehab Time/Intention   Session Not Performed other (see comments)  (Pt awaiting ortho work up/consult. Will f/u tomorrow as appropriate.)   Recommendation   PT - Next Appointment 01/22/24
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
## 2024-01-21 NOTE — PROGRESS NOTES
Name: Rosas Christianson ADMIT: 2024   : 1940  PCP: Rashid Klein MD    MRN: [id number] LOS: 0 days   AGE/SEX: 83 y.o. male  ROOM: [facility]     Subjective   Subjective   Patient continues to have some pelvic pain.  Otherwise no complaints. Tolerated breakfast    Review of Systems   Constitutional:  Negative for chills and fever.   Respiratory:  Negative for cough and shortness of breath.    Cardiovascular:  Negative for chest pain and palpitations.   Gastrointestinal:  Negative for abdominal pain, diarrhea, nausea and vomiting.     As above     Objective   Objective   Vital Signs  Temp:  [97.2 °F (36.2 °C)-98.8 °F (37.1 °C)] 98.4 °F (36.9 °C)  Heart Rate:  [66-86] 66  Resp:  [18] 18  BP: (163-185)/(83-94) 185/90  SpO2:  [90 %-100 %] 100 %  on   ;   Device (Oxygen Therapy): room air  Body mass index is 23.77 kg/m².  Physical Exam  Vitals and nursing note reviewed.   Constitutional:       General: He is not in acute distress.  Cardiovascular:      Rate and Rhythm: Normal rate and regular rhythm.   Pulmonary:      Effort: Pulmonary effort is normal. No respiratory distress.   Abdominal:      General: Abdomen is flat. There is no distension.      Tenderness: There is no abdominal tenderness.      Comments: Ostomy bag with brown stool   Musculoskeletal:         General: No swelling or deformity.      Comments: Bilateral pelvic pain tenderness   Skin:     General: Skin is warm and dry.   Neurological:      General: No focal deficit present.      Mental Status: He is alert. Mental status is at baseline.         Results Review     I reviewed the patient's new clinical results.  Results from last 7 days   Lab Units 24  0524 24  1605   WBC 10*3/mm3 6.72 7.09   HEMOGLOBIN g/dL 11.8* 12.5*   PLATELETS 10*3/mm3 138* 139*     Results from last 7 days   Lab Units 24  0524 24  1605   SODIUM mmol/L 134* 132*   POTASSIUM mmol/L 4.3 3.9   CHLORIDE mmol/L 100 97*   CO2 mmol/L 27.0 23.9   BUN mg/dL  "16 19   CREATININE mg/dL 0.99 0.97   GLUCOSE mg/dL 103* 107*   Estimated Creatinine Clearance: 56.7 mL/min (by C-G formula based on SCr of 0.99 mg/dL).    Results from last 7 days   Lab Units 01/21/24  0524 01/20/24  1605   CALCIUM mg/dL 9.0 9.1     No results found for: \"COVID19\"  Glucose   Date/Time Value Ref Range Status   01/21/2024 0807 86 70 - 130 mg/dL Final       CT Pelvis Without Contrast  Narrative: PELVIS CT WITHOUT CONTRAST     HISTORY: Difficulty walking after a fall several weeks ago.     TECHNIQUE: Noncontrast pelvis CT is provided. Lumbar CT performed today  is reported separately.     FINDINGS: There is a typical configuration of sacral fractures with  sagittally oriented fractures extending across the lateral margins of  the sacrum at the S1 through S3 levels and a nondisplaced, transverse  fracture extending across the midline at the S2-S3 level, demonstrated  only on the sagittal reformatted images. There is no canal or foraminal  stenosis. Mild adjacent soft tissue edema.     The ischiopubic rami and the other bones of the pelvis are intact. The  proximal femurs are intact. No hip joint effusion.     Intrapelvic structures appear normal. Extensive atheromatous  calcification along the pelvic arteries. 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 Partially visualized cholelithiasis.     Musculature around the hips and pelvis appears normal. There is no  hematoma.     Impression: Acute nondisplaced sacral fractures sagittally oriented  along the lateral margins of the sacrum S1 through S3 and extending  transversely across the midline at the S2-S3 level. Incidentally noted  cholelithiasis.        Radiation dose reduction techniques were utilized, including automated  exposure control and exposure modulation based on body size.        This report was finalized on 1/20/2024 6:20 PM by Dr. Frederic Melgoza M.D on Workstation: [id number]     CT Lumbar Spine Without Contrast  CT SCAN OF THE " LUMBAR SPINE     HISTORY: Fell 3 weeks ago. Worsening low back pain.     The CT scan was performed as an emergency procedure through the lumbar  spine without contrast. The following findings are present:     1. 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     2. There is mild to moderate diffuse osteoporosis but there is no  evidence of acute compression fracture in the lumbar spine extending to  the level of the bottom of T11.     3. There is extensive degenerative change at all levels of the lumbar  spine and there is asymmetric disc space narrowing extending more to the  right at L2-3 and L3-4 producing a mild lumbar scoliosis convex to the  left.     4. At T11-12 there is slight facet spurring, left greater than right.  There is no central stenosis but there is slight left foraminal  encroachment. At T12-L1 there is slight annular disc bulge extending  more to the left combined with some facet spurring, left greater than  right. This also produces mild to moderate left foraminal encroachment.     5. At L1-2 there is prominent annular disc bulge and facet hypertrophy  and some facet spurring resulting in mild central canal stenosis. There  is slight right foraminal encroachment.     6. At L2-3 there is prominent disc space narrowing extending more to the  right with right lateral spurring. There is moderate annular disc bulge  and severe facet spurring, right greater than left producing mild  central canal stenosis and mild to moderate right foraminal  encroachment.     7. At L3-4 there is asymmetric disc space narrowing extending more to  the right with right lateral spurring. There is moderate annular disc  bulge and some annular bony spurring extending more to the right  combined with facet spurring and resulting in moderate central canal  stenosis as well as severe right foraminal encroachment.     8. At L4-5 there is prominent disc degeneration  extending slightly more  to the right and there is also left lateral subluxation of L4 relative  to L5 measuring 8 mm. There is annular disc bulge extending more to the  right and into the right neural foramen combined with severe facet  spurring and resulting in moderate central canal stenosis. There is  severe bilateral foraminal encroachment as well.     9. At L5-S1 there is prominent disc degeneration with disc space  narrowing and a vacuum phenomenon that extends slightly more to the  left. There is moderate annular disc bulge extending more to the left  combined with severe facet spurring and resulting in severe left and  mild right foraminal encroachment.                 Radiation dose reduction techniques were utilized, including automated  exposure control and exposure modulation based on body size.        This report was finalized on 1/20/2024 6:01 PM by Dr. Rizwan Sena M.D  on Workstation: [id number]       I reviewed the patient's daily medications.  Scheduled Medications  enoxaparin, 40 mg, Subcutaneous, Daily  levothyroxine, 125 mcg, Oral, Q AM  Lidocaine, 2 patch, Transdermal, Q24H  multivitamin with minerals, 1 tablet, Oral, Daily  senna-docusate sodium, 2 tablet, Oral, BID  sodium chloride, 10 mL, Intravenous, Q12H  tamsulosin, 0.4 mg, Oral, Daily  valsartan, 40 mg, Oral, Daily    Infusions   Diet  Diet: Cardiac Diets; Healthy Heart (2-3 Na+); Texture: Regular Texture (IDDSI 7); Fluid Consistency: Thin (IDDSI 0)         I have personally reviewed:  [x]  Laboratory   []  Microbiology   [x]  Radiology   []  EKG/Telemetry   []  Cardiology/Vascular   []  Pathology   [x]  Records     Assessment/Plan     Active Hospital Problems    Diagnosis  POA    **Sacral fracture [S32.10XA]  Yes    BPH (benign prostatic hyperplasia) [N40.0]  Yes    Anxiety [F41.9]  Yes    Anemia of chronic disease [D63.8]  Yes    Hypertension [I10]  Yes    Hypothyroidism [E03.9]  Yes      Resolved Hospital Problems   No resolved  problems to display.       83 y.o. male admitted with Sacral fracture.    Mr. Christianson is a 83 y.o. former smoker with a history of hypertension, hypothyroidism, anemia, anxiety who presents with acute back pain.     Sacral fracture  -Initiate Lidoderm patch to sacrum  -Consult CCP for placement  -Supportive care with analgesics  -PT change eval  -Ortho consulted, appreciate recommendations     Essential hypertension  -Continue home valsartan  -Had missed a couple days of doses prior to admission     Hypothyroidism  -Continue home Synthroid therapy     Anemia of chronic disease  -Hemoglobin stable     BPH  -Continue home tamsulosin    Ulcerative colitis status post colostomy over 20 years ago  -Not currently on any medications    Lovenox 40 mg SC daily for DVT prophylaxis.  Full code.  Discussed with patient and nursing staff.  Anticipate discharge home with HH vs SNU facility in 1-2 days.    Expected discharge date/ time has not been documented.      Luigi Pearson MD  [facility]  01/21/24  09:41 EST

## 2024-01-22 LAB
ANION GAP SERPL CALCULATED.3IONS-SCNC: 9.8 MMOL/L (ref 5–15)
BASOPHILS # BLD AUTO: 0.06 10*3/MM3 (ref 0–0.2)
BASOPHILS NFR BLD AUTO: 0.9 % (ref 0–1.5)
BUN SERPL-MCNC: 19 MG/DL (ref 8–23)
BUN/CREAT SERPL: 20.7 (ref 7–25)
CALCIUM SPEC-SCNC: 8.6 MG/DL (ref 8.6–10.5)
CHLORIDE SERPL-SCNC: 98 MMOL/L (ref 98–107)
CO2 SERPL-SCNC: 25.2 MMOL/L (ref 22–29)
CREAT SERPL-MCNC: 0.92 MG/DL (ref 0.76–1.27)
DEPRECATED RDW RBC AUTO: 44.6 FL (ref 37–54)
EGFRCR SERPLBLD CKD-EPI 2021: 82.5 ML/MIN/1.73
EOSINOPHIL # BLD AUTO: 0.24 10*3/MM3 (ref 0–0.4)
EOSINOPHIL NFR BLD AUTO: 3.8 % (ref 0.3–6.2)
ERYTHROCYTE [DISTWIDTH] IN BLOOD BY AUTOMATED COUNT: 12.5 % (ref 12.3–15.4)
GLUCOSE SERPL-MCNC: 96 MG/DL (ref 65–99)
HCT VFR BLD AUTO: 34.1 % (ref 37.5–51)
HGB BLD-MCNC: 11.6 G/DL (ref 13–17.7)
LYMPHOCYTES # BLD AUTO: 0.95 10*3/MM3 (ref 0.7–3.1)
LYMPHOCYTES NFR BLD AUTO: 14.9 % (ref 19.6–45.3)
MCH RBC QN AUTO: 32.8 PG (ref 26.6–33)
MCHC RBC AUTO-ENTMCNC: 34 G/DL (ref 31.5–35.7)
MCV RBC AUTO: 96.3 FL (ref 79–97)
MONOCYTES # BLD AUTO: 0.88 10*3/MM3 (ref 0.1–0.9)
MONOCYTES NFR BLD AUTO: 13.8 % (ref 5–12)
NEUTROPHILS NFR BLD AUTO: 4.21 10*3/MM3 (ref 1.7–7)
NEUTROPHILS NFR BLD AUTO: 66.3 % (ref 42.7–76)
PLATELET # BLD AUTO: 140 10*3/MM3 (ref 140–450)
PMV BLD AUTO: 9.9 FL (ref 6–12)
POTASSIUM SERPL-SCNC: 4 MMOL/L (ref 3.5–5.2)
RBC # BLD AUTO: 3.54 10*6/MM3 (ref 4.14–5.8)
SODIUM SERPL-SCNC: 133 MMOL/L (ref 136–145)
WBC NRBC COR # BLD AUTO: 6.36 10*3/MM3 (ref 3.4–10.8)

## 2024-01-22 PROCEDURE — 25010000002 HYDROMORPHONE PER 4 MG: Performed by: INTERNAL MEDICINE

## 2024-01-22 PROCEDURE — 97530 THERAPEUTIC ACTIVITIES: CPT

## 2024-01-22 PROCEDURE — 97165 OT EVAL LOW COMPLEX 30 MIN: CPT

## 2024-01-22 PROCEDURE — 85025 COMPLETE CBC W/AUTO DIFF WBC: CPT | Performed by: INTERNAL MEDICINE

## 2024-01-22 PROCEDURE — 25010000002 ENOXAPARIN PER 10 MG: Performed by: INTERNAL MEDICINE

## 2024-01-22 PROCEDURE — 97162 PT EVAL MOD COMPLEX 30 MIN: CPT

## 2024-01-22 PROCEDURE — 80048 BASIC METABOLIC PNL TOTAL CA: CPT | Performed by: INTERNAL MEDICINE

## 2024-01-22 RX ADMIN — OXYCODONE AND ACETAMINOPHEN 1 TABLET: 5; 325 TABLET ORAL at 22:49

## 2024-01-22 RX ADMIN — OXYCODONE AND ACETAMINOPHEN 1 TABLET: 5; 325 TABLET ORAL at 08:59

## 2024-01-22 RX ADMIN — OXYCODONE AND ACETAMINOPHEN 1 TABLET: 5; 325 TABLET ORAL at 16:53

## 2024-01-22 RX ADMIN — LEVOTHYROXINE SODIUM 125 MCG: 125 TABLET ORAL at 06:41

## 2024-01-22 RX ADMIN — VALSARTAN 40 MG: 40 TABLET, COATED ORAL at 08:59

## 2024-01-22 RX ADMIN — Medication 1 TABLET: at 08:59

## 2024-01-22 RX ADMIN — Medication 10 ML: at 09:03

## 2024-01-22 RX ADMIN — OXYCODONE AND ACETAMINOPHEN 1 TABLET: 5; 325 TABLET ORAL at 04:09

## 2024-01-22 RX ADMIN — Medication 10 ML: at 20:09

## 2024-01-22 RX ADMIN — TAMSULOSIN HYDROCHLORIDE 0.4 MG: 0.4 CAPSULE ORAL at 08:59

## 2024-01-22 RX ADMIN — ENOXAPARIN SODIUM 40 MG: 100 INJECTION SUBCUTANEOUS at 08:59

## 2024-01-22 RX ADMIN — HYDROMORPHONE HYDROCHLORIDE 0.5 MG: 1 INJECTION, SOLUTION INTRAMUSCULAR; INTRAVENOUS; SUBCUTANEOUS at 13:32

## 2024-01-22 NOTE — PLAN OF CARE
The pt was admitted to [facility] 2/2 to a fall at home resulting in a sacral fx (non-op, wbat with the use of a walker). Pmhx significant for HTN, neuropathy, ulcerative collitis, and arthritis. The pt reports living alone with a ramp to enter and a flight of steps to navigate to his bedroom. The pt's wife recently passed away in November. Today, the pt received IV pain medication prior to activity. SBA for bed mobility and CGA to stand and mobilize into his bathroom and out to the nurses station (~8-10 min of functional endurance using a walker). He did very well and reported good pain control 2/2 to IV pain meds. He does not have many friends/family to assist him at d/c - anticipate SNF pending progress.

## 2024-01-22 NOTE — NURSING NOTE
Per Dr. Magdaleno no walking in the hallway. Bed to chair ONLY, and bathroom privileges. No surgical intervention.

## 2024-01-22 NOTE — PLAN OF CARE
Goal Outcome Evaluation:  Patient alert and oriented. Access and drains include: colostomy, IV. PRNs utilized during shift: 5-325mg percocet PO x2. No complaints of nausea during shift. Patient kept on continuous pulse ox during shift. No acute adverse events during shift. Plan of care ongoing.         Progress: improving

## 2024-01-22 NOTE — PLAN OF CARE
Goal Outcome Evaluation:  Plan of Care Reviewed With: patient           Outcome Evaluation: Patient is an 84 y/o male admitted to [facility] following a fall at home which resulted in a sacral fracture. PMHx includes but is not limited to: HTN, neuropathy, ulcerative collitis, and arthritis. Per incomplete note from ortho yesterday the patient is WBAT with use of walker for transfers and short gait distances, however, the note has still not been finallized at this time. The patient lives alone with a ramp to enter and full flight of stairs to reach his bedroom. At baseline he is (I) ADLs and mobility with primary use of a cane but does have a walker as needed. Today he performed bed mobility sba and sat unsuported EOB for a brief time with sba before returning to supine due to complaints of severe pain. He declined any additional attempts at mobilizing this morning. Anticipated DC SNF. PT will continue to monitor and progress as tolerated.      Anticipated Discharge Disposition (PT): skilled nursing facility
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
## 2024-01-22 NOTE — PLAN OF CARE
Goal Outcome Evaluation:  Plan of Care Reviewed With: patient        Progress: no change  Outcome Evaluation: Pt is alert and oriented x4. Up with x1 assist and walker. Pt does really well with pain medication prior to PT and OT, he was able to ambulate in the hallway. Takes pills whole. Continuous pulse ox. VSS. Pt is getting prn 0.5 dilaudid and oxycodone. Using urinal. Will cont plan of care.

## 2024-01-22 NOTE — PROGRESS NOTES
Name: Rosas Christianson ADMIT: 2024   : 1940  PCP: Rashid Klein MD    MRN: [id number] LOS: 1 days   AGE/SEX: 83 y.o. male  ROOM: [facility]/     Subjective   Subjective   Patient continues to have some pelvic pain.  Worse pain when sitting up in bed.  States he was walking and standing okay prior to admission.  Otherwise no complaints. Tolerated breakfast    Review of Systems   Constitutional:  Negative for chills and fever.   Respiratory:  Negative for cough and shortness of breath.    Cardiovascular:  Negative for chest pain and palpitations.   Gastrointestinal:  Negative for abdominal pain, diarrhea, nausea and vomiting.     As above     Objective   Objective   Vital Signs  Temp:  [96.8 °F (36 °C)-98.1 °F (36.7 °C)] 96.8 °F (36 °C)  Heart Rate:  [65-79] 67  Resp:  [16] 16  BP: (130-165)/(61-86) 165/85  SpO2:  [96 %-99 %] 98 %  on   ;   Device (Oxygen Therapy): room air  Body mass index is 23.77 kg/m².  Physical Exam  Vitals and nursing note reviewed.   Constitutional:       General: He is not in acute distress.  Cardiovascular:      Rate and Rhythm: Normal rate and regular rhythm.   Pulmonary:      Effort: Pulmonary effort is normal. No respiratory distress.   Abdominal:      General: Abdomen is flat. There is no distension.      Tenderness: There is no abdominal tenderness.      Comments: Ostomy bag with brown stool   Musculoskeletal:         General: No swelling or deformity.      Comments: Bilateral pelvic pain tenderness   Skin:     General: Skin is warm and dry.   Neurological:      General: No focal deficit present.      Mental Status: He is alert. Mental status is at baseline.         Results Review     I reviewed the patient's new clinical results.  Results from last 7 days   Lab Units 24  0549 24  0524 24  1605   WBC 10*3/mm3 6.36 6.72 7.09   HEMOGLOBIN g/dL 11.6* 11.8* 12.5*   PLATELETS 10*3/mm3 140 138* 139*     Results from last 7 days   Lab Units 24  0549  "01/21/24  0524 01/20/24  1605   SODIUM mmol/L 133* 134* 132*   POTASSIUM mmol/L 4.0 4.3 3.9   CHLORIDE mmol/L 98 100 97*   CO2 mmol/L 25.2 27.0 23.9   BUN mg/dL 19 16 19   CREATININE mg/dL 0.92 0.99 0.97   GLUCOSE mg/dL 96 103* 107*   Estimated Creatinine Clearance: 61 mL/min (by C-G formula based on SCr of 0.92 mg/dL).    Results from last 7 days   Lab Units 01/22/24  0549 01/21/24  0524 01/20/24  1605   CALCIUM mg/dL 8.6 9.0 9.1     No results found for: \"COVID19\"  Glucose   Date/Time Value Ref Range Status   01/21/2024 0807 86 70 - 130 mg/dL Final       CT Pelvis Without Contrast  Narrative: PELVIS CT WITHOUT CONTRAST     HISTORY: Difficulty walking after a fall several weeks ago.     TECHNIQUE: Noncontrast pelvis CT is provided. Lumbar CT performed today  is reported separately.     FINDINGS: There is a typical configuration of sacral fractures with  sagittally oriented fractures extending across the lateral margins of  the sacrum at the S1 through S3 levels and a nondisplaced, transverse  fracture extending across the midline at the S2-S3 level, demonstrated  only on the sagittal reformatted images. There is no canal or foraminal  stenosis. Mild adjacent soft tissue edema.     The ischiopubic rami and the other bones of the pelvis are intact. The  proximal femurs are intact. No hip joint effusion.     Intrapelvic structures appear normal. Extensive atheromatous  calcification along the pelvic arteries. 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 Partially visualized cholelithiasis.     Musculature around the hips and pelvis appears normal. There is no  hematoma.     Impression: Acute nondisplaced sacral fractures sagittally oriented  along the lateral margins of the sacrum S1 through S3 and extending  transversely across the midline at the S2-S3 level. Incidentally noted  cholelithiasis.        Radiation dose reduction techniques were utilized, including automated  exposure control and exposure " modulation based on body size.        This report was finalized on 1/20/2024 6:20 PM by Dr. Frederic Melgoza M.D on Workstation: [id number]     CT Lumbar Spine Without Contrast  CT SCAN OF THE LUMBAR SPINE     HISTORY: Fell 3 weeks ago. Worsening low back pain.     The CT scan was performed as an emergency procedure through the lumbar  spine without contrast. The following findings are present:     1. 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     2. There is mild to moderate diffuse osteoporosis but there is no  evidence of acute compression fracture in the lumbar spine extending to  the level of the bottom of T11.     3. There is extensive degenerative change at all levels of the lumbar  spine and there is asymmetric disc space narrowing extending more to the  right at L2-3 and L3-4 producing a mild lumbar scoliosis convex to the  left.     4. At T11-12 there is slight facet spurring, left greater than right.  There is no central stenosis but there is slight left foraminal  encroachment. At T12-L1 there is slight annular disc bulge extending  more to the left combined with some facet spurring, left greater than  right. This also produces mild to moderate left foraminal encroachment.     5. At L1-2 there is prominent annular disc bulge and facet hypertrophy  and some facet spurring resulting in mild central canal stenosis. There  is slight right foraminal encroachment.     6. At L2-3 there is prominent disc space narrowing extending more to the  right with right lateral spurring. There is moderate annular disc bulge  and severe facet spurring, right greater than left producing mild  central canal stenosis and mild to moderate right foraminal  encroachment.     7. At L3-4 there is asymmetric disc space narrowing extending more to  the right with right lateral spurring. There is moderate annular disc  bulge and some annular bony spurring extending more to the  right  combined with facet spurring and resulting in moderate central canal  stenosis as well as severe right foraminal encroachment.     8. At L4-5 there is prominent disc degeneration extending slightly more  to the right and there is also left lateral subluxation of L4 relative  to L5 measuring 8 mm. There is annular disc bulge extending more to the  right and into the right neural foramen combined with severe facet  spurring and resulting in moderate central canal stenosis. There is  severe bilateral foraminal encroachment as well.     9. At L5-S1 there is prominent disc degeneration with disc space  narrowing and a vacuum phenomenon that extends slightly more to the  left. There is moderate annular disc bulge extending more to the left  combined with severe facet spurring and resulting in severe left and  mild right foraminal encroachment.                 Radiation dose reduction techniques were utilized, including automated  exposure control and exposure modulation based on body size.        This report was finalized on 1/20/2024 6:01 PM by Dr. Rizwan Sena M.D  on Workstation: [id number]       I reviewed the patient's daily medications.  Scheduled Medications  enoxaparin, 40 mg, Subcutaneous, Daily  levothyroxine, 125 mcg, Oral, Q AM  Lidocaine, 2 patch, Transdermal, Q24H  multivitamin with minerals, 1 tablet, Oral, Daily  senna-docusate sodium, 2 tablet, Oral, BID  sodium chloride, 10 mL, Intravenous, Q12H  tamsulosin, 0.4 mg, Oral, Daily  valsartan, 40 mg, Oral, Daily    Infusions   Diet  Diet: Cardiac Diets; Healthy Heart (2-3 Na+); Texture: Regular Texture (IDDSI 7); Fluid Consistency: Thin (IDDSI 0)         I have personally reviewed:  [x]  Laboratory   []  Microbiology   [x]  Radiology   []  EKG/Telemetry   []  Cardiology/Vascular   []  Pathology   [x]  Records     Assessment/Plan     Active Hospital Problems    Diagnosis  POA    **Sacral fracture [S32.10XA]  Yes    BPH (benign prostatic hyperplasia)  [N40.0]  Yes    Anxiety [F41.9]  Yes    Anemia of chronic disease [D63.8]  Yes    Hypertension [I10]  Yes    Hypothyroidism [E03.9]  Yes      Resolved Hospital Problems   No resolved problems to display.       83 y.o. male admitted with Sacral fracture.    Mr. Christianson is a 83 y.o. former smoker with a history of hypertension, hypothyroidism, anemia, anxiety who presents with acute back pain.     Sacral fracture  -Initiate Lidoderm patch to sacrum  -Consult CCP for placement  -Supportive care with analgesics  -PT change eval  -Ortho consulted-recommend weightbearing as tolerated. Follow up with Ortho in office in 3-4 weeks. , call office [phone] for appointment.      Essential hypertension  -Continue home valsartan     Hypothyroidism  -Continue home Synthroid therapy     Anemia of chronic disease  -Hemoglobin stable     BPH  -Continue home tamsulosin    Ulcerative colitis status post colostomy over 20 years ago  -Not currently on any medications    Lovenox 40 mg SC daily for DVT prophylaxis.  Full code.  Discussed with patient and nursing staff.  Anticipate discharge to SNU facility in 1-2 days.    Expected discharge date/ time has not been documented.      Luigi Pearson MD  [facility]  01/22/24  14:17 EST
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
## 2024-01-22 NOTE — NURSING NOTE
"   01/22/24 1330   Ileostomy Standard ([place], end) RLQ   No Placement date: If unknown, DO NOT use \"Add Comment\" note or Placement time: If unknown, DO NOT use \"Add Comment\" note found.   Present on Admission? Select all that apply: Unknown placement date/time  Ileostomy Type: Standard ([place], end)  Locati...   Stomal Appliance Intact;1 piece   Stool Color brown, dark   Stool Consistency loose     Wound/ostomy - consult received regarding existing ostomy. Chart briefly reviewed, unable to find date of surgery but patient has hx UC and is s/p colectomy with ileostomy, rectum is still intact. Patient states he has had ostomy for  \"a long time\", he and family are independent with pouch changes. He uses a Convatec pouch with clamp and changes weekly. Elle at the bedside and is going home to get his ostomy supplies and she will change later. We will not follow patient since they are independent with management and prefer to do themselves. Please re-consult for any additional needs.  "

## 2024-01-23 LAB
ANION GAP SERPL CALCULATED.3IONS-SCNC: 9.8 MMOL/L (ref 5–15)
BUN SERPL-MCNC: 22 MG/DL (ref 8–23)
BUN/CREAT SERPL: 21.4 (ref 7–25)
CALCIUM SPEC-SCNC: 8.5 MG/DL (ref 8.6–10.5)
CHLORIDE SERPL-SCNC: 97 MMOL/L (ref 98–107)
CO2 SERPL-SCNC: 24.2 MMOL/L (ref 22–29)
CREAT SERPL-MCNC: 1.03 MG/DL (ref 0.76–1.27)
DEPRECATED RDW RBC AUTO: 43.2 FL (ref 37–54)
EGFRCR SERPLBLD CKD-EPI 2021: 72.1 ML/MIN/1.73
EOSINOPHIL # BLD MANUAL: 0.13 10*3/MM3 (ref 0–0.4)
EOSINOPHIL NFR BLD MANUAL: 2 % (ref 0.3–6.2)
ERYTHROCYTE [DISTWIDTH] IN BLOOD BY AUTOMATED COUNT: 12.4 % (ref 12.3–15.4)
GLUCOSE SERPL-MCNC: 95 MG/DL (ref 65–99)
HCT VFR BLD AUTO: 34.9 % (ref 37.5–51)
HGB BLD-MCNC: 11.9 G/DL (ref 13–17.7)
LYMPHOCYTES # BLD MANUAL: 0.6 10*3/MM3 (ref 0.7–3.1)
LYMPHOCYTES NFR BLD MANUAL: 15.3 % (ref 5–12)
MCH RBC QN AUTO: 32.8 PG (ref 26.6–33)
MCHC RBC AUTO-ENTMCNC: 34.1 G/DL (ref 31.5–35.7)
MCV RBC AUTO: 96.1 FL (ref 79–97)
MONOCYTES # BLD: 1 10*3/MM3 (ref 0.1–0.9)
NEUTROPHILS # BLD AUTO: 4.81 10*3/MM3 (ref 1.7–7)
NEUTROPHILS NFR BLD MANUAL: 73.5 % (ref 42.7–76)
PLAT MORPH BLD: NORMAL
PLATELET # BLD AUTO: 135 10*3/MM3 (ref 140–450)
PMV BLD AUTO: 10.3 FL (ref 6–12)
POTASSIUM SERPL-SCNC: 4.2 MMOL/L (ref 3.5–5.2)
RBC # BLD AUTO: 3.63 10*6/MM3 (ref 4.14–5.8)
RBC MORPH BLD: NORMAL
SMUDGE CELLS BLD QL SMEAR: ABNORMAL
SODIUM SERPL-SCNC: 131 MMOL/L (ref 136–145)
VARIANT LYMPHS NFR BLD MANUAL: 9.2 % (ref 19.6–45.3)
WBC NRBC COR # BLD AUTO: 6.54 10*3/MM3 (ref 3.4–10.8)

## 2024-01-23 PROCEDURE — 97110 THERAPEUTIC EXERCISES: CPT

## 2024-01-23 PROCEDURE — 25010000002 ENOXAPARIN PER 10 MG: Performed by: INTERNAL MEDICINE

## 2024-01-23 PROCEDURE — 85025 COMPLETE CBC W/AUTO DIFF WBC: CPT | Performed by: INTERNAL MEDICINE

## 2024-01-23 PROCEDURE — 85007 BL SMEAR W/DIFF WBC COUNT: CPT | Performed by: INTERNAL MEDICINE

## 2024-01-23 PROCEDURE — 80048 BASIC METABOLIC PNL TOTAL CA: CPT | Performed by: INTERNAL MEDICINE

## 2024-01-23 RX ADMIN — OXYCODONE AND ACETAMINOPHEN 1 TABLET: 5; 325 TABLET ORAL at 12:38

## 2024-01-23 RX ADMIN — Medication 10 ML: at 09:07

## 2024-01-23 RX ADMIN — LEVOTHYROXINE SODIUM 125 MCG: 125 TABLET ORAL at 06:12

## 2024-01-23 RX ADMIN — VALSARTAN 40 MG: 40 TABLET, COATED ORAL at 09:01

## 2024-01-23 RX ADMIN — Medication 10 ML: at 21:22

## 2024-01-23 RX ADMIN — TAMSULOSIN HYDROCHLORIDE 0.4 MG: 0.4 CAPSULE ORAL at 08:58

## 2024-01-23 RX ADMIN — OXYCODONE AND ACETAMINOPHEN 1 TABLET: 5; 325 TABLET ORAL at 17:21

## 2024-01-23 RX ADMIN — ENOXAPARIN SODIUM 40 MG: 100 INJECTION SUBCUTANEOUS at 08:58

## 2024-01-23 RX ADMIN — OXYCODONE AND ACETAMINOPHEN 1 TABLET: 5; 325 TABLET ORAL at 06:11

## 2024-01-23 RX ADMIN — OXYCODONE AND ACETAMINOPHEN 1 TABLET: 5; 325 TABLET ORAL at 21:23

## 2024-01-23 RX ADMIN — Medication 1 TABLET: at 08:58

## 2024-01-23 RX ADMIN — Medication 5 MG: at 21:23
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
## 2024-01-23 NOTE — PLAN OF CARE
Goal Outcome Evaluation:  Plan of Care Reviewed With: patient        Progress: improving  Outcome Evaluation: Pt demos steady progress with PT today as evidenced by tolerance of increased activity. Pt performed bed mobility with SBA, stood with CGA, and ambulated 25' with CGA and rwx. Ambulation distance limited by back pain as well as ortho order to keep gait to short distances only. Pt completed supine LE ther ex and standing UE ther ex for strengthening. Reports pain is better in standing than sitting. Pt will continue to benefit from PT for further strengthening and progression of mobility. Recommend DC to SNF.      Anticipated Discharge Disposition (PT): skilled nursing facility

## 2024-01-23 NOTE — PLAN OF CARE
Goal Outcome Evaluation:   Patient resting comfortably in bed. Reports pain intermittently for sacral fractures- See MAR. Colostomy changed per patient. Goal is SNF in [place] near family. Up x1 assist and walker to chair. Will cont to monitor

## 2024-01-23 NOTE — PROGRESS NOTES
Discharge Planning Assessment  [facility]     Patient Name: Rosas Christianson  MRN: [id number]  Today's Date: 1/23/2024    Admit Date: 1/20/2024    Plan: skilled rehab at discharge   Discharge Needs Assessment       Row Name 01/23/24 1434       Living Environment    People in Home alone    Current Living Arrangements home    Potentially Unsafe Housing Conditions none    Primary Care Provided by self    Provides Primary Care For no one, unable/limited ability to care for self    Caregiving Concerns Adult children    Family Caregiver if Needed child(abdifatah), adult    Quality of Family Relationships helpful    Able to Return to Prior Arrangements no    Living Arrangement Comments needs skilled rehab       Resource/Environmental Concerns    Resource/Environmental Concerns none    Transportation Concerns none       Transition Planning    Patient/Family Anticipated Services at Transition skilled nursing    Transportation Anticipated health plan transportation       Discharge Needs Assessment    Equipment Currently Used at Home cane, straight;walker, rolling;wheelchair;wheelchair, motorized;scales;pulse ox;bp cuff;ramp    Anticipated Changes Related to Illness inability to care for self    Equipment Needed After Discharge --  will continue to follow for any needs    Outpatient/Agency/Support Group Needs skilled nursing facility    Discharge Facility/Level of Care Needs nursing facility, skilled    Provided Post Acute Provider List? Yes    Post Acute Provider List Nursing Home    Provided Post Acute Provider Quality & Resource List? Yes    Post Acute Provider Quality and Resource List Nursing Home    Delivered To Patient    Method of Delivery In person    Patient's Choice of Community Agency(s) The patient states he would like to go to [facility] because he has family there or [facility] or in the area of [facility]. He states he is okay with which facility accepts him.    Current Discharge Risk lives alone                    Discharge Plan       Row Name 01/23/24 1445       Plan    Plan skilled rehab at discharge    Roadmap to Recovery Yes    Plan Comments The patient transferred to [facility] from ER on 1/20/24. Spoke with the patient at bedside and he is willing to go to skilled rehab. Gave him the Subacute Nursing Facilities list and he choice [place] facilities and some [place] facilities. He states he has children that live in [place] and in [place] by [address]. The patient will need transportation at discharge. [facility] in [place] is where he picks up his prescriptions. LAVERN Jaime, Rn, CCP.                  Continued Care and Services - Admitted Since 1/20/2024       Destination       Service Provider Request Status Selected Services Address Phone Fax Patient Preferred    [facility] Considering N/A [address] [facility] [place] [address] [phone] --    [facility] Pending - Request Sent N/A [address] [facility] [id number] [phone] --    [facility] Pending - Request Sent N/A [address] [place] [address] [phone] --    [facility] Pending - Request Sent N/A [address] [facility] [place] [address] [phone] --    [facility] Pending - Request Sent N/A [address] [facility] [facility] [place] [phone] --    [facility] Pending - Request Sent N/A [address] [facility] [facility] [address] [phone] --    [facility] Pending - Request Sent N/A [address] [facility] [address] [phone] --    [facility] Pending - Request Sent N/A [address] [facility] [address] [phone] --                  Expected Discharge Date and Time       Expected Discharge Date Expected Discharge Time    Jan 25, 2024             Demographic Summary       Row Name 01/23/24 1433       General Information    Admission Type inpatient    Arrived From emergency department    Referral Source admission list    Reason for Consult decision-making;discharge planning    Preferred Language English       Contact Information    Permission Granted to Share Info With     Contact Information Obtained for                    Functional Status       Row Name 01/23/24 1442       Functional Status    Usual Activity Tolerance fair    Current Activity Tolerance fair       Functional Status, IADL    Medications assistive equipment    Meal Preparation assistive equipment    Housekeeping assistive equipment    Laundry assistive equipment    Shopping assistive equipment    IADL Comments assist of DME prior to admission       Mental Status    General Appearance WDL WDL       Mental Status Summary    Recent Changes in Mental Status/Cognitive Functioning no changes       Employment/    Employment Status retired    Employment/ Comments [employer] years                   Psychosocial    No documentation.                  Abuse/Neglect    No documentation.                  Legal    No documentation.                  Substance Abuse    No documentation.                  Patient Forms    No documentation.                     Britt Jaime RN

## 2024-01-23 NOTE — DISCHARGE INSTRUCTIONS
Please notify PCP for possible evaluation of Osteoporosis if not already done.   Follow up with me in office in 3-4 weeks. , call office [phone] for appointment. - dr yanez

## 2024-01-23 NOTE — DISCHARGE PLACEMENT REQUEST
"Jamar Greenwood (83 y.o. Male)       Date of Birth   1940    Social Security Number       [address]   [address] [address]    Home Phone   [phone]    MRN   [id number]       Episcopal   Ep[employer]opalian    Marital Status                               Admission Date   1/20/24    Admission Type   Emergency    Admitting Provider   Donn Johnson MD    Attending Provider   Luigi Pearson MD    Department, Room/Bed   [address], 86/[address]       Discharge Date       Discharge Disposition       Discharge Destination                                 Attending Provider: Luigi Pearson MD    Allergies: Aspirin, Nsaids, Prednisone    Isolation: None   Infection: None   Code Status: CPR    Ht: 172.7 cm (68\")   Wt: 70.9 kg (156 lb 4.9 oz)    Admission Cmt: None   Principal Problem: Sacral fracture [S32.10XA]                   Active Insurance as of 1/20/2024       Primary Coverage       Payor Plan Insurance Group Employer/Plan Group    MEDICARE MEDICARE A & B        Payor Plan Address Payor Plan Phone Number Payor Plan Fax Number Effective Dates    [address] [phone]  1/1/2005 - None Entered    [place] [address]         Subscriber Name Subscriber Birth Date Member ID       JAMAR GREENWOOD 1940 [id number]                     Emergency Contacts        (Rel.) Home Phone Work Phone Mobile Phone    DELORES GREENWOOD (Daughter) -- -- [phone]                "

## 2024-01-23 NOTE — PLAN OF CARE
Goal Outcome Evaluation:           Progress: no change  Outcome Evaluation: Pt aox4, VSS. Pt on cont pulse ox. Using urinal. Pt given percocet 5mg x1 for pain. Pt resting comfortably with no complaints at this time. Will continue to monitor.

## 2024-01-23 NOTE — PROGRESS NOTES
Name: Rosas Christianson ADMIT: 2024   : 1940  PCP: Rashid Klein MD    MRN: [id number] LOS: 2 days   AGE/SEX: 83 y.o. male  ROOM: [facility]     Subjective   Subjective   Patient continues to have some pelvic pain.  Much improved from yesterday.  Was able to walk some with physical therapy today.  Tolerating diet.  Grandchildren at bedside.    Review of Systems   Constitutional:  Negative for chills and fever.   Respiratory:  Negative for cough and shortness of breath.    Cardiovascular:  Negative for chest pain and palpitations.   Gastrointestinal:  Negative for abdominal pain, diarrhea, nausea and vomiting.     As above     Objective   Objective   Vital Signs  Temp:  [97 °F (36.1 °C)-98.1 °F (36.7 °C)] 97.4 °F (36.3 °C)  Heart Rate:  [64-77] 75  Resp:  [16-24] 16  BP: (136-167)/(70-87) 150/87  SpO2:  [95 %-100 %] 98 %  on   ;   Device (Oxygen Therapy): room air  Body mass index is 23.77 kg/m².  Physical Exam  Vitals and nursing note reviewed.   Constitutional:       General: He is not in acute distress.  Cardiovascular:      Rate and Rhythm: Normal rate and regular rhythm.   Pulmonary:      Effort: Pulmonary effort is normal. No respiratory distress.   Abdominal:      General: Abdomen is flat. There is no distension.      Tenderness: There is no abdominal tenderness.      Comments: Ostomy bag with brown stool   Musculoskeletal:         General: No swelling or deformity.      Comments: Bilateral pelvic pain tenderness   Skin:     General: Skin is warm and dry.   Neurological:      General: No focal deficit present.      Mental Status: He is alert. Mental status is at baseline.         Results Review     I reviewed the patient's new clinical results.  Results from last 7 days   Lab Units 24  0550 24  0549 24  0524 24  1605   WBC 10*3/mm3 6.54 6.36 6.72 7.09   HEMOGLOBIN g/dL 11.9* 11.6* 11.8* 12.5*   PLATELETS 10*3/mm3 135* 140 138* 139*     Results from last 7 days   Lab Units  "01/23/24  0550 01/22/24  0549 01/21/24  0524 01/20/24  1605   SODIUM mmol/L 131* 133* 134* 132*   POTASSIUM mmol/L 4.2 4.0 4.3 3.9   CHLORIDE mmol/L 97* 98 100 97*   CO2 mmol/L 24.2 25.2 27.0 23.9   BUN mg/dL 22 19 16 19   CREATININE mg/dL 1.03 0.92 0.99 0.97   GLUCOSE mg/dL 95 96 103* 107*   Estimated Creatinine Clearance: 54.5 mL/min (by C-G formula based on SCr of 1.03 mg/dL).    Results from last 7 days   Lab Units 01/23/24  0550 01/22/24  0549 01/21/24  0524 01/20/24  1605   CALCIUM mg/dL 8.5* 8.6 9.0 9.1     No results found for: \"COVID19\"  Glucose   Date/Time Value Ref Range Status   01/21/2024 0807 86 70 - 130 mg/dL Final       CT Pelvis Without Contrast  Narrative: PELVIS CT WITHOUT CONTRAST     HISTORY: Difficulty walking after a fall several weeks ago.     TECHNIQUE: Noncontrast pelvis CT is provided. Lumbar CT performed today  is reported separately.     FINDINGS: There is a typical configuration of sacral fractures with  sagittally oriented fractures extending across the lateral margins of  the sacrum at the S1 through S3 levels and a nondisplaced, transverse  fracture extending across the midline at the S2-S3 level, demonstrated  only on the sagittal reformatted images. There is no canal or foraminal  stenosis. Mild adjacent soft tissue edema.     The ischiopubic rami and the other bones of the pelvis are intact. The  proximal femurs are intact. No hip joint effusion.     Intrapelvic structures appear normal. Extensive atheromatous  calcification along the pelvic arteries. 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 Partially visualized cholelithiasis.     Musculature around the hips and pelvis appears normal. There is no  hematoma.     Impression: Acute nondisplaced sacral fractures sagittally oriented  along the lateral margins of the sacrum S1 through S3 and extending  transversely across the midline at the S2-S3 level. Incidentally noted  cholelithiasis.        Radiation dose reduction " techniques were utilized, including automated  exposure control and exposure modulation based on body size.        This report was finalized on 1/20/2024 6:20 PM by Dr. Frederic Melgoza M.D on Workstation: [id number]     CT Lumbar Spine Without Contrast  CT SCAN OF THE LUMBAR SPINE     HISTORY: Fell 3 weeks ago. Worsening low back pain.     The CT scan was performed as an emergency procedure through the lumbar  spine without contrast. The following findings are present:     1. 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     2. There is mild to moderate diffuse osteoporosis but there is no  evidence of acute compression fracture in the lumbar spine extending to  the level of the bottom of T11.     3. There is extensive degenerative change at all levels of the lumbar  spine and there is asymmetric disc space narrowing extending more to the  right at L2-3 and L3-4 producing a mild lumbar scoliosis convex to the  left.     4. At T11-12 there is slight facet spurring, left greater than right.  There is no central stenosis but there is slight left foraminal  encroachment. At T12-L1 there is slight annular disc bulge extending  more to the left combined with some facet spurring, left greater than  right. This also produces mild to moderate left foraminal encroachment.     5. At L1-2 there is prominent annular disc bulge and facet hypertrophy  and some facet spurring resulting in mild central canal stenosis. There  is slight right foraminal encroachment.     6. At L2-3 there is prominent disc space narrowing extending more to the  right with right lateral spurring. There is moderate annular disc bulge  and severe facet spurring, right greater than left producing mild  central canal stenosis and mild to moderate right foraminal  encroachment.     7. At L3-4 there is asymmetric disc space narrowing extending more to  the right with right lateral spurring. There is  moderate annular disc  bulge and some annular bony spurring extending more to the right  combined with facet spurring and resulting in moderate central canal  stenosis as well as severe right foraminal encroachment.     8. At L4-5 there is prominent disc degeneration extending slightly more  to the right and there is also left lateral subluxation of L4 relative  to L5 measuring 8 mm. There is annular disc bulge extending more to the  right and into the right neural foramen combined with severe facet  spurring and resulting in moderate central canal stenosis. There is  severe bilateral foraminal encroachment as well.     9. At L5-S1 there is prominent disc degeneration with disc space  narrowing and a vacuum phenomenon that extends slightly more to the  left. There is moderate annular disc bulge extending more to the left  combined with severe facet spurring and resulting in severe left and  mild right foraminal encroachment.                 Radiation dose reduction techniques were utilized, including automated  exposure control and exposure modulation based on body size.        This report was finalized on 1/20/2024 6:01 PM by Dr. Rizwan Sena M.D  on Workstation: [id number]       I reviewed the patient's daily medications.  Scheduled Medications  enoxaparin, 40 mg, Subcutaneous, Daily  levothyroxine, 125 mcg, Oral, Q AM  multivitamin with minerals, 1 tablet, Oral, Daily  sodium chloride, 10 mL, Intravenous, Q12H  tamsulosin, 0.4 mg, Oral, Daily  valsartan, 40 mg, Oral, Daily    Infusions   Diet  Diet: Cardiac Diets; Healthy Heart (2-3 Na+); Texture: Regular Texture (IDDSI 7); Fluid Consistency: Thin (IDDSI 0)         I have personally reviewed:  [x]  Laboratory   []  Microbiology   [x]  Radiology   []  EKG/Telemetry   []  Cardiology/Vascular   []  Pathology   [x]  Records     Assessment/Plan     Active Hospital Problems    Diagnosis  POA    **Sacral fracture [S32.10XA]  Yes    BPH (benign prostatic hyperplasia)  [N40.0]  Yes    Anxiety [F41.9]  Yes    Anemia of chronic disease [D63.8]  Yes    Hypertension [I10]  Yes    Hypothyroidism [E03.9]  Yes      Resolved Hospital Problems   No resolved problems to display.       83 y.o. male admitted with Sacral fracture.    Mr. Christianson is a 83 y.o. former smoker with a history of hypertension, hypothyroidism, anemia, anxiety who presents with acute back pain.     Sacral fracture  -Initiate Lidoderm patch to sacrum  -Consult CCP for placement  -Supportive care with analgesics  -PT change eval  -Ortho consulted-recommend weightbearing as tolerated.  Okay to walk short distances about 25 feet to start.  Follow up with Ortho in office in 3-4 weeks. , call office [phone] for appointment.      Essential hypertension  -Continue home valsartan     Hypothyroidism  -Continue home Synthroid therapy     Anemia of chronic disease  -Hemoglobin stable     BPH  -Continue home tamsulosin    Ulcerative colitis status post colostomy over 20 years ago  -Not currently on any medications    Lovenox 40 mg SC daily for DVT prophylaxis.  Full code.  Discussed with patient and nursing staff.  Anticipate discharge to SNU facility in 1-2 days.    Expected Discharge Date: 1/25/2024; Expected Discharge Time:       Luigi Pearson MD  [facility]  01/23/24  15:22 EST

## 2024-01-23 NOTE — PROGRESS NOTES
Discharge Planning Assessment  [facility]     Patient Name: Rosas Christianson  MRN: [id number]  Today's Date: 1/23/2024    Admit Date: 1/20/2024    Plan: skilled rehab at discharge   Discharge Needs Assessment       Row Name 01/23/24 1434       Living Environment    People in Home alone    Current Living Arrangements home    Potentially Unsafe Housing Conditions none    Primary Care Provided by self    Provides Primary Care For no one, unable/limited ability to care for self    Caregiving Concerns Adult children    Family Caregiver if Needed child(abdifatah), adult    Quality of Family Relationships helpful    Able to Return to Prior Arrangements no    Living Arrangement Comments needs skilled rehab       Resource/Environmental Concerns    Resource/Environmental Concerns none    Transportation Concerns none       Transition Planning    Patient/Family Anticipated Services at Transition skilled nursing    Transportation Anticipated health plan transportation       Discharge Needs Assessment    Equipment Currently Used at Home cane, straight;walker, rolling;wheelchair;wheelchair, motorized;scales;pulse ox;bp cuff;ramp    Anticipated Changes Related to Illness inability to care for self    Equipment Needed After Discharge --  will continue to follow for any needs    Outpatient/Agency/Support Group Needs skilled nursing facility    Discharge Facility/Level of Care Needs nursing facility, skilled    Provided Post Acute Provider List? Yes    Post Acute Provider List Nursing Home    Provided Post Acute Provider Quality & Resource List? Yes    Post Acute Provider Quality and Resource List Nursing Home    Delivered To Patient    Method of Delivery In person    Patient's Choice of Community Agency(s) The patient states he would like to go to [facility] because he has family there or [facility] or in the area of [facility]. He states he is okay with which facility accepts him.    Current Discharge Risk lives alone                    Discharge Plan       Row Name 01/23/24 1639       Plan    Plan Comments [facility] in [place] can accept tomorrow to a skilled bed. The patient is agreeable to go to [facility] for rehab. [facility] and [facility] also. Will review with the patient in the AM. LAVERN Jaime RN CCP      Row Name 01/23/24 1445       Plan    Plan skilled rehab at discharge    Roadmap to Recovery Yes    Plan Comments The patient transferred to [facility] from ER on 1/20/24. Spoke with the patient at bedside and he is willing to go to skilled rehab. Gave him the Subacute Nursing Facilities list and he choice [place] facilities and some [place] facilities. He states he has children that live in [place] and in [place] by [address]. The patient will need transportation at discharge. LAVERN Jaime Rn, CCP.                  Continued Care and Services - Admitted Since 1/20/2024       Destination Coordination complete.      Service Provider Request Status Selected Services Address Phone Fax Patient Preferred    [facility]  Selected Skilled Nursing [address] [facility] [place] [id number] [phone] --    [facility] Considering N/A [address] [facility] [place] [address] [phone] --    [facility] Pending - Request Sent N/A [address] [facility] [phone] --    [facility] Pending - Request Sent N/A [address] [facility] [place] [address] [phone] --    [facility] Pending - Request Sent N/A [address] [facility] [place] [phone] --    [facility] Pending - Request Sent N/A [address] [facility] [id number] [phone] --    [facility] Pending - Request Sent N/A [address] [facility] [address] [phone]  [phone] --    [facility] Pending - Request Sent N/A [address] [address] [phone] --                  Expected Discharge Date and Time       Expected Discharge Date Expected Discharge Time    Jan 25, 2024            Demographic Summary       Row Name 01/23/24 1433       General Information    Admission Type inpatient    Arrived From emergency department    Referral Source admission list    Reason for Consult decision-making;discharge planning    Preferred Language English       Contact Information    Permission Granted to Share Info With     Contact Information Obtained for                    Functional Status       Row Name 01/23/24 1442       Functional Status    Usual Activity Tolerance fair    Current Activity Tolerance fair       Functional Status, IADL    Medications assistive equipment    Meal Preparation assistive equipment    Housekeeping assistive equipment    Laundry assistive equipment    Shopping assistive equipment    IADL Comments assist of DME prior to admission       Mental Status    General Appearance WDL WDL       Mental Status Summary    Recent Changes in Mental Status/Cognitive Functioning no changes       Employment/    Employment Status retired    Employment/ Comments [employer] years                   Psychosocial    No documentation.                  Abuse/Neglect    No documentation.                  Legal    No documentation.                  Substance Abuse    No documentation.                  Patient Forms    No documentation.                     Britt Jaime RN

## 2024-01-24 VITALS
DIASTOLIC BLOOD PRESSURE: 72 MMHG | BODY MASS INDEX: 23.69 KG/M2 | HEART RATE: 64 BPM | RESPIRATION RATE: 16 BRPM | WEIGHT: 156.31 LBS | OXYGEN SATURATION: 99 % | HEIGHT: 68 IN | TEMPERATURE: 97.3 F | SYSTOLIC BLOOD PRESSURE: 147 MMHG

## 2024-01-24 LAB
ANION GAP SERPL CALCULATED.3IONS-SCNC: 8.8 MMOL/L (ref 5–15)
BASOPHILS # BLD AUTO: 0.06 10*3/MM3 (ref 0–0.2)
BASOPHILS NFR BLD AUTO: 1 % (ref 0–1.5)
BUN SERPL-MCNC: 21 MG/DL (ref 8–23)
BUN/CREAT SERPL: 26.6 (ref 7–25)
CALCIUM SPEC-SCNC: 8.3 MG/DL (ref 8.6–10.5)
CHLORIDE SERPL-SCNC: 97 MMOL/L (ref 98–107)
CO2 SERPL-SCNC: 24.2 MMOL/L (ref 22–29)
CREAT SERPL-MCNC: 0.79 MG/DL (ref 0.76–1.27)
DEPRECATED RDW RBC AUTO: 40.5 FL (ref 37–54)
EGFRCR SERPLBLD CKD-EPI 2021: 88.1 ML/MIN/1.73
EOSINOPHIL # BLD AUTO: 0.17 10*3/MM3 (ref 0–0.4)
EOSINOPHIL NFR BLD AUTO: 2.7 % (ref 0.3–6.2)
ERYTHROCYTE [DISTWIDTH] IN BLOOD BY AUTOMATED COUNT: 12.2 % (ref 12.3–15.4)
GLUCOSE SERPL-MCNC: 98 MG/DL (ref 65–99)
HCT VFR BLD AUTO: 32.8 % (ref 37.5–51)
HGB BLD-MCNC: 11.3 G/DL (ref 13–17.7)
LYMPHOCYTES # BLD AUTO: 0.84 10*3/MM3 (ref 0.7–3.1)
LYMPHOCYTES NFR BLD AUTO: 13.3 % (ref 19.6–45.3)
MCH RBC QN AUTO: 31.7 PG (ref 26.6–33)
MCHC RBC AUTO-ENTMCNC: 34.5 G/DL (ref 31.5–35.7)
MCV RBC AUTO: 92.1 FL (ref 79–97)
MONOCYTES # BLD AUTO: 0.91 10*3/MM3 (ref 0.1–0.9)
MONOCYTES NFR BLD AUTO: 14.4 % (ref 5–12)
NEUTROPHILS NFR BLD AUTO: 4.3 10*3/MM3 (ref 1.7–7)
NEUTROPHILS NFR BLD AUTO: 68.1 % (ref 42.7–76)
PLATELET # BLD AUTO: 146 10*3/MM3 (ref 140–450)
PMV BLD AUTO: 9.9 FL (ref 6–12)
POTASSIUM SERPL-SCNC: 4 MMOL/L (ref 3.5–5.2)
RBC # BLD AUTO: 3.56 10*6/MM3 (ref 4.14–5.8)
SODIUM SERPL-SCNC: 130 MMOL/L (ref 136–145)
WBC NRBC COR # BLD AUTO: 6.31 10*3/MM3 (ref 3.4–10.8)

## 2024-01-24 PROCEDURE — 25010000002 ENOXAPARIN PER 10 MG: Performed by: INTERNAL MEDICINE

## 2024-01-24 PROCEDURE — 85025 COMPLETE CBC W/AUTO DIFF WBC: CPT | Performed by: INTERNAL MEDICINE

## 2024-01-24 PROCEDURE — 80048 BASIC METABOLIC PNL TOTAL CA: CPT | Performed by: INTERNAL MEDICINE

## 2024-01-24 RX ORDER — TRAMADOL HYDROCHLORIDE 50 MG/1
50 TABLET ORAL EVERY 6 HOURS PRN
Qty: 12 TABLET | Refills: 0 | Status: SHIPPED | OUTPATIENT
Start: 2024-01-24 | End: 2024-01-27

## 2024-01-24 RX ORDER — OXYCODONE HYDROCHLORIDE AND ACETAMINOPHEN 5; 325 MG/1; MG/1
0.5 TABLET ORAL EVERY 6 HOURS PRN
Qty: 6 TABLET | Refills: 0 | Status: SHIPPED | OUTPATIENT
Start: 2024-01-24 | End: 2024-01-27

## 2024-01-24 RX ORDER — ONDANSETRON 4 MG/1
4 TABLET, ORALLY DISINTEGRATING ORAL EVERY 6 HOURS PRN
Qty: 21 TABLET | Refills: 0 | Status: SHIPPED | OUTPATIENT
Start: 2024-01-24 | End: 2024-01-31

## 2024-01-24 RX ADMIN — ENOXAPARIN SODIUM 40 MG: 100 INJECTION SUBCUTANEOUS at 08:56

## 2024-01-24 RX ADMIN — LEVOTHYROXINE SODIUM 125 MCG: 125 TABLET ORAL at 06:13

## 2024-01-24 RX ADMIN — TAMSULOSIN HYDROCHLORIDE 0.4 MG: 0.4 CAPSULE ORAL at 08:56

## 2024-01-24 RX ADMIN — ACETAMINOPHEN 650 MG: 325 TABLET, FILM COATED ORAL at 08:58

## 2024-01-24 RX ADMIN — VALSARTAN 40 MG: 40 TABLET, COATED ORAL at 08:56

## 2024-01-24 RX ADMIN — ACETAMINOPHEN 650 MG: 325 TABLET, FILM COATED ORAL at 02:15

## 2024-01-24 RX ADMIN — Medication 1 TABLET: at 08:56

## 2024-01-24 RX ADMIN — TRAMADOL HYDROCHLORIDE 50 MG: 50 TABLET ORAL at 09:04

## 2024-01-24 NOTE — PLAN OF CARE
Goal Outcome Evaluation:  Plan of Care Reviewed With: patient        Progress: improving  Outcome Evaluation: Pt AOx4.  VSS.  Colostomy changed per pt.  Urinal at bedside.  Percocet given x1 for pain.   Pt currently sleeping.  Will continue to monitor.

## 2024-01-24 NOTE — PROGRESS NOTES
Case Management Discharge Note      Final Note: Discharged to [facility], [place], skilled bed and [facility] transported. LAVERN Jaime RN, CCP.    Provided Post Acute Provider List?: Yes  Post Acute Provider List: Nursing Home  Provided Post Acute Provider Quality & Resource List?: Yes  Post Acute Provider Quality and Resource List: Nursing Home  Delivered To: Patient  Method of Delivery: In person    Selected Continued Care - Admitted Since 1/20/2024       Destination Coordination complete.      Service Provider Selected Services Address Phone Fax Patient Preferred    [facility] [address] [facility] [facility] [place] [address] [phone] --              Durable Medical Equipment    No services have been selected for the patient.                Dialysis/Infusion    No services have been selected for the patient.                Home Medical Care    No services have been selected for the patient.                Therapy    No services have been selected for the patient.                Community Resources    No services have been selected for the patient.                Community & DME    No services have been selected for the patient.                    Transportation Services  Ambulance: [facility]    Final Discharge Disposition Code: 03 - skilled nursing facility (SNF)
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
## 2024-01-24 NOTE — DISCHARGE SUMMARY
Patient Name: Rosas Christianson  : 1940  MRN: [id number]    Date of Admission: 2024  Date of Discharge:  2024  Primary Care Physician: Rashid Klein MD      Chief Complaint:   Back Pain      Discharge Diagnoses     Active Hospital Problems    Diagnosis  POA    **Sacral fracture [S32.10XA]  Yes    BPH (benign prostatic hyperplasia) [N40.0]  Yes    Anxiety [F41.9]  Yes    Anemia of chronic disease [D63.8]  Yes    Hypertension [I10]  Yes    Hypothyroidism [E03.9]  Yes      Resolved Hospital Problems   No resolved problems to display.        Hospital Course     Mr. Christianson is a 83 y.o. male with a history of hypertension, hypothyroidism, anemia of chronic disease, BPH, ulcerative colitis status post colonoscopy over 20 years ago no longer on UC medications presenting with lower back and pelvic pain.  Had a mechanical fall 2 to 3 weeks ago and slowly had worsening pain.  Patient was found to have a closed sacral fracture.  Ortho was consulted and recommended weightbearing as tolerated.  No surgical procedure needed.  Okay to walk short distances about 25 feet to start.  Ortho recommended following up with them in 3 to 4 weeks, call their office for follow-up appointment: [phone] .    Pain and ambulation have improved every day during admission.  Did have a little dizziness with his Percocet yesterday, have decreased dose to 2.5 mg.  Patient is on home tramadol and hopefully this will soon take care of all of his pain.    At the time of discharge patient was told to take all medications as prescribed, keep all follow-up appointments, and call their doctor or return to the hospital with any worsening or concerning symptoms.    Day of Discharge     Subjective:  Patient sitting up in bed.  Pain slowly is improving.  Had little dizziness after Percocet yesterday.  Otherwise no complaints.  Tolerating diet    Review of Systems   Constitutional:  Negative for chills and fever.   Respiratory:  Negative  for cough and shortness of breath.    Cardiovascular:  Negative for chest pain and palpitations.   Gastrointestinal:  Negative for abdominal pain, diarrhea, nausea and vomiting.       Physical Exam:  Temp:  [97.3 °F (36.3 °C)-98.1 °F (36.7 °C)] 97.3 °F (36.3 °C)  Heart Rate:  [60-75] 64  Resp:  [16] 16  BP: (147-158)/(72-87) 147/72  Body mass index is 23.77 kg/m².  Physical Exam  Vitals and nursing note reviewed.   Constitutional:       General: He is not in acute distress.  Cardiovascular:      Rate and Rhythm: Normal rate and regular rhythm.   Pulmonary:      Effort: Pulmonary effort is normal. No respiratory distress.   Abdominal:      General: Abdomen is flat. There is no distension.      Tenderness: There is no abdominal tenderness.      Comments: Ostomy bag with brown stool   Musculoskeletal:         General: No swelling or deformity.      Comments: Bilateral pelvic pain tenderness, improving  Skin:     General: Skin is warm and dry.   Neurological:      General: No focal deficit present.      Mental Status: He is alert. Mental status is at baseline.   Consultants     Consult Orders (all) (From admission, onward)       Start     Ordered    01/21/24 0851  Inpatient Orthopedic Surgery Consult  Once        Specialty:  Orthopedic Surgery  Provider:  Rowena Lang MD    01/21/24 0850    01/20/24 2004  Inpatient Case Management  Consult  Once        Provider:  (Not yet assigned)    01/20/24 2003 01/20/24 1804  [facility] (on-call MD unless specified) Details  Once        Specialty:  Hospitalist  Provider:  (Not yet assigned)    01/20/24 1803                  Procedures     Imaging Results (All)       Procedure Component Value Units Date/Time    CT Pelvis Without Contrast [[id number]] Collected: 01/20/24 1757     Updated: 01/20/24 1824    Narrative:      PELVIS CT WITHOUT CONTRAST     HISTORY: Difficulty walking after a fall several weeks ago.     TECHNIQUE: Noncontrast pelvis CT is provided. Lumbar CT  performed today  is reported separately.     FINDINGS: There is a typical configuration of sacral fractures with  sagittally oriented fractures extending across the lateral margins of  the sacrum at the S1 through S3 levels and a nondisplaced, transverse  fracture extending across the midline at the S2-S3 level, demonstrated  only on the sagittal reformatted images. There is no canal or foraminal  stenosis. Mild adjacent soft tissue edema.     The ischiopubic rami and the other bones of the pelvis are intact. The  proximal femurs are intact. No hip joint effusion.     Intrapelvic structures appear normal. Extensive atheromatous  calcification along the pelvic arteries. 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 Partially visualized cholelithiasis.     Musculature around the hips and pelvis appears normal. There is no  hematoma.       Impression:      Acute nondisplaced sacral fractures sagittally oriented  along the lateral margins of the sacrum S1 through S3 and extending  transversely across the midline at the S2-S3 level. Incidentally noted  cholelithiasis.        Radiation dose reduction techniques were utilized, including automated  exposure control and exposure modulation based on body size.        This report was finalized on 1/20/2024 6:20 PM by Dr. Frederic Melgoza M.D on Workstation: [id number]       CT Lumbar Spine Without Contrast [[id number]] Collected: 01/20/24 1750     Updated: 01/20/24 1804    Narrative:      CT SCAN OF THE LUMBAR SPINE     HISTORY: Fell 3 weeks ago. Worsening low back pain.     The CT scan was performed as an emergency procedure through the lumbar  spine without contrast. The following findings are present:     1. 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     2. There is mild to moderate diffuse osteoporosis but there is no  evidence of acute compression fracture in the lumbar spine extending to  the level of the  bottom of T11.     3. There is extensive degenerative change at all levels of the lumbar  spine and there is asymmetric disc space narrowing extending more to the  right at L2-3 and L3-4 producing a mild lumbar scoliosis convex to the  left.     4. At T11-12 there is slight facet spurring, left greater than right.  There is no central stenosis but there is slight left foraminal  encroachment. At T12-L1 there is slight annular disc bulge extending  more to the left combined with some facet spurring, left greater than  right. This also produces mild to moderate left foraminal encroachment.     5. At L1-2 there is prominent annular disc bulge and facet hypertrophy  and some facet spurring resulting in mild central canal stenosis. There  is slight right foraminal encroachment.     6. At L2-3 there is prominent disc space narrowing extending more to the  right with right lateral spurring. There is moderate annular disc bulge  and severe facet spurring, right greater than left producing mild  central canal stenosis and mild to moderate right foraminal  encroachment.     7. At L3-4 there is asymmetric disc space narrowing extending more to  the right with right lateral spurring. There is moderate annular disc  bulge and some annular bony spurring extending more to the right  combined with facet spurring and resulting in moderate central canal  stenosis as well as severe right foraminal encroachment.     8. At L4-5 there is prominent disc degeneration extending slightly more  to the right and there is also left lateral subluxation of L4 relative  to L5 measuring 8 mm. There is annular disc bulge extending more to the  right and into the right neural foramen combined with severe facet  spurring and resulting in moderate central canal stenosis. There is  severe bilateral foraminal encroachment as well.     9. At L5-S1 there is prominent disc degeneration with disc space  narrowing and a vacuum phenomenon that extends slightly  "more to the  left. There is moderate annular disc bulge extending more to the left  combined with severe facet spurring and resulting in severe left and  mild right foraminal encroachment.                 Radiation dose reduction techniques were utilized, including automated  exposure control and exposure modulation based on body size.        This report was finalized on 1/20/2024 6:01 PM by Dr. Rizwan Sena M.D  on Workstation: [id number]               Pertinent Labs     Results from last 7 days   Lab Units 01/24/24  0751 01/23/24  0550 01/22/24  0549 01/21/24  0524   WBC 10*3/mm3 6.31 6.54 6.36 6.72   HEMOGLOBIN g/dL 11.3* 11.9* 11.6* 11.8*   PLATELETS 10*3/mm3 146 135* 140 138*     Results from last 7 days   Lab Units 01/24/24  0752 01/23/24  0550 01/22/24  0549 01/21/24  0524   SODIUM mmol/L 130* 131* 133* 134*   POTASSIUM mmol/L 4.0 4.2 4.0 4.3   CHLORIDE mmol/L 97* 97* 98 100   CO2 mmol/L 24.2 24.2 25.2 27.0   BUN mg/dL 21 22 19 16   CREATININE mg/dL 0.79 1.03 0.92 0.99   GLUCOSE mg/dL 98 95 96 103*   Estimated Creatinine Clearance: 71 mL/min (by C-G formula based on SCr of 0.79 mg/dL).    Results from last 7 days   Lab Units 01/24/24  0752 01/23/24  0550 01/22/24  0549 01/21/24  0524   CALCIUM mg/dL 8.3* 8.5* 8.6 9.0               Invalid input(s): \"LDLCALC\"        Test Results Pending at Discharge       Discharge Details        Discharge Medications        New Medications        Instructions Start Date   ondansetron ODT 4 MG disintegrating tablet  Commonly known as: ZOFRAN-ODT   4 mg, Translingual, Every 6 Hours PRN      oxyCODONE-acetaminophen 5-325 MG per tablet  Commonly known as: PERCOCET   0.5 tablets, Oral, Every 6 Hours PRN             Continue These Medications        Instructions Start Date   calcium carbonate 500 MG chewable tablet  Commonly known as: TUMS   1 tablet, Oral, 2 Times Daily      Diclofenac Sodium 1 % gel gel  Commonly known as: VOLTAREN   4 g, Topical, 3 Times Daily      fish oil 1000 " MG capsule capsule   1,000 mg, Oral, Daily With Breakfast      glucosamine sulfate 500 MG capsule capsule   2,000 mg, Oral, Daily      levothyroxine 125 MCG tablet  Commonly known as: SYNTHROID, LEVOTHROID   125 mcg, Oral, Daily      multivitamin with minerals tablet tablet   1 tablet, Oral, Daily      tamsulosin 0.4 MG capsule 24 hr capsule  Commonly known as: FLOMAX   0.4 mg, Oral, Daily      tiZANidine 2 MG tablet  Commonly known as: ZANAFLEX   2 mg, Oral, Every 8 Hours PRN      traMADol 50 MG tablet  Commonly known as: ULTRAM   50 mg, Oral, Every 6 Hours PRN      valsartan 40 MG tablet  Commonly known as: Diovan   40 mg, Oral, Daily      VITAMIN D3 PO   1 each, Oral, Daily               Allergies   Allergen Reactions    Aspirin Unknown - Low Severity    Nsaids Unknown - Low Severity    Prednisone Unknown - Low Severity         Discharge Disposition:  Skilled Nursing Facility (DC - External)    Discharge Diet:  Diet Order   Procedures    Diet: Cardiac Diets; Healthy Heart (2-3 Na+); Texture: Regular Texture (IDDSI 7); Fluid Consistency: Thin (IDDSI 0)       Discharge Activity:   As tolerated    CODE STATUS:    Code Status and Medical Interventions:   Ordered at: 01/20/24 1959     Code Status (Patient has no pulse and is not breathing):    CPR (Attempt to Resuscitate)     Medical Interventions (Patient has pulse or is breathing):    Full Support       Future Appointments   Date Time Provider Department Center   1/24/2024 12:00 PM EMS MED 4  [facility]   3/29/2024 10:45 AM Rashid Klein MD [facility]   1/13/2025  1:20 PM LAB CHAIR 6 [facility]  [facility]   1/13/2025  1:40 PM June Smiley MD [facility]      Contact information for follow-up providers       Rashid Klein MD .    Specialty: Family Medicine  Contact information:  [id number] [id number] [phone]                       Contact information for after-discharge care       Destination        [facility] .    Service: Skilled Nursing  Contact information:  [address]  [facility] [address] [phone]                                   Time Spent on Discharge:  Greater than 30 minutes      Luigi Pearson MD  [facility]  01/24/24  11:02 EST

## 2024-01-24 NOTE — TELEPHONE ENCOUNTER
Patient subsequently went to the ER plain films of the pelvis in office were negative for fracture CT scan in the ER showed pelvic fracture

## 2024-02-16 ENCOUNTER — HOSPITAL ENCOUNTER (OUTPATIENT)
Facility: HOSPITAL | Age: 84
Setting detail: OBSERVATION
Discharge: NURSING, REHAB OR LONG-TERM CARE | End: 2024-02-18
Attending: EMERGENCY MEDICINE | Admitting: EMERGENCY MEDICINE
Payer: MEDICARE

## 2024-02-16 ENCOUNTER — APPOINTMENT (OUTPATIENT)
Dept: GENERAL RADIOLOGY | Facility: HOSPITAL | Age: 84
End: 2024-02-16
Payer: MEDICARE

## 2024-02-16 ENCOUNTER — APPOINTMENT (OUTPATIENT)
Dept: CT IMAGING | Facility: HOSPITAL | Age: 84
End: 2024-02-16
Payer: MEDICARE

## 2024-02-16 DIAGNOSIS — R06.02 SHORTNESS OF BREATH: Primary | ICD-10-CM

## 2024-02-16 LAB
ALBUMIN SERPL-MCNC: 3.7 G/DL (ref 3.5–5.2)
ALBUMIN/GLOB SERPL: 1.2 G/DL
ALP SERPL-CCNC: 163 U/L (ref 39–117)
ALT SERPL W P-5'-P-CCNC: 25 U/L (ref 1–41)
ANION GAP SERPL CALCULATED.3IONS-SCNC: 11 MMOL/L (ref 5–15)
AST SERPL-CCNC: 37 U/L (ref 1–40)
BASOPHILS # BLD AUTO: 0.1 10*3/MM3 (ref 0–0.2)
BASOPHILS NFR BLD AUTO: 0.9 % (ref 0–1.5)
BILIRUB SERPL-MCNC: 0.5 MG/DL (ref 0–1.2)
BUN SERPL-MCNC: 19 MG/DL (ref 8–23)
BUN/CREAT SERPL: 24.7 (ref 7–25)
CALCIUM SPEC-SCNC: 9.2 MG/DL (ref 8.6–10.5)
CHLORIDE SERPL-SCNC: 92 MMOL/L (ref 98–107)
CO2 SERPL-SCNC: 26 MMOL/L (ref 22–29)
CREAT SERPL-MCNC: 0.77 MG/DL (ref 0.76–1.27)
D DIMER PPP FEU-MCNC: 9.73 MG/L (FEU) (ref 0–0.84)
DEPRECATED RDW RBC AUTO: 43.8 FL (ref 37–54)
EGFRCR SERPLBLD CKD-EPI 2021: 88.3 ML/MIN/1.73
EOSINOPHIL # BLD AUTO: 0.1 10*3/MM3 (ref 0–0.4)
EOSINOPHIL NFR BLD AUTO: 1 % (ref 0.3–6.2)
ERYTHROCYTE [DISTWIDTH] IN BLOOD BY AUTOMATED COUNT: 13.1 % (ref 12.3–15.4)
GLOBULIN UR ELPH-MCNC: 3.2 GM/DL
GLUCOSE SERPL-MCNC: 111 MG/DL (ref 65–99)
HCT VFR BLD AUTO: 38 % (ref 37.5–51)
HGB BLD-MCNC: 12.8 G/DL (ref 13–17.7)
LYMPHOCYTES # BLD AUTO: 0.7 10*3/MM3 (ref 0.7–3.1)
LYMPHOCYTES NFR BLD AUTO: 9.6 % (ref 19.6–45.3)
MCH RBC QN AUTO: 33.2 PG (ref 26.6–33)
MCHC RBC AUTO-ENTMCNC: 33.6 G/DL (ref 31.5–35.7)
MCV RBC AUTO: 98.9 FL (ref 79–97)
MONOCYTES # BLD AUTO: 0.8 10*3/MM3 (ref 0.1–0.9)
MONOCYTES NFR BLD AUTO: 11.8 % (ref 5–12)
NEUTROPHILS NFR BLD AUTO: 5.3 10*3/MM3 (ref 1.7–7)
NEUTROPHILS NFR BLD AUTO: 76.7 % (ref 42.7–76)
NRBC BLD AUTO-RTO: 0 /100 WBC (ref 0–0.2)
NT-PROBNP SERPL-MCNC: 475.4 PG/ML (ref 0–1800)
PLATELET # BLD AUTO: 135 10*3/MM3 (ref 140–450)
PMV BLD AUTO: 7.6 FL (ref 6–12)
POTASSIUM SERPL-SCNC: 3.8 MMOL/L (ref 3.5–5.2)
PROT SERPL-MCNC: 6.9 G/DL (ref 6–8.5)
RBC # BLD AUTO: 3.84 10*6/MM3 (ref 4.14–5.8)
SODIUM SERPL-SCNC: 129 MMOL/L (ref 136–145)
TROPONIN T SERPL HS-MCNC: 27 NG/L
WBC NRBC COR # BLD AUTO: 7 10*3/MM3 (ref 3.4–10.8)
WHOLE BLOOD HOLD COAG: NORMAL

## 2024-02-16 PROCEDURE — 85025 COMPLETE CBC W/AUTO DIFF WBC: CPT | Performed by: EMERGENCY MEDICINE

## 2024-02-16 PROCEDURE — 93005 ELECTROCARDIOGRAM TRACING: CPT

## 2024-02-16 PROCEDURE — 83880 ASSAY OF NATRIURETIC PEPTIDE: CPT | Performed by: EMERGENCY MEDICINE

## 2024-02-16 PROCEDURE — 80053 COMPREHEN METABOLIC PANEL: CPT | Performed by: EMERGENCY MEDICINE

## 2024-02-16 PROCEDURE — 36415 COLL VENOUS BLD VENIPUNCTURE: CPT

## 2024-02-16 PROCEDURE — 71045 X-RAY EXAM CHEST 1 VIEW: CPT

## 2024-02-16 PROCEDURE — 25510000001 IOPAMIDOL PER 1 ML: Performed by: EMERGENCY MEDICINE

## 2024-02-16 PROCEDURE — 93005 ELECTROCARDIOGRAM TRACING: CPT | Performed by: EMERGENCY MEDICINE

## 2024-02-16 PROCEDURE — 84484 ASSAY OF TROPONIN QUANT: CPT | Performed by: EMERGENCY MEDICINE

## 2024-02-16 PROCEDURE — 71275 CT ANGIOGRAPHY CHEST: CPT

## 2024-02-16 PROCEDURE — 99285 EMERGENCY DEPT VISIT HI MDM: CPT

## 2024-02-16 PROCEDURE — 85379 FIBRIN DEGRADATION QUANT: CPT | Performed by: EMERGENCY MEDICINE

## 2024-02-16 RX ORDER — SODIUM CHLORIDE 0.9 % (FLUSH) 0.9 %
10 SYRINGE (ML) INJECTION AS NEEDED
Status: DISCONTINUED | OUTPATIENT
Start: 2024-02-16 | End: 2024-02-18 | Stop reason: HOSPADM

## 2024-02-16 RX ADMIN — IOPAMIDOL 100 ML: 755 INJECTION, SOLUTION INTRAVENOUS at 23:53

## 2024-02-16 NOTE — LETTER
EMS Transport Request  For use at [facility], [place], [place], and [place] only   Patient Name: Rosas Christianson : 1940   Weight:63.5 kg (139 lb 15.9 oz) Pick-up Location: [facility] ([facility]) BLS/ALS: BLS/ALS: BLS   Insurance: MEDICARE    Pre-Cert #: D/C Summary complete: yes   Destination: Other [facility]   Contact Precautions: None   Equipment (O2, Fluids, etc.): None   Arrive By Date/Time: 24, 1400 Stretcher/WC: Wheelchair   CM Requesting: Melody Fermin RN Ext: [phone]   Notes/Medical Necessity: WC Van needed to transport to [facility]     ______________________________________________________________________    *Only 2 patient bags OR 1 carry-on size bag are permitted.  Wheelchairs and walkers CANNOT transported with the patient. Acknowledge: Yes

## 2024-02-17 ENCOUNTER — APPOINTMENT (OUTPATIENT)
Dept: CARDIOLOGY | Facility: HOSPITAL | Age: 84
End: 2024-02-17
Payer: MEDICARE

## 2024-02-17 PROBLEM — R06.02 SHORTNESS OF BREATH: Status: ACTIVE | Noted: 2024-02-17

## 2024-02-17 LAB
ALBUMIN SERPL-MCNC: 3.4 G/DL (ref 3.5–5.2)
ALP SERPL-CCNC: 153 U/L (ref 39–117)
ALT SERPL W P-5'-P-CCNC: 21 U/L (ref 1–41)
ANION GAP SERPL CALCULATED.3IONS-SCNC: 9 MMOL/L (ref 5–15)
AST SERPL-CCNC: 36 U/L (ref 1–40)
BH CV ECHO MEAS - AO MAX PG: 7.4 MMHG
BH CV ECHO MEAS - AO MEAN PG: 4 MMHG
BH CV ECHO MEAS - AO V2 MAX: 136 CM/SEC
BH CV ECHO MEAS - AO V2 VTI: 23.7 CM
BH CV ECHO MEAS - AVA(I,D): 2.6 CM2
BH CV ECHO MEAS - EDV(CUBED): 85.2 ML
BH CV ECHO MEAS - EDV(MOD-SP4): 150 ML
BH CV ECHO MEAS - EF(MOD-BP): 55 %
BH CV ECHO MEAS - EF(MOD-SP4): 55.2 %
BH CV ECHO MEAS - ESV(CUBED): 29.8 ML
BH CV ECHO MEAS - ESV(MOD-SP4): 67.2 ML
BH CV ECHO MEAS - FS: 29.5 %
BH CV ECHO MEAS - IVS/LVPW: 1 CM
BH CV ECHO MEAS - IVSD: 0.9 CM
BH CV ECHO MEAS - LA DIMENSION: 3.1 CM
BH CV ECHO MEAS - LAT PEAK E' VEL: 7 CM/SEC
BH CV ECHO MEAS - LV DIASTOLIC VOL/BSA (35-75): 85.4 CM2
BH CV ECHO MEAS - LV MASS(C)D: 128 GRAMS
BH CV ECHO MEAS - LV MAX PG: 6.5 MMHG
BH CV ECHO MEAS - LV MEAN PG: 3 MMHG
BH CV ECHO MEAS - LV SYSTOLIC VOL/BSA (12-30): 38.3 CM2
BH CV ECHO MEAS - LV V1 MAX: 127 CM/SEC
BH CV ECHO MEAS - LV V1 VTI: 19.9 CM
BH CV ECHO MEAS - LVIDD: 4.4 CM
BH CV ECHO MEAS - LVIDS: 3.1 CM
BH CV ECHO MEAS - LVOT AREA: 3.1 CM2
BH CV ECHO MEAS - LVOT DIAM: 2 CM
BH CV ECHO MEAS - LVPWD: 0.9 CM
BH CV ECHO MEAS - MED PEAK E' VEL: 8.6 CM/SEC
BH CV ECHO MEAS - MV A MAX VEL: 106 CM/SEC
BH CV ECHO MEAS - MV DEC SLOPE: 266 CM/SEC2
BH CV ECHO MEAS - MV DEC TIME: 0.26 SEC
BH CV ECHO MEAS - MV E MAX VEL: 66.6 CM/SEC
BH CV ECHO MEAS - MV E/A: 0.63
BH CV ECHO MEAS - MV MAX PG: 5.4 MMHG
BH CV ECHO MEAS - MV MEAN PG: 2 MMHG
BH CV ECHO MEAS - MV P1/2T: 91.2 MSEC
BH CV ECHO MEAS - MV V2 VTI: 24.1 CM
BH CV ECHO MEAS - MVA(P1/2T): 2.41 CM2
BH CV ECHO MEAS - MVA(VTI): 2.6 CM2
BH CV ECHO MEAS - PA ACC TIME: 0.11 SEC
BH CV ECHO MEAS - PA V2 MAX: 114 CM/SEC
BH CV ECHO MEAS - RV MAX PG: 2.07 MMHG
BH CV ECHO MEAS - RV V1 MAX: 71.9 CM/SEC
BH CV ECHO MEAS - RV V1 VTI: 10.7 CM
BH CV ECHO MEAS - RVDD: 3.7 CM
BH CV ECHO MEAS - SI(MOD-SP4): 47.1 ML/M2
BH CV ECHO MEAS - SV(LVOT): 62.5 ML
BH CV ECHO MEAS - SV(MOD-SP4): 82.8 ML
BH CV ECHO MEAS - TAPSE (>1.6): 2.43 CM
BH CV ECHO MEAS - TR MAX PG: 24.2 MMHG
BH CV ECHO MEAS - TR MAX VEL: 246 CM/SEC
BH CV ECHO MEASUREMENTS AVERAGE E/E' RATIO: 8.54
BH CV XLRA - TDI S': 21.5 CM/SEC
BILIRUB CONJ SERPL-MCNC: 0.2 MG/DL (ref 0–0.3)
BILIRUB INDIRECT SERPL-MCNC: 0.3 MG/DL
BILIRUB SERPL-MCNC: 0.5 MG/DL (ref 0–1.2)
BUN SERPL-MCNC: 15 MG/DL (ref 8–23)
BUN/CREAT SERPL: 19.7 (ref 7–25)
CALCIUM SPEC-SCNC: 9.1 MG/DL (ref 8.6–10.5)
CHLORIDE SERPL-SCNC: 93 MMOL/L (ref 98–107)
CHOLEST SERPL-MCNC: 151 MG/DL (ref 0–200)
CO2 SERPL-SCNC: 27 MMOL/L (ref 22–29)
CREAT SERPL-MCNC: 0.76 MG/DL (ref 0.76–1.27)
CRP SERPL-MCNC: 0.46 MG/DL (ref 0–0.5)
DEPRECATED RDW RBC AUTO: 44.6 FL (ref 37–54)
EGFRCR SERPLBLD CKD-EPI 2021: 88.6 ML/MIN/1.73
ERYTHROCYTE [DISTWIDTH] IN BLOOD BY AUTOMATED COUNT: 13.4 % (ref 12.3–15.4)
ERYTHROCYTE [SEDIMENTATION RATE] IN BLOOD: 28 MM/HR (ref 0–20)
GLUCOSE SERPL-MCNC: 104 MG/DL (ref 65–99)
HBA1C MFR BLD: 5.4 % (ref 4.8–5.6)
HCT VFR BLD AUTO: 38 % (ref 37.5–51)
HDLC SERPL-MCNC: 60 MG/DL (ref 40–60)
HGB BLD-MCNC: 13 G/DL (ref 13–17.7)
LDLC SERPL CALC-MCNC: 83 MG/DL (ref 0–100)
LDLC/HDLC SERPL: 1.42 {RATIO}
MCH RBC QN AUTO: 33.3 PG (ref 26.6–33)
MCHC RBC AUTO-ENTMCNC: 34.2 G/DL (ref 31.5–35.7)
MCV RBC AUTO: 97.4 FL (ref 79–97)
PLATELET # BLD AUTO: 140 10*3/MM3 (ref 140–450)
PMV BLD AUTO: 7.8 FL (ref 6–12)
POTASSIUM SERPL-SCNC: 3.4 MMOL/L (ref 3.5–5.2)
POTASSIUM SERPL-SCNC: 4.7 MMOL/L (ref 3.5–5.2)
PROT SERPL-MCNC: 6.5 G/DL (ref 6–8.5)
QT INTERVAL: 407 MS
QTC INTERVAL: 430 MS
RBC # BLD AUTO: 3.9 10*6/MM3 (ref 4.14–5.8)
SINUS: 2.9 CM
SODIUM SERPL-SCNC: 129 MMOL/L (ref 136–145)
STJ: 2.7 CM
T4 FREE SERPL-MCNC: 1.87 NG/DL (ref 0.93–1.7)
TRIGL SERPL-MCNC: 30 MG/DL (ref 0–150)
TROPONIN T SERPL HS-MCNC: 31 NG/L
TSH SERPL DL<=0.05 MIU/L-ACNC: 2.27 UIU/ML (ref 0.27–4.2)
VLDLC SERPL-MCNC: 8 MG/DL (ref 5–40)
WBC NRBC COR # BLD AUTO: 7.3 10*3/MM3 (ref 3.4–10.8)

## 2024-02-17 PROCEDURE — 85027 COMPLETE CBC AUTOMATED: CPT | Performed by: EMERGENCY MEDICINE

## 2024-02-17 PROCEDURE — 96374 THER/PROPH/DIAG INJ IV PUSH: CPT

## 2024-02-17 PROCEDURE — 96372 THER/PROPH/DIAG INJ SC/IM: CPT

## 2024-02-17 PROCEDURE — 80061 LIPID PANEL: CPT | Performed by: NURSE PRACTITIONER

## 2024-02-17 PROCEDURE — 93306 TTE W/DOPPLER COMPLETE: CPT | Performed by: INTERNAL MEDICINE

## 2024-02-17 PROCEDURE — 93306 TTE W/DOPPLER COMPLETE: CPT

## 2024-02-17 PROCEDURE — G0378 HOSPITAL OBSERVATION PER HR: HCPCS

## 2024-02-17 PROCEDURE — 83036 HEMOGLOBIN GLYCOSYLATED A1C: CPT | Performed by: NURSE PRACTITIONER

## 2024-02-17 PROCEDURE — 85652 RBC SED RATE AUTOMATED: CPT | Performed by: NURSE PRACTITIONER

## 2024-02-17 PROCEDURE — 80048 BASIC METABOLIC PNL TOTAL CA: CPT | Performed by: EMERGENCY MEDICINE

## 2024-02-17 PROCEDURE — 84443 ASSAY THYROID STIM HORMONE: CPT | Performed by: NURSE PRACTITIONER

## 2024-02-17 PROCEDURE — 25010000002 ENOXAPARIN PER 10 MG: Performed by: NURSE PRACTITIONER

## 2024-02-17 PROCEDURE — 96375 TX/PRO/DX INJ NEW DRUG ADDON: CPT

## 2024-02-17 PROCEDURE — 80076 HEPATIC FUNCTION PANEL: CPT | Performed by: NURSE PRACTITIONER

## 2024-02-17 PROCEDURE — 25010000002 LABETALOL 5 MG/ML SOLUTION: Performed by: EMERGENCY MEDICINE

## 2024-02-17 PROCEDURE — 84132 ASSAY OF SERUM POTASSIUM: CPT | Performed by: NURSE PRACTITIONER

## 2024-02-17 PROCEDURE — 86140 C-REACTIVE PROTEIN: CPT | Performed by: NURSE PRACTITIONER

## 2024-02-17 PROCEDURE — 84484 ASSAY OF TROPONIN QUANT: CPT | Performed by: NURSE PRACTITIONER

## 2024-02-17 PROCEDURE — 84439 ASSAY OF FREE THYROXINE: CPT | Performed by: NURSE PRACTITIONER

## 2024-02-17 PROCEDURE — 25010000002 HYDRALAZINE PER 20 MG: Performed by: NURSE PRACTITIONER

## 2024-02-17 RX ORDER — BISACODYL 10 MG
10 SUPPOSITORY, RECTAL RECTAL DAILY PRN
Status: DISCONTINUED | OUTPATIENT
Start: 2024-02-17 | End: 2024-02-18 | Stop reason: HOSPADM

## 2024-02-17 RX ORDER — HYDRALAZINE HYDROCHLORIDE 20 MG/ML
10 INJECTION INTRAMUSCULAR; INTRAVENOUS EVERY 6 HOURS PRN
Status: DISCONTINUED | OUTPATIENT
Start: 2024-02-17 | End: 2024-02-18 | Stop reason: HOSPADM

## 2024-02-17 RX ORDER — NITROGLYCERIN 0.4 MG/1
0.4 TABLET SUBLINGUAL
Status: DISCONTINUED | OUTPATIENT
Start: 2024-02-17 | End: 2024-02-18 | Stop reason: HOSPADM

## 2024-02-17 RX ORDER — SODIUM CHLORIDE 0.9 % (FLUSH) 0.9 %
10 SYRINGE (ML) INJECTION EVERY 12 HOURS SCHEDULED
Status: DISCONTINUED | OUTPATIENT
Start: 2024-02-17 | End: 2024-02-18 | Stop reason: HOSPADM

## 2024-02-17 RX ORDER — AMLODIPINE BESYLATE 5 MG/1
5 TABLET ORAL
Status: DISCONTINUED | OUTPATIENT
Start: 2024-02-17 | End: 2024-02-18 | Stop reason: HOSPADM

## 2024-02-17 RX ORDER — ACETAMINOPHEN 325 MG/1
650 TABLET ORAL EVERY 4 HOURS PRN
Status: DISCONTINUED | OUTPATIENT
Start: 2024-02-17 | End: 2024-02-18 | Stop reason: HOSPADM

## 2024-02-17 RX ORDER — SODIUM CHLORIDE 9 MG/ML
40 INJECTION, SOLUTION INTRAVENOUS AS NEEDED
Status: DISCONTINUED | OUTPATIENT
Start: 2024-02-17 | End: 2024-02-18 | Stop reason: HOSPADM

## 2024-02-17 RX ORDER — VALSARTAN 80 MG/1
80 TABLET ORAL DAILY
Status: DISCONTINUED | OUTPATIENT
Start: 2024-02-18 | End: 2024-02-18 | Stop reason: HOSPADM

## 2024-02-17 RX ORDER — ONDANSETRON 2 MG/ML
4 INJECTION INTRAMUSCULAR; INTRAVENOUS EVERY 6 HOURS PRN
Status: DISCONTINUED | OUTPATIENT
Start: 2024-02-17 | End: 2024-02-18 | Stop reason: HOSPADM

## 2024-02-17 RX ORDER — SODIUM CHLORIDE 0.9 % (FLUSH) 0.9 %
10 SYRINGE (ML) INJECTION AS NEEDED
Status: DISCONTINUED | OUTPATIENT
Start: 2024-02-17 | End: 2024-02-18 | Stop reason: HOSPADM

## 2024-02-17 RX ORDER — VALSARTAN 40 MG/1
40 TABLET ORAL DAILY
Status: DISCONTINUED | OUTPATIENT
Start: 2024-02-17 | End: 2024-02-17

## 2024-02-17 RX ORDER — POLYETHYLENE GLYCOL 3350 17 G/17G
17 POWDER, FOR SOLUTION ORAL DAILY PRN
Status: DISCONTINUED | OUTPATIENT
Start: 2024-02-17 | End: 2024-02-18 | Stop reason: HOSPADM

## 2024-02-17 RX ORDER — TAMSULOSIN HYDROCHLORIDE 0.4 MG/1
0.4 CAPSULE ORAL DAILY
Status: DISCONTINUED | OUTPATIENT
Start: 2024-02-17 | End: 2024-02-18 | Stop reason: HOSPADM

## 2024-02-17 RX ORDER — BISACODYL 5 MG/1
5 TABLET, DELAYED RELEASE ORAL DAILY PRN
Status: DISCONTINUED | OUTPATIENT
Start: 2024-02-17 | End: 2024-02-18 | Stop reason: HOSPADM

## 2024-02-17 RX ORDER — TIZANIDINE 4 MG/1
2 TABLET ORAL EVERY 8 HOURS PRN
Status: DISCONTINUED | OUTPATIENT
Start: 2024-02-17 | End: 2024-02-18 | Stop reason: HOSPADM

## 2024-02-17 RX ORDER — LABETALOL HYDROCHLORIDE 5 MG/ML
10 INJECTION, SOLUTION INTRAVENOUS ONCE
Status: COMPLETED | OUTPATIENT
Start: 2024-02-17 | End: 2024-02-17

## 2024-02-17 RX ORDER — ENOXAPARIN SODIUM 100 MG/ML
40 INJECTION SUBCUTANEOUS EVERY 24 HOURS
Status: DISCONTINUED | OUTPATIENT
Start: 2024-02-17 | End: 2024-02-18 | Stop reason: HOSPADM

## 2024-02-17 RX ORDER — TRAMADOL HYDROCHLORIDE 50 MG/1
50 TABLET ORAL EVERY 6 HOURS PRN
Status: DISCONTINUED | OUTPATIENT
Start: 2024-02-17 | End: 2024-02-18 | Stop reason: HOSPADM

## 2024-02-17 RX ORDER — POTASSIUM CHLORIDE 20 MEQ/1
40 TABLET, EXTENDED RELEASE ORAL EVERY 4 HOURS
Qty: 4 TABLET | Refills: 0 | Status: COMPLETED | OUTPATIENT
Start: 2024-02-17 | End: 2024-02-17

## 2024-02-17 RX ORDER — ONDANSETRON 4 MG/1
4 TABLET, ORALLY DISINTEGRATING ORAL EVERY 6 HOURS PRN
Status: DISCONTINUED | OUTPATIENT
Start: 2024-02-17 | End: 2024-02-18 | Stop reason: HOSPADM

## 2024-02-17 RX ORDER — AMOXICILLIN 250 MG
2 CAPSULE ORAL 2 TIMES DAILY PRN
Status: DISCONTINUED | OUTPATIENT
Start: 2024-02-17 | End: 2024-02-18 | Stop reason: HOSPADM

## 2024-02-17 RX ADMIN — POTASSIUM CHLORIDE 40 MEQ: 1500 TABLET, EXTENDED RELEASE ORAL at 10:14

## 2024-02-17 RX ADMIN — Medication 10 ML: at 01:10

## 2024-02-17 RX ADMIN — AMLODIPINE BESYLATE 5 MG: 5 TABLET ORAL at 12:06

## 2024-02-17 RX ADMIN — Medication 10 ML: at 10:14

## 2024-02-17 RX ADMIN — Medication 10 MG: at 01:10

## 2024-02-17 RX ADMIN — ENOXAPARIN SODIUM 40 MG: 100 INJECTION SUBCUTANEOUS at 16:47

## 2024-02-17 RX ADMIN — VALSARTAN 40 MG: 40 TABLET, COATED ORAL at 10:14

## 2024-02-17 RX ADMIN — HYDRALAZINE HYDROCHLORIDE 10 MG: 20 INJECTION INTRAMUSCULAR; INTRAVENOUS at 10:39

## 2024-02-17 RX ADMIN — Medication 10 ML: at 10:15

## 2024-02-17 RX ADMIN — TAMSULOSIN HYDROCHLORIDE 0.4 MG: 0.4 CAPSULE ORAL at 10:14

## 2024-02-17 RX ADMIN — Medication 10 ML: at 21:33

## 2024-02-17 RX ADMIN — POTASSIUM CHLORIDE 40 MEQ: 1500 TABLET, EXTENDED RELEASE ORAL at 14:18

## 2024-02-17 RX ADMIN — LEVOTHYROXINE SODIUM 125 MCG: 0.1 TABLET ORAL at 05:50

## 2024-02-17 NOTE — CASE MANAGEMENT/SOCIAL WORK
Continued Stay Note  [facility]     Patient Name: Rosas Christianson  MRN: [id number]  Today's Date: 2/17/2024    Admit Date: 2/16/2024    Plan: [facility] accepted. No precert required. Bed available 2/18.   Discharge Plan       Row Name 02/17/24 1708       Plan    Plan [facility] accepted. No precert required. Bed available 2/18.    Plan Comments Family requested referral to [facility]. Liaison Claire notified of new referral to see if pt clinically appropriate. Liaison Claire accepted, with bed available tomorrow.                            Melody Fermin RN

## 2024-02-17 NOTE — PLAN OF CARE
Problem: Adult Inpatient Plan of Care  Goal: Absence of Hospital-Acquired Illness or Injury  Outcome: Ongoing, Progressing  Intervention: Identify and Manage Fall Risk  Recent Flowsheet Documentation  Taken 2/17/2024 0600 by Fatou Hickey RN  Safety Promotion/Fall Prevention:   safety round/check completed   assistive device/personal items within reach   clutter free environment maintained  Intervention: Prevent Infection  Recent Flowsheet Documentation  Taken 2/17/2024 0600 by Fatou Hickey RN  Infection Prevention:   hand hygiene promoted   rest/sleep promoted   single patient room provided     Problem: Hypertension Comorbidity  Goal: Blood Pressure in Desired Range  Outcome: Ongoing, Progressing     Problem: Breathing Pattern Ineffective  Goal: Effective Breathing Pattern  Outcome: Ongoing, Progressing   Goal Outcome Evaluation:      Pt admitted to the floor with c/o JEFFERSON, pt sats are 100% on room air, pt has infrequent non productive cough. Pt is very inpatient.  Will continue to monitor.
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
## 2024-02-17 NOTE — PLAN OF CARE
Goal Outcome Evaluation:  Plan of Care Reviewed With: patient        Progress: improving  Outcome Evaluation: pt complains of SOA during rest, HR, SPO2 and BP all WNL. Pt does not want to return to [facility] and is pending acceptance at a different facility. Call light within reach
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
## 2024-02-17 NOTE — CASE MANAGEMENT/SOCIAL WORK
Continued Stay Note  GORDON Fagan     Patient Name: Rosas Christianson  MRN: [id number]  Today's Date: 2/17/2024    Admit Date: 2/16/2024        Discharge Plan       Row Name 02/17/24 1328       Plan    Plan Comments Verified with liaison Zulma patient can return to [facility] for rehab. No precert required.                            Melody Fermin RN

## 2024-02-17 NOTE — DISCHARGE SUMMARY
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     parents gave them to me   Vaping Use    Vaping Use: Never used   Substance and Sexual Activity    Alcohol use: Not Currently     Comment: rare    Drug use: No    Sexual activity: Not Currently     Partners: Female     Birth control/protection: None       Procedures Performed         Consults:   Consults       Date and Time Order Name Status Description    2024  8:51 AM Inpatient Orthopedic Surgery Consult Completed             Condition on Discharge:     Stable    Discharge Disposition  Rehab Facility or Unit (DC - External)    Discharge Medications     Discharge Medications        New Medications        Instructions Start Date   amLODIPine 5 MG tablet  Commonly known as: NORVASC   5 mg, Oral, Every 24 Hours Scheduled   Start Date: 2024            Changes to Medications        Instructions Start Date   valsartan 80 MG tablet  Commonly known as: DIOVAN  What changed:   medication strength  how much to take   80 mg, Oral, Daily   Start Date: 2024            Continue These Medications        Instructions Start Date   calcium carbonate 500 MG chewable tablet  Commonly known as: TUMS   1 tablet, Oral, 2 Times Daily PRN      fish oil 1000 MG capsule capsule   1,000 mg, Oral, Daily With  Breakfast      glucosamine sulfate 500 MG capsule capsule   500 mg, Oral, 3 times daily      levothyroxine 125 MCG tablet  Commonly known as: SYNTHROID, LEVOTHROID   125 mcg, Oral, Daily      multivitamin with minerals tablet tablet   1 tablet, Oral, Daily      tamsulosin 0.4 MG capsule 24 hr capsule  Commonly known as: FLOMAX   0.4 mg, Oral, Daily      VITAMIN D3 PO   1 each, Oral, Daily               Discharge Diet:   Diet Instructions       Diet: Cardiac Diets; Healthy Heart (2-3 Na+); Regular Texture (IDDSI 7); Thin (IDDSI 0)      Discharge Diet: Cardiac Diets    Cardiac Diet: Healthy Heart (2-3 Na+)    Texture: Regular Texture (IDDSI 7)    Fluid Consistency: Thin (IDDSI 0)            Activity at Discharge:   Activity Instructions       Activity as Tolerated              Follow-up Appointments  Future Appointments   Date Time Provider Department Center   3/29/2024 10:45 AM Rashid Klein MD [facility]   1/13/2025  1:20 PM LAB CHAIR [facility]   1/13/2025  1:40 PM June Smiley MD [facility]     Additional Instructions for the Follow-ups that You Need to Schedule       Discharge Follow-up with PCP   As directed       Currently Documented PCP:    Rashid Klein MD    PCP Phone Number:    [phone]     Follow Up Details: 7-10 days                Test Results Pending at Discharge  Pending Labs       Order Current Status    Osmolality, Urine - Urine, Clean Catch In process             Risk for Readmission (LACE) Score: 3 (2/18/2024  6:00 AM)          MARITZA Lubin  02/18/24  09:45 EST        I spent 35 minutes caring for Rosas on this date of service. This time includes time spent by me in the following activities: reviewing tests, obtaining and/or reviewing a separately obtained history, performing a medically appropriate examination and/or evaluation, counseling and educating the patient/family/caregiver, ordering medications, tests, or procedures, referring  and communicating with other health care professionals, documenting information in the medical record, independently interpreting results and communicating that information with the patient/family/caregiver, and care coordination.

## 2024-02-18 VITALS
BODY MASS INDEX: 21.22 KG/M2 | HEIGHT: 68 IN | DIASTOLIC BLOOD PRESSURE: 87 MMHG | RESPIRATION RATE: 15 BRPM | SYSTOLIC BLOOD PRESSURE: 137 MMHG | WEIGHT: 139.99 LBS | OXYGEN SATURATION: 99 % | TEMPERATURE: 97.5 F | HEART RATE: 81 BPM

## 2024-02-18 LAB
ALBUMIN SERPL-MCNC: 3.4 G/DL (ref 3.5–5.2)
ALP SERPL-CCNC: 145 U/L (ref 39–117)
ALT SERPL W P-5'-P-CCNC: 24 U/L (ref 1–41)
ANION GAP SERPL CALCULATED.3IONS-SCNC: 11 MMOL/L (ref 5–15)
AST SERPL-CCNC: 53 U/L (ref 1–40)
BASOPHILS # BLD AUTO: 0.1 10*3/MM3 (ref 0–0.2)
BASOPHILS NFR BLD AUTO: 1.2 % (ref 0–1.5)
BILIRUB CONJ SERPL-MCNC: 0.2 MG/DL (ref 0–0.3)
BILIRUB INDIRECT SERPL-MCNC: 0.3 MG/DL
BILIRUB SERPL-MCNC: 0.5 MG/DL (ref 0–1.2)
BUN SERPL-MCNC: 23 MG/DL (ref 8–23)
BUN/CREAT SERPL: 25 (ref 7–25)
CALCIUM SPEC-SCNC: 9 MG/DL (ref 8.6–10.5)
CHLORIDE SERPL-SCNC: 94 MMOL/L (ref 98–107)
CO2 SERPL-SCNC: 22 MMOL/L (ref 22–29)
CREAT SERPL-MCNC: 0.92 MG/DL (ref 0.76–1.27)
DEPRECATED RDW RBC AUTO: 43.8 FL (ref 37–54)
EGFRCR SERPLBLD CKD-EPI 2021: 82 ML/MIN/1.73
EOSINOPHIL # BLD AUTO: 0.1 10*3/MM3 (ref 0–0.4)
EOSINOPHIL NFR BLD AUTO: 1 % (ref 0.3–6.2)
ERYTHROCYTE [DISTWIDTH] IN BLOOD BY AUTOMATED COUNT: 13.1 % (ref 12.3–15.4)
GLUCOSE SERPL-MCNC: 100 MG/DL (ref 65–99)
HCT VFR BLD AUTO: 38.1 % (ref 37.5–51)
HGB BLD-MCNC: 12.7 G/DL (ref 13–17.7)
LYMPHOCYTES # BLD AUTO: 1 10*3/MM3 (ref 0.7–3.1)
LYMPHOCYTES NFR BLD AUTO: 16.1 % (ref 19.6–45.3)
MCH RBC QN AUTO: 32.4 PG (ref 26.6–33)
MCHC RBC AUTO-ENTMCNC: 33.4 G/DL (ref 31.5–35.7)
MCV RBC AUTO: 96.9 FL (ref 79–97)
MONOCYTES # BLD AUTO: 1.1 10*3/MM3 (ref 0.1–0.9)
MONOCYTES NFR BLD AUTO: 18.2 % (ref 5–12)
NEUTROPHILS NFR BLD AUTO: 3.8 10*3/MM3 (ref 1.7–7)
NEUTROPHILS NFR BLD AUTO: 63.5 % (ref 42.7–76)
NRBC BLD AUTO-RTO: 0.2 /100 WBC (ref 0–0.2)
OSMOLALITY UR: 450 MOSM/KG (ref 300–800)
PLATELET # BLD AUTO: 147 10*3/MM3 (ref 140–450)
PMV BLD AUTO: 7.8 FL (ref 6–12)
POTASSIUM SERPL-SCNC: 4.3 MMOL/L (ref 3.5–5.2)
PROT SERPL-MCNC: 6.3 G/DL (ref 6–8.5)
RBC # BLD AUTO: 3.93 10*6/MM3 (ref 4.14–5.8)
SODIUM SERPL-SCNC: 127 MMOL/L (ref 136–145)
SODIUM UR-SCNC: 49 MMOL/L
WBC NRBC COR # BLD AUTO: 6 10*3/MM3 (ref 3.4–10.8)

## 2024-02-18 PROCEDURE — 80076 HEPATIC FUNCTION PANEL: CPT | Performed by: NURSE PRACTITIONER

## 2024-02-18 PROCEDURE — G0378 HOSPITAL OBSERVATION PER HR: HCPCS

## 2024-02-18 PROCEDURE — 84300 ASSAY OF URINE SODIUM: CPT | Performed by: NURSE PRACTITIONER

## 2024-02-18 PROCEDURE — 85025 COMPLETE CBC W/AUTO DIFF WBC: CPT | Performed by: NURSE PRACTITIONER

## 2024-02-18 PROCEDURE — 83935 ASSAY OF URINE OSMOLALITY: CPT | Performed by: NURSE PRACTITIONER

## 2024-02-18 PROCEDURE — 80048 BASIC METABOLIC PNL TOTAL CA: CPT | Performed by: NURSE PRACTITIONER

## 2024-02-18 RX ORDER — VALSARTAN 80 MG/1
80 TABLET ORAL DAILY
Qty: 90 TABLET | Refills: 0 | Status: SHIPPED | OUTPATIENT
Start: 2024-02-19

## 2024-02-18 RX ORDER — AMLODIPINE BESYLATE 5 MG/1
5 TABLET ORAL
Qty: 90 TABLET | Refills: 0 | Status: SHIPPED | OUTPATIENT
Start: 2024-02-19

## 2024-02-18 RX ADMIN — Medication 10 ML: at 08:55

## 2024-02-18 RX ADMIN — VALSARTAN 80 MG: 80 TABLET, FILM COATED ORAL at 08:54

## 2024-02-18 RX ADMIN — LEVOTHYROXINE SODIUM 125 MCG: 0.1 TABLET ORAL at 06:12

## 2024-02-18 RX ADMIN — AMLODIPINE BESYLATE 5 MG: 5 TABLET ORAL at 08:54

## 2024-02-18 RX ADMIN — TAMSULOSIN HYDROCHLORIDE 0.4 MG: 0.4 CAPSULE ORAL at 08:54

## 2024-02-18 RX ADMIN — ACETAMINOPHEN 650 MG: 325 TABLET, FILM COATED ORAL at 01:12

## 2024-02-18 NOTE — PLAN OF CARE
Goal Outcome Evaluation:  Plan of Care Reviewed With: patient        Progress: improving  Outcome Evaluation: Pt has no c/o SOA, Resting in bed call light within reach. acceted at [facility] bed ready after 1400. Family to transport. Call light within reach

## 2024-02-18 NOTE — SIGNIFICANT NOTE
Case Management Discharge Note      Final Note: (P) d/c to [facility]         Selected Continued Care - Discharged on 2/18/2024 Admission date: 2/16/2024 - Discharge disposition: Rehab Facility or Unit (DC - External)      Destination Coordination complete.      Service Provider Selected Services Address Phone Fax Patient Preferred    [facility] [address] [facility] [place] [address] [phone] --                 Selected Continued Care - Prior Encounters Includes continued care and service providers with selected services from prior encounters from 11/18/2023 to 2/18/2024      Discharged on 1/24/2024 Admission date: 1/20/2024 - Discharge disposition: Skilled Nursing Facility (DC - External)      Destination       Service Provider Selected Services Address Phone Fax Patient Preferred    [facility] [address] [facility] [place] [address] [phone] --                               Final Discharge Disposition Code: (P) 62 - inpatient rehab facility

## 2024-02-18 NOTE — PLAN OF CARE
Goal Outcome Evaluation:  Plan of Care Reviewed With: patient        Progress: improving  Outcome Evaluation: Pt has no c/o SOA, Resting in bed call light within reach. acceted at [facility] bed ready after 1400. Family to transport. Call light within reach

## 2024-02-18 NOTE — PLAN OF CARE
Goal Outcome Evaluation:  Plan of Care Reviewed With: patient        Progress: no change       Pt is A&ox4, on room air. Pt is a falls risk with bed alarm on. Fall risk education and prevention provided and understanding verified. Call light is within reach. Pt has an ostomy. Pt is refusing tele monitor, education provided and understanding verified. Pt has not reported any shortness of breath.

## 2024-03-01 ENCOUNTER — TELEPHONE (OUTPATIENT)
Dept: INTERNAL MEDICINE | Facility: CLINIC | Age: 84
End: 2024-03-01

## 2024-03-01 NOTE — TELEPHONE ENCOUNTER
Caller: DEMI    Relationship: Home Health    Best call back number: [phone]    What orders are you requesting (i.e. lab or imaging): NURSING, PHYSICAL THERAPY AND OCC THERAPY    In what timeframe would the patient need to come in:  AS SOON AS POSSIBLE    Where will you receive your lab/imaging services: AT HOME  Additional notes: PATIENT RELEASED FROM [facility] TODAY AND REQUESTING VERBAL ORDERS FOR PATIENT.

## 2024-03-29 ENCOUNTER — OFFICE VISIT (OUTPATIENT)
Dept: INTERNAL MEDICINE | Facility: CLINIC | Age: 84
End: 2024-03-29
Payer: MEDICARE

## 2024-03-29 VITALS
HEART RATE: 66 BPM | TEMPERATURE: 97.6 F | SYSTOLIC BLOOD PRESSURE: 144 MMHG | DIASTOLIC BLOOD PRESSURE: 80 MMHG | WEIGHT: 162 LBS | BODY MASS INDEX: 24.55 KG/M2 | OXYGEN SATURATION: 98 % | HEIGHT: 68 IN

## 2024-03-29 DIAGNOSIS — I10 PRIMARY HYPERTENSION: ICD-10-CM

## 2024-03-29 DIAGNOSIS — G89.29 CHRONIC LEFT SHOULDER PAIN: Primary | ICD-10-CM

## 2024-03-29 DIAGNOSIS — M25.512 CHRONIC LEFT SHOULDER PAIN: Primary | ICD-10-CM

## 2024-03-29 RX ORDER — TRAMADOL HYDROCHLORIDE 50 MG/1
50 TABLET ORAL EVERY 12 HOURS PRN
Qty: 60 TABLET | Refills: 0 | Status: SHIPPED | OUTPATIENT
Start: 2024-03-29

## 2024-03-29 NOTE — PROGRESS NOTES
"Chief Complaint  Hypertension    Subjective        Rosas Christianson presents to [facility]  Hypertension      Patient follows up for ongoing management hypertension well-controlled presently with amlodipine valsartan he is staying well-hydrated.  No chest pain shortness of breath or increased fatigue he does have some persistent left shoulder pain with decreased range of motion from fall he is a refill of tramadol which she has used intermittently with benefit    Objective   Vital Signs:  /80   Pulse 66   Temp 97.6 °F (36.4 °C)   Ht 172.7 cm (67.99\")   Wt 73.5 kg (162 lb)   SpO2 98%   BMI 24.64 kg/m²   Estimated body mass index is 24.64 kg/m² as calculated from the following:    Height as of this encounter: 172.7 cm (67.99\").    Weight as of this encounter: 73.5 kg (162 lb).       BMI is within normal parameters. No other follow-up for BMI required.      Physical Exam  Vitals and nursing note reviewed.   Constitutional:       Appearance: Normal appearance.   Musculoskeletal:      Left shoulder: No tenderness or bony tenderness. Decreased range of motion. Decreased strength.   Neurological:      Mental Status: He is alert.        Result Review :      Common labs          2/23/2024    05:00 2/27/2024    05:16 2/29/2024    05:30   Common Labs   Glucose 95  120  110    BUN 67  49  31    Creatinine 1.00  0.91  0.90    Sodium 135  132  135    Potassium 4.8  4.4  4.9    Chloride 103  102  104    Calcium 9.2  8.7  8.7    WBC  6.90     Hemoglobin  11.6     Hematocrit  35.6     Platelets  140                    Assessment and Plan     Diagnoses and all orders for this visit:    1. Chronic left shoulder pain (Primary)    2. Primary hypertension    Other orders  -     traMADol (ULTRAM) 50 MG tablet; Take 1 tablet by mouth Every 12 (Twelve) Hours As Needed for Moderate Pain.  Dispense: 60 tablet; Refill: 0    Hypertension continue valsartan and amlodipine  This patient has a PCP that is " "the continuing focal point for all health care services, and the patient sees this physician to be evaluated for shoulder pain. The inherent complexity that this code () captures is not in the clinical condition itself-- shoulder pain --but rather the cognitition of the continued responsibility of being the focal point for all needed services for this patient.\"        Follow Up     Return in about 3 months (around 6/29/2024), or if symptoms worsen or fail to improve, for Recheck.  Patient was given instructions and counseling regarding his condition or for health maintenance advice. Please see specific information pulled into the AVS if appropriate.         "

## 2024-05-15 NOTE — PROGRESS NOTES
- Large Joint Arthrocentesis: bilateral knee on 1/11/2024 11:18 AM  Indications: pain  Details: 22 G needle, ultrasound-guided superolateral approach  Medications (Right): 88 mg Hyaluronan 88 MG/4ML  Medications (Left): 88 mg Hyaluronan 88 MG/4ML  Outcome: tolerated well, no immediate complications  Procedure, treatment alternatives, risks and benefits explained, specific risks discussed. Consent was given by the patient. Immediately prior to procedure a time out was called to verify the correct patient, procedure, equipment, support staff and site/side marked as required. Patient was prepped and draped in the usual sterile fashion.          Mild asthma, no recent meds

## 2024-06-06 NOTE — TELEPHONE ENCOUNTER
Patient notified that Dr. Klein would like for him to see Dr. Akhtar.  Referral put in [facility].   yes

## 2024-07-13 ENCOUNTER — PATIENT MESSAGE (OUTPATIENT)
Dept: INTERNAL MEDICINE | Facility: CLINIC | Age: 84
End: 2024-07-13
Payer: MEDICARE

## 2024-07-15 RX ORDER — AMLODIPINE BESYLATE 5 MG/1
5 TABLET ORAL
Qty: 90 TABLET | Refills: 2 | Status: SHIPPED | OUTPATIENT
Start: 2024-07-15

## 2024-07-15 NOTE — TELEPHONE ENCOUNTER
From: Rosas Christianson  To: Rashid Klein  Sent: 7/13/2024 3:00 PM EDT  Subject: Amlodipine???    cannot refill at pharmacy or through Mychart.  farnaz refill this amlodipine 5mg.

## 2024-07-29 RX ORDER — VALSARTAN 80 MG/1
80 TABLET ORAL DAILY
Qty: 30 TABLET | Refills: 3 | Status: SHIPPED | OUTPATIENT
Start: 2024-07-29

## 2024-08-27 DIAGNOSIS — R35.1 NOCTURIA: ICD-10-CM

## 2024-08-27 RX ORDER — TAMSULOSIN HYDROCHLORIDE 0.4 MG/1
1 CAPSULE ORAL DAILY
Qty: 30 CAPSULE | Refills: 3 | Status: SHIPPED | OUTPATIENT
Start: 2024-08-27

## 2024-09-27 RX ORDER — VALSARTAN 80 MG/1
80 TABLET ORAL DAILY
Qty: 90 TABLET | Refills: 1 | Status: SHIPPED | OUTPATIENT
Start: 2024-09-27

## 2024-10-12 DIAGNOSIS — R35.1 NOCTURIA: ICD-10-CM

## 2024-10-14 RX ORDER — TAMSULOSIN HYDROCHLORIDE 0.4 MG/1
1 CAPSULE ORAL DAILY
Qty: 90 CAPSULE | Refills: 1 | Status: SHIPPED | OUTPATIENT
Start: 2024-10-14

## 2024-10-16 ENCOUNTER — OFFICE VISIT (OUTPATIENT)
Dept: SPORTS MEDICINE | Facility: CLINIC | Age: 84
End: 2024-10-16
Payer: MEDICARE

## 2024-10-16 VITALS
HEART RATE: 66 BPM | WEIGHT: 164 LBS | OXYGEN SATURATION: 98 % | HEIGHT: 68 IN | DIASTOLIC BLOOD PRESSURE: 60 MMHG | SYSTOLIC BLOOD PRESSURE: 118 MMHG | RESPIRATION RATE: 16 BRPM | BODY MASS INDEX: 24.86 KG/M2

## 2024-10-16 DIAGNOSIS — M25.562 CHRONIC PAIN OF BOTH KNEES: Primary | ICD-10-CM

## 2024-10-16 DIAGNOSIS — M25.561 CHRONIC PAIN OF BOTH KNEES: Primary | ICD-10-CM

## 2024-10-16 DIAGNOSIS — M17.0 PRIMARY OSTEOARTHRITIS OF BOTH KNEES: ICD-10-CM

## 2024-10-16 DIAGNOSIS — G89.29 CHRONIC PAIN OF BOTH KNEES: Primary | ICD-10-CM

## 2024-10-16 PROCEDURE — 3078F DIAST BP <80 MM HG: CPT | Performed by: FAMILY MEDICINE

## 2024-10-16 PROCEDURE — 1160F RVW MEDS BY RX/DR IN RCRD: CPT | Performed by: FAMILY MEDICINE

## 2024-10-16 PROCEDURE — 3074F SYST BP LT 130 MM HG: CPT | Performed by: FAMILY MEDICINE

## 2024-10-16 PROCEDURE — 99213 OFFICE O/P EST LOW 20 MIN: CPT | Performed by: FAMILY MEDICINE

## 2024-10-16 PROCEDURE — 20610 DRAIN/INJ JOINT/BURSA W/O US: CPT | Performed by: FAMILY MEDICINE

## 2024-10-16 PROCEDURE — 1159F MED LIST DOCD IN RCRD: CPT | Performed by: FAMILY MEDICINE

## 2024-10-16 RX ORDER — TRIAMCINOLONE ACETONIDE 40 MG/ML
40 INJECTION, SUSPENSION INTRA-ARTICULAR; INTRAMUSCULAR
Status: COMPLETED | OUTPATIENT
Start: 2024-10-16 | End: 2024-10-16

## 2024-10-16 RX ADMIN — TRIAMCINOLONE ACETONIDE 40 MG: 40 INJECTION, SUSPENSION INTRA-ARTICULAR; INTRAMUSCULAR at 13:58

## 2024-10-16 RX ADMIN — TRIAMCINOLONE ACETONIDE 40 MG: 40 INJECTION, SUSPENSION INTRA-ARTICULAR; INTRAMUSCULAR at 13:59

## 2024-10-16 NOTE — PROGRESS NOTES
"Rosas is a 84 y.o. year old male presents to [facility]    Chief Complaint   Patient presents with    Knee Pain     F/u eval for b/l knee pain with OA - here for further evaluation and treatment        History of Present Illness  History of Present Illness  The patient presents for evaluation of knee pain.    He is seeking cortisone injections and gel shots for his knees. He has a history of a back fracture in 01/2024, which required a two-month hospital stay. This injury involved the crushing of four rib vertebrae. Despite undergoing vertebroplasty, he still experiences difficulty standing for more than five minutes due to persistent back pain. He has also undergone a month of therapy. He is currently under the care of an oncologist who uses liquid nitrogen for treatment.    I have reviewed the patient's medical, family, and social history in detail and updated the computerized patient record.    /60 (BP Location: Left arm, Patient Position: Sitting, Cuff Size: Adult)   Pulse 66   Resp 16   Ht 172.7 cm (67.99\")   Wt 74.4 kg (164 lb)   SpO2 98%   BMI 24.94 kg/m²      Physical Exam    Vital signs reviewed.   General: No acute distress.  Eyes: conjunctiva clear; pupils equally round and reactive  ENT: external ears atraumatic  CV: no peripheral edema  Resp: normal respiratory effort, no use of accessory muscles  Skin: no rashes or wounds; normal turgor  Psych: mood and affect appropriate; recent and remote memory intact  Neuro: sensation to light touch intact    Physical Exam      XR Knee 3 View Bilateral (04/30/2021)       Results      Large Joint Arthrocentesis: L knee  Date/Time: 10/16/2024 1:58 PM  Consent given by: patient  Timeout: Immediately prior to procedure a time out was called to verify the correct patient, procedure, equipment, support staff and site/side marked as required   Supporting Documentation  Indications: pain   Procedure Details  Location: knee - L " knee  Needle size: 25 G  Approach: anterolateral  Medications administered: 40 mg triamcinolone acetonide 40 MG/ML  Patient tolerance: patient tolerated the procedure well with no immediate complications      Large Joint Arthrocentesis: R knee  Date/Time: 10/16/2024 1:59 PM  Consent given by: patient  Timeout: Immediately prior to procedure a time out was called to verify the correct patient, procedure, equipment, support staff and site/side marked as required   Supporting Documentation  Indications: pain   Procedure Details  Location: knee - R knee  Needle size: 25 G  Approach: anterolateral  Medications administered: 40 mg triamcinolone acetonide 40 MG/ML  Patient tolerance: patient tolerated the procedure well with no immediate complications          Diagnoses and all orders for this visit:    Chronic pain of both knees    Primary osteoarthritis of both knees  -     Large Joint Arthrocentesis  -     Large Joint Arthrocentesis  -     Visco Treatment; Future      Assessment & Plan  1. Knee Pain.  Cortisone injections were administered today. Arrangements will be made for gel shots. He will be contacted regarding the scheduling of the gel shots.    2. Back Pain.  The patient reports a history of a back injury in January, resulting in crushed four rib vertebrae. He underwent vertebroplasty and has been experiencing persistent pain, especially when standing for more than 5 minutes. He completed a month of therapy post-hospitalization. No additional treatments are currently planned. He is advised to continue with his current therapy regimen and monitor his symptoms.              Follow Up     Patient was given instructions and counseling regarding his condition or for health maintenance advice. Please see specific information pulled into the AVS if appropriate.     Patient or patient representative verbalized consent for the use of Ambient Listening during the visit with  ESTELLE Keenan Jr.,  for chart  documentation. 10/16/2024  13:59 EDT

## 2024-10-17 ENCOUNTER — OFFICE VISIT (OUTPATIENT)
Dept: INTERNAL MEDICINE | Facility: CLINIC | Age: 84
End: 2024-10-17
Payer: MEDICARE

## 2024-10-17 VITALS
BODY MASS INDEX: 24.4 KG/M2 | SYSTOLIC BLOOD PRESSURE: 122 MMHG | TEMPERATURE: 96.5 F | DIASTOLIC BLOOD PRESSURE: 80 MMHG | HEIGHT: 68 IN | WEIGHT: 161 LBS | RESPIRATION RATE: 16 BRPM | OXYGEN SATURATION: 100 % | HEART RATE: 70 BPM

## 2024-10-17 DIAGNOSIS — Z76.0 MEDICATION REFILL: ICD-10-CM

## 2024-10-17 DIAGNOSIS — S41.111A: Primary | ICD-10-CM

## 2024-10-17 DIAGNOSIS — Z48.00 DRESSING CHANGE: ICD-10-CM

## 2024-10-17 DIAGNOSIS — I10 PRIMARY HYPERTENSION: ICD-10-CM

## 2024-10-17 PROCEDURE — 3079F DIAST BP 80-89 MM HG: CPT | Performed by: NURSE PRACTITIONER

## 2024-10-17 PROCEDURE — 3074F SYST BP LT 130 MM HG: CPT | Performed by: NURSE PRACTITIONER

## 2024-10-17 PROCEDURE — 99214 OFFICE O/P EST MOD 30 MIN: CPT | Performed by: NURSE PRACTITIONER

## 2024-10-17 PROCEDURE — 1125F AMNT PAIN NOTED PAIN PRSNT: CPT | Performed by: NURSE PRACTITIONER

## 2024-10-17 PROCEDURE — 1159F MED LIST DOCD IN RCRD: CPT | Performed by: NURSE PRACTITIONER

## 2024-10-17 PROCEDURE — 1160F RVW MEDS BY RX/DR IN RCRD: CPT | Performed by: NURSE PRACTITIONER

## 2024-10-17 RX ORDER — AMLODIPINE BESYLATE 5 MG/1
5 TABLET ORAL
Qty: 90 TABLET | Refills: 1 | Status: SHIPPED | OUTPATIENT
Start: 2024-10-17

## 2024-10-17 NOTE — PROGRESS NOTES
"Chief Complaint  right arm wound (F/u urgent care)  Medication refill    Subjective        Rosas Christianson presents to [facility]  History of Present Illness  History of Present Illness  The patient presents for evaluation of a wound on his right upper arm.    He sustained a wound on his arm after falling against a wall on Sunday night. Initially, the wound was bleeding, but he managed to stop it by applying a bandage. He sought medical attention at the ER on Monday morning, where they applied an oil gauze dressing. He was advised to consult a doctor for further care. He lives alone and has not been managing the wound. He is currently on Keflex until 10/20/2024. He did not lose consciousness during the fall and does not require therapy as he gets plenty of exercise. He received a tetanus shot at the urgent care and believes he may have had another one during a hospital stay in 01/2024 or 02/2024. He also received an RSV vaccine after chelsey RSV.    He has two wounds, one of which is several months old above the existing skin tear.  Patient states it is nonhealing.    He reports no history of diabetes and maintains a healthy diet, consuming at least 60 g of protein daily. His breakfast typically includes a 30 g meal replacement shake, oatmeal, or pancakes. He also consumes fish, hamburgers, and chicken regularly.    Supplemental Information:  He tore his rotator cuff and cannot lift his right arm. He has an ostomy.    ALLERGIES  The patient has no known allergies.       Objective   Vital Signs:  /80   Pulse 70   Temp 96.5 °F (35.8 °C)   Resp 16   Ht 172.7 cm (67.99\")   Wt 73 kg (161 lb)   SpO2 100%   BMI 24.49 kg/m²   Estimated body mass index is 24.49 kg/m² as calculated from the following:    Height as of this encounter: 172.7 cm (67.99\").    Weight as of this encounter: 73 kg (161 lb).       BMI is within normal parameters. No other follow-up for BMI " required.      Physical Exam  Vitals and nursing note reviewed.   Constitutional:       Appearance: Normal appearance.   HENT:      Head: Normocephalic.      Nose: Nose normal.      Mouth/Throat:      Mouth: Mucous membranes are moist.   Eyes:      Pupils: Pupils are equal, round, and reactive to light.   Cardiovascular:      Rate and Rhythm: Normal rate and regular rhythm.      Pulses: Normal pulses.      Heart sounds: Normal heart sounds.      Comments: No peripheral edema  Pulmonary:      Effort: Pulmonary effort is normal. No respiratory distress.      Breath sounds: Normal breath sounds. No stridor. No wheezing, rhonchi or rales.      Comments: Denies shortness of breath  Chest:      Chest wall: No tenderness.   Skin:     General: Skin is warm.      Findings: Laceration present.             Comments: Skin tear to R upper arm (2 x 3 cm)and adjacent skin tear  Healing   Non -infective    Neurological:      Mental Status: He is alert and oriented to person, place, and time.   Psychiatric:         Behavior: Behavior normal.        Physical Exam       Result Review :      Common labs          2/23/2024    05:00 2/27/2024    05:16 2/29/2024    05:30   Common Labs   Glucose 95  120  110    BUN 67  49  31    Creatinine 1.00  0.91  0.90    Sodium 135  132  135    Potassium 4.8  4.4  4.9    Chloride 103  102  104    Calcium 9.2  8.7  8.7    WBC  6.90     Hemoglobin  11.6     Hematocrit  35.6     Platelets  140         Results     UC with Duncan Toussaint MD (10/13/2024)            Assessment and Plan     Diagnoses and all orders for this visit:    1. Laceration of multiple sites of right upper extremity with complication, initial encounter (Primary)  -     Ambulatory Referral to Wound Clinic    2. Dressing change    3. Medication refill    4. Primary hypertension  Assessment & Plan:  Chronic/stable  Blood pressure in office today is 122/80.  Currently taking amlodipine 5 mg tablet daily.  Denies side effects of the  medication at this time and is requesting a refill.  Refill has been sent in per patient request.      Other orders  -     amLODIPine (NORVASC) 5 MG tablet; Take 1 tablet by mouth Daily.  Dispense: 90 tablet; Refill: 1      Assessment & Plan  1. Right lower forearm laceration.  The wound, measuring approximately 2 cm x 3 cm, appears to be healing well without signs of infection. He was advised to maintain cleanliness and dryness of the wound, avoid scrubbing, and ensure the bandage is not too tight. Nonadherent pads were provided for home use. He was also instructed to avoid wrapping the wound too tightly to ensure proper blood flow. A referral to a wound care clinic at [place] will be made. If an appointment cannot be secured for tomorrow, he will return to the office for further wound management.    2. Right upper arm skin tear.  The wound, which has been present for several months, continues to bleed and has not healed properly. He was advised to keep the wound clean and dry. Antibiotic ointment was applied to keep the area lubricated and prevent sticking. He was also instructed to use nonadherent pads. A referral to a wound care clinic at [place] will be made to trim the old skin and promote healing. If an appointment cannot be secured for tomorrow, he will return to the office for further wound management.    3. Medication Management.  He is currently on Keflex until 10/20/2024. He was advised to continue this medication as prescribed.           I spent 30 minutes caring for Rosas on this date of service. This time includes time spent by me in the following activities:preparing for the visit, reviewing tests, obtaining and/or reviewing a separately obtained history, performing a medically appropriate examination and/or evaluation , counseling and educating the patient/family/caregiver, ordering medications, tests, or procedures, referring and communicating with other health care professionals , documenting  information in the medical record, independently interpreting results and communicating that information with the patient/family/caregiver, and care coordination  Follow Up     Return in about 1 day (around 10/18/2024) for Recheck.  Patient was given instructions and counseling regarding his condition or for health maintenance advice. Please see specific information pulled into the AVS if appropriate.     Patient or patient representative verbalized consent for the use of Ambient Listening during the visit with  MARITZA Castro for chart documentation. 10/17/2024  10:55 EDT

## 2024-10-17 NOTE — ASSESSMENT & PLAN NOTE
Chronic/stable  Blood pressure in office today is 122/80.  Currently taking amlodipine 5 mg tablet daily.  Denies side effects of the medication at this time and is requesting a refill.  Refill has been sent in per patient request.

## 2024-10-18 ENCOUNTER — CLINICAL SUPPORT (OUTPATIENT)
Dept: INTERNAL MEDICINE | Facility: CLINIC | Age: 84
End: 2024-10-18
Payer: MEDICARE

## 2024-10-18 DIAGNOSIS — S41.111D: Primary | ICD-10-CM

## 2024-10-18 PROCEDURE — 99212 OFFICE O/P EST SF 10 MIN: CPT | Performed by: NURSE PRACTITIONER

## 2024-10-21 ENCOUNTER — APPOINTMENT (OUTPATIENT)
Dept: WOUND CARE | Facility: HOSPITAL | Age: 84
End: 2024-10-21
Payer: MEDICARE

## 2024-10-21 PROCEDURE — G0463 HOSPITAL OUTPT CLINIC VISIT: HCPCS

## 2024-10-24 ENCOUNTER — APPOINTMENT (OUTPATIENT)
Dept: WOUND CARE | Facility: HOSPITAL | Age: 84
End: 2024-10-24
Payer: MEDICARE

## 2024-10-24 PROCEDURE — G0463 HOSPITAL OUTPT CLINIC VISIT: HCPCS

## 2024-10-25 ENCOUNTER — PROCEDURE VISIT (OUTPATIENT)
Dept: SPORTS MEDICINE | Facility: CLINIC | Age: 84
End: 2024-10-25
Payer: MEDICARE

## 2024-10-25 VITALS
DIASTOLIC BLOOD PRESSURE: 60 MMHG | HEART RATE: 65 BPM | RESPIRATION RATE: 16 BRPM | WEIGHT: 161 LBS | BODY MASS INDEX: 24.4 KG/M2 | OXYGEN SATURATION: 99 % | HEIGHT: 68 IN | SYSTOLIC BLOOD PRESSURE: 122 MMHG

## 2024-10-25 DIAGNOSIS — M17.0 PRIMARY OSTEOARTHRITIS OF BOTH KNEES: ICD-10-CM

## 2024-10-25 NOTE — PROGRESS NOTES
- Large Joint Arthrocentesis: bilateral knee on 10/25/2024 12:09 PM  Indications: pain  Details: 22 G needle, ultrasound-guided superolateral approach  Medications (Right): 88 mg Hyaluronan 88 MG/4ML  Medications (Left): 88 mg Hyaluronan 88 MG/4ML  Outcome: tolerated well, no immediate complications  Procedure, treatment alternatives, risks and benefits explained, specific risks discussed. Consent was given by the patient. Immediately prior to procedure a time out was called to verify the correct patient, procedure, equipment, support staff and site/side marked as required. Patient was prepped and draped in the usual sterile fashion.

## 2024-10-28 ENCOUNTER — APPOINTMENT (OUTPATIENT)
Dept: WOUND CARE | Facility: HOSPITAL | Age: 84
End: 2024-10-28
Payer: MEDICARE

## 2024-10-28 PROCEDURE — 17250 CHEM CAUT OF GRANLTJ TISSUE: CPT

## 2024-10-31 ENCOUNTER — APPOINTMENT (OUTPATIENT)
Dept: WOUND CARE | Facility: HOSPITAL | Age: 84
End: 2024-10-31
Payer: MEDICARE

## 2024-10-31 PROCEDURE — G0463 HOSPITAL OUTPT CLINIC VISIT: HCPCS

## 2024-11-04 ENCOUNTER — APPOINTMENT (OUTPATIENT)
Dept: WOUND CARE | Facility: HOSPITAL | Age: 84
End: 2024-11-04
Payer: MEDICARE

## 2024-11-04 PROCEDURE — 17250 CHEM CAUT OF GRANLTJ TISSUE: CPT

## 2024-11-11 ENCOUNTER — APPOINTMENT (OUTPATIENT)
Dept: WOUND CARE | Facility: HOSPITAL | Age: 84
End: 2024-11-11
Payer: MEDICARE

## 2024-11-11 PROCEDURE — G0463 HOSPITAL OUTPT CLINIC VISIT: HCPCS

## 2024-11-18 ENCOUNTER — APPOINTMENT (OUTPATIENT)
Dept: WOUND CARE | Facility: HOSPITAL | Age: 84
End: 2024-11-18
Payer: MEDICARE

## 2024-11-18 PROCEDURE — G0463 HOSPITAL OUTPT CLINIC VISIT: HCPCS

## 2024-11-25 ENCOUNTER — APPOINTMENT (OUTPATIENT)
Dept: WOUND CARE | Facility: HOSPITAL | Age: 84
End: 2024-11-25
Payer: MEDICARE

## 2024-11-25 ENCOUNTER — LAB REQUISITION (OUTPATIENT)
Dept: LAB | Facility: HOSPITAL | Age: 84
End: 2024-11-25
Payer: MEDICARE

## 2024-11-25 DIAGNOSIS — L98.492 NON-PRESSURE CHRONIC ULCER OF SKIN OF OTHER SITES WITH FAT LAYER EXPOSED: ICD-10-CM

## 2024-11-25 PROCEDURE — 87070 CULTURE OTHR SPECIMN AEROBIC: CPT | Performed by: NURSE PRACTITIONER

## 2024-11-25 PROCEDURE — G0463 HOSPITAL OUTPT CLINIC VISIT: HCPCS

## 2024-11-25 PROCEDURE — 87205 SMEAR GRAM STAIN: CPT | Performed by: NURSE PRACTITIONER

## 2024-11-28 LAB
BACTERIA SPEC AEROBE CULT: NORMAL
GRAM STN SPEC: NORMAL
GRAM STN SPEC: NORMAL

## 2024-12-02 ENCOUNTER — APPOINTMENT (OUTPATIENT)
Dept: WOUND CARE | Facility: HOSPITAL | Age: 84
End: 2024-12-02
Payer: MEDICARE

## 2024-12-02 PROCEDURE — 97602 WOUND(S) CARE NON-SELECTIVE: CPT

## 2024-12-09 ENCOUNTER — APPOINTMENT (OUTPATIENT)
Dept: WOUND CARE | Facility: HOSPITAL | Age: 84
End: 2024-12-09
Payer: MEDICARE

## 2024-12-09 PROCEDURE — G0463 HOSPITAL OUTPT CLINIC VISIT: HCPCS

## 2024-12-16 ENCOUNTER — APPOINTMENT (OUTPATIENT)
Dept: WOUND CARE | Facility: HOSPITAL | Age: 84
End: 2024-12-16
Payer: MEDICARE

## 2024-12-16 PROCEDURE — 97602 WOUND(S) CARE NON-SELECTIVE: CPT

## 2024-12-28 DIAGNOSIS — R35.1 NOCTURIA: ICD-10-CM

## 2024-12-30 ENCOUNTER — APPOINTMENT (OUTPATIENT)
Dept: WOUND CARE | Facility: HOSPITAL | Age: 84
End: 2024-12-30
Payer: MEDICARE

## 2024-12-30 PROCEDURE — 17250 CHEM CAUT OF GRANLTJ TISSUE: CPT

## 2024-12-30 RX ORDER — TAMSULOSIN HYDROCHLORIDE 0.4 MG/1
1 CAPSULE ORAL DAILY
Qty: 90 CAPSULE | Refills: 1 | Status: SHIPPED | OUTPATIENT
Start: 2024-12-30

## 2025-01-27 ENCOUNTER — LAB (OUTPATIENT)
Dept: LAB | Facility: HOSPITAL | Age: 85
End: 2025-01-27
Payer: MEDICARE

## 2025-01-27 ENCOUNTER — APPOINTMENT (OUTPATIENT)
Dept: WOUND CARE | Facility: HOSPITAL | Age: 85
End: 2025-01-27
Payer: MEDICARE

## 2025-01-27 ENCOUNTER — OFFICE VISIT (OUTPATIENT)
Dept: ONCOLOGY | Facility: CLINIC | Age: 85
End: 2025-01-27
Payer: MEDICARE

## 2025-01-27 VITALS
OXYGEN SATURATION: 99 % | TEMPERATURE: 96.3 F | HEIGHT: 68 IN | WEIGHT: 161.6 LBS | DIASTOLIC BLOOD PRESSURE: 68 MMHG | RESPIRATION RATE: 16 BRPM | HEART RATE: 66 BPM | BODY MASS INDEX: 24.49 KG/M2 | SYSTOLIC BLOOD PRESSURE: 131 MMHG

## 2025-01-27 DIAGNOSIS — R79.89 ELEVATED FERRITIN LEVEL: ICD-10-CM

## 2025-01-27 DIAGNOSIS — D69.6 THROMBOCYTOPENIA: ICD-10-CM

## 2025-01-27 DIAGNOSIS — D53.9 MACROCYTIC ANEMIA: ICD-10-CM

## 2025-01-27 DIAGNOSIS — R13.19 ESOPHAGEAL DYSPHAGIA: ICD-10-CM

## 2025-01-27 DIAGNOSIS — D53.9 MACROCYTIC ANEMIA: Primary | ICD-10-CM

## 2025-01-27 LAB
BASOPHILS # BLD AUTO: 0.08 10*3/MM3 (ref 0–0.2)
BASOPHILS NFR BLD AUTO: 1 % (ref 0–1.5)
DEPRECATED RDW RBC AUTO: 50.4 FL (ref 37–54)
EOSINOPHIL # BLD AUTO: 0.12 10*3/MM3 (ref 0–0.4)
EOSINOPHIL NFR BLD AUTO: 1.5 % (ref 0.3–6.2)
ERYTHROCYTE [DISTWIDTH] IN BLOOD BY AUTOMATED COUNT: 13.2 % (ref 12.3–15.4)
FERRITIN SERPL-MCNC: 377 NG/ML (ref 30–400)
FOLATE SERPL-MCNC: >20 NG/ML (ref 4.78–24.2)
HCT VFR BLD AUTO: 37.5 % (ref 37.5–51)
HGB BLD-MCNC: 12.1 G/DL (ref 13–17.7)
IMM GRANULOCYTES # BLD AUTO: 0.03 10*3/MM3 (ref 0–0.05)
IMM GRANULOCYTES NFR BLD AUTO: 0.4 % (ref 0–0.5)
IRON 24H UR-MRATE: 90 MCG/DL (ref 59–158)
IRON SATN MFR SERPL: 31 % (ref 20–50)
LYMPHOCYTES # BLD AUTO: 1.7 10*3/MM3 (ref 0.7–3.1)
LYMPHOCYTES NFR BLD AUTO: 21.5 % (ref 19.6–45.3)
MCH RBC QN AUTO: 33.2 PG (ref 26.6–33)
MCHC RBC AUTO-ENTMCNC: 32.3 G/DL (ref 31.5–35.7)
MCV RBC AUTO: 103 FL (ref 79–97)
MONOCYTES # BLD AUTO: 0.77 10*3/MM3 (ref 0.1–0.9)
MONOCYTES NFR BLD AUTO: 9.7 % (ref 5–12)
NEUTROPHILS NFR BLD AUTO: 5.21 10*3/MM3 (ref 1.7–7)
NEUTROPHILS NFR BLD AUTO: 65.9 % (ref 42.7–76)
NRBC BLD AUTO-RTO: 0 /100 WBC (ref 0–0.2)
PLATELET # BLD AUTO: 112 10*3/MM3 (ref 140–450)
PMV BLD AUTO: 10 FL (ref 6–12)
RBC # BLD AUTO: 3.64 10*6/MM3 (ref 4.14–5.8)
TIBC SERPL-MCNC: 292 MCG/DL (ref 298–536)
TRANSFERRIN SERPL-MCNC: 196 MG/DL (ref 200–360)
VIT B12 BLD-MCNC: 645 PG/ML (ref 211–946)
WBC NRBC COR # BLD AUTO: 7.91 10*3/MM3 (ref 3.4–10.8)

## 2025-01-27 PROCEDURE — 85025 COMPLETE CBC W/AUTO DIFF WBC: CPT

## 2025-01-27 PROCEDURE — 82746 ASSAY OF FOLIC ACID SERUM: CPT | Performed by: INTERNAL MEDICINE

## 2025-01-27 PROCEDURE — 82728 ASSAY OF FERRITIN: CPT

## 2025-01-27 PROCEDURE — 82607 VITAMIN B-12: CPT | Performed by: INTERNAL MEDICINE

## 2025-01-27 PROCEDURE — 1159F MED LIST DOCD IN RCRD: CPT | Performed by: INTERNAL MEDICINE

## 2025-01-27 PROCEDURE — 83540 ASSAY OF IRON: CPT

## 2025-01-27 PROCEDURE — 3075F SYST BP GE 130 - 139MM HG: CPT | Performed by: INTERNAL MEDICINE

## 2025-01-27 PROCEDURE — 36415 COLL VENOUS BLD VENIPUNCTURE: CPT

## 2025-01-27 PROCEDURE — 97602 WOUND(S) CARE NON-SELECTIVE: CPT

## 2025-01-27 PROCEDURE — 84466 ASSAY OF TRANSFERRIN: CPT

## 2025-01-27 PROCEDURE — 99214 OFFICE O/P EST MOD 30 MIN: CPT | Performed by: INTERNAL MEDICINE

## 2025-01-27 PROCEDURE — 1126F AMNT PAIN NOTED NONE PRSNT: CPT | Performed by: INTERNAL MEDICINE

## 2025-01-27 PROCEDURE — 3078F DIAST BP <80 MM HG: CPT | Performed by: INTERNAL MEDICINE

## 2025-01-27 PROCEDURE — 1160F RVW MEDS BY RX/DR IN RCRD: CPT | Performed by: INTERNAL MEDICINE

## 2025-01-27 NOTE — PROGRESS NOTES
Subjective     CHIEF COMPLAINT:      Chief Complaint   Patient presents with    Follow-up     HISTORY OF PRESENT ILLNESS:     Rosas Christianson is a 85 y.o. male patient who returns today for follow up on his anemia.  He reports that he had a fall in early 2024 and fractured his back.  He was hospitalized with sacral fracture between 1/20/2024 and 1/24/2024.  He did not require surgery.  Pain is gradually improving.    Patient reports that he recently scraped the skin of the right arm.  He is following with the wound team and he is having dressing done.    Patient reports having intermittent bleeding from around the colostomy.  He was seen by the colostomy team and cauterization was not recommended.    ROS:  Pertinent ROS is in the HPI.     Past medical, surgical, social and family history were reviewed.     MEDICATIONS:    Current Outpatient Medications:     amLODIPine (NORVASC) 5 MG tablet, Take 1 tablet by mouth Daily., Disp: 90 tablet, Rfl: 1    Cholecalciferol (VITAMIN D3 PO), Take 1 each by mouth Daily., Disp: , Rfl:     glucosamine sulfate 500 MG capsule capsule, Take 1 capsule by mouth 3 times a day., Disp: , Rfl:     levothyroxine (SYNTHROID, LEVOTHROID) 125 MCG tablet, TAKE ONE TABLET BY MOUTH DAILY, Disp: 90 tablet, Rfl: 1    Multiple Vitamins-Minerals (MULTIVITAMIN ADULT PO), Take 1 tablet by mouth Daily., Disp: , Rfl:     Omega-3 Fatty Acids (fish oil) 1000 MG capsule capsule, Take 1 capsule by mouth Daily With Breakfast., Disp: , Rfl:     tamsulosin (FLOMAX) 0.4 MG capsule 24 hr capsule, Take 1 capsule by mouth Daily., Disp: 90 capsule, Rfl: 1    valsartan (DIOVAN) 80 MG tablet, Take 1 tablet by mouth Daily., Disp: 90 tablet, Rfl: 1    traMADol (ULTRAM) 50 MG tablet, Take 1 tablet by mouth Every 12 (Twelve) Hours As Needed for Moderate Pain., Disp: 60 tablet, Rfl: 0  Objective     VITAL SIGNS:     Vitals:    01/27/25 1527   BP: 131/68   Pulse: 66   Resp: 16   Temp: 96.3 °F (35.7 °C)   TempSrc: Oral  "  SpO2: 99%   Weight: 73.3 kg (161 lb 9.6 oz)   Height: 172.7 cm (67.99\")   PainSc: 0-No pain     Body mass index is 24.58 kg/m².     Wt Readings from Last 5 Encounters:   01/27/25 73.3 kg (161 lb 9.6 oz)   10/25/24 73 kg (161 lb)   10/17/24 73 kg (161 lb)   10/16/24 74.4 kg (164 lb)   10/14/24 74.5 kg (164 lb 4.8 oz)     PHYSICAL EXAMINATION:   GENERAL: The patient appears in fair general condition, not in acute distress.   SKIN: No Ecchymosis.  EYES: No jaundice. No Pallor.  CHEST: Normal respiratory effort. Normal breathing sounds bilaterally. No added sounds.  CVS: Normal S1 and S2. No Murmur.  ABDOMEN: Soft. No tenderness. No Hepatomegaly. No Splenomegaly. No masses.  Colostomy in the right lower quadrant.  EXTREMITIES: No joint deformity.     DIAGNOSTIC DATA:     Results from last 7 days   Lab Units 01/27/25  1521   WBC 10*3/mm3 7.91   NEUTROS ABS 10*3/mm3 5.21   HEMOGLOBIN g/dL 12.1*   HEMATOCRIT % 37.5   PLATELETS 10*3/mm3 112*         Lab 01/27/25  1521   IRON 90   IRON SATURATION (TSAT) 31   TIBC 292*   TRANSFERRIN 196*   FERRITIN 377.00      Assessment & Plan    *Macrocytic anemia.    It is attributed to anemia of chronic disease.    Hemoglobin decreased to 12.6 on 10/29/2020.  With the patient's ulcerative colitis and prior colon surgery, he is at increased risk for decreased absorption of vitamin B12.  Patient was started on vitamin B12 1000 mcg daily on 10/29/2020.  Hemoglobin improved to 13.0 on 10/28/2021. MCV improved to 97.1.  Vitamin B12 was 1260 and folate was >20.  Hemoglobin decreased to 12.1 on 11/8/2022.  MCV was 100.3.  He was continued on vitamin B12 1000 mcg daily.  Hemoglobin decreased to 11.7  Vitamin B12 786.  The worsening of his anemia was attributed to anemia of chronic kidney disease.  He reports that he has not been drinking adequate amount of fluids regularly.  1/2/2024: Hemoglobin improved to 13.0.  Vitamin B12 improved to 989.  1/27/2025: Hemoglobin 12.1.  1/27/2025: Ferritin " 377.  Transferrin saturation 31%.  He is taking a multivitamin daily.    *Chronic thrombocytopenia.  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  He had a mildly elevated IPF.  Celebrex may be contributing to his thrombocytopenia.  However, he needs the medicine due to his significant arthritis.  He usually develops significant worsening of his arthritis about 3 days after stopping Celebrex.  Platelet count was 113,000 on 11/8/2022.  Platelet count decreased to 96,000 on 6/1/2023.  1/2/2024: Platelet count improved to 111,000.  1/27/2025: Platelet count 112,000.  Since platelet count remains above 50,000, he does not need any treatment.    *Elevated ferritin level.    He had ferritin up to 450 in the past.    However, transferrin saturation was less than 50%.    This was attributed to acute phase reaction.  Hemochromatosis was considered unlikely.   Ferritin was 630 and transferrin saturation was 45% on 10/28/2021.  Ferritin decreased to 445 on 11/8/2022.  Transferrin saturation was 39%.   Ferritin decreased to 365 on 6/1/2023.  Transferrin saturation is 32%.  1/2/2024: Ferritin decreased to 341.  Transferrin saturation 36%.  The improvement in the iron level indicates that he does not have evidence of hemochromatosis.  1/27/2025: Ferritin decreased to 377-indicating that he does not have iron overload.    *Dysphagia.  Patient reports pain when he swallows.    In addition, he reports pain in the upper abdomen similar to what he experienced when he had an ulcer.  I recommended referral to GI.    PLAN:    1.  Continue vitamin B12 1000 mcg daily.  2.  Continue multivitamin once daily.  3.  Refer to GI.    4.  Follow-up in 9 months.  Will obtain CBC ferritin iron panel vitamin B12 and folate levels.            June Smiley MD  01/27/25

## 2025-02-03 ENCOUNTER — APPOINTMENT (OUTPATIENT)
Dept: WOUND CARE | Facility: HOSPITAL | Age: 85
End: 2025-02-03
Payer: MEDICARE

## 2025-02-10 ENCOUNTER — LAB (OUTPATIENT)
Dept: LAB | Facility: HOSPITAL | Age: 85
End: 2025-02-10
Payer: MEDICARE

## 2025-02-10 ENCOUNTER — APPOINTMENT (OUTPATIENT)
Dept: WOUND CARE | Facility: HOSPITAL | Age: 85
End: 2025-02-10
Payer: MEDICARE

## 2025-02-10 ENCOUNTER — TRANSCRIBE ORDERS (OUTPATIENT)
Dept: ADMINISTRATIVE | Facility: HOSPITAL | Age: 85
End: 2025-02-10
Payer: MEDICARE

## 2025-02-10 DIAGNOSIS — L98.492 SKIN ULCER OF PERIRECTAL REGION, WITH FAT LAYER EXPOSED: ICD-10-CM

## 2025-02-10 DIAGNOSIS — S70.312D: ICD-10-CM

## 2025-02-10 DIAGNOSIS — S40.811D: Primary | ICD-10-CM

## 2025-02-10 DIAGNOSIS — S40.811D: ICD-10-CM

## 2025-02-10 LAB
CREAT SERPL-MCNC: 1.2 MG/DL (ref 0.76–1.27)
EGFRCR SERPLBLD CKD-EPI 2021: 59.3 ML/MIN/1.73
HBA1C MFR BLD: 5.2 % (ref 4.8–5.6)
PREALB SERPL-MCNC: 21.9 MG/DL (ref 20–40)

## 2025-02-10 PROCEDURE — 36415 COLL VENOUS BLD VENIPUNCTURE: CPT

## 2025-02-10 PROCEDURE — 82565 ASSAY OF CREATININE: CPT

## 2025-02-10 PROCEDURE — 83036 HEMOGLOBIN GLYCOSYLATED A1C: CPT

## 2025-02-10 PROCEDURE — 84134 ASSAY OF PREALBUMIN: CPT

## 2025-02-17 ENCOUNTER — APPOINTMENT (OUTPATIENT)
Dept: WOUND CARE | Facility: HOSPITAL | Age: 85
End: 2025-02-17
Payer: MEDICARE

## 2025-02-24 ENCOUNTER — APPOINTMENT (OUTPATIENT)
Dept: WOUND CARE | Facility: HOSPITAL | Age: 85
End: 2025-02-24
Payer: MEDICARE

## 2025-02-24 PROCEDURE — G0463 HOSPITAL OUTPT CLINIC VISIT: HCPCS

## 2025-03-03 ENCOUNTER — APPOINTMENT (OUTPATIENT)
Dept: WOUND CARE | Facility: HOSPITAL | Age: 85
End: 2025-03-03
Payer: MEDICARE

## 2025-03-03 ENCOUNTER — TRANSCRIBE ORDERS (OUTPATIENT)
Dept: ADMINISTRATIVE | Facility: HOSPITAL | Age: 85
End: 2025-03-03
Payer: MEDICARE

## 2025-03-03 ENCOUNTER — HOSPITAL ENCOUNTER (OUTPATIENT)
Dept: GENERAL RADIOLOGY | Facility: HOSPITAL | Age: 85
Discharge: HOME | End: 2025-03-03
Payer: MEDICARE

## 2025-03-03 DIAGNOSIS — S80.212A ABRASION OF KNEE, LEFT, INITIAL ENCOUNTER: Primary | ICD-10-CM

## 2025-03-03 DIAGNOSIS — S80.212A ABRASION OF KNEE, LEFT, INITIAL ENCOUNTER: ICD-10-CM

## 2025-03-03 PROCEDURE — 73560 X-RAY EXAM OF KNEE 1 OR 2: CPT

## 2025-03-10 ENCOUNTER — APPOINTMENT (OUTPATIENT)
Dept: WOUND CARE | Facility: HOSPITAL | Age: 85
End: 2025-03-10
Payer: MEDICARE

## 2025-03-17 ENCOUNTER — APPOINTMENT (OUTPATIENT)
Dept: WOUND CARE | Facility: HOSPITAL | Age: 85
End: 2025-03-17
Payer: MEDICARE

## 2025-03-17 PROCEDURE — 17250 CHEM CAUT OF GRANLTJ TISSUE: CPT

## 2025-03-31 ENCOUNTER — APPOINTMENT (OUTPATIENT)
Dept: WOUND CARE | Facility: HOSPITAL | Age: 85
End: 2025-03-31
Payer: MEDICARE

## 2025-03-31 DIAGNOSIS — R35.1 NOCTURIA: ICD-10-CM

## 2025-03-31 PROCEDURE — G0463 HOSPITAL OUTPT CLINIC VISIT: HCPCS

## 2025-03-31 RX ORDER — VALSARTAN 80 MG/1
80 TABLET ORAL DAILY
Qty: 90 TABLET | Refills: 1 | Status: SHIPPED | OUTPATIENT
Start: 2025-03-31

## 2025-03-31 RX ORDER — TAMSULOSIN HYDROCHLORIDE 0.4 MG/1
1 CAPSULE ORAL DAILY
Qty: 90 CAPSULE | Refills: 1 | Status: SHIPPED | OUTPATIENT
Start: 2025-03-31

## 2025-03-31 RX ORDER — AMLODIPINE BESYLATE 5 MG/1
5 TABLET ORAL
Qty: 90 TABLET | Refills: 1 | Status: SHIPPED | OUTPATIENT
Start: 2025-03-31

## 2025-04-07 ENCOUNTER — APPOINTMENT (OUTPATIENT)
Dept: WOUND CARE | Facility: HOSPITAL | Age: 85
End: 2025-04-07
Payer: MEDICARE

## 2025-04-07 PROCEDURE — G0463 HOSPITAL OUTPT CLINIC VISIT: HCPCS

## 2025-04-14 ENCOUNTER — APPOINTMENT (OUTPATIENT)
Dept: WOUND CARE | Facility: HOSPITAL | Age: 85
End: 2025-04-14
Payer: MEDICARE

## 2025-04-14 PROCEDURE — G0463 HOSPITAL OUTPT CLINIC VISIT: HCPCS

## 2025-04-28 ENCOUNTER — APPOINTMENT (OUTPATIENT)
Dept: WOUND CARE | Facility: HOSPITAL | Age: 85
End: 2025-04-28
Payer: MEDICARE

## 2025-04-28 PROCEDURE — G0463 HOSPITAL OUTPT CLINIC VISIT: HCPCS

## 2025-05-05 ENCOUNTER — APPOINTMENT (OUTPATIENT)
Dept: WOUND CARE | Facility: HOSPITAL | Age: 85
End: 2025-05-05
Payer: MEDICARE

## 2025-05-05 PROCEDURE — G0463 HOSPITAL OUTPT CLINIC VISIT: HCPCS

## 2025-05-12 ENCOUNTER — TRANSCRIBE ORDERS (OUTPATIENT)
Dept: ADMINISTRATIVE | Facility: HOSPITAL | Age: 85
End: 2025-05-12
Payer: MEDICARE

## 2025-05-12 ENCOUNTER — OFFICE VISIT (OUTPATIENT)
Dept: PAIN MEDICINE | Facility: CLINIC | Age: 85
End: 2025-05-12
Payer: MEDICARE

## 2025-05-12 ENCOUNTER — HOSPITAL ENCOUNTER (OUTPATIENT)
Dept: CARDIOLOGY | Facility: HOSPITAL | Age: 85
Discharge: HOME | End: 2025-05-12
Admitting: ANESTHESIOLOGY
Payer: MEDICARE

## 2025-05-12 VITALS
DIASTOLIC BLOOD PRESSURE: 79 MMHG | WEIGHT: 159 LBS | OXYGEN SATURATION: 97 % | TEMPERATURE: 97.5 F | BODY MASS INDEX: 22.76 KG/M2 | HEART RATE: 58 BPM | RESPIRATION RATE: 18 BRPM | SYSTOLIC BLOOD PRESSURE: 122 MMHG | HEIGHT: 70 IN

## 2025-05-12 DIAGNOSIS — M17.0 PRIMARY OSTEOARTHRITIS OF BOTH KNEES: ICD-10-CM

## 2025-05-12 DIAGNOSIS — M79.672 BILATERAL FOOT PAIN: ICD-10-CM

## 2025-05-12 DIAGNOSIS — M15.3 POST-TRAUMATIC OSTEOARTHRITIS OF MULTIPLE JOINTS: ICD-10-CM

## 2025-05-12 DIAGNOSIS — F11.20 METHADONE MAINTENANCE THERAPY PATIENT: Primary | ICD-10-CM

## 2025-05-12 DIAGNOSIS — G89.4 CHRONIC PAIN SYNDROME: Primary | ICD-10-CM

## 2025-05-12 DIAGNOSIS — M79.671 BILATERAL FOOT PAIN: ICD-10-CM

## 2025-05-12 DIAGNOSIS — G89.29 CHRONIC BILATERAL LOW BACK PAIN, UNSPECIFIED WHETHER SCIATICA PRESENT: ICD-10-CM

## 2025-05-12 DIAGNOSIS — Z79.891 LONG TERM CURRENT USE OF METHADONE FOR PAIN CONTROL: ICD-10-CM

## 2025-05-12 DIAGNOSIS — F11.20 METHADONE MAINTENANCE THERAPY PATIENT: ICD-10-CM

## 2025-05-12 DIAGNOSIS — G62.9 NEUROPATHY: Chronic | ICD-10-CM

## 2025-05-12 DIAGNOSIS — M54.50 CHRONIC BILATERAL LOW BACK PAIN, UNSPECIFIED WHETHER SCIATICA PRESENT: ICD-10-CM

## 2025-05-12 LAB
POC AMPHETAMINES: NEGATIVE
POC BARBITURATES: NEGATIVE
POC BENZODIAZEPHINES: NEGATIVE
POC BUPRENORPHINE: NEGATIVE
POC COCAINE: NEGATIVE
POC METHADONE: POSITIVE
POC METHAMPHETAMINE SCREEN URINE: NEGATIVE
POC OPIATES: NEGATIVE
POC OXYCODONE: NEGATIVE
POC PHENCYCLIDINE: NEGATIVE
POC PROPOXYPHENE: NEGATIVE
POC THC: NEGATIVE
POC TRICYCLIC ANTIDEPRESSANTS: NEGATIVE

## 2025-05-12 PROCEDURE — 93005 ELECTROCARDIOGRAM TRACING: CPT | Performed by: ANESTHESIOLOGY

## 2025-05-12 RX ORDER — METHADONE HYDROCHLORIDE 5 MG/1
5 TABLET ORAL EVERY 12 HOURS SCHEDULED
Qty: 60 TABLET | Refills: 0 | Status: SHIPPED | OUTPATIENT
Start: 2025-05-12

## 2025-05-12 NOTE — PATIENT INSTRUCTIONS
A medication management agreement is signed by the patient and the provider today.  Reviewed with the patient that this contract is specific to controlled substances, specifically pain medication.  Reviewed core of pain medicine is to decrease pain and increase functional level.  Reviewed the medication must be picked up as a written prescription from this office and will not be called into any pharmacy.  Reviewed the use of [place] within the office setting.  Reviewed causes for potential of discontinuation of opiates,  including but not limited to consideration for diversion, obtaining other controlled substances from other license practitioners, or  inappropriate office behavior.  Patient understands to use a controlled substance as prescribed by physician and to avoid improper use of controlled substances.  Patient will also verbalized understanding that prescriptions to be filled at the same pharmacy  unless office staff is made aware of this.  Patient understands they can be submitted to random urine drug screens and/or random pill counts on any request.  Patient understands they are not to receive early refills.  The patient must produce an official police report for any effort to replace controlled substances that are lost or stolen.  No use of illegal street drugs while receiving controlled substances from this prescribing provider.  The patient is to take medication as prescribed  and not deviate from the normal schedule without consultation from the provider.  Patient is not to share the medication with others or to take medication with alcohol or other sedatives.  Use caution when driving or operating machinery.  Alert office if the patient becomes pregnant or begins nursing a child.  Reviewed the use of controlled substances recreates a risk for respiratory depression, which may result in serious harm or even death.  Must always keep the prescription in the original container.  Patient is to store  controlled substances in a locked cabinet or other secure storage unit that is cool, dry and out of sunlight.  Patient must immediately notify office if any controlled substances is stolen or improperly taken by another individual.  Reviewed risk for physical dependence, tolerance and addiction.      Discussed with the patient regarding long-term side effects of opioids including but not limited to dependence, addiction, sedation, respiratory depression, opioid induced hormonal suppression, hyperalgesia, and elevated risk of myocardial infarction.    -------  NALOXONE:  What it is and why we prescribe it    Naloxone is a medication that is used to reverse the effects of opioid/opiate pain medications.  In the event of an emergency, it can be used by you or a family member to block the harmful effect of a pain medications.    Always follow our prescribing instructions and do not violate our medication management agreement.    Opioid pain medications carry many severe risks including risk of respiratory depression (i.e. Stop breathing, or breathing too shallow) and this leads to death.  Naloxone can save a life.      Accidents can happen, and if there was an event in which a person took too much pain medication, or accidentally took a dose too soon, there can be major consequences.   This is a drug overdose.   With too much pain medication, a person can stop breathing and can die.      Also, your health might change.  If you were to be sick, that could decrease your metabolism.  If your bowels or liver or kidney function was to change, your body might not process the opioid pain medication as well or as quickly.  If that was to happen, then you could be at risk for overdose even with the same dose of medication.      The pharmacist can show you how to use Naloxone in an emergency.  Different forms come in injections to give in a muscle as a shot, or as a nasal spray.      If a person who takes opioids was to be found  to be oversedated, or not breathing, or worse, give the Naloxone (the entire dose) immediately and call 911.  Naloxone will hopefully give the EMS paramedics time to respond and provide further & better immediate treatment.   -------
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
## 2025-05-13 LAB
QT INTERVAL: 406 MS
QTC INTERVAL: 423 MS

## 2025-05-21 ENCOUNTER — HOSPITAL ENCOUNTER (OUTPATIENT)
Facility: HOSPITAL | Age: 85
Setting detail: OBSERVATION
LOS: 1 days | Discharge: HOME | End: 2025-05-22
Attending: EMERGENCY MEDICINE | Admitting: INTERNAL MEDICINE
Payer: MEDICARE

## 2025-05-21 ENCOUNTER — APPOINTMENT (OUTPATIENT)
Dept: CT IMAGING | Facility: HOSPITAL | Age: 85
End: 2025-05-21
Payer: MEDICARE

## 2025-05-21 DIAGNOSIS — K43.3 PARASTOMAL HERNIA WITH OBSTRUCTION AND WITHOUT GANGRENE: Primary | ICD-10-CM

## 2025-05-21 PROBLEM — K43.5 PARASTOMAL HERNIA: Status: ACTIVE | Noted: 2025-05-21

## 2025-05-21 PROBLEM — K56.600 PARTIAL SMALL BOWEL OBSTRUCTION: Status: ACTIVE | Noted: 2025-05-21

## 2025-05-21 LAB
ALBUMIN SERPL-MCNC: 4 G/DL (ref 3.5–5.2)
ALBUMIN/GLOB SERPL: 1.3 G/DL
ALP SERPL-CCNC: 90 U/L (ref 39–117)
ALT SERPL W P-5'-P-CCNC: 19 U/L (ref 1–41)
ANION GAP SERPL CALCULATED.3IONS-SCNC: 12 MMOL/L (ref 5–15)
ANION GAP SERPL CALCULATED.3IONS-SCNC: 7.3 MMOL/L (ref 5–15)
AST SERPL-CCNC: 38 U/L (ref 1–40)
BACTERIA UR QL AUTO: NORMAL /HPF
BASOPHILS # BLD AUTO: 0.05 10*3/MM3 (ref 0–0.2)
BASOPHILS NFR BLD AUTO: 0.6 % (ref 0–1.5)
BILIRUB SERPL-MCNC: 0.4 MG/DL (ref 0–1.2)
BILIRUB UR QL STRIP: NEGATIVE
BUN SERPL-MCNC: 36 MG/DL (ref 8–23)
BUN SERPL-MCNC: 39 MG/DL (ref 8–23)
BUN/CREAT SERPL: 33.6 (ref 7–25)
BUN/CREAT SERPL: 36.1 (ref 7–25)
CALCIUM SPEC-SCNC: 8.9 MG/DL (ref 8.6–10.5)
CALCIUM SPEC-SCNC: 9.5 MG/DL (ref 8.6–10.5)
CHLORIDE SERPL-SCNC: 105 MMOL/L (ref 98–107)
CHLORIDE SERPL-SCNC: 109 MMOL/L (ref 98–107)
CLARITY UR: CLEAR
CO2 SERPL-SCNC: 20 MMOL/L (ref 22–29)
CO2 SERPL-SCNC: 20.7 MMOL/L (ref 22–29)
COLOR UR: YELLOW
CREAT SERPL-MCNC: 1.07 MG/DL (ref 0.76–1.27)
CREAT SERPL-MCNC: 1.08 MG/DL (ref 0.76–1.27)
D-LACTATE SERPL-SCNC: 1.1 MMOL/L (ref 0.5–2)
DEPRECATED RDW RBC AUTO: 42.9 FL (ref 37–54)
DEPRECATED RDW RBC AUTO: 46.2 FL (ref 37–54)
EGFRCR SERPLBLD CKD-EPI 2021: 67.2 ML/MIN/1.73
EGFRCR SERPLBLD CKD-EPI 2021: 68 ML/MIN/1.73
EOSINOPHIL # BLD AUTO: 0.09 10*3/MM3 (ref 0–0.4)
EOSINOPHIL NFR BLD AUTO: 1.1 % (ref 0.3–6.2)
ERYTHROCYTE [DISTWIDTH] IN BLOOD BY AUTOMATED COUNT: 11.8 % (ref 12.3–15.4)
ERYTHROCYTE [DISTWIDTH] IN BLOOD BY AUTOMATED COUNT: 12.1 % (ref 12.3–15.4)
GLOBULIN UR ELPH-MCNC: 3 GM/DL
GLUCOSE SERPL-MCNC: 109 MG/DL (ref 65–99)
GLUCOSE SERPL-MCNC: 90 MG/DL (ref 65–99)
GLUCOSE UR STRIP-MCNC: NEGATIVE MG/DL
HCT VFR BLD AUTO: 31.3 % (ref 37.5–51)
HCT VFR BLD AUTO: 36.2 % (ref 37.5–51)
HGB BLD-MCNC: 10.5 G/DL (ref 13–17.7)
HGB BLD-MCNC: 11.6 G/DL (ref 13–17.7)
HGB UR QL STRIP.AUTO: ABNORMAL
HOLD SPECIMEN: NORMAL
HOLD SPECIMEN: NORMAL
HYALINE CASTS UR QL AUTO: NORMAL /LPF
IMM GRANULOCYTES # BLD AUTO: 0.02 10*3/MM3 (ref 0–0.05)
IMM GRANULOCYTES NFR BLD AUTO: 0.2 % (ref 0–0.5)
KETONES UR QL STRIP: NEGATIVE
LEUKOCYTE ESTERASE UR QL STRIP.AUTO: NEGATIVE
LIPASE SERPL-CCNC: 55 U/L (ref 13–60)
LYMPHOCYTES # BLD AUTO: 1.14 10*3/MM3 (ref 0.7–3.1)
LYMPHOCYTES NFR BLD AUTO: 13.8 % (ref 19.6–45.3)
MCH RBC QN AUTO: 32.8 PG (ref 26.6–33)
MCH RBC QN AUTO: 33.2 PG (ref 26.6–33)
MCHC RBC AUTO-ENTMCNC: 32 G/DL (ref 31.5–35.7)
MCHC RBC AUTO-ENTMCNC: 33.5 G/DL (ref 31.5–35.7)
MCV RBC AUTO: 102.3 FL (ref 79–97)
MCV RBC AUTO: 99.1 FL (ref 79–97)
MONOCYTES # BLD AUTO: 0.73 10*3/MM3 (ref 0.1–0.9)
MONOCYTES NFR BLD AUTO: 8.8 % (ref 5–12)
NEUTROPHILS NFR BLD AUTO: 6.23 10*3/MM3 (ref 1.7–7)
NEUTROPHILS NFR BLD AUTO: 75.5 % (ref 42.7–76)
NITRITE UR QL STRIP: NEGATIVE
PH UR STRIP.AUTO: <=5 [PH] (ref 5–8)
PLATELET # BLD AUTO: 104 10*3/MM3 (ref 140–450)
PLATELET # BLD AUTO: 98 10*3/MM3 (ref 140–450)
PMV BLD AUTO: 10.4 FL (ref 6–12)
PMV BLD AUTO: 10.4 FL (ref 6–12)
POTASSIUM SERPL-SCNC: 4.3 MMOL/L (ref 3.5–5.2)
POTASSIUM SERPL-SCNC: 4.5 MMOL/L (ref 3.5–5.2)
PROT SERPL-MCNC: 7 G/DL (ref 6–8.5)
PROT UR QL STRIP: ABNORMAL
QT INTERVAL: 413 MS
QTC INTERVAL: 424 MS
RBC # BLD AUTO: 3.16 10*6/MM3 (ref 4.14–5.8)
RBC # BLD AUTO: 3.54 10*6/MM3 (ref 4.14–5.8)
RBC # UR STRIP: NORMAL /HPF
REF LAB TEST METHOD: NORMAL
SODIUM SERPL-SCNC: 137 MMOL/L (ref 136–145)
SODIUM SERPL-SCNC: 137 MMOL/L (ref 136–145)
SP GR UR STRIP: 1.01 (ref 1–1.03)
SQUAMOUS #/AREA URNS HPF: NORMAL /HPF
UROBILINOGEN UR QL STRIP: ABNORMAL
WBC # UR STRIP: NORMAL /HPF
WBC NRBC COR # BLD AUTO: 7.78 10*3/MM3 (ref 3.4–10.8)
WBC NRBC COR # BLD AUTO: 8.26 10*3/MM3 (ref 3.4–10.8)
WHOLE BLOOD HOLD COAG: NORMAL
WHOLE BLOOD HOLD SPECIMEN: NORMAL

## 2025-05-21 PROCEDURE — 99285 EMERGENCY DEPT VISIT HI MDM: CPT

## 2025-05-21 PROCEDURE — 25510000001 IOPAMIDOL 61 % SOLUTION: Performed by: EMERGENCY MEDICINE

## 2025-05-21 PROCEDURE — 25010000002 ONDANSETRON PER 1 MG: Performed by: EMERGENCY MEDICINE

## 2025-05-21 PROCEDURE — 85027 COMPLETE CBC AUTOMATED: CPT | Performed by: NURSE PRACTITIONER

## 2025-05-21 PROCEDURE — 99203 OFFICE O/P NEW LOW 30 MIN: CPT | Performed by: STUDENT IN AN ORGANIZED HEALTH CARE EDUCATION/TRAINING PROGRAM

## 2025-05-21 PROCEDURE — 25810000003 SODIUM CHLORIDE 0.9 % SOLUTION: Performed by: NURSE PRACTITIONER

## 2025-05-21 PROCEDURE — 80053 COMPREHEN METABOLIC PANEL: CPT | Performed by: EMERGENCY MEDICINE

## 2025-05-21 PROCEDURE — 81001 URINALYSIS AUTO W/SCOPE: CPT | Performed by: EMERGENCY MEDICINE

## 2025-05-21 PROCEDURE — 83690 ASSAY OF LIPASE: CPT | Performed by: EMERGENCY MEDICINE

## 2025-05-21 PROCEDURE — 25010000002 HYDROMORPHONE 1 MG/ML SOLUTION: Performed by: EMERGENCY MEDICINE

## 2025-05-21 PROCEDURE — 74177 CT ABD & PELVIS W/CONTRAST: CPT

## 2025-05-21 PROCEDURE — 83605 ASSAY OF LACTIC ACID: CPT | Performed by: EMERGENCY MEDICINE

## 2025-05-21 PROCEDURE — 85025 COMPLETE CBC W/AUTO DIFF WBC: CPT | Performed by: EMERGENCY MEDICINE

## 2025-05-21 PROCEDURE — 96374 THER/PROPH/DIAG INJ IV PUSH: CPT

## 2025-05-21 PROCEDURE — 96375 TX/PRO/DX INJ NEW DRUG ADDON: CPT

## 2025-05-21 PROCEDURE — 36415 COLL VENOUS BLD VENIPUNCTURE: CPT | Performed by: NURSE PRACTITIONER

## 2025-05-21 PROCEDURE — 93005 ELECTROCARDIOGRAM TRACING: CPT | Performed by: EMERGENCY MEDICINE

## 2025-05-21 RX ORDER — ONDANSETRON 2 MG/ML
4 INJECTION INTRAMUSCULAR; INTRAVENOUS EVERY 6 HOURS PRN
Status: DISCONTINUED | OUTPATIENT
Start: 2025-05-21 | End: 2025-05-22 | Stop reason: HOSPADM

## 2025-05-21 RX ORDER — SODIUM CHLORIDE 0.9 % (FLUSH) 0.9 %
10 SYRINGE (ML) INJECTION AS NEEDED
Status: DISCONTINUED | OUTPATIENT
Start: 2025-05-21 | End: 2025-05-22 | Stop reason: HOSPADM

## 2025-05-21 RX ORDER — ACETAMINOPHEN 650 MG/1
650 SUPPOSITORY RECTAL EVERY 4 HOURS PRN
Status: DISCONTINUED | OUTPATIENT
Start: 2025-05-21 | End: 2025-05-22 | Stop reason: HOSPADM

## 2025-05-21 RX ORDER — HYDROMORPHONE HYDROCHLORIDE 1 MG/ML
0.5 INJECTION, SOLUTION INTRAMUSCULAR; INTRAVENOUS; SUBCUTANEOUS
Refills: 0 | Status: DISCONTINUED | OUTPATIENT
Start: 2025-05-21 | End: 2025-05-22 | Stop reason: HOSPADM

## 2025-05-21 RX ORDER — IOPAMIDOL 612 MG/ML
85 INJECTION, SOLUTION INTRAVASCULAR
Status: COMPLETED | OUTPATIENT
Start: 2025-05-21 | End: 2025-05-21

## 2025-05-21 RX ORDER — ONDANSETRON 2 MG/ML
4 INJECTION INTRAMUSCULAR; INTRAVENOUS ONCE
Status: COMPLETED | OUTPATIENT
Start: 2025-05-21 | End: 2025-05-21

## 2025-05-21 RX ORDER — TAMSULOSIN HYDROCHLORIDE 0.4 MG/1
0.4 CAPSULE ORAL DAILY
Status: DISCONTINUED | OUTPATIENT
Start: 2025-05-21 | End: 2025-05-22 | Stop reason: HOSPADM

## 2025-05-21 RX ORDER — SODIUM CHLORIDE 9 MG/ML
100 INJECTION, SOLUTION INTRAVENOUS CONTINUOUS
Status: DISCONTINUED | OUTPATIENT
Start: 2025-05-21 | End: 2025-05-22 | Stop reason: HOSPADM

## 2025-05-21 RX ORDER — CALCIUM CARBONATE 500 MG/1
2 TABLET, CHEWABLE ORAL 2 TIMES DAILY PRN
Status: DISCONTINUED | OUTPATIENT
Start: 2025-05-21 | End: 2025-05-22 | Stop reason: HOSPADM

## 2025-05-21 RX ORDER — METHADONE HYDROCHLORIDE 10 MG/1
5 TABLET ORAL EVERY 12 HOURS SCHEDULED
Refills: 0 | Status: DISCONTINUED | OUTPATIENT
Start: 2025-05-21 | End: 2025-05-22 | Stop reason: HOSPADM

## 2025-05-21 RX ORDER — ACETAMINOPHEN 160 MG/5ML
650 SOLUTION ORAL EVERY 4 HOURS PRN
Status: DISCONTINUED | OUTPATIENT
Start: 2025-05-21 | End: 2025-05-22 | Stop reason: HOSPADM

## 2025-05-21 RX ORDER — SODIUM CHLORIDE 0.9 % (FLUSH) 0.9 %
10 SYRINGE (ML) INJECTION EVERY 12 HOURS SCHEDULED
Status: DISCONTINUED | OUTPATIENT
Start: 2025-05-21 | End: 2025-05-22 | Stop reason: HOSPADM

## 2025-05-21 RX ORDER — SODIUM CHLORIDE 9 MG/ML
40 INJECTION, SOLUTION INTRAVENOUS AS NEEDED
Status: DISCONTINUED | OUTPATIENT
Start: 2025-05-21 | End: 2025-05-22 | Stop reason: HOSPADM

## 2025-05-21 RX ORDER — ACETAMINOPHEN 325 MG/1
650 TABLET ORAL EVERY 4 HOURS PRN
Status: DISCONTINUED | OUTPATIENT
Start: 2025-05-21 | End: 2025-05-22 | Stop reason: HOSPADM

## 2025-05-21 RX ORDER — ONDANSETRON 4 MG/1
4 TABLET, ORALLY DISINTEGRATING ORAL EVERY 6 HOURS PRN
Status: DISCONTINUED | OUTPATIENT
Start: 2025-05-21 | End: 2025-05-22 | Stop reason: HOSPADM

## 2025-05-21 RX ORDER — VALSARTAN 80 MG/1
80 TABLET ORAL DAILY
Status: DISCONTINUED | OUTPATIENT
Start: 2025-05-21 | End: 2025-05-22 | Stop reason: HOSPADM

## 2025-05-21 RX ORDER — AMLODIPINE BESYLATE 5 MG/1
5 TABLET ORAL
Status: DISCONTINUED | OUTPATIENT
Start: 2025-05-21 | End: 2025-05-22 | Stop reason: HOSPADM

## 2025-05-21 RX ORDER — LEVOTHYROXINE SODIUM 125 UG/1
125 TABLET ORAL DAILY
Status: DISCONTINUED | OUTPATIENT
Start: 2025-05-21 | End: 2025-05-22 | Stop reason: HOSPADM

## 2025-05-21 RX ADMIN — IOPAMIDOL 85 ML: 612 INJECTION, SOLUTION INTRAVENOUS at 03:11

## 2025-05-21 RX ADMIN — HYDROMORPHONE HYDROCHLORIDE 1 MG: 1 INJECTION, SOLUTION INTRAMUSCULAR; INTRAVENOUS; SUBCUTANEOUS at 03:26

## 2025-05-21 RX ADMIN — Medication 10 ML: at 08:26

## 2025-05-21 RX ADMIN — METHADONE HYDROCHLORIDE 5 MG: 10 TABLET ORAL at 09:39

## 2025-05-21 RX ADMIN — ONDANSETRON 4 MG: 2 INJECTION, SOLUTION INTRAMUSCULAR; INTRAVENOUS at 03:26

## 2025-05-21 RX ADMIN — METHADONE HYDROCHLORIDE 5 MG: 10 TABLET ORAL at 22:48

## 2025-05-21 RX ADMIN — TAMSULOSIN HYDROCHLORIDE 0.4 MG: 0.4 CAPSULE ORAL at 09:39

## 2025-05-21 RX ADMIN — SODIUM CHLORIDE 100 ML/HR: 9 INJECTION, SOLUTION INTRAVENOUS at 05:34

## 2025-05-21 NOTE — CASE MANAGEMENT/SOCIAL WORK
Discharge Planning Assessment  [facility]     Patient Name: Rosas Christianson  MRN: [id number]  Today's Date: 5/21/2025    Admit Date: 5/21/2025    Plan: Patient plans to return home upon discharge with family to transport. Denies any needs.   Discharge Needs Assessment       Row Name 05/21/25 0949       Living Environment    People in Home alone    Current Living Arrangements home    Potentially Unsafe Housing Conditions none    Primary Care Provided by self    Provides Primary Care For no one    Family Caregiver if Needed grandchild(abdifatah), adult    Quality of Family Relationships helpful;involved;supportive    Able to Return to Prior Arrangements yes    Living Arrangement Comments Patient resides alone in 1 level home with no vocalized environmental concerns.       Resource/Environmental Concerns    Resource/Environmental Concerns none    Transportation Concerns none       Transition Planning    Patient/Family Anticipates Transition to home    Patient/Family Anticipated Services at Transition none    Transportation Anticipated family or friend will provide       Discharge Needs Assessment    Readmission Within the Last 30 Days no previous admission in last 30 days    Equipment Currently Used at Home cane, straight;colostomy/ostomy supplies;walker, rolling;wheelchair, motorized    Concerns to be Addressed no discharge needs identified;denies needs/concerns at this time    Anticipated Changes Related to Illness none    Equipment Needed After Discharge none                   Discharge Plan       Row Name 05/21/25 0950       Plan    Plan Patient plans to return home upon discharge with family to transport. Denies any needs.    Patient/Family in Agreement with Plan yes    Plan Comments Patient plans to return home upon discharge, where he lives alone in 1 level home with no vocalized environmental concerns. He reports that he is MOD I with IADL's at baseline with use of rolling walker or cane. He also reports he has an  electric wheelchair. He reports that he does fall occasionally, but adamantly declines any outpatient or HH physical therapy. He drives and denies any transportation concerns. He reports that family can provide discharge transportation. He uses [facility] in [place] and denies issues obtaining/afforind medications. He declines Meds to Beds. He is not currently enrolled in outpatient therapies and denies need for them. No needs anticipated. Encouraged to contact case management should needs change.                    Expected Discharge Date and Time       Expected Discharge Date Expected Discharge Time    May 23, 2025            Demographic Summary       Row Name 05/21/25 3802       General Information    Admission Type inpatient    Arrived From home    Required Notices Provided Important Message from Medicare  Verified IMM on chart.    Referral Source admission list;emergency department    Reason for Consult discharge planning    Preferred Language English    General Information Comments Facesheet verified. Role of CCP explained. Appropriate PPE worn at all times.                   Functional Status       Row Name 05/21/25 5421       Functional Status, IADL    Medications assistive equipment    Meal Preparation assistive equipment    Housekeeping assistive equipment    Laundry assistive equipment    Shopping assistive equipment    IADL Comments Patient reports baseline MOD I with use of rolling walker or cane.       Mental Status    General Appearance WDL WDL                   Psychosocial       Row Name 05/21/25 2709       Behavior WDL    Behavior WDL WDL       Emotion Mood WDL    Emotion/Mood/Affect WDL WDL       Speech WDL    Speech WDL WDL       Perceptual State WDL    Perceptual State WDL WDL       Thought Process WDL    Thought Process WDL WDL       Intellectual Performance WDL    Intellectual Performance WDL WDL    Level of Consciousness Alert       Coping/Stress    Major Change/Loss/Stressor none     Patient Personal Strengths expressive of emotions;expressive of needs;self-reliant    Sources of Support other family members    Techniques to [place] with Loss/Stress/Change not applicable    Reaction to Health Status accepting;adjusting       Developmental Stage (Eriksson's Stages of Development)    Developmental Stage Stage 8 (65 years-death/Late Adulthood) Integrity vs. Despair                   Abuse/Neglect       Row Name 05/21/25 0949       Personal Safety    Physical Signs of Abuse Present no                   Legal    No documentation.                  Substance Abuse    No documentation.                  Patient Forms    No documentation.                     Aristides Holman RN

## 2025-05-21 NOTE — ED PROVIDER NOTES
EMERGENCY DEPARTMENT ENCOUNTER    History  Chief Complaint   Patient presents with    Abdominal Pain       History provided by: Patient    HPI:  Context: Rosas Christianson is a 85 y.o. male with a medical history of hypertension, hypothyroidism, ulcerative colitis, anemia of chronic disease, thrombocytopenia who presents to the ED c/o acute abdominal pain.  Symptoms have been present throughout the day yesterday and now today.  He notes periumbilical pain without much radiation.  He has not had any fever.  No nausea, vomiting.  He does have a history of a previous colectomy and subsequent end ileostomy secondary to ulcerative colitis.  He has had normal output from the stoma.  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      Past Medical History:  Active Ambulatory Problems     Diagnosis Date Noted    Post-traumatic osteoarthritis of multiple joints     Esophageal reflux     Hypertension     Hypothyroidism     Neuropathy     Ulcerative colitis     H/O colectomy 10/24/2016    Anemia of chronic disease 01/18/2017    Thrombocytopenia 01/18/2017    History of frostbite 10/04/2017    Arthritis 11/03/2017    Presence of colostomy 11/03/2017    Health care maintenance 11/03/2017    Bilateral foot pain 11/06/2017    ERRONEOUS ENCOUNTER--DISREGARD 12/03/2018    Medicare annual wellness visit, subsequent 09/06/2019    Primary osteoarthritis of both knees 04/27/2021    Lung nodule 06/02/2022    Anxiety 04/28/2023    Chronic pain of both shoulders 07/27/2023    Edema 07/27/2023    Chronic left SI joint pain 01/16/2024    Chronic right SI joint pain 01/16/2024    Acute bilateral low back pain without sciatica 01/16/2024    Sacral fracture 01/20/2024    BPH (benign prostatic hyperplasia) 01/20/2024    Shortness of breath 02/17/2024    Chronic left shoulder pain 03/29/2024    Cancer      Resolved Ambulatory Problems     Diagnosis Date Noted    Long term current use of methadone for pain  control 11/06/2017    Closed fracture of multiple ribs of right side with delayed healing 06/02/2022     Past Medical History:   Diagnosis Date    Acromioclavicular separation 2-3 years ago    Allergic 80's    Ankle sprain broken-3 places    Benign prostatic hyperplasia     Brain concussion 89    Cataract surgery-?    Chronic pain disorder foot & back    Clotting disorder 2000    Colon polyp 2000    Extremity pain all my life    Eye exam, routine 2011    Fracture of ankle 75 and 12/6/23    Frozen shoulder     H/O ulcer disease     Heart murmur 10 years old to now    Herpes zoster     Injury of back dislocated  in 80's    Joint pain 1975 -getting worse    Kidney stone 70's    Low back pain Jan 2024    Macrocytic anemia     Osteoarthritis 1975    Peptic ulceration     Periarthritis of shoulder     Peripheral neuropathy all my life    Pneumonia 80's    Rash thin skin- now    Rotator cuff syndrome 2019 and Nov 2023    Scoliosis 80's to now    Spinal stenosis 1997-getting worse    Tear of meniscus of knee 1964    Torn rotator cuff     Torn rotator cuff     Visual impairment glasses       Past Surgical History:  Past Surgical History:   Procedure Laterality Date    APPENDECTOMY  with colon removal    CATARACT EXTRACTION Bilateral     CATARACT EXTRACTION, BILATERAL      COLON SURGERY  2000    removed colon    COLONOSCOPY  2005    has a colostomy    DENTAL PROCEDURE      EPIDURAL BLOCK  1997 & 2000    HAND SURGERY Left 73    hand stuck in car fan when running    SKIN CANCER DESTRUCTION  01/2016    on head    TRIGGER POINT INJECTION  knees many times    WRIST SURGERY  wrist and hand 73    lost a tendon         Family History:  Family History   Problem Relation Age of Onset    Heart disease Mother     Arthritis Mother     Osteoporosis Mother     Coronary artery disease Mother         Heart attack at 73    Heart disease Father     Arthritis Father     Osteoporosis Father     Coronary artery disease Father         Heart attack  at 65    Asthma Brother     Osteoporosis Brother     Arthritis Brother     Asthma Brother          Social History:  Social History     Socioeconomic History    Marital status:      Spouse name: Cynthia    Number of children: 2    Years of education: College   Tobacco Use    Smoking status: Former     Current packs/day: 0.00     Average packs/day: 3.0 packs/day for 33.0 years (99.1 ttl pk-yrs)     Types: Cigarettes     Start date: 1956     Quit date: 1985     Years since quittin.1    Smokeless tobacco: Never    Tobacco comments:     parents gave them to me   Vaping Use    Vaping status: Never Used   Substance and Sexual Activity    Alcohol use: Not Currently     Comment: rare    Drug use: No    Sexual activity: Not Currently     Partners: Female     Birth control/protection: None         Allergies:  Aspirin, Nsaids, and Prednisone        Physical Exam  ED Triage Vitals [25 0201]   Temp Heart Rate Resp BP SpO2   98.7 °F (37.1 °C) 79 18 139/65 100 %      Temp src Heart Rate Source Patient Position BP Location FiO2 (%)   -- -- -- -- --     Physical Exam  Constitutional:       Appearance: He is not ill-appearing.   Eyes:      Conjunctiva/sclera: Conjunctivae normal.   Pulmonary:      Effort: Pulmonary effort is normal. No respiratory distress.   Abdominal:      Palpations: Abdomen is soft.      Tenderness: There is abdominal tenderness in the periumbilical area. There is no guarding or rebound.      Comments: There is an end ileostomy in place in the right mid abdomen, normal stool in bag, beefy red healthy appearing stoma, parastomal hernia, which is soft, partially reducible, but immediately returns   Neurological:      Mental Status: He is alert and oriented to person, place, and time.             Medications Given in ER:   Medications   sodium chloride 0.9 % flush 10 mL (has no administration in time range)   HYDROmorphone (DILAUDID) injection 1 mg (1 mg Intravenous Given 25 9936)    ondansetron (ZOFRAN) injection 4 mg (4 mg Intravenous Given 5/21/25 0326)   iopamidol (ISOVUE-300) 61 % injection 85 mL (85 mL Intravenous Given 5/21/25 0311)         Orders Placed:  Orders Placed This Encounter   Procedures    CT Abdomen Pelvis With Contrast    [place] Draw    Comprehensive Metabolic Panel    Lipase    Urinalysis With Microscopic If Indicated (No Culture) - Urine, Clean Catch    Lactic Acid, Plasma    CBC Auto Differential    Urinalysis, Microscopic Only - Urine, Clean Catch    Surgery (on-call MD unless specified)    [facility]HA (on-call MD unless specified) Details    ECG 12 Lead QT Measurement    Insert Peripheral IV    Inpatient Admission    CBC & Differential    Green Top (Gel)    Lavender Top    Gold Top - SST    Light Blue Top         Outpatient Medication Management:   Current Facility-Administered Medications Ordered in Epic   Medication Dose Route Frequency Provider Last Rate Last Admin    sodium chloride 0.9 % flush 10 mL  10 mL Intravenous PRN Nadine Acuña MD         Current Outpatient Medications Ordered in Epic   Medication Sig Dispense Refill    amLODIPine (NORVASC) 5 MG tablet Take 1 tablet by mouth Daily. 90 tablet 1    Cholecalciferol (VITAMIN D3 PO) Take 1 each by mouth Daily.      Cyanocobalamin (B-12) 1000 MCG capsule Take 1 capsule by mouth Daily.      glucosamine sulfate 500 MG capsule capsule Take 1 capsule by mouth 3 times a day.      levothyroxine (SYNTHROID, LEVOTHROID) 125 MCG tablet TAKE ONE TABLET BY MOUTH DAILY 90 tablet 1    methadone (DOLOPHINE) 5 MG tablet Take 1 tablet by mouth Every 12 (Twelve) Hours. 60 tablet 0    Multiple Vitamins-Minerals (MULTIVITAMIN ADULT PO) Take 1 tablet by mouth Daily.      naloxone (NARCAN) 4 MG/0.1ML nasal spray Call 911. Don't prime. [place] in 1 nostril for overdose. Repeat in 2-3 minutes in other nostril if no or minimal breathing/responsiveness. 1 each 1    Omega-3 Fatty Acids (fish oil) 1000 MG capsule capsule Take 1 capsule by  mouth Daily With Breakfast.      tamsulosin (FLOMAX) 0.4 MG capsule 24 hr capsule Take 1 capsule by mouth Daily. 90 capsule 1    valsartan (DIOVAN) 80 MG tablet Take 1 tablet by mouth Daily. 90 tablet 1           Medical Decision Making:  All labs have been independently interpreted by me.  All radiology studies have been reviewed by me. All EKGs have been independently viewed and interpreted by me.  Discussion below represents my analysis of pertinent findings related to patient's condition, differential diagnosis, treatment plan and final disposition.    Differential Diagnosis includes but not limited to: Bowel obstruction, parastomal hernia with incarceration, symptomatic parastomal hernia, enteritis, PUD, GERD, pancreatitis    Review of prior external notes (non-ED) -and- Review of prior external test results outside of this encounter:  I reviewed the pain management office visit note from 5/12/2025.  Patient is maintained on methadone.    Labs Results:  Recent Results (from the past 24 hours)   Comprehensive Metabolic Panel    Collection Time: 05/21/25  2:22 AM    Specimen: Blood   Result Value Ref Range    Glucose 109 (H) 65 - 99 mg/dL    BUN 39 (H) 8 - 23 mg/dL    Creatinine 1.08 0.76 - 1.27 mg/dL    Sodium 137 136 - 145 mmol/L    Potassium 4.3 3.5 - 5.2 mmol/L    Chloride 105 98 - 107 mmol/L    CO2 20.0 (L) 22.0 - 29.0 mmol/L    Calcium 9.5 8.6 - 10.5 mg/dL    Total Protein 7.0 6.0 - 8.5 g/dL    Albumin 4.0 3.5 - 5.2 g/dL    ALT (SGPT) 19 1 - 41 U/L    AST (SGOT) 38 1 - 40 U/L    Alkaline Phosphatase 90 39 - 117 U/L    Total Bilirubin 0.4 0.0 - 1.2 mg/dL    Globulin 3.0 gm/dL    A/G Ratio 1.3 g/dL    BUN/Creatinine Ratio 36.1 (H) 7.0 - 25.0    Anion Gap 12.0 5.0 - 15.0 mmol/L    eGFR 67.2 >60.0 mL/min/1.73   Lipase    Collection Time: 05/21/25  2:22 AM    Specimen: Blood   Result Value Ref Range    Lipase 55 13 - 60 U/L   Lactic Acid, Plasma    Collection Time: 05/21/25  2:22 AM    Specimen: Blood   Result  Value Ref Range    Lactate 1.1 0.5 - 2.0 mmol/L   Green Top (Gel)    Collection Time: 05/21/25  2:22 AM   Result Value Ref Range    Extra Tube Hold for add-ons.    Lavender Top    Collection Time: 05/21/25  2:22 AM   Result Value Ref Range    Extra Tube hold for add-on    Gold Top - SST    Collection Time: 05/21/25  2:22 AM   Result Value Ref Range    Extra Tube Hold for add-ons.    Light Blue Top    Collection Time: 05/21/25  2:22 AM   Result Value Ref Range    Extra Tube Hold for add-ons.    CBC Auto Differential    Collection Time: 05/21/25  2:22 AM    Specimen: Blood   Result Value Ref Range    WBC 8.26 3.40 - 10.80 10*3/mm3    RBC 3.54 (L) 4.14 - 5.80 10*6/mm3    Hemoglobin 11.6 (L) 13.0 - 17.7 g/dL    Hematocrit 36.2 (L) 37.5 - 51.0 %    .3 (H) 79.0 - 97.0 fL    MCH 32.8 26.6 - 33.0 pg    MCHC 32.0 31.5 - 35.7 g/dL    RDW 12.1 (L) 12.3 - 15.4 %    RDW-SD 46.2 37.0 - 54.0 fl    MPV 10.4 6.0 - 12.0 fL    Platelets 104 (L) 140 - 450 10*3/mm3    Neutrophil % 75.5 42.7 - 76.0 %    Lymphocyte % 13.8 (L) 19.6 - 45.3 %    Monocyte % 8.8 5.0 - 12.0 %    Eosinophil % 1.1 0.3 - 6.2 %    Basophil % 0.6 0.0 - 1.5 %    Immature Grans % 0.2 0.0 - 0.5 %    Neutrophils, Absolute 6.23 1.70 - 7.00 10*3/mm3    Lymphocytes, Absolute 1.14 0.70 - 3.10 10*3/mm3    Monocytes, Absolute 0.73 0.10 - 0.90 10*3/mm3    Eosinophils, Absolute 0.09 0.00 - 0.40 10*3/mm3    Basophils, Absolute 0.05 0.00 - 0.20 10*3/mm3    Immature Grans, Absolute 0.02 0.00 - 0.05 10*3/mm3   ECG 12 Lead QT Measurement    Collection Time: 05/21/25  2:48 AM   Result Value Ref Range    QT Interval 413 ms    QTC Interval 424 ms   Urinalysis With Microscopic If Indicated (No Culture) - Urine, Clean Catch    Collection Time: 05/21/25  3:28 AM    Specimen: Urine, Clean Catch   Result Value Ref Range    Color, UA Yellow Yellow, Straw    Appearance, UA Clear Clear    pH, UA <=5.0 5.0 - 8.0    Specific Gravity, UA 1.014 1.005 - 1.030    Glucose, UA Negative Negative     Ketones, UA Negative Negative    Bilirubin, UA Negative Negative    Blood, UA Trace (A) Negative    Protein, UA Trace (A) Negative    Leuk Esterase, UA Negative Negative    Nitrite, UA Negative Negative    Urobilinogen, UA 0.2 E.U./dL 0.2 - 1.0 E.U./dL   Urinalysis, Microscopic Only - Urine, Clean Catch    Collection Time: 05/21/25  3:28 AM    Specimen: Urine, Clean Catch   Result Value Ref Range    RBC, UA 0-2 None Seen, 0-2 /HPF    WBC, UA None Seen None Seen, 0-2 /HPF    Bacteria, UA None Seen None Seen /HPF    Squamous Epithelial Cells, UA 0-2 None Seen, 0-2 /HPF    Hyaline Casts, UA 0-2 None Seen /LPF    Methodology Automated Microscopy      Lab Comments:  My independent interpretation of the above labs: Consistent with known history      Radiology:  CT Abdomen Pelvis With Contrast  Addendum Date: 5/21/2025  ADDENDUM: 05 21 25 04:23 Call Doctor Regarding Above results, called  Dr. YU   on 05 21 04:24 (-04:00)     Result Date: 5/21/2025  CR Patient: JAMAR GREENWOOD  Time Out: 04:21 Exam(s): CT ABDOMEN + PELVIS With Contrast EXAM:   CT Abdomen and Pelvis With Intravenous Contrast CLINICAL HISTORY:     Periumbilical abdominal pain, history colectomy secondary to ulcerative colitis. TECHNIQUE:   Axial computed tomography images of the abdomen and pelvis with intravenous contrast.  CTDI is 13.72 mGy and DLP is 551.7 mGy-cm.  This CT exam was performed according to the principle of ALARA (As Low As Reasonably Achievable) by using one or more of the following dose reduction techniques: automated exposure control, adjustment of the mA and or kV according to patient size, and or use of iterative reconstruction technique. COMPARISON:   No relevant prior studies available. FINDINGS:   Lung bases:  Unremarkable.  No mass.  No consolidation.  ABDOMEN:   Liver:  Unremarkable.  No mass.   Gallbladder and bile ducts:  The gallbladder appears somewhat hydropic.  There are subcentimeter calcified gallstones layering  posteriorly in the gallbladder.  No gallbladder wall thickening.  Normal central intrahepatic biliary ectasia.  The common bile duct is within normal limits for the patient's age.   Pancreas:  Unremarkable.  No mass.  No ductal dilation.   Spleen:  Unremarkable.  No splenomegaly.   Adrenals:  Unremarkable.  No mass.   Kidneys and ureters:  Unremarkable.  No solid mass.  No hydronephrosis.   Stomach and bowel:  Subtotal colectomy with a right upper quadrant ileostomy noted.  There is extensive peristomal herniation of multiple small bowel loops in the subcutaneous soft tissues.  These herniated bowel loops are decompressed.  However, there is prominent mesenteric and peristomal fat stranding with minimal adjacent fluid.  There are abnormal fluid and gas distended small bowel loops throughout the abdomen and pelvis, measuring up to 2.9 cm in diameter.  There is abnormal fecal appearing material in the distal small bowel extending to the ostomy site.  PELVIS:   Appendix: Surgically absent.   Bladder:  Unremarkable.  No mass.   Reproductive:  Unremarkable as visualized.  ABDOMEN and PELVIS:   Intraperitoneal space:  Unremarkable.  No free air.  No significant fluid collection.   Bones joints:  Diffuse moderately severe degenerative changes throughout the thoracolumbar spine with scoliosis, convex left.  No acute osseous abnormality.  No dislocation.   Soft tissues:  See above.   Vasculature:  Diffuse atherosclerotic calcification and tortuosity of the abdominal aorta.  There is ectasia of the infrarenal aorta, measuring up to 3.1 x 3.4 cm.  There is also ectasia and tortuosity of the common iliac arteries.  No dissection or acute periaortic abnormality noted.   Lymph nodes:  Unremarkable.  No enlarged lymph nodes. IMPRESSION:     1.  Subtotal colectomy with a right upper quadrant ileostomy noted.  There is extensive peristomal herniation of multiple small bowel loops in the subcutaneous soft tissues.  These herniated  bowel loops are decompressed.  However, there is prominent mesenteric and peristomal fat stranding with minimal adjacent fluid.  The appearance is most consistent with edema inflammation suggesting developing incarceration. 2.  There are abnormal fluid and gas distended small bowel loops throughout the abdomen and pelvis, measuring up to 2.9 cm in diameter.  There is abnormal fecal appearing material in the distal small bowel extending to the ostomy site.  Findings are consistent with a small bowel obstruction.  No pneumatosis or pneumoperitoneum.     Communications:  Call Doctor Above results Electronically signed by Donn Serra MD on 05-21-25 at 0421    Radiology Comments:  I ordered the above imaging and reviewed the results.    My independent interpretation of the CT abdomen and pelvis: Parastomal hernia with evidence of bowel obstruction    EKG Interpreted by me: Normal sinus rhythm, low voltage, no QTc prolongation      Rationale:  This is a 85-year-old male who is presenting today secondary to periumbilical abdominal pain.  He has a history of a total colectomy secondary to ulcerative colitis about 30 years ago.  He has done well since.    Presents afebrile with unremarkable vital signs.  Exam does note a parastomal hernia which is partially reducible, but not completely on exam.  He does not seem to have much tenderness with me manipulating the hernia contents and does not have any overlying skin changes.     He was evaluated with labs, which demonstrate mild anemia, thrombocytopenia, which is chronic per report.  His CMP is fairly unremarkable.  UA does not demonstrate any evidence of infection.    He was evaluated with a CT scan of the abdomen and pelvis, which is concerning for developing incarcerated hernia with associated small bowel obstruction.  Will plan on general surgery consultation.    Clinical Scores:                                   Progress Notes:  4:46 AM EDT : I spoke with the patient  about his ED work up and diagnosis. I informed the patient that he will be admitted for further evaluation/treatment. All questions answered.      Consult:  4:34 AM EDT: Discussed case with Dr. Garcia, general surgery.  Reviewed history, exam, results and treatments.  He will consult, will admit to medicine team   4:40 AM EDT: I spoke with SHEELA Smith with A, will admit to Dr. Turcios            Complexity of Care:  The patient requires admission.    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

## 2025-05-21 NOTE — PLAN OF CARE
Goal Outcome Evaluation:  Plan of Care Reviewed With: patient, suzanne(abdifatah)        Progress: improving  Outcome Evaluation: Alert and oriented, occ forgetful. RA. Voiding per urinal. 325ml out from ostomy this shift. Tolerating clear liquid diet. Denies any further ABD pain. Grandson at bedside, supportive. Name and number added to sticky note. Call with any needs/information.  Problem: Adult Inpatient Plan of Care  Goal: Plan of Care Review  Outcome: Progressing  Flowsheets (Taken 5/21/2025 1622)  Progress: improving  Outcome Evaluation: Alert and oriented, occ forgetful. RA. Voiding per urinal. 325ml out from ostomy this shift.  Plan of Care Reviewed With:   patient   suzanne(abdifatah)  Goal: Patient-Specific Goal (Individualized)  Outcome: Progressing  Goal: Absence of Hospital-Acquired Illness or Injury  Outcome: Progressing  Intervention: Identify and Manage Fall Risk  Description: Perform standard risk assessment on admission using a validated tool or comprehensive approach appropriate to the patient; reassess fall risk frequently, with change in status or transfer to another level of care.Communicate risk to interprofessional healthcare team; ensure fall risk visible cue.Determine need for increased observation, equipment and environmental modification, as well as use of supportive, nonskid footwear.Adjust safety measures to individual needs and identified risk factors.Reinforce the importance of active participation with fall risk prevention, safety, and physical activity with the patient and family.Perform regular intentional rounding to assess need for position change, pain assessment and personal needs, including assistance with toileting.  Recent Flowsheet Documentation  Taken 5/21/2025 1600 by Fabienne Hernandes RN  Safety Promotion/Fall Prevention:   assistive device/personal items within reach   clutter free environment maintained   fall prevention program maintained   nonskid shoes/slippers when out of  bed   room organization consistent   safety round/check completed  Taken 5/21/2025 1400 by Fabienne Hernnades, RN  Safety Promotion/Fall Prevention:   assistive device/personal items within reach   clutter free environment maintained   fall prevention program maintained   nonskid shoes/slippers when out of bed   room organization consistent   safety round/check completed  Taken 5/21/2025 1200 by Fabienne Hernandes, RN  Safety Promotion/Fall Prevention:   assistive device/personal items within reach   clutter free environment maintained   fall prevention program maintained   nonskid shoes/slippers when out of bed   room organization consistent   safety round/check completed  Taken 5/21/2025 1000 by Fabienne Hernandes, RN  Safety Promotion/Fall Prevention:   assistive device/personal items within reach   clutter free environment maintained   fall prevention program maintained   nonskid shoes/slippers when out of bed   room organization consistent   safety round/check completed  Taken 5/21/2025 0810 by Fabienne Hernandes, RN  Safety Promotion/Fall Prevention:   assistive device/personal items within reach   clutter free environment maintained   fall prevention program maintained   nonskid shoes/slippers when out of bed   room organization consistent   safety round/check completed  Intervention: Prevent Skin Injury  Description: Perform a screening for skin injury risk, such as pressure or moisture-associated skin damage on admission and at regular intervals throughout hospital stay.Keep all areas of skin (especially folds) clean and dry.Maintain adequate skin hydration.Relieve and redistribute pressure and protect bony prominences and skin at risk for injury; implement measures based on patient-specific risk factors.Match turning and repositioning schedule to clinical condition.Encourage weight shift frequently; assist with reposition if unable to complete independently.Float heels off bed; avoid pressure on the Achilles tendon.Keep  skin free from extended contact with medical devices.Optimize nutrition and hydration.Encourage functional activity and mobility, as early as tolerated.Use aids (e.g., slide boards, mechanical lift) during transfer.  Recent Flowsheet Documentation  Taken 5/21/2025 1600 by Fabienne Hernandes RN  Body Position:   position changed independently   neutral body alignment   neutral head position   sitting up in bed  Taken 5/21/2025 1400 by Fabienne Hernandes RN  Body Position:   tilted   right  Taken 5/21/2025 1200 by Fabienne Hernandes RN  Body Position:   left   tilted  Taken 5/21/2025 1000 by Fabienne Hernandes RN  Body Position:   right   tilted   neutral body alignment   neutral head position  Taken 5/21/2025 0810 by Fabienne Hernandes RN  Body Position:   position changed independently   neutral body alignment   neutral head position   sitting up in bed  Intervention: Prevent Infection  Description: Maintain skin and mucous membrane integrity; promote hand, oral and pulmonary hygiene.Optimize fluid balance, nutrition, sleep and glycemic control to maximize infection resistance.Identify potential sources of infection early to prevent or mitigate progression of infection (e.g., wound, lines, devices).Evaluate ongoing need for invasive devices; remove promptly when no longer indicated.Review vaccination status.  Recent Flowsheet Documentation  Taken 5/21/2025 1600 by Fabienne Hernandes RN  Infection Prevention:   environmental surveillance performed   equipment surfaces disinfected   hand hygiene promoted   single patient room provided  Taken 5/21/2025 1400 by Fabienne Hernandes RN  Infection Prevention:   environmental surveillance performed   equipment surfaces disinfected   hand hygiene promoted   single patient room provided  Taken 5/21/2025 1200 by Fabienne Hernandes RN  Infection Prevention:   environmental surveillance performed   equipment surfaces disinfected   hand hygiene promoted   single patient room provided  Taken 5/21/2025  1000 by Fabienne Hernandes, RN  Infection Prevention:   environmental surveillance performed   equipment surfaces disinfected   hand hygiene promoted   single patient room provided  Taken 5/21/2025 0810 by Fabienne Hernandes, RN  Infection Prevention:   environmental surveillance performed   equipment surfaces disinfected   hand hygiene promoted   single patient room provided  Goal: Optimal Comfort and Wellbeing  Outcome: Progressing  Intervention: Provide Person-Centered Care  Description: Use a family-focused approach to care; encourage support system presence and participation.Develop trust and rapport by proactively providing information, encouraging questions, addressing concerns and offering reassurance.Acknowledge emotional response to hospitalization.Recognize and utilize personal coping strategies and strengths; develop goals via shared decision-making.Honor spiritual and cultural preferences.  Recent Flowsheet Documentation  Taken 5/21/2025 0810 by Fabienne Hernandes RN  Trust Relationship/Rapport:   care explained   choices provided   questions encouraged   emotional support provided   empathic listening provided   questions answered   reassurance provided   thoughts/feelings acknowledged  Goal: Readiness for Transition of Care  Outcome: Progressing     Problem: Fall Injury Risk  Goal: Absence of Fall and Fall-Related Injury  Outcome: Progressing  Intervention: Identify and Manage Contributors  Description: Develop a fall prevention plan, considering patient-centered interventions and family/caregiver involvement; identify and address patient's facilitators and barriers.Provide reorientation, appropriate sensory stimulation and routines with changes in mental status to decrease risk of fall.Promote use of personal vision and auditory aids.Assess assistance level required for safe and effective self-care; provide support as needed, such as toileting and mobilization. For age 65 and older, implement timed toileting  with assistance.Encourage physical activity, such as performance of mobility and self-care at highest level of patient ability, multicomponent exercise program and provision of appropriate assistive devices.If fall occurs, assess the severity of injury; implement fall injury protocol. Determine the cause and revise fall injury prevention plan.Regularly review and advocate for medication adjustment to decrease fall risk; consider administration times, polypharmacy and age.Balance adequate pain management with potential for oversedation.  Recent Flowsheet Documentation  Taken 5/21/2025 1600 by Fabienne Hernandes RN  Medication Review/Management: medications reviewed  Taken 5/21/2025 1400 by Fabienne Hernandes, RN  Medication Review/Management: medications reviewed  Taken 5/21/2025 1200 by Fabienne Hernandes RN  Medication Review/Management: medications reviewed  Taken 5/21/2025 1000 by Fabienne Hernandes, RN  Medication Review/Management: medications reviewed  Taken 5/21/2025 0810 by Fabienne Hernandes RN  Medication Review/Management: medications reviewed  Intervention: Promote Injury-Free Environment  Description: Provide a safe, barrier-free environment that encourages independent activity.Keep care area uncluttered and well-lighted.Determine need for increased observation or monitoring.Avoid use of devices that minimize mobility, such as restraints or indwelling urinary catheter.  Recent Flowsheet Documentation  Taken 5/21/2025 1600 by Fabienne Hernandes RN  Safety Promotion/Fall Prevention:   assistive device/personal items within reach   clutter free environment maintained   fall prevention program maintained   nonskid shoes/slippers when out of bed   room organization consistent   safety round/check completed  Taken 5/21/2025 1400 by Fabienne Hernandes RN  Safety Promotion/Fall Prevention:   assistive device/personal items within reach   clutter free environment maintained   fall prevention program maintained   nonskid shoes/slippers  when out of bed   room organization consistent   safety round/check completed  Taken 5/21/2025 1200 by Fabienne Hernandes, RN  Safety Promotion/Fall Prevention:   assistive device/personal items within reach   clutter free environment maintained   fall prevention program maintained   nonskid shoes/slippers when out of bed   room organization consistent   safety round/check completed  Taken 5/21/2025 1000 by Fabienne Hernandes, RN  Safety Promotion/Fall Prevention:   assistive device/personal items within reach   clutter free environment maintained   fall prevention program maintained   nonskid shoes/slippers when out of bed   room organization consistent   safety round/check completed  Taken 5/21/2025 0810 by Fabienne Hernandes, RN  Safety Promotion/Fall Prevention:   assistive device/personal items within reach   clutter free environment maintained   fall prevention program maintained   nonskid shoes/slippers when out of bed   room organization consistent   safety round/check completed

## 2025-05-21 NOTE — ED TRIAGE NOTES
To ER via EMS from home.  C/o mid abd pain  Denies N/V/D or constipation.  Pain progressively getting worse throughout the day.     Methadone without relief. States prescribed q12 but has been taking q8

## 2025-05-21 NOTE — NURSING NOTE
"   05/21/25 0845   Ileostomy Standard ([place], end) RLQ   No Placement date: If unknown, DO NOT use \"Add Comment\" note or Placement time: If unknown, DO NOT use \"Add Comment\" note found.   Present on Admission? Select all that apply: Unknown placement date/time  Ileostomy Type: Standard ([place], end)  Locati...   Stomal Appliance Clean;Dry;Intact;Drainable   Stoma Function stool;flatus   Stool Color brown   Stool Consistency creamy   Output (mL) 200 mL     OMS: Follow up Ostomy care, existing Ostomy appliance remains intact, good seal noted, no leaks, Convatec pouch with clamp placed. Stated to be  independent in his ostomy care. His family will bring him supplies that he uses. He does not want them from the hospital,  despite being provided.  The pouch was emptied.  The sacrococcygeal area and bilateral heel remain with intact skin and normal coloration.  WOCN will follow according his needs.    "
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
## 2025-05-22 ENCOUNTER — READMISSION MANAGEMENT (OUTPATIENT)
Dept: CALL CENTER | Facility: HOSPITAL | Age: 85
End: 2025-05-22
Payer: MEDICARE

## 2025-05-22 VITALS
HEART RATE: 79 BPM | WEIGHT: 163.58 LBS | DIASTOLIC BLOOD PRESSURE: 66 MMHG | RESPIRATION RATE: 16 BRPM | HEIGHT: 70 IN | OXYGEN SATURATION: 97 % | BODY MASS INDEX: 23.42 KG/M2 | SYSTOLIC BLOOD PRESSURE: 127 MMHG | TEMPERATURE: 97.7 F

## 2025-05-22 LAB
ANION GAP SERPL CALCULATED.3IONS-SCNC: 10 MMOL/L (ref 5–15)
BASOPHILS # BLD AUTO: 0.06 10*3/MM3 (ref 0–0.2)
BASOPHILS NFR BLD AUTO: 0.8 % (ref 0–1.5)
BUN SERPL-MCNC: 20 MG/DL (ref 8–23)
BUN/CREAT SERPL: 18.3 (ref 7–25)
CALCIUM SPEC-SCNC: 8.3 MG/DL (ref 8.6–10.5)
CHLORIDE SERPL-SCNC: 108 MMOL/L (ref 98–107)
CO2 SERPL-SCNC: 20 MMOL/L (ref 22–29)
CREAT SERPL-MCNC: 1.09 MG/DL (ref 0.76–1.27)
DEPRECATED RDW RBC AUTO: 43.4 FL (ref 37–54)
EGFRCR SERPLBLD CKD-EPI 2021: 66.5 ML/MIN/1.73
EOSINOPHIL # BLD AUTO: 0.27 10*3/MM3 (ref 0–0.4)
EOSINOPHIL NFR BLD AUTO: 3.5 % (ref 0.3–6.2)
ERYTHROCYTE [DISTWIDTH] IN BLOOD BY AUTOMATED COUNT: 11.8 % (ref 12.3–15.4)
GLUCOSE SERPL-MCNC: 89 MG/DL (ref 65–99)
HCT VFR BLD AUTO: 31.5 % (ref 37.5–51)
HGB BLD-MCNC: 10.4 G/DL (ref 13–17.7)
IMM GRANULOCYTES # BLD AUTO: 0.02 10*3/MM3 (ref 0–0.05)
IMM GRANULOCYTES NFR BLD AUTO: 0.3 % (ref 0–0.5)
LYMPHOCYTES # BLD AUTO: 1.46 10*3/MM3 (ref 0.7–3.1)
LYMPHOCYTES NFR BLD AUTO: 19 % (ref 19.6–45.3)
MAGNESIUM SERPL-MCNC: 1.9 MG/DL (ref 1.6–2.4)
MCH RBC QN AUTO: 33 PG (ref 26.6–33)
MCHC RBC AUTO-ENTMCNC: 33 G/DL (ref 31.5–35.7)
MCV RBC AUTO: 100 FL (ref 79–97)
MONOCYTES # BLD AUTO: 0.96 10*3/MM3 (ref 0.1–0.9)
MONOCYTES NFR BLD AUTO: 12.5 % (ref 5–12)
NEUTROPHILS NFR BLD AUTO: 4.9 10*3/MM3 (ref 1.7–7)
NEUTROPHILS NFR BLD AUTO: 63.9 % (ref 42.7–76)
PHOSPHATE SERPL-MCNC: 2.7 MG/DL (ref 2.5–4.5)
PLATELET # BLD AUTO: 92 10*3/MM3 (ref 140–450)
PMV BLD AUTO: 10.4 FL (ref 6–12)
POTASSIUM SERPL-SCNC: 4.3 MMOL/L (ref 3.5–5.2)
RBC # BLD AUTO: 3.15 10*6/MM3 (ref 4.14–5.8)
SODIUM SERPL-SCNC: 138 MMOL/L (ref 136–145)
WBC NRBC COR # BLD AUTO: 7.67 10*3/MM3 (ref 3.4–10.8)

## 2025-05-22 PROCEDURE — 84100 ASSAY OF PHOSPHORUS: CPT | Performed by: INTERNAL MEDICINE

## 2025-05-22 PROCEDURE — 85025 COMPLETE CBC W/AUTO DIFF WBC: CPT | Performed by: INTERNAL MEDICINE

## 2025-05-22 PROCEDURE — 80048 BASIC METABOLIC PNL TOTAL CA: CPT | Performed by: INTERNAL MEDICINE

## 2025-05-22 PROCEDURE — 96361 HYDRATE IV INFUSION ADD-ON: CPT

## 2025-05-22 PROCEDURE — G0378 HOSPITAL OBSERVATION PER HR: HCPCS

## 2025-05-22 PROCEDURE — 99232 SBSQ HOSP IP/OBS MODERATE 35: CPT | Performed by: STUDENT IN AN ORGANIZED HEALTH CARE EDUCATION/TRAINING PROGRAM

## 2025-05-22 PROCEDURE — 83735 ASSAY OF MAGNESIUM: CPT | Performed by: INTERNAL MEDICINE

## 2025-05-22 RX ADMIN — LEVOTHYROXINE SODIUM 125 MCG: 0.12 TABLET ORAL at 08:12

## 2025-05-22 RX ADMIN — Medication 10 ML: at 08:19

## 2025-05-22 RX ADMIN — TAMSULOSIN HYDROCHLORIDE 0.4 MG: 0.4 CAPSULE ORAL at 08:12

## 2025-05-22 RX ADMIN — SODIUM CHLORIDE 100 ML/HR: 9 INJECTION, SOLUTION INTRAVENOUS at 08:01

## 2025-05-22 RX ADMIN — METHADONE HYDROCHLORIDE 5 MG: 10 TABLET ORAL at 08:12

## 2025-05-22 NOTE — SIGNIFICANT NOTE
05/22/25 1340   OTHER   Discipline physical therapist  (Pt reports he is DC'ing today, denied PT needs, reports  he has 3 walkers and canes at home. will defer formal PT eval.)     Per chart review pt is SBA w walker.

## 2025-05-22 NOTE — OUTREACH NOTE
Prep Survey      Flowsheet Row Responses   [facility] facility patient discharged from? [facility]   Is LACE score < 7 ? Yes   Eligibility [facility]   Date of Admission 05/21/25   Date of Discharge 05/22/25   Discharge Disposition Home or Self Care   Discharge diagnosis Partial small bowel obstruction   Does the patient have one of the following disease processes/diagnoses(primary or secondary)? Other   Does the patient have Home health ordered? No   Is there a DME ordered? No   Prep survey completed? Yes            OCTAVIO A - Registered Nurse

## 2025-05-22 NOTE — DISCHARGE SUMMARY
"Date of Admission: 5/21/2025  Date of Discharge:  5/22/2025  Primary Care Physician: Rashid Klein MD     Discharge Diagnosis:  Active Hospital Problems    Diagnosis  POA    **Partial small bowel obstruction [K56.600]  Yes    Parastomal hernia [K43.5]  Yes    BPH (benign prostatic hyperplasia) [N40.0]  Yes    Presence of colostomy [Z93.3]  Not Applicable    Anemia of chronic disease [D63.8]  Yes    Thrombocytopenia [D69.6]  Yes    Esophageal reflux [K21.9]  Yes    Hypertension [I10]  Yes    Hypothyroidism [E03.9]  Yes    Ulcerative colitis [K51.90]  Yes      Resolved Hospital Problems   No resolved problems to display.       Presenting Problem/History of Present Illness:  Parastomal hernia with obstruction and without gangrene [K43.3]  Parastomal hernia [K43.5]  Partial small bowel obstruction [K56.600]     Hospital Course:  The patient is a 85 y.o. male with a history of HTN, Hypothyroidism, BPH, GERD, and ulcerative colitis s/p colectomy and ileostomy in 2000 who presented with abdominal pain and was found to have a partial small bowel obstruction. He was placed on IV fluids, pain control, and gut rest. CT abdomen/pelvis showed large parastomal hernia which he says he has had for years. He was evaluated by general surgery and they did not recommend surgical intervention. His symptoms resolved and his bowel function returned to normal. His diet was advanced and he tolerated this well. He is medically stable and will be discharged home.   Of note his blood pressures were on the low end and his norvasc and valsartan were held. His blood pressure remained normal. He showed me his home blood pressure log and his pressures do tend to run low. I have asked him to hold his norvasc and valsartan and to continue following his home blood pressures and keep close PCP follow up with these.     Exam Today:  Blood pressure 127/66, pulse 79, temperature 97.7 °F (36.5 °C), resp. rate 16, height 177.8 cm (70\"), weight 74.2 kg " (163 lb 9.3 oz), SpO2 97%.  Vitals and nursing note reviewed.   Constitutional:       General: He is not in acute distress.     Appearance: He is ill-appearing (chronically). He is not toxic-appearing or diaphoretic.   HENT:      Head: Normocephalic and atraumatic.      Nose: Nose normal.      Mouth/Throat:      Mouth: Mucous membranes are moist.      Pharynx: Oropharynx is clear.   Eyes:      Conjunctiva/sclera: Conjunctivae normal.      Pupils: Pupils are equal, round, and reactive to light.   Cardiovascular:      Rate and Rhythm: Normal rate and regular rhythm.      Pulses: Normal pulses.   Pulmonary:      Effort: Pulmonary effort is normal.      Breath sounds: Normal breath sounds.   Abdominal:      General: Bowel sounds are decreased.      Palpations: Abdomen is soft.      Hernia: A hernia (parastomal) is present.      Comments: Ileostomy in place   Musculoskeletal:         General: No swelling or tenderness.      Cervical back: Neck supple.   Skin:     General: Skin is warm and dry.      Capillary Refill: Capillary refill takes less than 2 seconds.   Neurological:      General: No focal deficit present.      Mental Status: He is alert and oriented to person, place, and time.   Psychiatric:         Mood and Affect: Mood normal.         Behavior: Behavior normal.     Procedures Performed:  CT Abdomen and Pelvis With Intravenous Contrast-5/21/25   CLINICAL HISTORY:      Periumbilical abdominal pain, history colectomy secondary to   ulcerative colitis.     TECHNIQUE:    Axial computed tomography images of the abdomen and pelvis with   intravenous contrast.  CTDI is 13.72 mGy and DLP is 551.7 mGy-cm.  This   CT exam was performed according to the principle of ALARA (As Low As   Reasonably Achievable) by using one or more of the following dose   reduction techniques: automated exposure control, adjustment of the mA   and or kV according to patient size, and or use of iterative   reconstruction technique.      COMPARISON:    No relevant prior studies available.     FINDINGS:    Lung bases:  Unremarkable.  No mass.  No consolidation.      ABDOMEN:    Liver:  Unremarkable.  No mass.    Gallbladder and bile ducts:  The gallbladder appears somewhat hydropic.    There are subcentimeter calcified gallstones layering posteriorly in the   gallbladder.  No gallbladder wall thickening.  Normal central   intrahepatic biliary ectasia.  The common bile duct is within normal   limits for the patient's age.    Pancreas:  Unremarkable.  No mass.  No ductal dilation.    Spleen:  Unremarkable.  No splenomegaly.    Adrenals:  Unremarkable.  No mass.    Kidneys and ureters:  Unremarkable.  No solid mass.  No hydronephrosis.    Stomach and bowel:  Subtotal colectomy with a right upper quadrant   ileostomy noted.  There is extensive peristomal herniation of multiple   small bowel loops in the subcutaneous soft tissues.  These herniated   bowel loops are decompressed.  However, there is prominent mesenteric and   peristomal fat stranding with minimal adjacent fluid.  There are abnormal   fluid and gas distended small bowel loops throughout the abdomen and   pelvis, measuring up to 2.9 cm in diameter.  There is abnormal fecal   appearing material in the distal small bowel extending to the ostomy site.      PELVIS:    Appendix: Surgically absent.    Bladder:  Unremarkable.  No mass.    Reproductive:  Unremarkable as visualized.      ABDOMEN and PELVIS:    Intraperitoneal space:  Unremarkable.  No free air.  No significant   fluid collection.    Bones joints:  Diffuse moderately severe degenerative changes   throughout the thoracolumbar spine with scoliosis, convex left.  No acute   osseous abnormality.  No dislocation.    Soft tissues:  See above.    Vasculature:  Diffuse atherosclerotic calcification and tortuosity of   the abdominal aorta.  There is ectasia of the infrarenal aorta, measuring   up to 3.1 x 3.4 cm.  There is also ectasia and  tortuosity of the common   iliac arteries.  No dissection or acute periaortic abnormality noted.    Lymph nodes:  Unremarkable.  No enlarged lymph nodes.     IMPRESSION:       1.  Subtotal colectomy with a right upper quadrant ileostomy noted.    There is extensive peristomal herniation of multiple small bowel loops in   the subcutaneous soft tissues.  These herniated bowel loops are   decompressed.  However, there is prominent mesenteric and peristomal fat   stranding with minimal adjacent fluid.  The appearance is most consistent   with edema inflammation suggesting developing incarceration.  2.  There are abnormal fluid and gas distended small bowel loops   throughout the abdomen and pelvis, measuring up to 2.9 cm in diameter.    There is abnormal fecal appearing material in the distal small bowel   extending to the ostomy site.  Findings are consistent with a small bowel   obstruction.  No pneumatosis or pneumoperitoneum.    Consults:   Consults       Date and Time Order Name Status Description    5/21/2025  4:44 AM Inpatient General Surgery Consult Completed     5/21/2025  4:34 AM [facility] (on-call MD unless specified) Details      5/21/2025  4:23 AM Surgery (on-call MD unless specified) Completed              Discharge Disposition:  Home or Self Care    Discharge Medications:     Discharge Medications        Continue These Medications        Instructions Start Date   B-12 1000 MCG capsule   1 capsule, Daily      fish oil 1000 MG capsule capsule   1,000 mg, Daily With Breakfast      glucosamine sulfate 500 MG capsule capsule   500 mg, 3 times daily      levothyroxine 125 MCG tablet  Commonly known as: SYNTHROID, LEVOTHROID   125 mcg, Oral, Daily      methadone 5 MG tablet  Commonly known as: DOLOPHINE   5 mg, Oral, Every 12 Hours Scheduled      multivitamin with minerals tablet tablet   1 tablet, Daily      naloxone 4 MG/0.1ML nasal spray  Commonly known as: NARCAN   Call 911. Don't prime. Spray in 1 nostril for  overdose. Repeat in 2-3 minutes in other nostril if no or minimal breathing/responsiveness.      tamsulosin 0.4 MG capsule 24 hr capsule  Commonly known as: FLOMAX   0.4 mg, Oral, Daily      VITAMIN D3 PO   1 each, Daily             Stop These Medications      amLODIPine 5 MG tablet  Commonly known as: NORVASC     valsartan 80 MG tablet  Commonly known as: DIOVAN              Discharge Diet:   Diet Instructions       Diet: Regular/House Diet; Regular (IDDSI 7); Thin (IDDSI 0)      Discharge Diet: Regular/House Diet    Texture: Regular (IDDSI 7)    Fluid Consistency: Thin (IDDSI 0)            Activity at Discharge:   Activity Instructions       Activity as Tolerated              Follow-up Appointments:  Future Appointments   Date Time Provider Department Center   5/23/2025  7:30 AM Rashid Klein MD [facility]   6/9/2025  9:40 AM London Bell MD [facility] PM [facility]   11/12/2025  2:30 PM LAB CHAIR [facility]   11/12/2025  3:00 PM June Smiley MD [facility]     Additional Instructions for the Follow-ups that You Need to Schedule       Discharge Follow-up with PCP   As directed       Currently Documented PCP:    Rashid Klein MD    PCP Phone Number:    [phone]     Follow Up Details: 1 week                Donn Johnson MD  05/22/25  10:13 EDT    Time Spent on Discharge Activities: Greater than 30 minutes.

## 2025-05-22 NOTE — PROGRESS NOTES
"General Surgery Progress Note    Summary: Mr. Rosas Christianson is a 85 y.o. year old gentleman with an incarcerated parastomal hernia, now clinically resolved.  Tolerating a diet and having ostomy output.  Okay for discharge from surgery standpoint.  Follow-up as needed.    Chief Complaint: Abdominal pain    Interval Events: No acute events overnight.  Having ostomy output.  No nausea or vomiting.    Vitals: /66 (BP Location: Right arm, Patient Position: Lying)   Pulse 79   Temp 97.7 °F (36.5 °C)   Resp 16   Ht 177.8 cm (70\")   Wt 74.2 kg (163 lb 9.3 oz)   SpO2 97%   BMI 23.47 kg/m²     GENERAL: alert, well appearing, and in no distress  HEENT: normocephalic, atraumatic, moist mucous membranes, clear sclerae   CHEST: no increased work of breathing, symmetric air entry  CARDIAC: regular rate and rhythm    ABDOMEN: Soft, ostomy with stool in the bag  EXTREMITIES: no cyanosis, clubbing, or edema   SKIN: Warm and moist, no rashes    Labs:  Results from last 7 days   Lab Units 05/22/25  0717 05/21/25  0748 05/21/25  0222   WBC 10*3/mm3 7.67 7.78 8.26   HEMOGLOBIN g/dL 10.4* 10.5* 11.6*   HEMATOCRIT % 31.5* 31.3* 36.2*   PLATELETS 10*3/mm3 92* 98* 104*     Results from last 7 days   Lab Units 05/22/25  0717 05/21/25  0748 05/21/25  0222   SODIUM mmol/L 138 137 137   POTASSIUM mmol/L 4.3 4.5 4.3   CHLORIDE mmol/L 108* 109* 105   CO2 mmol/L 20.0* 20.7* 20.0*   BUN mg/dL 20 36* 39*   CREATININE mg/dL 1.09 1.07 1.08   CALCIUM mg/dL 8.3* 8.9 9.5   BILIRUBIN mg/dL  --   --  0.4   ALK PHOS U/L  --   --  90   ALT (SGPT) U/L  --   --  19   AST (SGOT) U/L  --   --  38   GLUCOSE mg/dL 89 90 109*           GILA BUCIO MD  General and Endoscopic Surgery  [facility]    [address], [address]  [place], [place], [address]  P: [phone]  F: [phone]     "

## 2025-05-23 ENCOUNTER — TRANSITIONAL CARE MANAGEMENT TELEPHONE ENCOUNTER (OUTPATIENT)
Dept: CALL CENTER | Facility: HOSPITAL | Age: 85
End: 2025-05-23
Payer: MEDICARE

## 2025-05-23 ENCOUNTER — OFFICE VISIT (OUTPATIENT)
Dept: INTERNAL MEDICINE | Facility: CLINIC | Age: 85
End: 2025-05-23
Payer: MEDICARE

## 2025-05-23 VITALS
OXYGEN SATURATION: 99 % | DIASTOLIC BLOOD PRESSURE: 62 MMHG | WEIGHT: 164.8 LBS | BODY MASS INDEX: 23.59 KG/M2 | HEART RATE: 72 BPM | HEIGHT: 70 IN | TEMPERATURE: 97.9 F | SYSTOLIC BLOOD PRESSURE: 114 MMHG

## 2025-05-23 DIAGNOSIS — Z93.3 PRESENCE OF COLOSTOMY: ICD-10-CM

## 2025-05-23 DIAGNOSIS — R35.1 NOCTURIA: ICD-10-CM

## 2025-05-23 DIAGNOSIS — E03.9 ACQUIRED HYPOTHYROIDISM: Primary | ICD-10-CM

## 2025-05-23 DIAGNOSIS — I10 PRIMARY HYPERTENSION: ICD-10-CM

## 2025-05-23 DIAGNOSIS — Z00.00 MEDICARE ANNUAL WELLNESS VISIT, SUBSEQUENT: ICD-10-CM

## 2025-05-23 RX ORDER — TAMSULOSIN HYDROCHLORIDE 0.4 MG/1
2 CAPSULE ORAL DAILY
Qty: 180 CAPSULE | Refills: 1 | Status: SHIPPED | OUTPATIENT
Start: 2025-05-23

## 2025-05-23 NOTE — PROGRESS NOTES
"Transitional Care Follow Up Visit  Subjective     Rosas Christianson is a 85 y.o. male who presents for a transitional care management visit.    Within 48 business hours after discharge our office contacted him via telephone to coordinate his care and needs.      I reviewed and discussed the details of that call along with the discharge summary, hospital problems, inpatient lab results, inpatient diagnostic studies, and consultation reports with Rosas.     Current outpatient and discharge medications have been reconciled for the patient.  Reviewed by: Rashid Klein MD          5/22/2025     5:49 PM   Date of TCM Phone Call   [facility]   Date of Admission 5/21/2025   Date of Discharge 5/22/2025   Discharge Disposition Home or Self Care     Risk for Readmission (LACE) Score: 4 (5/22/2025  6:00 AM)      History of Present Illness   Course During Hospital Stay:  who presented with abdominal pain and was found to have a partial small bowel obstruction. He was placed on IV fluids, pain control, and gut rest. CT abdomen/pelvis showed large parastomal hernia which he says he has had for years. He was evaluated by general surgery and they did not recommend surgical intervention. His symptoms resolved and his bowel function returned to normal. His diet was advanced and he tolerated this well. He is medically stable and will be discharged home.    history of HTN, Hypothyroidism, BPH, GERD, and ulcerative colitis s/p colectomy and ileostomy in 2000   The following portions of the patient's history were reviewed and updated as appropriate: allergies, current medications, past family history, past medical history, past social history, past surgical history, and problem list.    Review of Systems    Objective   /62   Pulse 72   Temp 97.9 °F (36.6 °C)   Ht 177.8 cm (70\")   Wt 74.8 kg (164 lb 12.8 oz)   SpO2 99%   BMI 23.65 kg/m²   Physical Exam  Vitals and nursing note reviewed.   Constitutional:       " Appearance: Normal appearance. He is not ill-appearing.   HENT:      Head: Normocephalic and atraumatic.      Right Ear: Tympanic membrane, ear canal and external ear normal.      Left Ear: Tympanic membrane, ear canal and external ear normal.   Eyes:      Comments: Eye left eye ptosis decreased vision chronic   Neck:      Vascular: No carotid bruit.   Cardiovascular:      Rate and Rhythm: Normal rate and regular rhythm.      Pulses: Normal pulses.      Heart sounds: Normal heart sounds.   Pulmonary:      Effort: Pulmonary effort is normal.      Breath sounds: Normal breath sounds.   Abdominal:      Tenderness: There is no right CVA tenderness or left CVA tenderness.   Musculoskeletal:      Cervical back: Normal range of motion. No tenderness.      Right lower leg: Edema present.      Left lower leg: Edema present.   Lymphadenopathy:      Cervical: No cervical adenopathy.   Skin:     General: Skin is warm and dry.   Neurological:      Mental Status: He is alert.   Psychiatric:         Mood and Affect: Mood normal.         Behavior: Behavior normal.         Thought Content: Thought content normal.         Judgment: Judgment normal.         Assessment & Plan   Diagnoses and all orders for this visit:    1. Acquired hypothyroidism (Primary)  Assessment & Plan:    Orders:    TSH; Future      Orders:  -     TSH; Future    2. Nocturia  -     tamsulosin (FLOMAX) 0.4 MG capsule 24 hr capsule; Take 2 capsules by mouth Daily.  Dispense: 180 capsule; Refill: 1    3. Primary hypertension  Assessment & Plan:    4. Presence of colostomy  Assessment & Plan:         Increase Flomax 2 pills daily  Monitor abdominal pain increase fluids 60 ounces water daily  Follow-up otherwise as needed or 3 months

## 2025-05-23 NOTE — OUTREACH NOTE
Call Center TCM Note      Flowsheet Row Responses   [facility] patient discharged from? [facility]   Does the patient have one of the following disease processes/diagnoses(primary or secondary)? Other   TCM attempt successful? Yes   Discharge diagnosis Partial small bowel obstruction   Comments Pt completed HOSP DC FU appt with PCP this am. No TCM call required. TCM complete.   Does the patient have an appointment with their PCP within 7-14 days of discharge? Yes   TCM call completed? Yes            SALINA BENITEZ - Registered Nurse    5/23/2025, 11:27 EDT

## 2025-05-23 NOTE — CASE MANAGEMENT/SOCIAL WORK
Case Management Discharge Note      Final Note: Home with family transport         Selected Continued Care - Discharged on 5/22/2025 Admission date: 5/21/2025 - Discharge disposition: Home or Self Care      Destination    No services have been selected for the patient.                Durable Medical Equipment    No services have been selected for the patient.                Dialysis/Infusion    No services have been selected for the patient.                Home Medical Care    No services have been selected for the patient.                Therapy    No services have been selected for the patient.                Community Resources    No services have been selected for the patient.                Community & DME    No services have been selected for the patient.                    Transportation Services  Private: Car    Final Discharge Disposition Code: 01 - home or self-care

## 2025-05-23 NOTE — PROGRESS NOTES
Subjective   The ABCs of the Annual Wellness Visit  Medicare Wellness Visit      Rosas Christianson is a 85 y.o. patient who presents for a Medicare Wellness Visit.    The following portions of the patient's history were reviewed and   updated as appropriate: allergies, current medications, past family history, past medical history, past social history, past surgical history, and problem list.    Compared to one year ago, the patient's physical   health is the same.  Compared to one year ago, the patient's mental   health is the same.    Recent Hospitalizations:  This patient has had a [facility] admission record on file within the last 365 days.  Current Medical Providers:  Patient Care Team:  Rashid Klein MD as PCP - General (Family Medicine)  June Smiley MD as Consulting Physician (Hematology and Oncology)  Mann Bee MD as Referring Physician (Gastroenterology)  Garfield Santacruz MD as Consulting Physician (Dermatology)    Outpatient Medications Prior to Visit   Medication Sig Dispense Refill    Cholecalciferol (VITAMIN D3 PO) Take 1 each by mouth Daily.      Cyanocobalamin (B-12) 1000 MCG capsule Take 1 capsule by mouth Daily.      glucosamine sulfate 500 MG capsule capsule Take 1 capsule by mouth 3 times a day.      methadone (DOLOPHINE) 5 MG tablet Take 1 tablet by mouth Every 12 (Twelve) Hours. 60 tablet 0    Multiple Vitamins-Minerals (MULTIVITAMIN ADULT PO) Take 1 tablet by mouth Daily.      naloxone (NARCAN) 4 MG/0.1ML nasal spray Call 911. Don't prime. [place] in 1 nostril for overdose. Repeat in 2-3 minutes in other nostril if no or minimal breathing/responsiveness. 1 each 1    Omega-3 Fatty Acids (fish oil) 1000 MG capsule capsule Take 1 capsule by mouth Daily With Breakfast.      tamsulosin (FLOMAX) 0.4 MG capsule 24 hr capsule Take 1 capsule by mouth Daily. 90 capsule 1    levothyroxine (SYNTHROID, LEVOTHROID) 125 MCG tablet TAKE ONE TABLET BY MOUTH DAILY 90 tablet 1  "    No facility-administered medications prior to visit.     Opioid medication/s are on active medication list.  and I have evaluated his active treatment plan and pain score trends (see table).  Vitals:    05/23/25 0734   PainSc: 0-No pain     I have reviewed the chart for potential of high risk medication and harmful drug interactions in the elderly.        Aspirin is not on active medication list.  Aspirin use is not indicated based on review of current medical condition/s. Risk of harm outweighs potential benefits.  .    Patient Active Problem List   Diagnosis    Post-traumatic osteoarthritis of multiple joints    Esophageal reflux    Hypertension    Hypothyroidism    Neuropathy    Ulcerative colitis    H/O colectomy    Anemia of chronic disease    Thrombocytopenia    History of frostbite    Arthritis    Presence of colostomy    Health care maintenance    Bilateral foot pain    ERRONEOUS ENCOUNTER--DISREGARD    Medicare annual wellness visit, subsequent    Primary osteoarthritis of both knees    Lung nodule    Anxiety    Chronic pain of both shoulders    Edema    Chronic left SI joint pain    Chronic right SI joint pain    Acute bilateral low back pain without sciatica    Sacral fracture    BPH (benign prostatic hyperplasia)    Shortness of breath    Chronic left shoulder pain    Cancer    Parastomal hernia    Partial small bowel obstruction     Advance Care Planning Advance Directive is on file.  ACP discussion was held with the patient during this visit. Patient has an advance directive in EMR which is still valid.             Objective   Vitals:    05/23/25 0734   BP: 114/62   Pulse: 72   Temp: 97.9 °F (36.6 °C)   SpO2: 99%   Weight: 74.8 kg (164 lb 12.8 oz)   Height: 177.8 cm (70\")   PainSc: 0-No pain       Estimated body mass index is 23.65 kg/m² as calculated from the following:    Height as of this encounter: 177.8 cm (70\").    Weight as of this encounter: 74.8 kg (164 lb 12.8 oz).    BMI is within normal " parameters. No other follow-up for BMI required.           Does the patient have evidence of cognitive impairment? No                                                                                               Health  Risk Assessment    Smoking Status:  Social History     Tobacco Use   Smoking Status Former    Current packs/day: 0.00    Average packs/day: 3.0 packs/day for 33.0 years (99.1 ttl pk-yrs)    Types: Cigarettes    Start date: 1956    Quit date: 1985    Years since quittin.1   Smokeless Tobacco Never   Tobacco Comments    parents gave them to me     Alcohol Consumption:  Social History     Substance and Sexual Activity   Alcohol Use Not Currently    Comment: rare       Fall Risk Screen  STEADI Fall Risk Assessment was completed, and patient is at HIGH risk for falls. Assessment completed on:2025    Depression Screening   Little interest or pleasure in doing things? Not at all   Feeling down, depressed, or hopeless? Not at all   PHQ-2 Total Score 0      Health Habits and Functional and Cognitive Screenin/23/2025     7:38 AM   Functional & Cognitive Status   Do you have difficulty preparing food and eating? No   Do you have difficulty bathing yourself, getting dressed or grooming yourself? No   Do you have difficulty using the toilet? No   Do you have difficulty moving around from place to place? No   Do you have trouble with steps or getting out of a bed or a chair? No   Current Diet Well Balanced Diet   Dental Exam Not up to date   Eye Exam Not up to date   Exercise (times per week) 6 times per week   Current Exercises Include Stationary Bicycling/Spin Class   Do you need help using the phone?  No   Are you deaf or do you have serious difficulty hearing?  No   Do you need help to go to places out of walking distance? No   Do you need help shopping? No   Do you need help preparing meals?  No   Do you need help with housework?  No   Do you need help with laundry? No   Do you  need help taking your medications? No   Do you need help managing money? No   Do you ever drive or ride in a car without wearing a seat belt? No   Have you felt unusual stress, anger or loneliness in the last month? No   Who do you live with? Alone   If you need help, do you have trouble finding someone available to you? No   Have you been bothered in the last four weeks by sexual problems? No   Do you have difficulty concentrating, remembering or making decisions? Yes           Age-appropriate Screening Schedule:  Refer to the list below for future screening recommendations based on patient's age, sex and/or medical conditions. Orders for these recommended tests are listed in the plan section. The patient has been provided with a written plan.    Health Maintenance List  Health Maintenance   Topic Date Due    RSV Vaccine - Adults (1 - 1-dose 75+ series) Never done    ZOSTER VACCINE (2 of 2) 12/19/2016    ANNUAL WELLNESS VISIT  04/28/2024    COVID-19 Vaccine (5 - 2024-25 season) 06/06/2025 (Originally 9/1/2024)    INFLUENZA VACCINE  07/01/2025    TDAP/TD VACCINES (3 - Td or Tdap) 10/13/2034    Pneumococcal Vaccine 50+  Completed                                                                                                                                                CMS Preventative Services Quick Reference  Risk Factors Identified During Encounter  Chronic Pain: Natural history and expected course discussed. Questions answered.  Immunizations Discussed/Encouraged: Shingrix    The above risks/problems have been discussed with the patient.  Pertinent information has been shared with the patient in the After Visit Summary.  An After Visit Summary and PPPS were made available to the patient.    Follow Up:   Next Medicare Wellness visit to be scheduled in 1 year.     Assessment & Plan  Nocturia    Orders:    tamsulosin (FLOMAX) 0.4 MG capsule 24 hr capsule; Take 2 capsules by mouth Daily.    Acquired  hypothyroidism    Orders:    TSH; Future    Primary hypertension           Presence of colostomy         Medicare annual wellness visit, subsequent              Follow Up:   Return in about 1 year (around 5/23/2026) for Medicare Wellness.        Answers submitted by the patient for this visit:  Problem not listed (Submitted on 5/23/2025)  Chief Complaint: Other medical problem  Reason for appointment: ulcer attack  abdominal pain: Yes  anorexia: No  joint pain: No  change in stool: No  chest pain: No  chills: No  cough: No  diaphoresis: No  fatigue: No  fever: No  headaches: No  joint swelling: No  myalgias: No  nausea: No  neck pain: No  numbness: No  rash: No  sore throat: No  swollen glands: No  vertigo: No  visual change: No  vomiting: No  weakness: No  Other symptom: stomac pain  Onset: in the past 7 days  Chronicity: recurrent  Frequency: intermittently  Medications tried: omeprazole  Additional information: had ulcers on and off for last 60 years

## 2025-06-01 ENCOUNTER — ANESTHESIA (OUTPATIENT)
Dept: PERIOP | Facility: HOSPITAL | Age: 85
End: 2025-06-01
Payer: MEDICARE

## 2025-06-01 ENCOUNTER — HOSPITAL ENCOUNTER (INPATIENT)
Facility: HOSPITAL | Age: 85
LOS: 2 days | Discharge: HOME | End: 2025-06-04
Attending: EMERGENCY MEDICINE | Admitting: HOSPITALIST
Payer: MEDICARE

## 2025-06-01 ENCOUNTER — APPOINTMENT (OUTPATIENT)
Dept: CT IMAGING | Facility: HOSPITAL | Age: 85
End: 2025-06-01
Payer: MEDICARE

## 2025-06-01 ENCOUNTER — ANESTHESIA EVENT (OUTPATIENT)
Dept: PERIOP | Facility: HOSPITAL | Age: 85
End: 2025-06-01
Payer: MEDICARE

## 2025-06-01 DIAGNOSIS — K56.600 PARTIAL SMALL BOWEL OBSTRUCTION: ICD-10-CM

## 2025-06-01 DIAGNOSIS — K45.0 INCARCERATED HERNIA OF ABDOMINAL CAVITY: ICD-10-CM

## 2025-06-01 DIAGNOSIS — R10.9 ACUTE ABDOMINAL PAIN: Primary | ICD-10-CM

## 2025-06-01 DIAGNOSIS — K43.4: ICD-10-CM

## 2025-06-01 DIAGNOSIS — K56.609 SMALL BOWEL OBSTRUCTION: ICD-10-CM

## 2025-06-01 LAB
ALBUMIN SERPL-MCNC: 3.9 G/DL (ref 3.5–5.2)
ALBUMIN/GLOB SERPL: 1.1 G/DL
ALP SERPL-CCNC: 103 U/L (ref 39–117)
ALT SERPL W P-5'-P-CCNC: 26 U/L (ref 1–41)
ANION GAP SERPL CALCULATED.3IONS-SCNC: 14 MMOL/L (ref 5–15)
AST SERPL-CCNC: 43 U/L (ref 1–40)
BACTERIA UR QL AUTO: NORMAL /HPF
BASOPHILS # BLD AUTO: 0.06 10*3/MM3 (ref 0–0.2)
BASOPHILS NFR BLD AUTO: 0.8 % (ref 0–1.5)
BILIRUB SERPL-MCNC: 0.6 MG/DL (ref 0–1.2)
BILIRUB UR QL STRIP: NEGATIVE
BUN SERPL-MCNC: 33 MG/DL (ref 8–23)
BUN/CREAT SERPL: 27.3 (ref 7–25)
CALCIUM SPEC-SCNC: 10 MG/DL (ref 8.6–10.5)
CHLORIDE SERPL-SCNC: 102 MMOL/L (ref 98–107)
CLARITY UR: CLEAR
CO2 SERPL-SCNC: 22 MMOL/L (ref 22–29)
COLOR UR: YELLOW
CREAT SERPL-MCNC: 1.21 MG/DL (ref 0.76–1.27)
D-LACTATE SERPL-SCNC: 2 MMOL/L (ref 0.5–2)
D-LACTATE SERPL-SCNC: 2.1 MMOL/L (ref 0.5–2)
DEPRECATED RDW RBC AUTO: 43.3 FL (ref 37–54)
EGFRCR SERPLBLD CKD-EPI 2021: 58.7 ML/MIN/1.73
EOSINOPHIL # BLD AUTO: 0.17 10*3/MM3 (ref 0–0.4)
EOSINOPHIL NFR BLD AUTO: 2.2 % (ref 0.3–6.2)
ERYTHROCYTE [DISTWIDTH] IN BLOOD BY AUTOMATED COUNT: 11.8 % (ref 12.3–15.4)
GLOBULIN UR ELPH-MCNC: 3.4 GM/DL
GLUCOSE SERPL-MCNC: 117 MG/DL (ref 65–99)
GLUCOSE UR STRIP-MCNC: NEGATIVE MG/DL
HCT VFR BLD AUTO: 37.5 % (ref 37.5–51)
HGB BLD-MCNC: 12.5 G/DL (ref 13–17.7)
HGB UR QL STRIP.AUTO: NEGATIVE
HYALINE CASTS UR QL AUTO: NORMAL /LPF
IMM GRANULOCYTES # BLD AUTO: 0.02 10*3/MM3 (ref 0–0.05)
IMM GRANULOCYTES NFR BLD AUTO: 0.3 % (ref 0–0.5)
KETONES UR QL STRIP: NEGATIVE
LEUKOCYTE ESTERASE UR QL STRIP.AUTO: NEGATIVE
LIPASE SERPL-CCNC: 54 U/L (ref 13–60)
LYMPHOCYTES # BLD AUTO: 1.44 10*3/MM3 (ref 0.7–3.1)
LYMPHOCYTES NFR BLD AUTO: 18.3 % (ref 19.6–45.3)
MCH RBC QN AUTO: 33.2 PG (ref 26.6–33)
MCHC RBC AUTO-ENTMCNC: 33.3 G/DL (ref 31.5–35.7)
MCV RBC AUTO: 99.7 FL (ref 79–97)
MONOCYTES # BLD AUTO: 0.62 10*3/MM3 (ref 0.1–0.9)
MONOCYTES NFR BLD AUTO: 7.9 % (ref 5–12)
NEUTROPHILS NFR BLD AUTO: 5.56 10*3/MM3 (ref 1.7–7)
NEUTROPHILS NFR BLD AUTO: 70.5 % (ref 42.7–76)
NITRITE UR QL STRIP: NEGATIVE
PH UR STRIP.AUTO: 5.5 [PH] (ref 5–8)
PLATELET # BLD AUTO: 138 10*3/MM3 (ref 140–450)
PMV BLD AUTO: 10.4 FL (ref 6–12)
POTASSIUM SERPL-SCNC: 4 MMOL/L (ref 3.5–5.2)
PROCALCITONIN SERPL-MCNC: 0.22 NG/ML (ref 0–0.25)
PROT SERPL-MCNC: 7.3 G/DL (ref 6–8.5)
PROT UR QL STRIP: ABNORMAL
RBC # BLD AUTO: 3.76 10*6/MM3 (ref 4.14–5.8)
RBC # UR STRIP: NORMAL /HPF
REF LAB TEST METHOD: NORMAL
SODIUM SERPL-SCNC: 138 MMOL/L (ref 136–145)
SP GR UR STRIP: >1.03 (ref 1–1.03)
SQUAMOUS #/AREA URNS HPF: NORMAL /HPF
UROBILINOGEN UR QL STRIP: ABNORMAL
WBC # UR STRIP: NORMAL /HPF
WBC NRBC COR # BLD AUTO: 7.87 10*3/MM3 (ref 3.4–10.8)

## 2025-06-01 PROCEDURE — 25810000003 SODIUM CHLORIDE 0.9 % SOLUTION: Performed by: SURGERY

## 2025-06-01 PROCEDURE — 25810000003 LACTATED RINGERS PER 1000 ML: Performed by: ANESTHESIOLOGY

## 2025-06-01 PROCEDURE — 25010000002 DEXAMETHASONE SODIUM PHOSPHATE 20 MG/5ML SOLUTION: Performed by: NURSE ANESTHETIST, CERTIFIED REGISTERED

## 2025-06-01 PROCEDURE — 81001 URINALYSIS AUTO W/SCOPE: CPT | Performed by: EMERGENCY MEDICINE

## 2025-06-01 PROCEDURE — 99215 OFFICE O/P EST HI 40 MIN: CPT | Performed by: SURGERY

## 2025-06-01 PROCEDURE — 25810000003 SODIUM CHLORIDE 0.9 % SOLUTION: Performed by: EMERGENCY MEDICINE

## 2025-06-01 PROCEDURE — 25010000002 HYDROMORPHONE PER 4 MG: Performed by: EMERGENCY MEDICINE

## 2025-06-01 PROCEDURE — G0378 HOSPITAL OBSERVATION PER HR: HCPCS

## 2025-06-01 PROCEDURE — 25010000002 FENTANYL CITRATE (PF) 50 MCG/ML SOLUTION: Performed by: NURSE ANESTHETIST, CERTIFIED REGISTERED

## 2025-06-01 PROCEDURE — 25010000002 BUPIVACAINE LIPOSOME 1.3 % SUSPENSION 20 ML VIAL: Performed by: SURGERY

## 2025-06-01 PROCEDURE — 25010000002 CEFOXITIN PER 1 G: Performed by: SURGERY

## 2025-06-01 PROCEDURE — 83605 ASSAY OF LACTIC ACID: CPT | Performed by: EMERGENCY MEDICINE

## 2025-06-01 PROCEDURE — 85025 COMPLETE CBC W/AUTO DIFF WBC: CPT | Performed by: EMERGENCY MEDICINE

## 2025-06-01 PROCEDURE — 88307 TISSUE EXAM BY PATHOLOGIST: CPT | Performed by: SURGERY

## 2025-06-01 PROCEDURE — 25010000002 PIPERACILLIN SOD-TAZOBACTAM PER 1 G: Performed by: SURGERY

## 2025-06-01 PROCEDURE — 84145 PROCALCITONIN (PCT): CPT | Performed by: EMERGENCY MEDICINE

## 2025-06-01 PROCEDURE — 83690 ASSAY OF LIPASE: CPT | Performed by: EMERGENCY MEDICINE

## 2025-06-01 PROCEDURE — 99285 EMERGENCY DEPT VISIT HI MDM: CPT

## 2025-06-01 PROCEDURE — 0DBB0ZZ EXCISION OF ILEUM, OPEN APPROACH: ICD-10-PCS | Performed by: SURGERY

## 2025-06-01 PROCEDURE — 0WQF0ZZ REPAIR ABDOMINAL WALL, OPEN APPROACH: ICD-10-PCS | Performed by: SURGERY

## 2025-06-01 PROCEDURE — 88302 TISSUE EXAM BY PATHOLOGIST: CPT | Performed by: SURGERY

## 2025-06-01 PROCEDURE — 36415 COLL VENOUS BLD VENIPUNCTURE: CPT

## 2025-06-01 PROCEDURE — 88304 TISSUE EXAM BY PATHOLOGIST: CPT | Performed by: SURGERY

## 2025-06-01 PROCEDURE — 25510000001 IOPAMIDOL 61 % SOLUTION: Performed by: EMERGENCY MEDICINE

## 2025-06-01 PROCEDURE — 74177 CT ABD & PELVIS W/CONTRAST: CPT

## 2025-06-01 PROCEDURE — 25010000002 LIDOCAINE 2% SOLUTION: Performed by: NURSE ANESTHETIST, CERTIFIED REGISTERED

## 2025-06-01 PROCEDURE — 44125 REMOVAL OF SMALL INTESTINE: CPT | Performed by: SURGERY

## 2025-06-01 PROCEDURE — 25010000002 HYDROMORPHONE PER 4 MG: Performed by: NURSE ANESTHETIST, CERTIFIED REGISTERED

## 2025-06-01 PROCEDURE — 80053 COMPREHEN METABOLIC PANEL: CPT | Performed by: EMERGENCY MEDICINE

## 2025-06-01 PROCEDURE — 25010000002 ONDANSETRON PER 1 MG: Performed by: STUDENT IN AN ORGANIZED HEALTH CARE EDUCATION/TRAINING PROGRAM

## 2025-06-01 PROCEDURE — 25010000002 PROPOFOL 10 MG/ML EMULSION: Performed by: NURSE ANESTHETIST, CERTIFIED REGISTERED

## 2025-06-01 PROCEDURE — 25010000002 SUGAMMADEX 200 MG/2ML SOLUTION: Performed by: STUDENT IN AN ORGANIZED HEALTH CARE EDUCATION/TRAINING PROGRAM

## 2025-06-01 PROCEDURE — 25010000002 ONDANSETRON PER 1 MG: Performed by: EMERGENCY MEDICINE

## 2025-06-01 PROCEDURE — 25010000002 HYDROMORPHONE PER 4 MG: Performed by: SURGERY

## 2025-06-01 DEVICE — ENDOPATH ECHELON ENDOSCOPIC LINEAR CUTTER RELOADS, BLUE, 60MM
Type: IMPLANTABLE DEVICE | Site: ABDOMEN | Status: FUNCTIONAL
Brand: ECHELON ENDOPATH

## 2025-06-01 RX ORDER — DROPERIDOL 2.5 MG/ML
0.62 INJECTION, SOLUTION INTRAMUSCULAR; INTRAVENOUS
Status: DISCONTINUED | OUTPATIENT
Start: 2025-06-01 | End: 2025-06-01 | Stop reason: HOSPADM

## 2025-06-01 RX ORDER — TAMSULOSIN HYDROCHLORIDE 0.4 MG/1
0.8 CAPSULE ORAL DAILY
Status: DISCONTINUED | OUTPATIENT
Start: 2025-06-02 | End: 2025-06-04 | Stop reason: HOSPADM

## 2025-06-01 RX ORDER — ONDANSETRON 2 MG/ML
INJECTION INTRAMUSCULAR; INTRAVENOUS AS NEEDED
Status: DISCONTINUED | OUTPATIENT
Start: 2025-06-01 | End: 2025-06-01 | Stop reason: SURG

## 2025-06-01 RX ORDER — LIDOCAINE HYDROCHLORIDE 20 MG/ML
INJECTION, SOLUTION INFILTRATION; PERINEURAL AS NEEDED
Status: DISCONTINUED | OUTPATIENT
Start: 2025-06-01 | End: 2025-06-01 | Stop reason: SURG

## 2025-06-01 RX ORDER — SODIUM CHLORIDE 0.9 % (FLUSH) 0.9 %
10 SYRINGE (ML) INJECTION AS NEEDED
Status: DISCONTINUED | OUTPATIENT
Start: 2025-06-01 | End: 2025-06-01

## 2025-06-01 RX ORDER — FENTANYL CITRATE 50 UG/ML
INJECTION, SOLUTION INTRAMUSCULAR; INTRAVENOUS AS NEEDED
Status: DISCONTINUED | OUTPATIENT
Start: 2025-06-01 | End: 2025-06-01 | Stop reason: SURG

## 2025-06-01 RX ORDER — DIPHENHYDRAMINE HYDROCHLORIDE 50 MG/ML
12.5 INJECTION, SOLUTION INTRAMUSCULAR; INTRAVENOUS
Status: DISCONTINUED | OUTPATIENT
Start: 2025-06-01 | End: 2025-06-01 | Stop reason: HOSPADM

## 2025-06-01 RX ORDER — EPHEDRINE SULFATE 50 MG/ML
5 INJECTION, SOLUTION INTRAVENOUS ONCE AS NEEDED
Status: DISCONTINUED | OUTPATIENT
Start: 2025-06-01 | End: 2025-06-01 | Stop reason: HOSPADM

## 2025-06-01 RX ORDER — HYDROMORPHONE HYDROCHLORIDE 1 MG/ML
0.5 INJECTION, SOLUTION INTRAMUSCULAR; INTRAVENOUS; SUBCUTANEOUS ONCE
Refills: 0 | Status: COMPLETED | OUTPATIENT
Start: 2025-06-01 | End: 2025-06-01

## 2025-06-01 RX ORDER — SODIUM CHLORIDE, SODIUM LACTATE, POTASSIUM CHLORIDE, CALCIUM CHLORIDE 600; 310; 30; 20 MG/100ML; MG/100ML; MG/100ML; MG/100ML
9 INJECTION, SOLUTION INTRAVENOUS CONTINUOUS
Status: DISCONTINUED | OUTPATIENT
Start: 2025-06-01 | End: 2025-06-02

## 2025-06-01 RX ORDER — ENOXAPARIN SODIUM 100 MG/ML
40 INJECTION SUBCUTANEOUS DAILY
Status: DISCONTINUED | OUTPATIENT
Start: 2025-06-02 | End: 2025-06-04 | Stop reason: HOSPADM

## 2025-06-01 RX ORDER — EPHEDRINE SULFATE 50 MG/ML
INJECTION INTRAVENOUS AS NEEDED
Status: DISCONTINUED | OUTPATIENT
Start: 2025-06-01 | End: 2025-06-01 | Stop reason: SURG

## 2025-06-01 RX ORDER — OXYCODONE HYDROCHLORIDE 10 MG/1
10 TABLET ORAL EVERY 4 HOURS PRN
Status: DISCONTINUED | OUTPATIENT
Start: 2025-06-01 | End: 2025-06-03

## 2025-06-01 RX ORDER — ONDANSETRON 2 MG/ML
4 INJECTION INTRAMUSCULAR; INTRAVENOUS EVERY 6 HOURS PRN
Status: DISCONTINUED | OUTPATIENT
Start: 2025-06-01 | End: 2025-06-04 | Stop reason: HOSPADM

## 2025-06-01 RX ORDER — ONDANSETRON 2 MG/ML
4 INJECTION INTRAMUSCULAR; INTRAVENOUS ONCE AS NEEDED
Status: DISCONTINUED | OUTPATIENT
Start: 2025-06-01 | End: 2025-06-01 | Stop reason: HOSPADM

## 2025-06-01 RX ORDER — SODIUM CHLORIDE 0.9 % (FLUSH) 0.9 %
3 SYRINGE (ML) INJECTION EVERY 12 HOURS SCHEDULED
Status: DISCONTINUED | OUTPATIENT
Start: 2025-06-01 | End: 2025-06-01 | Stop reason: HOSPADM

## 2025-06-01 RX ORDER — ATROPINE SULFATE 0.4 MG/ML
0.4 INJECTION, SOLUTION INTRAMUSCULAR; INTRAVENOUS; SUBCUTANEOUS ONCE AS NEEDED
Status: DISCONTINUED | OUTPATIENT
Start: 2025-06-01 | End: 2025-06-01 | Stop reason: HOSPADM

## 2025-06-01 RX ORDER — LABETALOL HYDROCHLORIDE 5 MG/ML
5 INJECTION, SOLUTION INTRAVENOUS
Status: DISCONTINUED | OUTPATIENT
Start: 2025-06-01 | End: 2025-06-01 | Stop reason: HOSPADM

## 2025-06-01 RX ORDER — SODIUM CHLORIDE 9 MG/ML
75 INJECTION, SOLUTION INTRAVENOUS CONTINUOUS
Status: DISCONTINUED | OUTPATIENT
Start: 2025-06-01 | End: 2025-06-02

## 2025-06-01 RX ORDER — ONDANSETRON 2 MG/ML
4 INJECTION INTRAMUSCULAR; INTRAVENOUS ONCE
Status: COMPLETED | OUTPATIENT
Start: 2025-06-01 | End: 2025-06-01

## 2025-06-01 RX ORDER — IPRATROPIUM BROMIDE AND ALBUTEROL SULFATE 2.5; .5 MG/3ML; MG/3ML
3 SOLUTION RESPIRATORY (INHALATION) ONCE AS NEEDED
Status: DISCONTINUED | OUTPATIENT
Start: 2025-06-01 | End: 2025-06-01 | Stop reason: HOSPADM

## 2025-06-01 RX ORDER — HYDROMORPHONE HYDROCHLORIDE 1 MG/ML
0.25 INJECTION, SOLUTION INTRAMUSCULAR; INTRAVENOUS; SUBCUTANEOUS
Status: DISCONTINUED | OUTPATIENT
Start: 2025-06-01 | End: 2025-06-01 | Stop reason: HOSPADM

## 2025-06-01 RX ORDER — FLUMAZENIL 0.1 MG/ML
0.2 INJECTION INTRAVENOUS AS NEEDED
Status: DISCONTINUED | OUTPATIENT
Start: 2025-06-01 | End: 2025-06-01 | Stop reason: HOSPADM

## 2025-06-01 RX ORDER — LIDOCAINE HYDROCHLORIDE 10 MG/ML
0.5 INJECTION, SOLUTION INFILTRATION; PERINEURAL ONCE AS NEEDED
Status: DISCONTINUED | OUTPATIENT
Start: 2025-06-01 | End: 2025-06-01 | Stop reason: HOSPADM

## 2025-06-01 RX ORDER — ROCURONIUM BROMIDE 10 MG/ML
INJECTION, SOLUTION INTRAVENOUS AS NEEDED
Status: DISCONTINUED | OUTPATIENT
Start: 2025-06-01 | End: 2025-06-01 | Stop reason: SURG

## 2025-06-01 RX ORDER — HYDROCODONE BITARTRATE AND ACETAMINOPHEN 7.5; 325 MG/1; MG/1
1 TABLET ORAL EVERY 4 HOURS PRN
Status: DISCONTINUED | OUTPATIENT
Start: 2025-06-01 | End: 2025-06-01 | Stop reason: HOSPADM

## 2025-06-01 RX ORDER — PROMETHAZINE HYDROCHLORIDE 25 MG/1
25 SUPPOSITORY RECTAL ONCE AS NEEDED
Status: DISCONTINUED | OUTPATIENT
Start: 2025-06-01 | End: 2025-06-01 | Stop reason: HOSPADM

## 2025-06-01 RX ORDER — HYDROMORPHONE HYDROCHLORIDE 1 MG/ML
0.5 INJECTION, SOLUTION INTRAMUSCULAR; INTRAVENOUS; SUBCUTANEOUS
Status: DISCONTINUED | OUTPATIENT
Start: 2025-06-01 | End: 2025-06-03

## 2025-06-01 RX ORDER — OXYCODONE HYDROCHLORIDE 5 MG/1
5 TABLET ORAL EVERY 4 HOURS PRN
Status: DISCONTINUED | OUTPATIENT
Start: 2025-06-01 | End: 2025-06-03

## 2025-06-01 RX ORDER — HYDRALAZINE HYDROCHLORIDE 20 MG/ML
5 INJECTION INTRAMUSCULAR; INTRAVENOUS
Status: DISCONTINUED | OUTPATIENT
Start: 2025-06-01 | End: 2025-06-01 | Stop reason: HOSPADM

## 2025-06-01 RX ORDER — DEXAMETHASONE SODIUM PHOSPHATE 4 MG/ML
INJECTION, SOLUTION INTRA-ARTICULAR; INTRALESIONAL; INTRAMUSCULAR; INTRAVENOUS; SOFT TISSUE AS NEEDED
Status: DISCONTINUED | OUTPATIENT
Start: 2025-06-01 | End: 2025-06-01 | Stop reason: SURG

## 2025-06-01 RX ORDER — MAGNESIUM HYDROXIDE 1200 MG/15ML
LIQUID ORAL AS NEEDED
Status: DISCONTINUED | OUTPATIENT
Start: 2025-06-01 | End: 2025-06-01 | Stop reason: HOSPADM

## 2025-06-01 RX ORDER — HYDROCODONE BITARTRATE AND ACETAMINOPHEN 5; 325 MG/1; MG/1
1 TABLET ORAL ONCE AS NEEDED
Status: DISCONTINUED | OUTPATIENT
Start: 2025-06-01 | End: 2025-06-01 | Stop reason: HOSPADM

## 2025-06-01 RX ORDER — FENTANYL CITRATE 50 UG/ML
25 INJECTION, SOLUTION INTRAMUSCULAR; INTRAVENOUS
Status: DISCONTINUED | OUTPATIENT
Start: 2025-06-01 | End: 2025-06-01 | Stop reason: HOSPADM

## 2025-06-01 RX ORDER — ONDANSETRON 4 MG/1
4 TABLET, ORALLY DISINTEGRATING ORAL EVERY 6 HOURS PRN
Status: DISCONTINUED | OUTPATIENT
Start: 2025-06-01 | End: 2025-06-04 | Stop reason: HOSPADM

## 2025-06-01 RX ORDER — SODIUM CHLORIDE 0.9 % (FLUSH) 0.9 %
3-10 SYRINGE (ML) INJECTION AS NEEDED
Status: DISCONTINUED | OUTPATIENT
Start: 2025-06-01 | End: 2025-06-01 | Stop reason: HOSPADM

## 2025-06-01 RX ORDER — ACETAMINOPHEN 500 MG
1000 TABLET ORAL EVERY 6 HOURS
Status: DISCONTINUED | OUTPATIENT
Start: 2025-06-01 | End: 2025-06-04 | Stop reason: HOSPADM

## 2025-06-01 RX ORDER — PROPOFOL 10 MG/ML
VIAL (ML) INTRAVENOUS AS NEEDED
Status: DISCONTINUED | OUTPATIENT
Start: 2025-06-01 | End: 2025-06-01 | Stop reason: SURG

## 2025-06-01 RX ORDER — PROMETHAZINE HYDROCHLORIDE 25 MG/1
25 TABLET ORAL ONCE AS NEEDED
Status: DISCONTINUED | OUTPATIENT
Start: 2025-06-01 | End: 2025-06-01 | Stop reason: HOSPADM

## 2025-06-01 RX ORDER — IOPAMIDOL 612 MG/ML
100 INJECTION, SOLUTION INTRAVASCULAR
Status: COMPLETED | OUTPATIENT
Start: 2025-06-01 | End: 2025-06-01

## 2025-06-01 RX ORDER — NALOXONE HCL 0.4 MG/ML
0.2 VIAL (ML) INJECTION AS NEEDED
Status: DISCONTINUED | OUTPATIENT
Start: 2025-06-01 | End: 2025-06-01 | Stop reason: HOSPADM

## 2025-06-01 RX ADMIN — OXYCODONE HYDROCHLORIDE 10 MG: 10 TABLET ORAL at 22:08

## 2025-06-01 RX ADMIN — IOPAMIDOL 85 ML: 612 INJECTION, SOLUTION INTRAVENOUS at 13:52

## 2025-06-01 RX ADMIN — HYDROMORPHONE HYDROCHLORIDE 0.5 MG: 1 INJECTION, SOLUTION INTRAMUSCULAR; INTRAVENOUS; SUBCUTANEOUS at 23:22

## 2025-06-01 RX ADMIN — ACETAMINOPHEN 1000 MG: 500 TABLET, FILM COATED ORAL at 22:04

## 2025-06-01 RX ADMIN — FENTANYL CITRATE 25 MCG: 50 INJECTION, SOLUTION INTRAMUSCULAR; INTRAVENOUS at 20:35

## 2025-06-01 RX ADMIN — HYDROMORPHONE HYDROCHLORIDE 0.25 MG: 1 INJECTION, SOLUTION INTRAMUSCULAR; INTRAVENOUS; SUBCUTANEOUS at 20:45

## 2025-06-01 RX ADMIN — SUGAMMADEX 200 MG: 100 INJECTION, SOLUTION INTRAVENOUS at 20:00

## 2025-06-01 RX ADMIN — FENTANYL CITRATE 25 MCG: 50 INJECTION, SOLUTION INTRAMUSCULAR; INTRAVENOUS at 20:55

## 2025-06-01 RX ADMIN — PIPERACILLIN AND TAZOBACTAM 3.38 G: 3; .375 INJECTION, POWDER, FOR SOLUTION INTRAVENOUS at 15:19

## 2025-06-01 RX ADMIN — ONDANSETRON 4 MG: 2 INJECTION, SOLUTION INTRAMUSCULAR; INTRAVENOUS at 19:04

## 2025-06-01 RX ADMIN — DEXAMETHASONE SODIUM PHOSPHATE 6 MG: 4 INJECTION, SOLUTION INTRAMUSCULAR; INTRAVENOUS at 18:16

## 2025-06-01 RX ADMIN — EPHEDRINE SULFATE 5 MG: 50 INJECTION INTRAVENOUS at 19:18

## 2025-06-01 RX ADMIN — ROCURONIUM BROMIDE 50 MG: 10 INJECTION, SOLUTION INTRAVENOUS at 17:57

## 2025-06-01 RX ADMIN — FENTANYL CITRATE 25 MCG: 50 INJECTION, SOLUTION INTRAMUSCULAR; INTRAVENOUS at 20:30

## 2025-06-01 RX ADMIN — CEFOXITIN SODIUM 2 G: 2 POWDER, FOR SOLUTION INTRAVENOUS at 17:44

## 2025-06-01 RX ADMIN — EPHEDRINE SULFATE 5 MG: 50 INJECTION INTRAVENOUS at 19:01

## 2025-06-01 RX ADMIN — SODIUM CHLORIDE 500 ML: 9 INJECTION, SOLUTION INTRAVENOUS at 13:39

## 2025-06-01 RX ADMIN — SODIUM CHLORIDE, POTASSIUM CHLORIDE, SODIUM LACTATE AND CALCIUM CHLORIDE: 600; 310; 30; 20 INJECTION, SOLUTION INTRAVENOUS at 19:15

## 2025-06-01 RX ADMIN — LIDOCAINE HYDROCHLORIDE 40 MG: 20 INJECTION, SOLUTION INFILTRATION; PERINEURAL at 17:57

## 2025-06-01 RX ADMIN — FENTANYL CITRATE 25 MCG: 50 INJECTION, SOLUTION INTRAMUSCULAR; INTRAVENOUS at 18:18

## 2025-06-01 RX ADMIN — HYDROMORPHONE HYDROCHLORIDE 0.25 MG: 1 INJECTION, SOLUTION INTRAMUSCULAR; INTRAVENOUS; SUBCUTANEOUS at 21:20

## 2025-06-01 RX ADMIN — HYDROMORPHONE HYDROCHLORIDE 0.25 MG: 1 INJECTION, SOLUTION INTRAMUSCULAR; INTRAVENOUS; SUBCUTANEOUS at 20:40

## 2025-06-01 RX ADMIN — ONDANSETRON 4 MG: 2 INJECTION, SOLUTION INTRAMUSCULAR; INTRAVENOUS at 12:35

## 2025-06-01 RX ADMIN — SODIUM CHLORIDE, POTASSIUM CHLORIDE, SODIUM LACTATE AND CALCIUM CHLORIDE 9 ML/HR: 600; 310; 30; 20 INJECTION, SOLUTION INTRAVENOUS at 17:44

## 2025-06-01 RX ADMIN — PROPOFOL 100 MG: 10 INJECTION, EMULSION INTRAVENOUS at 17:57

## 2025-06-01 RX ADMIN — FENTANYL CITRATE 25 MCG: 50 INJECTION, SOLUTION INTRAMUSCULAR; INTRAVENOUS at 21:00

## 2025-06-01 RX ADMIN — HYDROMORPHONE HYDROCHLORIDE 0.5 MG: 1 INJECTION, SOLUTION INTRAMUSCULAR; INTRAVENOUS; SUBCUTANEOUS at 12:35

## 2025-06-01 RX ADMIN — FENTANYL CITRATE 25 MCG: 50 INJECTION, SOLUTION INTRAMUSCULAR; INTRAVENOUS at 18:21

## 2025-06-01 RX ADMIN — HYDROMORPHONE HYDROCHLORIDE 0.25 MG: 1 INJECTION, SOLUTION INTRAMUSCULAR; INTRAVENOUS; SUBCUTANEOUS at 21:15

## 2025-06-01 RX ADMIN — SODIUM CHLORIDE 75 ML/HR: 9 INJECTION, SOLUTION INTRAVENOUS at 22:04

## 2025-06-01 NOTE — ANESTHESIA PREPROCEDURE EVALUATION
Anesthesia Evaluation     Patient summary reviewed   NPO Solid Status: > 8 hours             Airway   Mallampati: II  TM distance: >3 FB  Neck ROM: full  No difficulty expected  Dental    (+) edentulous    Pulmonary    Cardiovascular   Exercise tolerance: unable to assess    Rhythm: regular    (+) hypertension      Neuro/Psych  GI/Hepatic/Renal/Endo    (+) GERD, renal disease- CRI, thyroid problem hypothyroidism    Musculoskeletal     Abdominal    Substance History      OB/GYN          Other   arthritis,   history of cancer remission                Anesthesia Plan    ASA 3 - emergent     general     intravenous induction     Anesthetic plan, risks, benefits, and alternatives have been provided, discussed and informed consent has been obtained with: patient.    CODE STATUS:

## 2025-06-01 NOTE — ANESTHESIA PROCEDURE NOTES
Airway  Reason: elective    Date/Time: 6/1/2025 5:58 PM  Airway not difficult    General Information and Staff    Patient location during procedure: OR  CRNA/CAA: Mariaelena Espinal CRNA    Indications and Patient Condition  Indications for airway management: airway protection    Preoxygenated: yes  MILS not maintained throughout    Mask difficulty assessment: 1 - vent by mask    Final Airway Details    Final airway type: endotracheal airway      Successful airway: ETT  Cuffed: yes   Successful intubation technique: direct laryngoscopy  Endotracheal tube insertion site: oral  Blade: Nataly  Blade size: 4  ETT size (mm): 7.5  Cormack-Lehane Classification: grade I - full view of glottis  Placement verified by: chest auscultation and capnometry   Measured from: lips  ETT/EBT  to lips (cm): 22  Number of attempts at approach: 1  Assessment: lips, teeth, and gum same as pre-op and atraumatic intubation    Additional Comments  Dentition intact and unchanged. CBEBS.  +ETCO2.
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
## 2025-06-01 NOTE — ED PROVIDER NOTES
EMERGENCY DEPARTMENT ENCOUNTER  Room Number:  09/09  PCP: Rashid Klein MD  Independent Historians: Patient      HPI:  Chief Complaint: had concerns including Nausea and Abdominal Pain.     A complete HPI/ROS/PMH/PSH/SH/FH are unobtainable due to: None    Chronic or social conditions impacting patient care (Social Determinants of Health): None      Context: Rosas Christianson is a 85 y.o. male with a medical history of BPH, colostomy, anemia of chronic disease, thrombocytopenia, hypertension, hypothyroidism, GERD, and ulcerative colitis who presents to the ED c/o acute recurrent abdominal pain for the last 2 hours.  Patient was in the hospital last week for partial small bowel obstruction reports the pain is very similar.  He has had normal ostomy output up to onset of discomfort.  No dysuria or hematuria.  No chest pain or shortness of breath.  Patient is nauseated with the pain at this time.  Pain is severe.  No clear exacerbating or relieving factors identified.      Review of prior external notes (non-ED) -and- Review of prior external test results outside of this encounter:  Discharge summary 5/22/2025 reviewed: Patient was admitted and treated for partial small bowel obstruction.      PAST MEDICAL HISTORY  Active Ambulatory Problems     Diagnosis Date Noted    Post-traumatic osteoarthritis of multiple joints     Esophageal reflux     Hypertension     Hypothyroidism     Neuropathy     Ulcerative colitis     H/O colectomy 10/24/2016    Anemia of chronic disease 01/18/2017    Thrombocytopenia 01/18/2017    History of frostbite 10/04/2017    Arthritis 11/03/2017    Presence of colostomy 11/03/2017    Health care maintenance 11/03/2017    Bilateral foot pain 11/06/2017    ERRONEOUS ENCOUNTER--DISREGARD 12/03/2018    Medicare annual wellness visit, subsequent 09/06/2019    Primary osteoarthritis of both knees 04/27/2021    Lung nodule 06/02/2022    Anxiety 04/28/2023    Chronic pain of both shoulders 07/27/2023     Edema 07/27/2023    Chronic left SI joint pain 01/16/2024    Chronic right SI joint pain 01/16/2024    Acute bilateral low back pain without sciatica 01/16/2024    Sacral fracture 01/20/2024    BPH (benign prostatic hyperplasia) 01/20/2024    Shortness of breath 02/17/2024    Chronic left shoulder pain 03/29/2024    Cancer     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    Chronic pain disorder foot & back    Clotting disorder 2000    Colon polyp 2000    Extremity pain all my life    Eye exam, routine 2011    Fracture of ankle 75 and 12/6/23    Frozen shoulder     H/O ulcer disease     Heart murmur 10 years old to now    Herpes zoster     Injury of back dislocated  in 80's    Joint pain 1975 -getting worse    Kidney stone 70's    Low back pain Jan 2024    Macrocytic anemia     Osteoarthritis 1975    Peptic ulceration     Periarthritis of shoulder     Peripheral neuropathy all my life    Pneumonia 80's    Rash thin skin- now    Rotator cuff syndrome 2019 and Nov 2023    Scoliosis 80's to now    Spinal stenosis 1997-getting worse    Tear of meniscus of knee 1964    Torn rotator cuff     Torn rotator cuff     Visual impairment glasses         PAST SURGICAL HISTORY  Past Surgical History:   Procedure Laterality Date    APPENDECTOMY  with colon removal    CATARACT EXTRACTION Bilateral     CATARACT EXTRACTION, BILATERAL      COLON SURGERY  2000    removed colon    COLONOSCOPY  2005    has a colostomy    DENTAL PROCEDURE      EPIDURAL BLOCK  1997 & 2000     HAND SURGERY Left 73    hand stuck in car fan when running    SKIN CANCER DESTRUCTION  2016    on head    TRIGGER POINT INJECTION  knees many times    WRIST SURGERY  wrist and hand 73    lost a tendon         FAMILY HISTORY  Family History   Problem Relation Age of Onset    Heart disease Mother     Arthritis Mother     Osteoporosis Mother     Coronary artery disease Mother         Heart attack at 73    Heart disease Father     Arthritis Father     Osteoporosis Father     Coronary artery disease Father         Heart attack at 65    Asthma Brother     Osteoporosis Brother     Arthritis Brother     Asthma Brother          SOCIAL HISTORY  Social History     Socioeconomic History    Marital status:      Spouse name: Cynthia    Number of children: 2    Years of education: College   Tobacco Use    Smoking status: Former     Current packs/day: 0.00     Average packs/day: 3.0 packs/day for 33.0 years (99.1 ttl pk-yrs)     Types: Cigarettes     Start date: 1956     Quit date: 1985     Years since quittin.1     Passive exposure: Past    Smokeless tobacco: Never    Tobacco comments:     parents gave them to me   Vaping Use    Vaping status: Never Used   Substance and Sexual Activity    Alcohol use: Not Currently     Comment: rare    Drug use: No    Sexual activity: Not Currently     Partners: Female     Birth control/protection: None         ALLERGIES  Aspirin, Nsaids, and Prednisone      REVIEW OF SYSTEMS  Review of Systems  Included in HPI  All systems reviewed and negative except for those discussed in HPI.      PHYSICAL EXAM    I have reviewed the triage vital signs and nursing notes.    ED Triage Vitals [25 1207]   Temp Heart Rate Resp BP SpO2   -- 109 24 -- 100 %      Temp src Heart Rate Source Patient Position BP Location FiO2 (%)   -- -- -- -- --       Physical Exam    Physical Exam   Constitutional: No distress.  Nontoxic but very uncomfortable appearing moderate  HENT:  Head:  Normocephalic and atraumatic.   Oropharynx: Mucous membranes are moist.   Eyes: . No scleral icterus. No conjunctival pallor.  Neck: Normal range of motion. Neck supple.   Cardiovascular: Pink warm and well perfused throughout.    Pulmonary/Chest: No respiratory distress.  No tachypnea or increased work of breathing appreciated.    Abdominal: Soft. There is diffuse discomfort palpation without focal tenderness.  Parastomal hernia present.  Stoma is normal-appearing with stool in bag.  There is no rebound and no guarding.   Musculoskeletal: Moves all extremities equally.    Neurological: Alert and oriented.  No acute focal deficit appreciated.  Skin: Skin is pink, warm, and dry.   Psychiatric: Mood and affect normal.   Nursing note and vitals reviewed.             LAB RESULTS  Recent Results (from the past 24 hours)   Comprehensive Metabolic Panel    Collection Time: 06/01/25 12:26 PM    Specimen: Blood   Result Value Ref Range    Glucose 117 (H) 65 - 99 mg/dL    BUN 33.0 (H) 8.0 - 23.0 mg/dL    Creatinine 1.21 0.76 - 1.27 mg/dL    Sodium 138 136 - 145 mmol/L    Potassium 4.0 3.5 - 5.2 mmol/L    Chloride 102 98 - 107 mmol/L    CO2 22.0 22.0 - 29.0 mmol/L    Calcium 10.0 8.6 - 10.5 mg/dL    Total Protein 7.3 6.0 - 8.5 g/dL    Albumin 3.9 3.5 - 5.2 g/dL    ALT (SGPT) 26 1 - 41 U/L    AST (SGOT) 43 (H) 1 - 40 U/L    Alkaline Phosphatase 103 39 - 117 U/L    Total Bilirubin 0.6 0.0 - 1.2 mg/dL    Globulin 3.4 gm/dL    A/G Ratio 1.1 g/dL    BUN/Creatinine Ratio 27.3 (H) 7.0 - 25.0    Anion Gap 14.0 5.0 - 15.0 mmol/L    eGFR 58.7 (L) >60.0 mL/min/1.73   Lipase    Collection Time: 06/01/25 12:26 PM    Specimen: Blood   Result Value Ref Range    Lipase 54 13 - 60 U/L   CBC Auto Differential    Collection Time: 06/01/25 12:26 PM    Specimen: Blood   Result Value Ref Range    WBC 7.87 3.40 - 10.80 10*3/mm3    RBC 3.76 (L) 4.14 - 5.80 10*6/mm3    Hemoglobin 12.5 (L) 13.0 - 17.7 g/dL    Hematocrit 37.5 37.5 - 51.0 %    MCV 99.7  (H) 79.0 - 97.0 fL    MCH 33.2 (H) 26.6 - 33.0 pg    MCHC 33.3 31.5 - 35.7 g/dL    RDW 11.8 (L) 12.3 - 15.4 %    RDW-SD 43.3 37.0 - 54.0 fl    MPV 10.4 6.0 - 12.0 fL    Platelets 138 (L) 140 - 450 10*3/mm3    Neutrophil % 70.5 42.7 - 76.0 %    Lymphocyte % 18.3 (L) 19.6 - 45.3 %    Monocyte % 7.9 5.0 - 12.0 %    Eosinophil % 2.2 0.3 - 6.2 %    Basophil % 0.8 0.0 - 1.5 %    Immature Grans % 0.3 0.0 - 0.5 %    Neutrophils, Absolute 5.56 1.70 - 7.00 10*3/mm3    Lymphocytes, Absolute 1.44 0.70 - 3.10 10*3/mm3    Monocytes, Absolute 0.62 0.10 - 0.90 10*3/mm3    Eosinophils, Absolute 0.17 0.00 - 0.40 10*3/mm3    Basophils, Absolute 0.06 0.00 - 0.20 10*3/mm3    Immature Grans, Absolute 0.02 0.00 - 0.05 10*3/mm3   Lactic Acid, Plasma    Collection Time: 06/01/25 12:26 PM    Specimen: Blood   Result Value Ref Range    Lactate 2.1 (C) 0.5 - 2.0 mmol/L   Procalcitonin    Collection Time: 06/01/25 12:26 PM    Specimen: Blood   Result Value Ref Range    Procalcitonin 0.22 0.00 - 0.25 ng/mL         RADIOLOGY  CT Abdomen Pelvis With Contrast  Result Date: 6/1/2025  CT ABDOMEN PELVIS W CONTRAST-  Radiation dose reduction techniques were utilized, including automated exposure control and exposure modulation based on body size.  Clinical: Acute abdominal pain. Right-sided ileostomy, prior colectomy  COMPARISON examination 5/21/2025  FINDINGS: 1. Similar to the previous examination there are small bowel loops demonstrated within the parastomal hernia, the degree of mesenteric induration within the hernia has increased with now development of fluid. Considerably dilated small bowel leading to this location with a large amount of edema demonstrated throughout the small bowel mesentery in the right abdomen. This is worrisome for impending ischemia. There is free intraperitoneal fluid, greatest amount within the pelvis. No free intraperitoneal gas nor pneumatosis intestinalis. The findings are compatible with incarcerated parastomal  hernia with small bowel obstruction.  2. Moderately distended fluid-filled stomach, the duodenum is normal. The rectal stump is typical in appearance.  3. There are several gallstones demonstrated within the gallbladder fundus, no gallbladder wall thickening. The liver is satisfactory in appearance, no abnormality of the pancreas or spleen. Both adrenal glands have a satisfactory appearance. Both kidneys are normal. No calculus or obstructive uropathy. Dilatation of the infrarenal aorta having a maximum diameter of 3.4 cm. There is considerable atherosclerotic plaque formation. No luminal compromise seen. Urinary bladder is satisfactory in appearance. The remainder is unremarkable.  FINDINGS of this report called directly to Dr. Miller in the emergency room at 2:21 p.m.     This report was finalized on 6/1/2025 2:21 PM by Dr. Rupesh Quinteros M.D on Workstation: [id number]          MEDICATIONS GIVEN IN ER  Medications   sodium chloride 0.9 % flush 10 mL (has no administration in time range)   HYDROmorphone (DILAUDID) injection 0.5 mg (0.5 mg Intravenous Given 6/1/25 1235)   ondansetron (ZOFRAN) injection 4 mg (4 mg Intravenous Given 6/1/25 1235)   sodium chloride 0.9 % bolus 500 mL (500 mL Intravenous New Bag 6/1/25 1339)   iopamidol (ISOVUE-300) 61 % injection 100 mL (85 mL Intravenous Given by Other 6/1/25 1352)         ORDERS PLACED DURING THIS VISIT:  Orders Placed This Encounter   Procedures    CT Abdomen Pelvis With Contrast    Comprehensive Metabolic Panel    Lipase    Urinalysis With Microscopic If Indicated (No Culture) - Urine, Clean Catch    Lactic Acid, Plasma    Procalcitonin    CBC Auto Differential    STAT Lactic Acid, Reflex    Monitor Blood Pressure    Continuous Pulse Oximetry    Surgery (on-call MD unless specified)    [facility] (on-call MD unless specified) Details    Insert Peripheral IV    Initiate Observation Status    CBC & Differential         OUTPATIENT MEDICATION MANAGEMENT:  Current  Facility-Administered Medications Ordered in Epic   Medication Dose Route Frequency Provider Last Rate Last Admin    sodium chloride 0.9 % flush 10 mL  10 mL Intravenous PRN Nemesio Miller MD         Current Outpatient Medications Ordered in Epic   Medication Sig Dispense Refill    Cholecalciferol (VITAMIN D3 PO) Take 1 each by mouth Daily.      Cyanocobalamin (B-12) 1000 MCG capsule Take 1 capsule by mouth Daily.      glucosamine sulfate 500 MG capsule capsule Take 1 capsule by mouth 3 times a day.      methadone (DOLOPHINE) 5 MG tablet Take 1 tablet by mouth Every 12 (Twelve) Hours. 60 tablet 0    Multiple Vitamins-Minerals (MULTIVITAMIN ADULT PO) Take 1 tablet by mouth Daily.      naloxone (NARCAN) 4 MG/0.1ML nasal spray Call 911. Don't prime. [place] in 1 nostril for overdose. Repeat in 2-3 minutes in other nostril if no or minimal breathing/responsiveness. 1 each 1    Omega-3 Fatty Acids (fish oil) 1000 MG capsule capsule Take 1 capsule by mouth Daily With Breakfast.      tamsulosin (FLOMAX) 0.4 MG capsule 24 hr capsule Take 2 capsules by mouth Daily. 180 capsule 1         PROCEDURES  Procedures            PROGRESS, DATA ANALYSIS, CONSULTS, AND MEDICAL DECISION MAKING  All labs have been independently interpreted by me.  All radiology studies have been reviewed by me. All EKGs have been independently viewed and interpreted by me.  Discussion below represents my analysis of pertinent findings related to patient's condition, differential diagnosis, treatment plan and final disposition.    Differential diagnosis:   My differential diagnosis for abdominal pain includes but is not limited to:  Gastritis, gastroenteritis, peptic ulcer disease, GERD, esophageal perforation, acute appendicitis, mesenteric adenitis, Meckel’s diverticulum, epiploic appendagitis, diverticulitis, colon cancer, ulcerative colitis, Crohn’s disease, intussusception, small bowel obstruction, adhesions, ischemic bowel, perforated viscus, ileus,  obstipation, biliary colic, cholecystitis, cholelithiasis, Ming-Angelito Ethan, hepatitis, pancreatitis, common bile duct obstruction, cholangitis, bile leak, splenic trauma, splenic rupture, splenic infarction, splenic abscess, abdominal abscess, ascites, spontaneous bacterial peritonitis, hernia, UTI, cystitis, prostatitis, ureterolithiasis, urinary obstruction, AAA, myocardial infarction, pneumonia, cancer, porphyria, DKA, medications, sickle cell, viral syndrome, zoster      Clinical Scores:                  ED Course as of 06/01/25 1455   Sun Jun 01, 2025   1303 WBC: 7.87 [RS]   1303 Hemoglobin(!): 12.5  Improved anemia [RS]   1303 Platelets(!): 138 [RS]   1314 Procalcitonin: 0.22 [RS]   1314 Glucose(!): 117 [RS]   1314 BUN(!): 33.0 [RS]   1314 Creatinine: 1.21 [RS]   1314 Sodium: 138 [RS]   1314 Lactate(!!): 2.1  Nothing really pointing towards acute bacterial infection.  Fluid bolus ordered. [RS]   1314 Lipase: 54 [RS]   1407 RADIOLOGY      Study: Contrasted CT abdomen pelvis  Findings: Distended small bowel concerning for bowel obstruction  I independently viewed and interpreted these images contemporaneously with treatment.    [RS]   1408 Patient's pain improved.  Repeat abdominal exam is improved.  CT concerning.  Plan surgical consult for disposition planning. [RS]   1417 CONSULT        Provider: Dr. Quinteros - [facility]    Discussion: Confirms concern for SBO.  He reports is related to incarcerated parastomal hernia.  He raises significant certain about free fluid in the abdomen and edema around the entrapped portion of the small bowel I notes this is a surgical emergency recommending general surgery consult.    Agreeable c treatment and planned disposition.         [RS]   1440 CONSULT        Provider: Dr. Mccoy-General Surgery    Discussion: Reviewed patient history, ED presentation and evaluation as well as concerns.  He will see the patient in the ED directly.    Agreeable c treatment and planned  disposition.         [RS]   4735 CONSULT        Provider: Dr. [facility]    Discussion: Reviewed patient history, ED presentation and evaluation as well as pending general surgery evaluation.  Agreeable to accept patient for admission.    Agreeable c treatment and planned disposition.         [RS]      ED Course User Index  [RS] Nemesio Miller MD         Prescription drug monitoring program review:     AS OF 14:55 EDT VITALS:    BP - 146/81  HR - 69  TEMP - 97.5 °F (36.4 °C) (Oral)  O2 SATS - 95%    COMPLEXITY OF CARE  The patient requires admission.      DIAGNOSIS  Final diagnoses:   Acute abdominal pain   Small bowel obstruction   Incarcerated hernia of abdominal cavity         DISPOSITION  ED Disposition       ED Disposition   Decision to Admit    Condition   --    Comment   Level of Care: Telemetry [5]   Diagnosis: Small bowel obstruction [340018]   Admitting Physician: GONZALO SOUTH [[id number]]   Is patient appropriate for Inpatient Observation Unit?: No [0]                    ADMISSION    Discussed treatment plan and reason for admission with pt/family and admitting physician.  Pt/family voiced understanding of the plan for admission for further testing/treatment as needed.       Please note that portions of this document were completed with a voice recognition program.    Note Disclaimer: At [facility], we believe that sharing information builds trust and better relationships. You are receiving this note because you recently visited [facility]. It is possible you will see health information before a provider has talked with you about it. This kind of information can be easy to misunderstand. To help you fully understand what it means for your health, we urge you to discuss this note with your provider.         Nemesio Miller MD  06/01/25 0187

## 2025-06-01 NOTE — ED NOTES
.Nursing report ED to floor  Rosas Christianson  85 y.o.  male    HPI :  HPI  Stated Reason for Visit: abd pain  History Obtained From: patient    Chief Complaint  Chief Complaint   Patient presents with    Nausea    Abdominal Pain       Admitting doctor:   Roman South MD    Admitting diagnosis:   The primary encounter diagnosis was Acute abdominal pain. Diagnoses of Small bowel obstruction and Incarcerated hernia of abdominal cavity were also pertinent to this visit.    Code status:   Current Code Status       Date Active Code Status Order ID Comments User Context       Prior            Allergies:   Aspirin, Nsaids, and Prednisone    Isolation:   No active isolations    Intake and Output  No intake or output data in the 24 hours ending 06/01/25 1459    Weight:   There were no vitals filed for this visit.    Most recent vitals:   Vitals:    06/01/25 1222 06/01/25 1224 06/01/25 1411 06/01/25 1441   BP:   146/81 138/79   Pulse: 67  69 58   Resp:       Temp:  97.5 °F (36.4 °C)     TempSrc:  Oral     SpO2: 99%  95% 97%       Active LDAs/IV Access:   Lines, Drains & Airways       Active LDAs       Name Placement date Placement time Site Days    Peripheral IV 06/01/25 1225 20 G Anterior;Left Forearm 06/01/25  1225  Forearm  less than 1    Ileostomy Standard (dimitry Esteban) RLQ --  --  RLQ  --                    Labs (abnormal labs have a star):   Labs Reviewed   COMPREHENSIVE METABOLIC PANEL - Abnormal; Notable for the following components:       Result Value    Glucose 117 (*)     BUN 33.0 (*)     AST (SGOT) 43 (*)     BUN/Creatinine Ratio 27.3 (*)     eGFR 58.7 (*)     All other components within normal limits    Narrative:     GFR Categories in Chronic Kidney Disease (CKD)              GFR Category          GFR (mL/min/1.73)    Interpretation  G1                    90 or greater        Normal or high (1)  G2                    60-89                Mild decrease (1)  G3a                   45-59                Mild to  "moderate decrease  G3b                   30-44                Moderate to severe decrease  G4                    15-29                Severe decrease  G5                    14 or less           Kidney failure    (1)In the absence of evidence of kidney disease, neither GFR category G1 or G2 fulfill the criteria for CKD.    eGFR calculation 2021 CKD-EPI creatinine equation, which does not include race as a factor   CBC WITH AUTO DIFFERENTIAL - Abnormal; Notable for the following components:    RBC 3.76 (*)     Hemoglobin 12.5 (*)     MCV 99.7 (*)     MCH 33.2 (*)     RDW 11.8 (*)     Platelets 138 (*)     Lymphocyte % 18.3 (*)     All other components within normal limits   LACTIC ACID, PLASMA - Abnormal; Notable for the following components:    Lactate 2.1 (*)     All other components within normal limits   LIPASE - Normal   PROCALCITONIN - Normal    Narrative:     As a Marker for Sepsis (Non-Neonates):    1. <0.5 ng/mL represents a low risk of severe sepsis and/or septic shock.  2. >2 ng/mL represents a high risk of severe sepsis and/or septic shock.    As a Marker for Lower Respiratory Tract Infections that require antibiotic therapy:    PCT on Admission    Antibiotic Therapy       6-12 Hrs later    >0.5                Strongly Recommended  >0.25 - <0.5        Recommended   0.1 - 0.25          Discouraged              Remeasure/reassess PCT  <0.1                Strongly Discouraged     Remeasure/reassess PCT    As 28 day mortality risk marker: \"Change in Procalcitonin Result\" (>80% or <=80%) if Day 0 (or Day 1) and Day 4 values are available. Refer to http://www.[employer]-pct-calculator.com    Change in PCT <=80%  A decrease of PCT levels below or equal to 80% defines a positive change in PCT test result representing a higher risk for 28-day all-cause mortality of patients diagnosed with severe sepsis for septic shock.    Change in PCT >80%  A decrease of PCT levels of more than 80% defines a negative change in PCT " result representing a lower risk for 28-day all-cause mortality of patients diagnosed with severe sepsis or septic shock.      URINALYSIS W/ MICROSCOPIC IF INDICATED (NO CULTURE)   LACTIC ACID, REFLEX   CBC AND DIFFERENTIAL    Narrative:     The following orders were created for panel order CBC & Differential.  Procedure                               Abnormality         Status                     ---------                               -----------         ------                     CBC Auto Differential[[id number]]        Abnormal            Final result                 Please view results for these tests on the individual orders.       EKG:   No orders to display       Meds given in ED:   Medications   sodium chloride 0.9 % flush 10 mL (has no administration in time range)   HYDROmorphone (DILAUDID) injection 0.5 mg (0.5 mg Intravenous Given 25 1235)   ondansetron (ZOFRAN) injection 4 mg (4 mg Intravenous Given 25 1235)   sodium chloride 0.9 % bolus 500 mL (500 mL Intravenous New Bag 25 1339)   iopamidol (ISOVUE-300) 61 % injection 100 mL (85 mL Intravenous Given by Other 25 1352)       Imaging results:  No radiology results for the last day    Ambulatory status:   -     Social issues:   Social History     Socioeconomic History    Marital status:      Spouse name: Cynthia    Number of children: 2    Years of education: College   Tobacco Use    Smoking status: Former     Current packs/day: 0.00     Average packs/day: 3.0 packs/day for 33.0 years (99.1 ttl pk-yrs)     Types: Cigarettes     Start date: 1956     Quit date: 1985     Years since quittin.1     Passive exposure: Past    Smokeless tobacco: Never    Tobacco comments:     parents gave them to me   Vaping Use    Vaping status: Never Used   Substance and Sexual Activity    Alcohol use: Not Currently     Comment: rare    Drug use: No    Sexual activity: Not Currently     Partners: Female     Birth control/protection: None        Peripheral Neurovascular  Peripheral Neurovascular (Adult)  Peripheral Neurovascular WDL: WDL    Neuro Cognitive  Neuro Cognitive (Adult)  Cognitive/Neuro/Behavioral WDL: WDL    Learning       Respiratory  Respiratory WDL  Respiratory WDL: WDL    Abdominal Pain       Pain Assessments  Pain (Adult)  (0-10) Pain Rating: Rest: 5  (0-10) Pain Rating: Activity: 5  Preferred Pain Scale: FACES (Chris-Baker FACES Pain Rating Scale)  FACES Pain Rating: Rest: 6-->hurts even more  Pain Location: abdomen  Pain Side/Orientation: upper  Response to Pain Interventions: interventions effective per patient         Emma Cabral RN  06/01/25 14:59 EDT

## 2025-06-02 LAB
ANION GAP SERPL CALCULATED.3IONS-SCNC: 9.2 MMOL/L (ref 5–15)
BASOPHILS # BLD MANUAL: 0.22 10*3/MM3 (ref 0–0.2)
BASOPHILS NFR BLD MANUAL: 1.1 % (ref 0–1.5)
BUN SERPL-MCNC: 28 MG/DL (ref 8–23)
BUN/CREAT SERPL: 27.5 (ref 7–25)
BURR CELLS BLD QL SMEAR: ABNORMAL
CALCIUM SPEC-SCNC: 8.7 MG/DL (ref 8.6–10.5)
CHLORIDE SERPL-SCNC: 104 MMOL/L (ref 98–107)
CO2 SERPL-SCNC: 21.8 MMOL/L (ref 22–29)
CREAT SERPL-MCNC: 1.02 MG/DL (ref 0.76–1.27)
DEPRECATED RDW RBC AUTO: 42.3 FL (ref 37–54)
EGFRCR SERPLBLD CKD-EPI 2021: 72 ML/MIN/1.73
EOSINOPHIL # BLD MANUAL: 0.43 10*3/MM3 (ref 0–0.4)
EOSINOPHIL NFR BLD MANUAL: 2.1 % (ref 0.3–6.2)
ERYTHROCYTE [DISTWIDTH] IN BLOOD BY AUTOMATED COUNT: 11.6 % (ref 12.3–15.4)
GLUCOSE SERPL-MCNC: 123 MG/DL (ref 65–99)
HCT VFR BLD AUTO: 33.4 % (ref 37.5–51)
HGB BLD-MCNC: 11 G/DL (ref 13–17.7)
LYMPHOCYTES # BLD MANUAL: 0.22 10*3/MM3 (ref 0.7–3.1)
LYMPHOCYTES NFR BLD MANUAL: 7.4 % (ref 5–12)
MCH RBC QN AUTO: 33.2 PG (ref 26.6–33)
MCHC RBC AUTO-ENTMCNC: 32.9 G/DL (ref 31.5–35.7)
MCV RBC AUTO: 100.9 FL (ref 79–97)
MONOCYTES # BLD: 1.5 10*3/MM3 (ref 0.1–0.9)
NEUTROPHILS # BLD AUTO: 17.93 10*3/MM3 (ref 1.7–7)
NEUTROPHILS NFR BLD MANUAL: 88.3 % (ref 42.7–76)
PLAT MORPH BLD: NORMAL
PLATELET # BLD AUTO: 119 10*3/MM3 (ref 140–450)
PMV BLD AUTO: 10.8 FL (ref 6–12)
POIKILOCYTOSIS BLD QL SMEAR: ABNORMAL
POTASSIUM SERPL-SCNC: 4.6 MMOL/L (ref 3.5–5.2)
RBC # BLD AUTO: 3.31 10*6/MM3 (ref 4.14–5.8)
SMUDGE CELLS BLD QL SMEAR: ABNORMAL
SODIUM SERPL-SCNC: 135 MMOL/L (ref 136–145)
VARIANT LYMPHS NFR BLD MANUAL: 1.1 % (ref 19.6–45.3)
WBC NRBC COR # BLD AUTO: 20.31 10*3/MM3 (ref 3.4–10.8)

## 2025-06-02 PROCEDURE — 85007 BL SMEAR W/DIFF WBC COUNT: CPT | Performed by: SURGERY

## 2025-06-02 PROCEDURE — 25010000002 PIPERACILLIN SOD-TAZOBACTAM PER 1 G: Performed by: NURSE PRACTITIONER

## 2025-06-02 PROCEDURE — 80048 BASIC METABOLIC PNL TOTAL CA: CPT | Performed by: SURGERY

## 2025-06-02 PROCEDURE — 25010000002 ENOXAPARIN PER 10 MG: Performed by: SURGERY

## 2025-06-02 PROCEDURE — 85025 COMPLETE CBC W/AUTO DIFF WBC: CPT | Performed by: SURGERY

## 2025-06-02 PROCEDURE — 25010000002 HYDROMORPHONE PER 4 MG: Performed by: SURGERY

## 2025-06-02 PROCEDURE — 99024 POSTOP FOLLOW-UP VISIT: CPT | Performed by: SURGERY

## 2025-06-02 RX ADMIN — HYDROMORPHONE HYDROCHLORIDE 0.5 MG: 1 INJECTION, SOLUTION INTRAMUSCULAR; INTRAVENOUS; SUBCUTANEOUS at 10:24

## 2025-06-02 RX ADMIN — ACETAMINOPHEN 1000 MG: 500 TABLET, FILM COATED ORAL at 08:34

## 2025-06-02 RX ADMIN — ACETAMINOPHEN 1000 MG: 500 TABLET, FILM COATED ORAL at 16:34

## 2025-06-02 RX ADMIN — PIPERACILLIN AND TAZOBACTAM 3.38 G: 3; .375 INJECTION, POWDER, FOR SOLUTION INTRAVENOUS at 03:30

## 2025-06-02 RX ADMIN — ACETAMINOPHEN 1000 MG: 500 TABLET, FILM COATED ORAL at 03:30

## 2025-06-02 RX ADMIN — PIPERACILLIN AND TAZOBACTAM 3.38 G: 3; .375 INJECTION, POWDER, FOR SOLUTION INTRAVENOUS at 08:32

## 2025-06-02 RX ADMIN — ZINC OXIDE 1 APPLICATION: 200 OINTMENT TOPICAL at 21:11

## 2025-06-02 RX ADMIN — ENOXAPARIN SODIUM 40 MG: 100 INJECTION SUBCUTANEOUS at 08:32

## 2025-06-02 RX ADMIN — HYDROMORPHONE HYDROCHLORIDE 0.5 MG: 1 INJECTION, SOLUTION INTRAMUSCULAR; INTRAVENOUS; SUBCUTANEOUS at 05:05

## 2025-06-02 RX ADMIN — OXYCODONE 5 MG: 5 TABLET ORAL at 16:37

## 2025-06-02 RX ADMIN — OXYCODONE 5 MG: 5 TABLET ORAL at 21:11

## 2025-06-02 RX ADMIN — PIPERACILLIN AND TAZOBACTAM 3.38 G: 3; .375 INJECTION, POWDER, FOR SOLUTION INTRAVENOUS at 16:34

## 2025-06-02 RX ADMIN — TAMSULOSIN HYDROCHLORIDE 0.8 MG: 0.4 CAPSULE ORAL at 08:32

## 2025-06-02 NOTE — CASE MANAGEMENT/SOCIAL WORK
Discharge Planning Assessment  [facility]     Patient Name: Rosas Christianson  MRN: [id number]  Today's Date: 6/2/2025    Admit Date: 6/1/2025    Plan: Home with family to transport vs drive self.   Discharge Needs Assessment       Row Name 06/02/25 1347       Living Environment    People in Home alone    Current Living Arrangements home    Potentially Unsafe Housing Conditions none    In the past 12 months has the electric, gas, oil, or water company threatened to shut off services in your home? No    Primary Care Provided by self    Provides Primary Care For no one, unable/limited ability to care for self    Family Caregiver if Needed grandchild(abdifatah), adult    Family Caregiver Names Marisol Miller [phone]    Quality of Family Relationships unable to assess    Able to Return to Prior Arrangements yes       Resource/Environmental Concerns    Resource/Environmental Concerns none    Transportation Concerns none       Transportation Needs    In the past 12 months, has lack of transportation kept you from medical appointments or from getting medications? no    In the past 12 months, has lack of transportation kept you from meetings, work, or from getting things needed for daily living? No       Food Insecurity    Within the past 12 months, you worried that your food would run out before you got the money to buy more. Never true    Within the past 12 months, the food you bought just didn't last and you didn't have money to get more. Never true       Transition Planning    Patient/Family Anticipates Transition to home    Patient/Family Anticipated Services at Transition     Transportation Anticipated car, drives self;family or friend will provide       Discharge Needs Assessment    Equipment Currently Used at Home walker, rolling;cane, straight;wound care supplies    Concerns to be Addressed denies needs/concerns at this time;discharge planning    Do you want help finding or keeping work or a job? I do not  need or want help    Do you want help with school or training? For example, starting or completing job training or getting a high school diploma, GED or equivalent No    Anticipated Changes Related to Illness none    Equipment Needed After Discharge none    Provided Post Acute Provider List? Refused    Refused Provider List Comment Patient states that he has cared for his ileostomy for 2 decades and does not need help.    Current Discharge Risk lives alone                   Discharge Plan       Row Name 06/02/25 9400       Plan    Plan Home with family to transport vs drive self.    Roadmap to Recovery Yes    Patient/Family in Agreement with Plan yes    Plan Comments Spoke with patient with at bedside. Introduced self and explained CCP role. Verified face sheet and local pharmacy is [facility], patient choice to enroll in [facility]. Patient denies difficulty with medication cost/ management. Patient lives alone in s/s home with a ramp to enter. Patient denies HH and SNF history. Patient uses a rolling walker at home and cane when out. Patient states that he is IADL and still drives at baseline. Patient denies discharge needs, stating that he has had his ileostomy for over 2 decades. Patient states that his car is here and if capable would like to drive self home otherwise will have family come and assist inn transport.                    Expected Discharge Date and Time       Expected Discharge Date Expected Discharge Time    Jun 4, 2025            Demographic Summary       Row Name 06/02/25 1346       General Information    Admission Type observation    Required Notices Provided Observation Status Notice    Referral Source admission list    Reason for Consult discharge planning    Preferred Language English                   Functional Status       Row Name 06/02/25 1346       Functional Status    Usual Activity Tolerance fair    Current Activity Tolerance fair       Physical Activity    On average, how many days per week do  you engage in moderate to strenuous exercise (like a brisk walk)? 0 days    On average, how many minutes do you engage in exercise at this level? 0 min    Number of minutes of exercise per week 0       Functional Status, IADL    Medications independent    Meal Preparation independent    Housekeeping assistive person    Laundry assistive person    Shopping assistive person    If for any reason you need help with day-to-day activities such as bathing, preparing meals, shopping, managing finances, etc., do you get the help you need? I don't need any help       Mental Status    General Appearance WDL WDL       Employment/    Employment Status retired                   Psychosocial       Row Name 06/02/25 1341       Values/Beliefs    Spiritual, Cultural Beliefs, Gnosticism Practices, Values that Affect Care no       Mental Health    Little interest or pleasure in doing things Not at all    Feeling down, depressed, or hopeless Not at all       Speech WDL    Speech WDL WDL       Stress    Do you feel stress - tense, restless, nervous, or anxious, or unable to sleep at night because your mind is troubled all the time - these days? Not at all       Developmental Stage (Eriksson's Stages of Development)    Developmental Stage Stage 8 (65 years-death/Late Adulthood) Integrity vs. Despair                   Abuse/Neglect    No documentation.                  Legal       Row Name 06/02/25 6887       Financial Resource Strain    How hard is it for you to pay for the very basics like food, housing, medical care, and heating? Not very       Financial/Legal    Financial/Environmental Concerns none       Legal    Criminal Activity/Legal Involvement none                   Substance Abuse    No documentation.                  Patient Forms    No documentation.                     Chitra Ritter RN

## 2025-06-02 NOTE — PROGRESS NOTES
Colorectal & General Surgery  Progress Note    Patient: Rosas Christianson  YOB: 1940  MRN: [id number]      Assessment  Rosas Christianson is a 85 y.o. 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    Afebrile with no tachycardia.  Abdominal exam is benign.  Pain is well-controlled.  Having output from his new ileostomy.  Drains are serosanguineous and low volume.  Overall, recovering well.    Anticipate that we will be able to discharge him as early as tomorrow or Wednesday.    Plan  WOCN team to see today  Advance to regular diet  Discontinue IV fluids  Continue YAMILE drains to bulb suction  Anticipate discharge Tuesday or Wednesday.      Subjective  No significant events.  Feels relatively well aside from pain.    Objective    Vitals:    06/02/25 0713   BP: 127/70   Pulse: 64   Resp: 18   Temp: 98.4 °F (36.9 °C)   SpO2: 100%       Physical Exam  Constitutional: Well-developed well-nourished, no acute distress  Neck: Supple, trachea midline  Respiratory: No increased work of breathing, Symmetric excursion  Cardiovascular: Well pefursed, no jugular venous distention evident   Abdominal: Incisions in good order.  Drains are serosanguineous.  Soft, appropriately tender, nondistended.  Ileostomy pink and above the skin with liquid stool in the appliance.  Skin: Warm, dry, no rash on visualized skin surfaces  Psychiatric: Alert and oriented ×3, normal affect     Laboratory Results  I have personally reviewed CBC with WBC 20, hemoglobin 11, platelets 119.  BMP with creatinine 1.02, bicarb 21    Radiology  None to review         Ishaan Mccoy MD  Colorectal & General Surgery  [facility]    [address], [address]  [place], [place], [address]  P: [phone]  F: [phone]

## 2025-06-02 NOTE — OP NOTE
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  It was reduced relatively easily.  There was a large rush of clear fluid as well.  Decision was made to  relocate his ileostomy.  Resection included the ileum that was stretched greatly from the parastomal hernia sac and a small area that was extremely erythematous, measuring approximately 15 cm total.  End ileostomy created in standard Eulalia fashion in left upper quadrant and was well above skin.    SURGEON:  Ishana Mccoy MD    ASSISTANT:  Paula NOVAK  was responsible for performing the following activities: Retraction, Suction, Irrigation, Closing, and Placing Dressing and their skilled assistance was necessary for the success of this case.     ANESTHESIA:  General-endotracheal    EBL:  50 mL    SPECIMEN:  Ileostomy  Ileum  Hernia sac    OPERATIVE DESCRIPTION:  The patient was brought to the operating room under the care of the nursing staff.  The patient was placed on the operating room table in the supine position where anesthesia was induced.  The patient was then prepped and positioned in the usual sterile fashion.  A standardized timeout was then performed.    Midline laparotomy was created.  The bowel within the peritoneal cavity was mildly dilated and viable.  The bowel within the parastomal hernia was reduced manually without great difficulty.  It was viable but there were certainly a few areas that were quite erythematous, mostly  adjacent to the ileostomy itself.  The entirety of the small bowel was examined back to the ligament of Treitz with no significant abnormalities noted aside from a large Meckel's diverticulum without any associated nodules or areas of thickening.  Decision was made to relocate the ileostomy to the left upper quadrant.  Elliptical incision was then created around the ileostomy itself.  The ileum was freed from the hernia sac.  The ostomy was divided with a surgical stapler.  It was passed off for permanent pathology.  The remainder of the ileum was then  from the hernia sac and placed within the peritoneal cavity.  The area of erythematous and somewhat devitalized ileum was then resected with a white load of the [place] 60 stapler.  The LigaSure device was used to divide the mesentery.  This was passed off as ileum.  A trephine was created in the left upper quadrant at the site previously marked in preop.  Anterior fascia was divided.  Rectus muscle was  and posterior fascia was divided.  The ileum was brought through without any twist in the mesentery or any tension at all.  Attention was then turned to the parastomal hernia sac.  It was excised completely back to healthy fascia.  The anterior and posterior fascia were  and there was visible muscle circumferentially.  Posterior fascia was closed with a running 0 PDS suture.  The anterior fascia was closed with interrupted 0 PDS suture in figure-of-eight fashion.  The fascia closed without any significant tension.  Hemostasis was verified within the parastomal hernia sac.  2 drains were placed, the inferior drain is circumferential around the entire hernia space.  The superior drain is linear to the middle of the hernia space.  The subcutaneous tissue was then closed down with 3-0 Vicryl sutures.  The skin was reapproximated with surgical staples.  The midline laparotomy site was then closed with 0 PDS suture in a running fashion.  Skin  reapproximated with surgical staples.  Ileostomy was then created in a Eulalia fashion.  It was well above the skin and viable.  Stoma appliance and island dressings placed for dressing.    All needle, sponge, and instrument counts were correct at the end of the case.    The patient tolerated the procedure well and was transferred to the postanesthesia care unit in stable condition.    Ishaan Mccoy M.D.  Colorectal & General Surgery  [facility]    [address] [place] [address], [address]  [place], [place], [address]  P: [phone]  F: [phone]
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
## 2025-06-02 NOTE — PLAN OF CARE
Goal Outcome Evaluation:            Pt A&Ox4 and able to make needs known this shift. Complaints of pain related to surgery, PRN medication given with positive effects. No other complaints. Upgraded to regular diet this shift. VSS. Safety maintained. PO intake encouraged.

## 2025-06-02 NOTE — ANESTHESIA POSTPROCEDURE EVALUATION
Patient: Rosas Christianson    Procedure Summary       Date: 06/01/25 Room / Location: [facility] OR [address] [place] [address] MAIN OR    Anesthesia Start: 1747 Anesthesia Stop: 2014    Procedure: ELPLORATORY LAPAROTOMY,ILEOSTOMY TAKEDOWN AND CREATION, SMALL BOWEL RESECTION, VENTRAL INCISIONAL HERNIA REPAIR (Abdomen) Diagnosis:       Parastomal hernia with gangrene      (Parastomal hernia with gangrene [K43.4])    Surgeons: Jeromy Mccoy MD Provider: Jocelyn Acosta MD    Anesthesia Type: general ASA Status: 3 - Emergent            Anesthesia Type: general    Vitals  Vitals Value Taken Time   /81 06/01/25 21:15   Temp 36.7 °C (98.1 °F) 06/01/25 21:15   Pulse 73 06/01/25 21:28   Resp 18 06/01/25 21:15   SpO2 100 % 06/01/25 21:28   Vitals shown include unfiled device data.        Post Anesthesia Care and Evaluation    Patient location during evaluation: PACU  Patient participation: complete - patient participated  Level of consciousness: awake  Pain management: adequate    Airway patency: patent  Anesthetic complications: No anesthetic complications  PONV Status: none  Cardiovascular status: stable  Respiratory status: acceptable  Hydration status: acceptable

## 2025-06-02 NOTE — PLAN OF CARE
Goal Outcome Evaluation:           Progress: improving  Outcome Evaluation: no acute issues overnight. call light within reach. plan of care ongoing. voiding well. still complains of moderate abd pain. a/o x 4. still on 2 LPM Via NC

## 2025-06-02 NOTE — NURSING NOTE
"   06/02/25 1601   Ileostomy Standard (Eul[place], end) LUQ   Placement date: If unknown, DO NOT use \"Add Comment\" note: 06/01/25   Inserted by: Dr. Ishaan Mccoy  Ileostomy Type: Standard (Eul[place], end)  Location: LUQ   Stomal Appliance 2 piece;Intact  ([place] 2 3/4\")   Stoma Appearance round;red;moist;protruding above skin level   Stoma Function stool   Stool Color brown, light   Stool Consistency liquid     Wound/ostomy - consult received for new ileostomy, patient is s/p small bowel resection with creation of a new end ileostomy and repair of his parastomal hernia for an incarcerated parastomal hernia with a small bowel obstruction on 6/1 per Dr. Mccoy after presenting to the ER with abdominal pain. Patient with a history of ulcerative colitis status post total abdominal colectomy about 25 years ago with permanent end ileostomy.     Patient's new stoma is located in his LUQ.  Patient was independent with ostomy care prior to this surgery, he will be able to manage this stoma as well. He uses Convatec pouches with a clamp, flat barrier, cut to fit. He states that he likes these pouches, he has used Columbia lock and roll before and he does not like that closure. He purchases his supplies from [employer], he states that he was not aware Medicare can be billed.     His RLQ area has an abraded area from his prior ostomy appliance likely did not have a good fit due to the parastomal hernia. It is dry, reddened, it causes discomfort. We can treat this with some magic barrier cream to soothe and heal.     Did not change pouch at this time, he had been trying to nap, will change pouch tomorrow and talk with patient more about how he obtains supplies. Shonna supplies left at the bedside but he brought his supplies and we can place his pouch since he likes them better.   "

## 2025-06-03 ENCOUNTER — TELEPHONE (OUTPATIENT)
Dept: PAIN MEDICINE | Facility: CLINIC | Age: 85
End: 2025-06-03

## 2025-06-03 LAB
ALBUMIN SERPL-MCNC: 3 G/DL (ref 3.5–5.2)
ALBUMIN/GLOB SERPL: 1.1 G/DL
ALP SERPL-CCNC: 61 U/L (ref 39–117)
ALT SERPL W P-5'-P-CCNC: 18 U/L (ref 1–41)
ANION GAP SERPL CALCULATED.3IONS-SCNC: 8.1 MMOL/L (ref 5–15)
AST SERPL-CCNC: 43 U/L (ref 1–40)
BILIRUB SERPL-MCNC: 0.6 MG/DL (ref 0–1.2)
BUN SERPL-MCNC: 22 MG/DL (ref 8–23)
BUN/CREAT SERPL: 18.5 (ref 7–25)
CALCIUM SPEC-SCNC: 8.5 MG/DL (ref 8.6–10.5)
CHLORIDE SERPL-SCNC: 106 MMOL/L (ref 98–107)
CO2 SERPL-SCNC: 22.9 MMOL/L (ref 22–29)
CREAT SERPL-MCNC: 1.19 MG/DL (ref 0.76–1.27)
CYTO UR: NORMAL
DEPRECATED RDW RBC AUTO: 42.8 FL (ref 37–54)
EGFRCR SERPLBLD CKD-EPI 2021: 59.9 ML/MIN/1.73
ERYTHROCYTE [DISTWIDTH] IN BLOOD BY AUTOMATED COUNT: 11.8 % (ref 12.3–15.4)
GLOBULIN UR ELPH-MCNC: 2.7 GM/DL
GLUCOSE SERPL-MCNC: 89 MG/DL (ref 65–99)
HCT VFR BLD AUTO: 30 % (ref 37.5–51)
HGB BLD-MCNC: 10.3 G/DL (ref 13–17.7)
LAB AP CASE REPORT: NORMAL
MCH RBC QN AUTO: 33.9 PG (ref 26.6–33)
MCHC RBC AUTO-ENTMCNC: 34.3 G/DL (ref 31.5–35.7)
MCV RBC AUTO: 98.7 FL (ref 79–97)
PATH REPORT.FINAL DX SPEC: NORMAL
PATH REPORT.GROSS SPEC: NORMAL
PLATELET # BLD AUTO: 114 10*3/MM3 (ref 140–450)
PMV BLD AUTO: 10.8 FL (ref 6–12)
POTASSIUM SERPL-SCNC: 4.4 MMOL/L (ref 3.5–5.2)
PROT SERPL-MCNC: 5.7 G/DL (ref 6–8.5)
RBC # BLD AUTO: 3.04 10*6/MM3 (ref 4.14–5.8)
SODIUM SERPL-SCNC: 137 MMOL/L (ref 136–145)
WBC NRBC COR # BLD AUTO: 12.67 10*3/MM3 (ref 3.4–10.8)

## 2025-06-03 PROCEDURE — 85027 COMPLETE CBC AUTOMATED: CPT | Performed by: HOSPITALIST

## 2025-06-03 PROCEDURE — 97162 PT EVAL MOD COMPLEX 30 MIN: CPT

## 2025-06-03 PROCEDURE — 25010000002 ENOXAPARIN PER 10 MG: Performed by: SURGERY

## 2025-06-03 PROCEDURE — 80053 COMPREHEN METABOLIC PANEL: CPT | Performed by: HOSPITALIST

## 2025-06-03 PROCEDURE — 25010000002 PIPERACILLIN SOD-TAZOBACTAM PER 1 G: Performed by: NURSE PRACTITIONER

## 2025-06-03 PROCEDURE — 99024 POSTOP FOLLOW-UP VISIT: CPT | Performed by: SURGERY

## 2025-06-03 PROCEDURE — 97110 THERAPEUTIC EXERCISES: CPT

## 2025-06-03 RX ORDER — METHADONE HYDROCHLORIDE 5 MG/1
5 TABLET ORAL EVERY 12 HOURS SCHEDULED
Refills: 0 | Status: DISCONTINUED | OUTPATIENT
Start: 2025-06-03 | End: 2025-06-04 | Stop reason: HOSPADM

## 2025-06-03 RX ADMIN — OXYCODONE 5 MG: 5 TABLET ORAL at 06:31

## 2025-06-03 RX ADMIN — ZINC OXIDE 1 APPLICATION: 200 OINTMENT TOPICAL at 22:16

## 2025-06-03 RX ADMIN — PIPERACILLIN AND TAZOBACTAM 3.38 G: 3; .375 INJECTION, POWDER, FOR SOLUTION INTRAVENOUS at 01:39

## 2025-06-03 RX ADMIN — ZINC OXIDE 1 APPLICATION: 200 OINTMENT TOPICAL at 09:24

## 2025-06-03 RX ADMIN — ACETAMINOPHEN 1000 MG: 500 TABLET, FILM COATED ORAL at 22:15

## 2025-06-03 RX ADMIN — ENOXAPARIN SODIUM 40 MG: 100 INJECTION SUBCUTANEOUS at 09:24

## 2025-06-03 RX ADMIN — METHADONE HYDROCHLORIDE 5 MG: 5 TABLET ORAL at 14:12

## 2025-06-03 RX ADMIN — ACETAMINOPHEN 1000 MG: 500 TABLET, FILM COATED ORAL at 16:49

## 2025-06-03 RX ADMIN — METHADONE HYDROCHLORIDE 5 MG: 5 TABLET ORAL at 22:15

## 2025-06-03 RX ADMIN — ACETAMINOPHEN 1000 MG: 500 TABLET, FILM COATED ORAL at 09:23

## 2025-06-03 RX ADMIN — TAMSULOSIN HYDROCHLORIDE 0.8 MG: 0.4 CAPSULE ORAL at 09:23

## 2025-06-03 RX ADMIN — PIPERACILLIN AND TAZOBACTAM 3.38 G: 3; .375 INJECTION, POWDER, FOR SOLUTION INTRAVENOUS at 09:23

## 2025-06-03 NOTE — PLAN OF CARE
Goal Outcome Evaluation:  Plan of Care Reviewed With: patient           Outcome Evaluation: Pt is an 86 yo male adm with 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 Pt lives alone and is independent at baseline, no stairs to enter his home but his bedroom is on the second floor, pt reports using a cane or walker at baseline and has an electric scooter for long distances. Pt presents with pain, weakness, and impaired functional mobility, he required min assist for bed mobility, he was able to stand and walk 60 ft with CGA, he was in pain and fatigued quickly with activity, pt feels that he can manage at home, he would be appropriate for consideration of SNF as he lives alone and does not have any help at home, PT will cont to follow    Anticipated Discharge Disposition (PT): home, skilled nursing facility (depending on progress)

## 2025-06-03 NOTE — PROGRESS NOTES
Name: Rosas Christianson ADMIT: 2025   : 1940  PCP: Rashid Klein MD    MRN: [id number] LOS: 1 days   AGE/SEX: 85 y.o. male  ROOM: [facility]     Subjective   Subjective   Resting in bed, tolerating regular diet, tells me surgery is happy with how things are going and he is hopeful for discharge tomorrow.       Objective   Objective   Vital Signs  Temp:  [97.7 °F (36.5 °C)-98.6 °F (37 °C)] 97.9 °F (36.6 °C)  Heart Rate:  [63-80] 80  Resp:  [18] 18  BP: (118-134)/(70-82) 118/70  SpO2:  [96 %-99 %] 98 %  on   ;   Device (Oxygen Therapy): room air  There is no height or weight on file to calculate BMI.  Physical Exam  Constitutional:       General: He is not in acute distress.     Appearance: He is ill-appearing. He is not toxic-appearing.   Cardiovascular:      Rate and Rhythm: Normal rate and regular rhythm.   Pulmonary:      Effort: Pulmonary effort is normal. No respiratory distress.      Breath sounds: Normal breath sounds. No wheezing.   Abdominal:      General: Abdomen is flat.      Palpations: Abdomen is soft.      Tenderness: There is no abdominal tenderness.      Comments: YAMILE drain in place   Musculoskeletal:         General: No tenderness.      Right lower leg: No edema.      Left lower leg: No edema.   Skin:     General: Skin is warm and dry.   Neurological:      General: No focal deficit present.      Mental Status: He is alert and oriented to person, place, and time. Mental status is at baseline.      Motor: No weakness.         Results Review     I reviewed the patient's new clinical results.  Results from last 7 days   Lab Units 25  0557 25  1226   WBC 10*3/mm3 12.67* 20.31* 7.87   HEMOGLOBIN g/dL 10.3* 11.0* 12.5*   PLATELETS 10*3/mm3 114* 119* 138*     Results from last 7 days   Lab Units 255 25  0557 25  1226   SODIUM mmol/L 137 135* 138   POTASSIUM mmol/L 4.4 4.6 4.0   CHLORIDE mmol/L 106 104 102   CO2 mmol/L 22.9 21.8* 22.0   BUN mg/dL  "22.0 28.0* 33.0*   CREATININE mg/dL 1.19 1.02 1.21   GLUCOSE mg/dL 89 123* 117*   Estimated Creatinine Clearance: 44.9 mL/min (by C-G formula based on SCr of 1.19 mg/dL).  Results from last 7 days   Lab Units 06/03/25  0415 06/01/25  1226   ALBUMIN g/dL 3.0* 3.9   BILIRUBIN mg/dL 0.6 0.6   ALK PHOS U/L 61 103   AST (SGOT) U/L 43* 43*   ALT (SGPT) U/L 18 26     Results from last 7 days   Lab Units 06/03/25  0415 06/02/25  0557 06/01/25  1226   CALCIUM mg/dL 8.5* 8.7 10.0   ALBUMIN g/dL 3.0*  --  3.9     Results from last 7 days   Lab Units 06/01/25  1520 06/01/25  1226   PROCALCITONIN ng/mL  --  0.22   LACTATE mmol/L 2.0 2.1*   No results found for: \"COVID19\"  No results found for: \"HGBA1C\", \"POCGLU\"    CT Abdomen Pelvis With Contrast  CT ABDOMEN PELVIS W CONTRAST-     Radiation dose reduction techniques were utilized, including automated  exposure control and exposure modulation based on body size.     Clinical: Acute abdominal pain. Right-sided ileostomy, prior colectomy     COMPARISON examination 5/21/2025     FINDINGS:  1. Similar to the previous examination there are small bowel loops  demonstrated within the parastomal hernia, the degree of mesenteric  induration within the hernia has increased with now development of  fluid. Considerably dilated small bowel leading to this location with a  large amount of edema demonstrated throughout the small bowel mesentery  in the right abdomen. This is worrisome for impending ischemia. There is  free intraperitoneal fluid, greatest amount within the pelvis. No free  intraperitoneal gas nor pneumatosis intestinalis. The findings are  compatible with incarcerated parastomal hernia with small bowel  obstruction.     2. Moderately distended fluid-filled stomach, the duodenum is normal.  The rectal stump is typical in appearance.     3. There are several gallstones demonstrated within the gallbladder  fundus, no gallbladder wall thickening. The liver is satisfactory " in  appearance, no abnormality of the pancreas or spleen. Both adrenal  glands have a satisfactory appearance. Both kidneys are normal. No  calculus or obstructive uropathy. Dilatation of the infrarenal aorta  having a maximum diameter of 3.4 cm. There is considerable  atherosclerotic plaque formation. No luminal compromise seen. Urinary  bladder is satisfactory in appearance. The remainder is unremarkable.     FINDINGS of this report called directly to Dr. Miller in the emergency  room at 2:21 p.m.              This report was finalized on 6/1/2025 2:21 PM by Dr. Rupesh Quinteros M.D  on Workstation: [id number]       Scheduled Medications  acetaminophen, 1,000 mg, Oral, Q6H  enoxaparin sodium, 40 mg, Subcutaneous, Daily  hydrocortisone-bacitracin-zinc oxide-nystatin, 1 Application, Topical, BID  methadone, 5 mg, Oral, Q12H  tamsulosin, 0.8 mg, Oral, Daily    Infusions   Diet  Diet: Regular/House; Fluid Consistency: Thin (IDDSI 0)         I have personally reviewed:  [x]  Laboratory   []  Microbiology   []  Radiology   [x]  EKG/Telemetry   []  Cardiology/Vascular   []  Pathology   []  Records    Assessment/Plan     Active Hospital Problems    Diagnosis  POA    **Small bowel obstruction [K56.609]  Yes    Parastomal hernia [K43.5]  Yes    BPH (benign prostatic hyperplasia) [N40.0]  Yes    Anxiety [F41.9]  Yes    H/O colectomy [Z90.49]  Not Applicable    Hypothyroidism [E03.9]  Yes    Hypertension [I10]  Yes    Ulcerative colitis [K51.90]  Yes      Resolved Hospital Problems   No resolved problems to display.       85 y.o. male admitted with Small bowel obstruction.    Incarcerated parastomal hernia with SBO s/p repair - POD2  Hx of UC s/p total abdominal colectomy   - doing well, surgery pleased with progress  - Tolerating a regular diet  -Pain medications adjusted back to his home regimen  -Surgery is planning for home health to help manage drain, discussed with CCP who will discuss with patient      Chronic pain on  methadone 5 mg twice daily  - Patient had been on oxycodone and Dilaudid postoperatively but is being transition back to his home methadone     Hypothyroidism  -levothyroxine     BPH  -Flomax    Lovenox 40 mg SC daily for DVT prophylaxis.  Full code.  Discussed with patient, nursing staff, and CCP.  Anticipated discharge home, tomorrow      Melinda Turcios MD  [facility]  06/03/25  14:11 EDT    I wore protective equipment throughout this patient encounter including gloves.  Hand hygiene was performed before donning protective equipment and after removal when leaving the room.    Expected Discharge Date and Time       Expected Discharge Date Expected Discharge Time    Jun 4, 2025              Copied text in this note has been reviewed and is accurate as of 06/03/25.

## 2025-06-03 NOTE — TELEPHONE ENCOUNTER
Caller: Rosas Christianson    Relationship to patient: Self    Best call back number:     Chief complaint: CHRONIC PAIN SYNDROME    Type of visit: 1 MONTH FOLLOW UP    Requested date: 7.7.25     If rescheduling, when is the original appointment: 6.9.25     Additional notes:PATIENT HAD APPOINTMENT FOR 6.9.25 FOR A 1 MONTH FOLLOW UP WITH DR. ROBERTS HOWEVER PATIENT IS CURRENTLY IN THE HOSPITAL AND CANNOT MAKE THE APPOINTMENT FOR 6.9.25 THE NEXT AVAILABLE WAS 7.7.25 SO THE PATIENT WAS RESCHEDULED TILL THEN. IF THIS IS TOO FAR OUT PLEASE CONTACT THE PATIENT TO BE WORKED IN SOONER IF POSSIBLE. PLEASE ADVICE.

## 2025-06-03 NOTE — PLAN OF CARE
Goal Outcome Evaluation:            Outcome Evaluation: pt is A&Ox4, VSS, pt refused to work with physical therapy, Will continue plan of care.

## 2025-06-03 NOTE — NURSING NOTE
"   06/03/25 1111   Wound 06/01/25 1918 midline abdomen Surgical Open Surgical Incision   Placement Date/Time: 06/01/25 1918   Present on Original Admission: No  Orientation: midline  Location: abdomen  Primary Wound Type: Surgical  Secondary Wound Type - Surgical: Open Surgical Incision   Dressing Appearance moist drainage;intact  (surgical island dressing intact, edge lifted to change ostomy appliance)   Closure Staples   Drainage Characteristics/Odor serosanguineous   Drainage Amount moderate   Ileostomy Standard ([place], end) LUQ   Placement date: If unknown, DO NOT use \"Add Comment\" note: 06/01/25   Inserted by: Dr. Ishaan Mccoy  Ileostomy Type: Standard ([place], end)  Location: LUQ   Stomal Appliance 2 piece;Changed  ([place] 2 1/4\" cut at 1 1/8\")   Stoma Appearance round;red;protruding above skin level;moist   Peristomal Assessment Clean;Intact   Accessories/Skin Care cleansed with water;skin barrier ring   Stoma Function stool   Stool Color brown, dark   Stool Consistency liquid   Treatment Bag change;Site care   Output (mL) 25 mL     Ostomy education provided to the patient/family: patient is independent in ostomy care and management, he uses the [employer] Stomahesive 2 piece system (red) with clamp closure. This system has a larger barrier and it will overlap the surgical incision , it also has a rigid coupling and the edge has caused peristomal skin damage from pressure and friction at his old stoma site, patient thin and wrinkly, loose skin, he would benefit from a more flexible pouching system. The [place] 2 1/4\" is more flexible and the barrier is smaller and does not overlap his surgical incision.     Patient mentions that he orders his supplies off of [employer], he use to get supplies locally and it was too much of a hassle to drive there and the paperwork to get insurance to pay so he just started paying OOP. He is agreeable to me initiating an account with [employer] for mail ordering, he says " "he would do but he just \"doesn't know where to start\". He has [employer], he some how has lost his supplement and doesn't know what to do to get another one. I will see about finding contact information for him who can help with selection and enrollment in a plan, he states that he doesn't have anyone to help him with these things, states \"I'm the one who helps them\".     [x]Pouch changed   [x]Pt observed   []Pt participated    []CG participated            []Instructed in frequency of pouch change  [x]Pouch emptied and cleaned, demonstrated use of pouch closure - reviewed the [place] lock and roll closure, patient uses a pouch with clamp closure, he doesn't like the lock and roll closure because he states the tail is difficult to clean, he is agreeable to use the [place] while here due to the barrier being smaller and will not overlap his surgical incision    [x]Pt observed   []Pt participated    []CG participated    []Instructed to empty pouch when 1/3 to 1/2 full  []Teaching packet/handouts provided/reviewed  []Return to ADL's, showering, clothing  [x]Peristomal skin care, avoiding use of soaps with moisturizers  []Diet   []Adequate po fluid intake   []S/S of dehydration   []Foods to thicken stool   [x]Foods to avoid to prevent blockage  [x]Supplies at bedside  []Samples ordered    Current Supplies: [place] 2 1/4\" with barrier ring    WOC Team follow up plan: daily and outpatient if necessary, WOC nurse to assist in setting patient up with [employer] account, will provide our phone number to our office so he has a reliable ostomy resource.   "
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
## 2025-06-03 NOTE — PLAN OF CARE
Goal Outcome Evaluation:  Plan of Care Reviewed With: patient        Progress: improving  Outcome Evaluation: No acute issues overnight. Pt alert and oriented x 4. Vital signs stable, room air. Still reported moderate abdominal pain, refusing scheduled Tylenol, PRN Roxicodone given x 2. IV antibiotics continued. YAMILE drains intact, output as charted.

## 2025-06-03 NOTE — PROGRESS NOTES
Colorectal & General Surgery  Progress Note    Patient: Rosas Christianosn  YOB: 1940  MRN: [id number]      Assessment  Rosas Christianson is a 85 y.o. male with incarcerated parastomal hernia in the setting of small bowel obstruction is now postoperative day 2 from small bowel resection with creation of a new end ileostomy and repair of his parastomal hernia.    He is doing fantastic.  The only thing keeping him in the hospital at this point is the nursing care required for his drains and education with new ostomy.    I have placed a referral for home health to assist with wound and drain care.    I suspect that he may be ready to go tomorrow.    I will stop his oxycodone and start him back on his home methadone.    Subjective  No significant events.  Feels pretty well.  Tolerating his diet with no problem.  Having good ileostomy output.    Objective    Vitals:    06/03/25 0704   BP: 134/74   Pulse:    Resp: 18   Temp: 98.6 °F (37 °C)   SpO2: 98%       Physical Exam  Constitutional: Well-developed well-nourished, no acute distress  Neck: Supple, trachea midline  Respiratory: No increased work of breathing, Symmetric excursion  Cardiovascular: Well pefursed, no jugular venous distention evident   Abdominal: Ileostomy is pink and above the skin.  Incisions are in good order.  Drain is serous.  Soft, non-tender, non-distended  Skin: Warm, dry, no rash on visualized skin surfaces  Psychiatric: Alert and oriented ×3, normal affect     Laboratory Results  I have personally reviewed CBC with WC 12, hemoglobin 10, platelets 114.  BMP with creatinine 1.19.    Radiology  None to review         Ishaan Mccoy MD  Colorectal & General Surgery  [facility]    [facility], [address]  [place], [place], [address]  P: [phone]  F: [phone]

## 2025-06-04 ENCOUNTER — READMISSION MANAGEMENT (OUTPATIENT)
Dept: CALL CENTER | Facility: HOSPITAL | Age: 85
End: 2025-06-04
Payer: MEDICARE

## 2025-06-04 VITALS
HEART RATE: 56 BPM | SYSTOLIC BLOOD PRESSURE: 141 MMHG | RESPIRATION RATE: 18 BRPM | DIASTOLIC BLOOD PRESSURE: 76 MMHG | TEMPERATURE: 97.5 F | OXYGEN SATURATION: 97 %

## 2025-06-04 LAB
DEPRECATED RDW RBC AUTO: 42.1 FL (ref 37–54)
ERYTHROCYTE [DISTWIDTH] IN BLOOD BY AUTOMATED COUNT: 11.9 % (ref 12.3–15.4)
HCT VFR BLD AUTO: 29.5 % (ref 37.5–51)
HGB BLD-MCNC: 10.1 G/DL (ref 13–17.7)
MCH RBC QN AUTO: 33.2 PG (ref 26.6–33)
MCHC RBC AUTO-ENTMCNC: 34.2 G/DL (ref 31.5–35.7)
MCV RBC AUTO: 97 FL (ref 79–97)
PLATELET # BLD AUTO: 122 10*3/MM3 (ref 140–450)
PMV BLD AUTO: 10.8 FL (ref 6–12)
RBC # BLD AUTO: 3.04 10*6/MM3 (ref 4.14–5.8)
WBC NRBC COR # BLD AUTO: 10.43 10*3/MM3 (ref 3.4–10.8)

## 2025-06-04 PROCEDURE — 25010000002 ENOXAPARIN PER 10 MG: Performed by: SURGERY

## 2025-06-04 PROCEDURE — 85027 COMPLETE CBC AUTOMATED: CPT | Performed by: STUDENT IN AN ORGANIZED HEALTH CARE EDUCATION/TRAINING PROGRAM

## 2025-06-04 PROCEDURE — 97530 THERAPEUTIC ACTIVITIES: CPT

## 2025-06-04 RX ADMIN — METHADONE HYDROCHLORIDE 5 MG: 5 TABLET ORAL at 08:06

## 2025-06-04 RX ADMIN — ZINC OXIDE 1 APPLICATION: 200 OINTMENT TOPICAL at 08:06

## 2025-06-04 RX ADMIN — TAMSULOSIN HYDROCHLORIDE 0.8 MG: 0.4 CAPSULE ORAL at 08:06

## 2025-06-04 RX ADMIN — ACETAMINOPHEN 1000 MG: 500 TABLET, FILM COATED ORAL at 08:06

## 2025-06-04 RX ADMIN — ENOXAPARIN SODIUM 40 MG: 100 INJECTION SUBCUTANEOUS at 08:06

## 2025-06-04 NOTE — CASE MANAGEMENT/SOCIAL WORK
Case Management Discharge Note      Final Note: Home with [facility] and family transport    Provided Post Acute Provider List?: Refused  Refused Provider List Comment: Patient states that he has cared for his ileostomy for 2 decades and does not need help.    Selected Continued Care - Discharged on 6/4/2025 Admission date: 6/1/2025 - Discharge disposition: Home or Self Care      Destination    No services have been selected for the patient.                Durable Medical Equipment    No services have been selected for the patient.                Dialysis/Infusion    No services have been selected for the patient.                Home Medical Care       Service Provider Services Address Phone Fax Patient Preferred    [facility] [address], [address], [place] [address] [phone] --              Therapy    No services have been selected for the patient.                Community Resources    No services have been selected for the patient.                Community & DME    No services have been selected for the patient.                    Transportation Services  Private: Car    Final Discharge Disposition Code: 06 - home with home health care

## 2025-06-04 NOTE — OUTREACH NOTE
Prep Survey      Flowsheet Row Responses   [facility] patient discharged from? [place]   Is LACE score < 7 ? No   Eligibility [facility]   Date of Admission 06/01/25   Date of Discharge 06/04/25   Discharge Disposition Home-Health Care Sv   Discharge diagnosis small bowel resection with end ileostomy creation   Does the patient have one of the following disease processes/diagnoses(primary or secondary)? General Surgery   Does the patient have Home health ordered? Yes   What is the Home health agency?  [facility]   Is there a DME ordered? No   Prep survey completed? Yes            Veronica SERRANO - Registered Nurse

## 2025-06-04 NOTE — NURSING NOTE
Wound/ostomy - patient is discharging home today with [facility]. The pouch that was placed yesterday is intact, no issues with leaking other than the pouch itself leaked due to lock and roll not being adequately closed. He states the barrier feels good on his abdomen, he just wants a clamp closure. For now placed a clamp on the lock and roll bag for patient ease of mind, its a little awkward and doesn't clamp easily but it worked. Discussed with him that Wilton has a clamp closure and he is agreeable to use the Shonna product if he can have a clamp closure.     Reviewed what size we are cutting the opening and in about 2 weeks stoma should be shrunk to its regular size and can transition to precut. Patient has been cutting his barriers for years and he does not mind doing so but he likes the idea of precut. Reviewed to cleanse skin with water, no need to use any skin protectant or medical adhesive with the Wilton cera plus products. Reviewed use of the barrier ring to enhance seal.     Patient is agreeable to me contacting [employer] and providing his information for an account to be set up on his behalf. Provided patient with an [employer] catalog with Barbara's contact number, phone number to someone who can assist with medicare/insurance enrollment due to patient somehow losing his supplemental coverage, provided patient with ostomy nurse office number here at the hospital as an additional resource.     I have requested Shonna samples with a pouch that has clamp closure. I have sent him home with supplies for 6-8 pouch changes.

## 2025-06-04 NOTE — PLAN OF CARE
Problem: Adult Inpatient Plan of Care  Goal: Plan of Care Review  Outcome: Adequate for Care Transition  Goal: Patient-Specific Goal (Individualized)  Outcome: Adequate for Care Transition  Goal: Absence of Hospital-Acquired Illness or Injury  Outcome: Adequate for Care Transition  Intervention: Identify and Manage Fall Risk  Recent Flowsheet Documentation  Taken 6/4/2025 1206 by Leslie Xiao RN  Safety Promotion/Fall Prevention:   activity supervised   assistive device/personal items within reach   clutter free environment maintained   fall prevention program maintained   nonskid shoes/slippers when out of bed   room organization consistent   safety round/check completed  Taken 6/4/2025 1000 by Leslie Xiao RN  Safety Promotion/Fall Prevention:   activity supervised   assistive device/personal items within reach   clutter free environment maintained   fall prevention program maintained   nonskid shoes/slippers when out of bed   room organization consistent   safety round/check completed  Taken 6/4/2025 0806 by Leslie Xiao RN  Safety Promotion/Fall Prevention:   activity supervised   assistive device/personal items within reach   clutter free environment maintained   fall prevention program maintained   nonskid shoes/slippers when out of bed   room organization consistent   safety round/check completed  Intervention: Prevent Skin Injury  Recent Flowsheet Documentation  Taken 6/4/2025 1206 by Leslie Xiao RN  Body Position: supine  Taken 6/4/2025 1000 by Leslie Xiao RN  Body Position: supine  Taken 6/4/2025 0806 by Leslie Xiao RN  Body Position:   position changed independently   supine  Goal: Optimal Comfort and Wellbeing  Outcome: Adequate for Care Transition  Goal: Readiness for Transition of Care  Outcome: Adequate for Care Transition     Problem: Fall Injury Risk  Goal: Absence of Fall and Fall-Related Injury  Outcome: Adequate for Care Transition  Intervention:  Promote Injury-Free Environment  Recent Flowsheet Documentation  Taken 6/4/2025 1206 by Leslie Xiao RN  Safety Promotion/Fall Prevention:   activity supervised   assistive device/personal items within reach   clutter free environment maintained   fall prevention program maintained   nonskid shoes/slippers when out of bed   room organization consistent   safety round/check completed  Taken 6/4/2025 1000 by Leslie Xaio RN  Safety Promotion/Fall Prevention:   activity supervised   assistive device/personal items within reach   clutter free environment maintained   fall prevention program maintained   nonskid shoes/slippers when out of bed   room organization consistent   safety round/check completed  Taken 6/4/2025 0806 by Leslie Xiao RN  Safety Promotion/Fall Prevention:   activity supervised   assistive device/personal items within reach   clutter free environment maintained   fall prevention program maintained   nonskid shoes/slippers when out of bed   room organization consistent   safety round/check completed     Problem: Skin Injury Risk Increased  Goal: Skin Health and Integrity  Outcome: Adequate for Care Transition  Intervention: Optimize Skin Protection  Recent Flowsheet Documentation  Taken 6/4/2025 1206 by Leslie Xiao RN  Head of Bed (HOB) Positioning: HOB at 30-45 degrees  Taken 6/4/2025 1000 by Leslie Xiao RN  Head of Bed (HOB) Positioning: HOB at 30-45 degrees  Taken 6/4/2025 0806 by Leslie Xiao RN  Activity Management: activity encouraged  Head of Bed (HOB) Positioning: HOB at 30-45 degrees   Goal Outcome Evaluation:

## 2025-06-04 NOTE — DISCHARGE PLACEMENT REQUEST
"Jamar Greenwood (85 y.o. Male)       Date of Birth   1940    Social Security Number       [place]   [address] [place] [address]    Home Phone       MRN   [id number]       Lutheran   Episcopal    Marital Status                               Admission Date   6/1/2025    Admission Type   Emergency    Admitting Provider   Neftali Elias MD    Attending Provider   Melinda Turcios MD    Department, Room/Bed   [address], [address]/1       Discharge Date       Discharge Disposition       Discharge Destination                                 Attending Provider: Melinda Turcios MD    Allergies: Aspirin, Nsaids, Prednisone    Isolation: None   Infection: None   Code Status: CPR    Ht: 170.2 cm (67\")   Wt: 69.9 kg (154 lb)    Admission Cmt: None   Principal Problem: Small bowel obstruction [K56.609]                   Active Insurance as of 6/1/2025       Primary Coverage       Payor Plan Insurance Group Employer/Plan Group    MEDICARE MEDICARE A & B        Payor Plan Address Payor Plan Phone Number Payor Plan Fax Number Effective Dates    [address] [phone]  1/1/2005 - None Entered    [place] [address]         Subscriber Name Subscriber Birth Date Member ID       JAMAR GREENWOOD 1940 [id number]                     Emergency Contacts        (Rel.) Home Phone Work Phone Mobile Phone    DAVID RIGGS (Grandchild) -- -- [phone]                "

## 2025-06-04 NOTE — PROGRESS NOTES
Start PACC Note    Home Health Referral    Evaluated patient and on Home Care and services available. Patient offered choice of available HHC and agreeable to SN services with [facility].    Isolation Precautions: No active isolations    START PATIENT REGISTRATION INFORMATION  Order Information  Order Signing Physician: Dr. Jeromy Mccoy  Service Ordered RN?: Yes  Service Ordered PT?: No  Service Ordered OT?: No  Service Ordered ST?: No  Service Ordered MSW?: No  Service Ordered HHA?: No  Following Physician: Dr. Jeromy Mccoy  Following Physician Phone: [phone]  Overseeing Physician: Dr. Jeromy Mccoy  (Required for Residents Only)  Agreeable to Follow ? Yes  Date/Time of Call 06/04/25 12:35 EDT, Spoke with: Written order in EPIC chart    Care Coordination  Same Day SOC?: No  Primary Care Physician: Rashid Klein MD  Primary Care Physician Phone: [phone]  Primary Care Physician Address: [address] [place] [address] / [place] [id number]  Visit Instructions:   Service Discharge Location Type: Home  Service Facility Name:   Service Floor Facility:   Service Room No:     Demographics  Patient Last Name: Sammy  Patient First Name: Rosas  Language/Communication Barrier: no  Service Address: [address] [place] [address]  Service City: [place]  Service State: [place]  Service Zip: [address]  Service Home Phone: [phone]  Other Phone Numbers:   Telephone Information:   Mobile [phone]     Emergency Contact:   Extended Emergency Contact Information  Primary Emergency Contact: DAVID RIGGS  Mobile Phone: [phone]  Relation: Grandchild  Hearing or visual needs: None  Other needs: None  Preferred language: English   needed? 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 Empty pouch when 1/3 full to prevent pulling and leakage.  Date of procedure: 06/01/25  Physician/Surgeon: Dr. Jeromy Mccoy  Ostomy teaching done?: Yes      END PATIENT REGISTRATION INFORMATION    Start PACC Summary    Additional Comments: Patient has new Ileostomy but has had Ileostomy in the past so is knowledgeable on care    END PACC Summary    Discharge Date: 06/04/2025    Referral Source: [facility]    Signed By: Sheree Sanon RN, 6/4/2025, 12:35 EDT     Date/Time: 06/04/25 12:35 EDT    End PACC Note

## 2025-06-04 NOTE — CASE MANAGEMENT/SOCIAL WORK
Continued Stay Note  [facility]     Patient Name: Rosas Christianson  MRN: [id number]  Today's Date: 6/4/2025    Admit Date: 6/1/2025    Plan: Referral pending for [facility]. Home with family to transport vs drive self.   Discharge Plan       Row Name 06/04/25 0952       Plan    Plan Referral pending for [facility]. Home with family to transport vs drive self.    Patient/Family in Agreement with Plan yes    Plan Comments Spoke with patient at bedside. Introduced self and reviewed discharge plan. Patient voiced agreement with referral for HH without a preference. Patient discharge home with YAMILE drains. Referrals placed.                   Discharge Codes    No documentation.                 Expected Discharge Date and Time       Expected Discharge Date Expected Discharge Time    Jun 4, 2025               Chitra Ritter RN
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
## 2025-06-04 NOTE — PROGRESS NOTES
General Surgery  Progress Note    Patient: Rosas Christianson  YOB: 1940  MRN: [id number]      Assessment  Rosas Christianson is a 85 y.o. male with incarcerated parastomal hernia in the setting of small bowel obstruction is now postoperative day 3 from small bowel resection with creation of a new end ileostomy and repair of his parastomal hernia.    Very well.  Okay for discharge today.  He will keep with his drains and follow-up with Dr. Mccoy next week.  Home health consulted for assistance with wound and drain care.  On home methadone.    Subjective  No significant events.  Tolerating a diet.  Having good ileostomy output.  No issues.    Objective    Vitals:    06/04/25 0712   BP: 141/76   Pulse: 56   Resp: 18   Temp: 97.5 °F (36.4 °C)   SpO2: 97%       Physical Exam  Constitutional: Well-developed well-nourished, no acute distress  Neck: Supple, trachea midline  Respiratory: No increased work of breathing, Symmetric excursion  Cardiovascular: Well pefursed, no jugular venous distention evident   Abdominal: Ileostomy is pink and above the skin.  Incisions are in good order.  Drain is serous.  Soft, non-tender, non-distended  Skin: Warm, dry, no rash on visualized skin surfaces  Psychiatric: Alert and oriented ×3, normal affect     Laboratory Results  White blood cell count 10 from 12    Radiology  None to review    Imelda Regalado MD  General Surgery  [facility]    [address], [address]  [place], [place], [address]  P: [phone]  F: [phone]

## 2025-06-04 NOTE — PLAN OF CARE
Goal Outcome Evaluation:  Plan of Care Reviewed With: (P) patient        Progress: (P) improving  Outcome Evaluation: (P) pt showing improvement in mobility from previous treatment, walked 80 feet with roller walker with supervision. Plans to go home today with home health.

## 2025-06-04 NOTE — DISCHARGE SUMMARY
Patient Name: Rosas Christianson  : 1940  MRN: [id number]    Date of Admission: 2025  Date of Discharge:  2025  Primary Care Physician: Rashid Klein MD      Chief Complaint:   Nausea and Abdominal Pain      Discharge Diagnoses     Active Hospital Problems    Diagnosis  POA    **Small bowel obstruction [K56.609]  Yes    Parastomal hernia [K43.5]  Yes    BPH (benign prostatic hyperplasia) [N40.0]  Yes    Anxiety [F41.9]  Yes    H/O colectomy [Z90.49]  Not Applicable    Hypothyroidism [E03.9]  Yes    Hypertension [I10]  Yes    Ulcerative colitis [K51.90]  Yes      Resolved Hospital Problems   No resolved problems to display.        Hospital Course     Mr. Christianson is a 85 y.o. male with a history of UC with history of total abdominal colectomy, chronic pain on methadone, hypothyroidism, BPH who presented to [facility] initially complaining of abdominal pain and nausea.  Please see the admitting history and physical for further details.  He was found to have incarcerated parastomal hernia with concern for strangulation and was admitted to the hospital for further evaluation and treatment.  Surgery consulted and the patient was taken to the OR on 2025 which confirmed incarcerated parastomal hernia with small bowel obstruction.  He underwent small bowel resection with end ileostomy creation and tolerated the procedure well.  Patient had an uneventful postoperative course, he has been transition back to his home pain medications, he is stable for discharge and will continue his drains at discharge with close follow-up with surgery in about 1 week.  Home health has been arranged for him to help with drain care.    Day of Discharge     Subjective:  Resting in bed, eager to go home.    Physical Exam:  Temp:  [97.5 °F (36.4 °C)-97.9 °F (36.6 °C)] 97.5 °F (36.4 °C)  Heart Rate:  [56-76] 56  Resp:  [18] 18  BP: (118-141)/(70-88) 141/76  There is no height or weight on file to calculate  BMI.  Physical Exam  Constitutional:       General: He is not in acute distress.     Appearance: He is ill-appearing. He is not toxic-appearing.   Cardiovascular:      Rate and Rhythm: Normal rate and regular rhythm.   Pulmonary:      Effort: Pulmonary effort is normal. No respiratory distress.      Breath sounds: Normal breath sounds. No wheezing.   Abdominal:      General: Abdomen is flat.      Palpations: Abdomen is soft.      Tenderness: There is no abdominal tenderness.      Comments: YAMILE drain in place   Musculoskeletal:         General: No tenderness.      Right lower leg: No edema.      Left lower leg: No edema.   Skin:     General: Skin is warm and dry.   Neurological:      General: No focal deficit present.      Mental Status: He is alert and oriented to person, place, and time. Mental status is at baseline.      Motor: No weakness.         Consultants     Consult Orders (all) (From admission, onward)       Start     Ordered    06/01/25 1421  [facility] (on-call MD unless specified) Details  Once,   Status:  Canceled        Specialty:  Hospitalist  Provider:  Roman South MD    06/01/25 1420    06/01/25 1409  Surgery (on-call MD unless specified)  Once        Specialty:  General Surgery  Provider:  Jeromy Mccoy MD    06/01/25 1408                  Procedures     Imaging Results (All)       Procedure Component Value Units Date/Time    CT Abdomen Pelvis With Contrast [[id number]] Collected: 06/01/25 1409     Updated: 06/01/25 1424    Narrative:      CT ABDOMEN PELVIS W CONTRAST-     Radiation dose reduction techniques were utilized, including automated  exposure control and exposure modulation based on body size.     Clinical: Acute abdominal pain. Right-sided ileostomy, prior colectomy     COMPARISON examination 5/21/2025     FINDINGS:  1. Similar to the previous examination there are small bowel loops  demonstrated within the parastomal hernia, the degree of mesenteric  induration within the hernia has  increased with now development of  fluid. Considerably dilated small bowel leading to this location with a  large amount of edema demonstrated throughout the small bowel mesentery  in the right abdomen. This is worrisome for impending ischemia. There is  free intraperitoneal fluid, greatest amount within the pelvis. No free  intraperitoneal gas nor pneumatosis intestinalis. The findings are  compatible with incarcerated parastomal hernia with small bowel  obstruction.     2. Moderately distended fluid-filled stomach, the duodenum is normal.  The rectal stump is typical in appearance.     3. There are several gallstones demonstrated within the gallbladder  fundus, no gallbladder wall thickening. The liver is satisfactory in  appearance, no abnormality of the pancreas or spleen. Both adrenal  glands have a satisfactory appearance. Both kidneys are normal. No  calculus or obstructive uropathy. Dilatation of the infrarenal aorta  having a maximum diameter of 3.4 cm. There is considerable  atherosclerotic plaque formation. No luminal compromise seen. Urinary  bladder is satisfactory in appearance. The remainder is unremarkable.     FINDINGS of this report called directly to Dr. Miller in the emergency  room at 2:21 p.m.              This report was finalized on 6/1/2025 2:21 PM by Dr. Rupesh Quinteros M.D  on Workstation: [id number]               Pertinent Labs     Results from last 7 days   Lab Units 06/04/25  0312 06/03/25  0415 06/02/25  0557 06/01/25  1226   WBC 10*3/mm3 10.43 12.67* 20.31* 7.87   HEMOGLOBIN g/dL 10.1* 10.3* 11.0* 12.5*   PLATELETS 10*3/mm3 122* 114* 119* 138*     Results from last 7 days   Lab Units 06/03/25  0415 06/02/25  0557 06/01/25  1226   SODIUM mmol/L 137 135* 138   POTASSIUM mmol/L 4.4 4.6 4.0   CHLORIDE mmol/L 106 104 102   CO2 mmol/L 22.9 21.8* 22.0   BUN mg/dL 22.0 28.0* 33.0*   CREATININE mg/dL 1.19 1.02 1.21   GLUCOSE mg/dL 89 123* 117*   Estimated Creatinine Clearance: 44.9 mL/min (by  "C-G formula based on SCr of 1.19 mg/dL).  Results from last 7 days   Lab Units 06/03/25  0415 06/01/25  1226   ALBUMIN g/dL 3.0* 3.9   BILIRUBIN mg/dL 0.6 0.6   ALK PHOS U/L 61 103   AST (SGOT) U/L 43* 43*   ALT (SGPT) U/L 18 26     Results from last 7 days   Lab Units 06/03/25  0415 06/02/25  0557 06/01/25  1226   CALCIUM mg/dL 8.5* 8.7 10.0   ALBUMIN g/dL 3.0*  --  3.9     Results from last 7 days   Lab Units 06/01/25  1226   LIPASE U/L 54             Invalid input(s): \"LDLCALC\"        Test Results Pending at Discharge       Discharge Details        Discharge Medications        Continue These Medications        Instructions Start Date   B-12 1000 MCG capsule   1 capsule, Daily      fish oil 1000 MG capsule capsule   1,000 mg, Daily With Breakfast      glucosamine sulfate 500 MG capsule capsule   500 mg, 3 times daily      methadone 5 MG tablet  Commonly known as: DOLOPHINE   5 mg, Oral, Every 12 Hours Scheduled      multivitamin with minerals tablet tablet   1 tablet, Daily      naloxone 4 MG/0.1ML nasal spray  Commonly known as: NARCAN   Call 911. Don't prime. [place] in 1 nostril for overdose. Repeat in 2-3 minutes in other nostril if no or minimal breathing/responsiveness.      tamsulosin 0.4 MG capsule 24 hr capsule  Commonly known as: FLOMAX   0.8 mg, Oral, Daily      VITAMIN D3 PO   1 each, Daily               Allergies   Allergen Reactions    Aspirin Unknown - Low Severity    Nsaids Unknown - Low Severity    Prednisone Unknown - Low Severity         Discharge Disposition:  Home or Self Care    Discharge Diet:  Diet Order   Procedures    Diet: Regular/House; Fluid Consistency: Thin (IDDSI 0)       Discharge Activity:   Activity Instructions       Activity as Tolerated              CODE STATUS:    Code Status and Medical Interventions: CPR (Attempt to Resuscitate); Full Support   Ordered at: 06/01/25 5958     Code Status (Patient has no pulse and is not breathing):    CPR (Attempt to Resuscitate)     Medical " Interventions (Patient has pulse or is breathing):    Full Support       Future Appointments   Date Time Provider Department Center   6/12/2025 11:15 AM Vandana Mccoy MD [facility]   7/7/2025  2:40 PM London Bell MD [facility] PM [facility]   11/12/2025  2:30 PM [facility]   11/12/2025  3:00 PM June Smiley MD [facility]     Additional Instructions for the Follow-ups that You Need to Schedule       Ambulatory Referral to Home Health   As directed      Face to Face Visit Date: 6/3/2025   Follow-up provider for Plan of Care?: I will be treating the patient on an ongoing basis.  Please send me the Plan of Care for signature.   Follow-up provider: VANDANA MCCOY [[id number]]   Reason/Clinical Findings: new ileostomy, drain care   Describe mobility limitations that make leaving home difficult: as above   Nursing/Therapeutic Services Requested: Skilled Nursing   Skilled nursing orders: Ostomy instruction Wound care dressing/changes   Frequency: 1 Week 1        Discharge Follow-up with PCP   As directed       Currently Documented PCP:    Rashid Klein MD    PCP Phone Number:    [phone]     Follow Up Details: post-hospital follow up        Discharge Follow-up with Specified Provider: Dr. Mccoy in 1 week   As directed      To: Dr. Mccoy in 1 week               Follow-up Information       Rashid Klein MD .    Specialty: Family Medicine  Why: post-hospital follow up  Contact information:  [address] [address]  [phone]                             Additional Instructions for the Follow-ups that You Need to Schedule       Ambulatory Referral to Home Health   As directed      Face to Face Visit Date: 6/3/2025   Follow-up provider for Plan of Care?: I will be treating the patient on an ongoing basis.  Please send me the Plan of Care for signature.   Follow-up provider: VANDANA MCCOY [[id number]]    Reason/Clinical Findings: new ileostomy, drain care   Describe mobility limitations that make leaving home difficult: as above   Nursing/Therapeutic Services Requested: Skilled Nursing   Skilled nursing orders: Ostomy instruction Wound care dressing/changes   Frequency: 1 Week 1        Discharge Follow-up with PCP   As directed       Currently Documented PCP:    Rashid Klein MD    PCP Phone Number:    [phone]     Follow Up Details: post-hospital follow up        Discharge Follow-up with Specified Provider: Dr. Mccoy in 1 week   As directed      To: Dr. Mccoy in 1 week            Time Spent on Discharge:  I spent greater than 30 minutes on this discharge activity which included: face-to-face encounter with the patient, reviewing the data in the system, coordination of the care with the nursing staff as well as consultants, documentation, and entering orders.       Melinda Turcios MD  [facility]  06/04/25  12:10 EDT

## 2025-06-05 ENCOUNTER — TRANSITIONAL CARE MANAGEMENT TELEPHONE ENCOUNTER (OUTPATIENT)
Dept: CALL CENTER | Facility: HOSPITAL | Age: 85
End: 2025-06-05
Payer: MEDICARE

## 2025-06-05 NOTE — OUTREACH NOTE
Call Center TCM Note      Flowsheet Row Responses   [facility] patient discharged from? [place]   Does the patient have one of the following disease processes/diagnoses(primary or secondary)? General Surgery   TCM attempt successful? Yes   Call start time 1432   Call end time 1438   Discharge diagnosis small bowel resection with end ileostomy creation   Is patient permission given to speak with other caregiver? Yes   List who call center can speak with Marisol Miller--Grandchild   Person spoke with today (if not patient) and relationship patient   Meds reviewed with patient/caregiver? Yes   Is the patient having any side effects they believe may be caused by any medication additions or changes? No   Does the patient have all medications related to this admission filled (includes all antibiotics, pain medications, etc.) N/A   Prescription comments No new rxs at time of discharge. No questions or concerns with medications.   Is the patient taking all medications as directed (includes completed medication regime)? Yes   Comments Patient has a PCP OFFICE VISIT scheduled for 6/13/25 at 1:00 PM with Dr. Rashid Klein. This appt falls within TCM timeframe, will message PCP office to change to HOSPITAL f/u appt if possible.   Does the patient have an appointment with their PCP within 7-14 days of discharge? Yes   What [facility] the [facility]    Has home health visited the patient within 72 hours of discharge? Yes   Home health comments Patient states  has already visited.   Psychosocial issues? No   Comments patient states Ileostomy has good output. Patient states he ate a fried fish sandwich today and that he is nauseated afterwards. Advised to avoid all fried foods, greasy, spicy. Advised a bland diet and advance as tolerated. Patient verbalized understanding.   Did the patient receive a copy of their discharge instructions? Yes   Nursing interventions Reviewed instructions with patient   What is the  patient's perception of their health status since discharge? Improving   Nursing interventions Nurse provided patient education   Is the patient /caregiver able to teach back basic post-op care? Practice 'cough and deep breath', Take showers only when approved by MD-sponge bathe until then, No tub bath, swimming, or hot tub until instructed by MD, Keep incision areas clean,dry and protected, Do not remove steri-strips, Lifting as instructed by MD in discharge instructions   Is the patient/caregiver able to teach back signs and symptoms of incisional infection? Increased redness, swelling or pain at the incisonal site, Increased drainage or bleeding, Pus or odor from incision, Incisional warmth, Fever   Is the patient/caregiver able to teach back steps to recovery at home? Set small, achievable goals for return to baseline health, Rest and rebuild strength, gradually increase activity   If the patient is a current smoker, are they able to teach back resources for cessation? Not a smoker   Is the patient/caregiver able to teach back the hierarchy of who to call/visit for symptoms/problems? PCP, Specialist, Home health nurse, Urgent Care, ED, 911 Yes   TCM call completed? Yes   Wrap up additional comments Patient has a PCP OFFICE VISIT scheduled for 6/13/25 at 1:00 PM with Dr. Rashid Klein. This appt falls within TCM timeframe, will message PCP office to change to HOSPITAL f/u appt if possible. Patient has Surgery post op appt on 6/12/25 at 11:15 AM with Dr. Jeromy Mccoy.   Call end time 7308   Would this patient benefit from a Referral to [facility] Social Work? No   Is the patient interested in additional calls from an ambulatory ? No            Fara FLORES - Registered Nurse    6/5/2025, 14:38 EDT            Fara FLORES - Gwendolyn Nurse

## 2025-06-10 ENCOUNTER — TELEPHONE (OUTPATIENT)
Dept: WOUND CARE | Facility: HOSPITAL | Age: 85
End: 2025-06-10
Payer: MEDICARE

## 2025-06-12 ENCOUNTER — OFFICE VISIT (OUTPATIENT)
Dept: SURGERY | Facility: CLINIC | Age: 85
End: 2025-06-12
Payer: MEDICARE

## 2025-06-12 VITALS
BODY MASS INDEX: 24.01 KG/M2 | DIASTOLIC BLOOD PRESSURE: 88 MMHG | WEIGHT: 153 LBS | SYSTOLIC BLOOD PRESSURE: 150 MMHG | HEART RATE: 65 BPM | HEIGHT: 67 IN | OXYGEN SATURATION: 100 %

## 2025-06-12 DIAGNOSIS — K43.3 PARASTOMAL HERNIA WITH OBSTRUCTION AND WITHOUT GANGRENE: Primary | ICD-10-CM

## 2025-06-12 PROCEDURE — 99024 POSTOP FOLLOW-UP VISIT: CPT | Performed by: SURGERY

## 2025-06-12 NOTE — PROGRESS NOTES
Colorectal & General Surgery  Post - Op    Patient: Rosas Christianson  YOB: 1940  MRN: [id number]      Assessment  Rosas Christianson is a 85 y.o. male with recent history of incarcerated parastomal hernia now status post takedown of his ileostomy with creation of new ileostomy in his left abdomen and repair of his hernia.  He has recovered relatively well from surgery.  His new ileostomy is functioning well.  He has very little drain output from drain #2 so it was removed in the office today.  We will see him back in the office next week for drain removal and staple removal.    History of Present Illness   Rosas Christianson is a 85 y.o. male who presents the office in postoperative follow-up with no complaints today.    Vital Signs  Vitals:    06/12/25 1058   BP: 150/88   Pulse: 65   SpO2: 100%        Physical Exam  Constitutional: Resting comfortably, no acute distress  Neck: Supple, trachea midline  Respiratory: No increased work of breathing, Symmetric excursion  Cardiovascular: Well pefursed, no jugular venous distention evident   Abdominal: Incisions are in good order.  Ileostomy is pink and above the skin.  Drains are serous.  Soft, non-tender, non-distended  Lymphatics: No cervical or suprascapular adenopathy  Skin: Warm, dry, no rash on visualized skin surfaces  Musculoskeletal: Symmetric strength, no obvious gross abnormalities  Psychiatric: Alert and oriented ×3, normal affect   BMI is within normal parameters. No other follow-up for BMI required.    Ishaan Mccoy MD  Colorectal & General Surgery  [facility]    [address], [address]  [place], [place], [address]  P: [phone]  F: [phone]

## 2025-06-13 DIAGNOSIS — E03.9 ACQUIRED HYPOTHYROIDISM: ICD-10-CM

## 2025-06-14 LAB
T4 FREE SERPL-MCNC: 0.89 NG/DL (ref 0.82–1.77)
TSH SERPL DL<=0.005 MIU/L-ACNC: 34.1 UIU/ML (ref 0.45–4.5)

## 2025-06-16 ENCOUNTER — READMISSION MANAGEMENT (OUTPATIENT)
Dept: CALL CENTER | Facility: HOSPITAL | Age: 85
End: 2025-06-16
Payer: MEDICARE

## 2025-06-16 DIAGNOSIS — E03.9 ACQUIRED HYPOTHYROIDISM: Primary | ICD-10-CM

## 2025-06-16 RX ORDER — LEVOTHYROXINE SODIUM 75 UG/1
75 TABLET ORAL
Qty: 30 TABLET | Refills: 1 | Status: SHIPPED | OUTPATIENT
Start: 2025-06-30

## 2025-06-16 RX ORDER — LEVOTHYROXINE SODIUM 25 UG/1
TABLET ORAL
Qty: 21 TABLET | Refills: 0 | Status: SHIPPED | OUTPATIENT
Start: 2025-06-16 | End: 2025-06-30

## 2025-06-16 NOTE — OUTREACH NOTE
General Surgery Week 2 Survey      Flowsheet Row Responses   [facility] patient discharged from? [place]   Does the patient have one of the following disease processes/diagnoses(primary or secondary)? General Surgery   Week 2 attempt successful? No   Unsuccessful attempts Attempt 1   Revoke Vanessa BENITEZ - Registered Nurse

## 2025-06-17 ENCOUNTER — TELEPHONE (OUTPATIENT)
Dept: INTERNAL MEDICINE | Facility: CLINIC | Age: 85
End: 2025-06-17
Payer: MEDICARE

## 2025-06-17 NOTE — TELEPHONE ENCOUNTER
[facility]er[facility]     Relationship: [unfilled]     Best call back number: [phone]     What is your medical concern? HE STATED THE MEDICATION     levothyroxine (SYNTHROID, LEVOTHROID) 25 MCG tablet   NEED TO CLARIFY THE DIRECTIONS AND PLEASE CALL HIM BACK.

## 2025-06-18 ENCOUNTER — TELEPHONE (OUTPATIENT)
Dept: INTERNAL MEDICINE | Facility: CLINIC | Age: 85
End: 2025-06-18
Payer: MEDICARE

## 2025-06-19 ENCOUNTER — OFFICE VISIT (OUTPATIENT)
Dept: SURGERY | Facility: CLINIC | Age: 85
End: 2025-06-19
Payer: MEDICARE

## 2025-06-19 VITALS
WEIGHT: 154.6 LBS | HEIGHT: 67 IN | HEART RATE: 55 BPM | DIASTOLIC BLOOD PRESSURE: 86 MMHG | OXYGEN SATURATION: 100 % | SYSTOLIC BLOOD PRESSURE: 150 MMHG | BODY MASS INDEX: 24.27 KG/M2

## 2025-06-19 DIAGNOSIS — K56.609 SMALL BOWEL OBSTRUCTION: Primary | ICD-10-CM

## 2025-06-19 PROBLEM — K56.600 PARTIAL SMALL BOWEL OBSTRUCTION: Status: RESOLVED | Noted: 2025-05-21 | Resolved: 2025-06-19

## 2025-06-19 PROBLEM — K43.5 PARASTOMAL HERNIA: Status: RESOLVED | Noted: 2025-05-21 | Resolved: 2025-06-19

## 2025-06-19 PROBLEM — Z93.3 PRESENCE OF COLOSTOMY: Status: RESOLVED | Noted: 2017-11-03 | Resolved: 2025-06-19

## 2025-06-19 PROCEDURE — 99024 POSTOP FOLLOW-UP VISIT: CPT | Performed by: SURGERY

## 2025-06-19 NOTE — PROGRESS NOTES
85-year-old man status post recent resection of his end ileostomy and repair of his parastomal hernia with creation of new end ileostomy on the left side of his abdomen.  Overall, doing quite well.  He is tolerating his diet.  Weight is stable.  Incisions are healing nicely.  Ileostomy pink and red skin.  Drain with minimal output.    We removed his drain as well as the staples in the office today.  Steri-Strips were placed.    Advised him to continue with lifting restrictions for 1 additional month.    Is welcome to follow-up with me on an as-needed basis.

## 2025-06-20 NOTE — TELEPHONE ENCOUNTER
Spoke to patient and he stated that the pharmacy was confused on prescription but they finally filled it as requested. So this has been taken care of.

## 2025-06-24 ENCOUNTER — OFFICE VISIT (OUTPATIENT)
Dept: SPORTS MEDICINE | Facility: CLINIC | Age: 85
End: 2025-06-24
Payer: MEDICARE

## 2025-06-24 VITALS
HEIGHT: 67 IN | TEMPERATURE: 98.2 F | BODY MASS INDEX: 24.17 KG/M2 | WEIGHT: 154 LBS | DIASTOLIC BLOOD PRESSURE: 80 MMHG | RESPIRATION RATE: 16 BRPM | OXYGEN SATURATION: 99 % | SYSTOLIC BLOOD PRESSURE: 118 MMHG | HEART RATE: 88 BPM

## 2025-06-24 DIAGNOSIS — M75.52 SUBACROMIAL BURSITIS OF LEFT SHOULDER JOINT: ICD-10-CM

## 2025-06-24 DIAGNOSIS — M25.512 ACUTE PAIN OF LEFT SHOULDER: Primary | ICD-10-CM

## 2025-06-24 PROCEDURE — 1159F MED LIST DOCD IN RCRD: CPT | Performed by: FAMILY MEDICINE

## 2025-06-24 PROCEDURE — 3079F DIAST BP 80-89 MM HG: CPT | Performed by: FAMILY MEDICINE

## 2025-06-24 PROCEDURE — 1160F RVW MEDS BY RX/DR IN RCRD: CPT | Performed by: FAMILY MEDICINE

## 2025-06-24 PROCEDURE — 20610 DRAIN/INJ JOINT/BURSA W/O US: CPT | Performed by: FAMILY MEDICINE

## 2025-06-24 PROCEDURE — 3074F SYST BP LT 130 MM HG: CPT | Performed by: FAMILY MEDICINE

## 2025-06-24 PROCEDURE — 99213 OFFICE O/P EST LOW 20 MIN: CPT | Performed by: FAMILY MEDICINE

## 2025-06-24 RX ORDER — TRIAMCINOLONE ACETONIDE 40 MG/ML
40 INJECTION, SUSPENSION INTRA-ARTICULAR; INTRAMUSCULAR
Status: COMPLETED | OUTPATIENT
Start: 2025-06-24 | End: 2025-06-24

## 2025-06-24 RX ADMIN — TRIAMCINOLONE ACETONIDE 40 MG: 40 INJECTION, SUSPENSION INTRA-ARTICULAR; INTRAMUSCULAR at 15:11

## 2025-06-24 NOTE — PROGRESS NOTES
"Rosas is a 85 y.o. year old male presents to [facility]    Chief Complaint   Patient presents with    Shoulder Pain     Left shoulder pain for several weeks, KNI       History of Present Illness  History of Present Illness  The patient presents for evaluation of left shoulder pain.    Left Shoulder Pain  - He reports discomfort in his left shoulder since hospital discharge, which has escalated to disrupt sleep.  - He cannot recall if the pain is comparable to his right shoulder pain 1.5 years ago.  - He has difficulty moving his arm when lying down and suspects the pain may be due to straining his shoulder while closing a door.  - Lidocaine patch provides some relief but is challenging to apply.    Supplemental information: His routine involves waking at 6:00 AM, taking methadone, and returning to bed until 7:00 AM. He was mostly bedridden during his 4-day hospital stay.    I have reviewed the patient's medical, family, and social history in detail and updated the computerized patient record.    /80 (BP Location: Left arm, Patient Position: Sitting, Cuff Size: Adult)   Pulse 88   Temp 98.2 °F (36.8 °C)   Resp 16   Ht 170.2 cm (67.01\")   Wt 69.9 kg (154 lb)   SpO2 99%   BMI 24.11 kg/m²      Physical Exam    Vital signs reviewed.   General: No acute distress.  Eyes: conjunctiva clear; pupils equally round and reactive  ENT: external ears atraumatic  CV: no peripheral edema  Resp: normal respiratory effort, no use of accessory muscles  Skin: no rashes or wounds; normal turgor  Psych: mood and affect appropriate; recent and remote memory intact  Neuro: sensation to light touch intact    MSK Exam  Physical Exam  Musculoskeletal  - Left shoulder: Negative drop arm, negative empty can, positive Neer sign, positive [place], negative belly press, negative lift off      Left Shoulder X-Ray  Indication: Pain  Views: AP Internal and External Rotation    Findings:  No fracture  No " bony lesion  Normal soft tissues  Normal joint spaces    prior studies were available for comparison.        Large Joint Arthrocentesis: L subacromial bursa  Date/Time: 6/24/2025 3:11 PM  Consent given by: patient  Site marked: site marked  Timeout: Immediately prior to procedure a time out was called to verify the correct patient, procedure, equipment, support staff and site/side marked as required   Supporting Documentation  Indications: pain   Procedure Details  Location: shoulder - L subacromial bursa  Needle size: 25 G  Approach: posterior  Medications administered: 40 mg triamcinolone acetonide 40 MG/ML  Patient tolerance: patient tolerated the procedure well with no immediate complications          Diagnoses and all orders for this visit:    1. Acute pain of left shoulder (Primary)  -     XR Shoulder 2+ View Left    2. Subacromial bursitis of left shoulder joint  -     Large Joint Arthrocentesis        Assessment & Plan  1. Left shoulder pain: Likely bursitis.  - Administered cortisone injection for inflammation and pain.  - Monitor symptoms over the next few weeks. Contact office if no improvement.    Follow-up  - Call the office if there is no improvement in symptoms.    PROCEDURE  Administered cortisone injection in the left shoulder.          Patient or patient representative verbalized consent for the use of Ambient Listening during the visit with  ESTELLE Keenan Jr., DO for chart documentation on 06/24/2025 at 14:24 EDT.

## 2025-06-27 DIAGNOSIS — E03.9 ACQUIRED HYPOTHYROIDISM: ICD-10-CM

## 2025-06-27 RX ORDER — LEVOTHYROXINE SODIUM 25 UG/1
TABLET ORAL
Qty: 21 TABLET | Refills: 0 | OUTPATIENT
Start: 2025-06-27

## 2025-07-07 ENCOUNTER — OFFICE VISIT (OUTPATIENT)
Dept: PAIN MEDICINE | Facility: CLINIC | Age: 85
End: 2025-07-07
Payer: MEDICARE

## 2025-07-07 VITALS
RESPIRATION RATE: 18 BRPM | HEART RATE: 81 BPM | DIASTOLIC BLOOD PRESSURE: 83 MMHG | HEIGHT: 67 IN | WEIGHT: 152.4 LBS | SYSTOLIC BLOOD PRESSURE: 151 MMHG | TEMPERATURE: 97.4 F | OXYGEN SATURATION: 97 % | BODY MASS INDEX: 23.92 KG/M2

## 2025-07-07 DIAGNOSIS — M15.3 POST-TRAUMATIC OSTEOARTHRITIS OF MULTIPLE JOINTS: ICD-10-CM

## 2025-07-07 DIAGNOSIS — G62.9 NEUROPATHY: ICD-10-CM

## 2025-07-07 DIAGNOSIS — Z79.891 LONG TERM CURRENT USE OF METHADONE FOR PAIN CONTROL: ICD-10-CM

## 2025-07-07 DIAGNOSIS — G89.4 CHRONIC PAIN SYNDROME: Primary | ICD-10-CM

## 2025-07-07 RX ORDER — METHADONE HYDROCHLORIDE 5 MG/1
5 TABLET ORAL EVERY 12 HOURS PRN
Qty: 60 TABLET | Refills: 0 | Status: SHIPPED | OUTPATIENT
Start: 2025-07-07
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
## 2025-07-22 ENCOUNTER — OFFICE VISIT (OUTPATIENT)
Dept: INTERNAL MEDICINE | Facility: CLINIC | Age: 85
End: 2025-07-22
Payer: MEDICARE

## 2025-07-22 VITALS
HEART RATE: 61 BPM | SYSTOLIC BLOOD PRESSURE: 124 MMHG | HEIGHT: 68 IN | TEMPERATURE: 97.6 F | WEIGHT: 157.1 LBS | BODY MASS INDEX: 23.81 KG/M2 | OXYGEN SATURATION: 100 % | DIASTOLIC BLOOD PRESSURE: 68 MMHG

## 2025-07-22 DIAGNOSIS — E03.9 ACQUIRED HYPOTHYROIDISM: ICD-10-CM

## 2025-07-22 DIAGNOSIS — R53.83 OTHER FATIGUE: ICD-10-CM

## 2025-07-22 DIAGNOSIS — D69.6 THROMBOCYTOPENIA: ICD-10-CM

## 2025-07-22 DIAGNOSIS — R35.1 NOCTURIA: ICD-10-CM

## 2025-07-22 DIAGNOSIS — R06.02 SHORTNESS OF BREATH: Primary | ICD-10-CM

## 2025-07-22 PROCEDURE — 3078F DIAST BP <80 MM HG: CPT | Performed by: FAMILY MEDICINE

## 2025-07-22 PROCEDURE — 3074F SYST BP LT 130 MM HG: CPT | Performed by: FAMILY MEDICINE

## 2025-07-22 PROCEDURE — 1159F MED LIST DOCD IN RCRD: CPT | Performed by: FAMILY MEDICINE

## 2025-07-22 PROCEDURE — 1126F AMNT PAIN NOTED NONE PRSNT: CPT | Performed by: FAMILY MEDICINE

## 2025-07-22 PROCEDURE — 99214 OFFICE O/P EST MOD 30 MIN: CPT | Performed by: FAMILY MEDICINE

## 2025-07-22 PROCEDURE — G2211 COMPLEX E/M VISIT ADD ON: HCPCS | Performed by: FAMILY MEDICINE

## 2025-07-22 PROCEDURE — 1160F RVW MEDS BY RX/DR IN RCRD: CPT | Performed by: FAMILY MEDICINE

## 2025-07-22 RX ORDER — TAMSULOSIN HYDROCHLORIDE 0.4 MG/1
1 CAPSULE ORAL DAILY
Start: 2025-07-22

## 2025-07-22 NOTE — PROGRESS NOTES
"Chief Complaint  Sleeping too much     Subjective        Rosas Christianson presents to [facility]  Shortness of Breath  Chronicity:  Chronic  Onset:  More than 1 year ago  Frequency:  Constantly  Progression since onset:  Worsening  Fever:  No fever  Associated symptoms: nasal congestion, leg pain, leg swelling, orthopnea, PND, sputum production and dry cough    Associated symptoms: no chest congestion, no chest pain, no fever, no headaches, no hemoptysis, no sore throat, no swollen glands, no syncope, no vomiting and no wheezing    Aggravating factors:  Nothing and lying flat  PMH Includes: no asthma and no COPD    Recent oxygen %:  Varies from 99 to 90 after sitting down gets worse longer I sit. Any activity makes it better.  Additional Information:  Been going on 1-1/2 years since I had RSV in the hospital when I  broke my back. leg pain and swelling has nothing to do wilth shortnes of breath, going on all my life, inherited?    Follows up after recent hospitalization for hernia associated with ostomy and ischemia with redo his underlying hypothyroidism taking methadone for pain previously and taking methadone at 5 mg pills any take little pieces off of them for his chronic pain says he still has quite a bit of pain does not know anything specifically helping and he does not want to be on methadone at these larger doses seems like it is making him quite a bit tired as well as short of breath  Objective   Vital Signs:  /68   Pulse 61   Temp 97.6 °F (36.4 °C)   Ht 172.7 cm (68\")   Wt 71.3 kg (157 lb 1.6 oz)   SpO2 100%   BMI 23.89 kg/m²   Estimated body mass index is 23.89 kg/m² as calculated from the following:    Height as of this encounter: 172.7 cm (68\").    Weight as of this encounter: 71.3 kg (157 lb 1.6 oz).    BMI is within normal parameters. No other follow-up for BMI required.      Physical Exam  Vitals reviewed.   Constitutional:       Appearance: Normal " appearance.   HENT:      Head: Normocephalic and atraumatic.   Eyes:      General: No scleral icterus.  Cardiovascular:      Rate and Rhythm: Normal rate and regular rhythm.      Pulses: Normal pulses.      Heart sounds: Normal heart sounds.   Pulmonary:      Effort: Pulmonary effort is normal.      Breath sounds: Normal breath sounds.   Abdominal:      Tenderness: There is no abdominal tenderness.          Comments: No tenderness or erythema well-healed scars and ostomy clean   Neurological:      Mental Status: He is alert.   Psychiatric:         Mood and Affect: Mood normal.         Behavior: Behavior normal.         Thought Content: Thought content normal.         Judgment: Judgment normal.        Result Review :    Common labs          6/2/2025    05:57 6/3/2025    04:15 6/4/2025    03:12   Common Labs   Glucose 123  89     BUN 28.0  22.0     Creatinine 1.02  1.19     Sodium 135  137     Potassium 4.6  4.4     Chloride 104  106     Calcium 8.7  8.5     Albumin  3.0     Total Bilirubin  0.6     Alkaline Phosphatase  61     AST (SGOT)  43     ALT (SGPT)  18     WBC 20.31  12.67  10.43    Hemoglobin 11.0  10.3  10.1    Hematocrit 33.4  30.0  29.5    Platelets 119  114  122                Assessment and Plan   Diagnoses and all orders for this visit:    1. Shortness of breath (Primary)  -     CBC & Differential    2. Nocturia  -     tamsulosin (FLOMAX) 0.4 MG capsule 24 hr capsule; Take 1 capsule by mouth Daily.    3. Acquired hypothyroidism  -     TSH  -     T4, Free    4. Thrombocytopenia  -     CBC & Differential    5. Other fatigue  -     CBC & Differential    Decrease tamsulosin to 1 pill  When decreasing methadone half pill daily has follow-up with pain management in 1 to 2 weeks     This patient has a PCP that is the continuing focal point for all health care services, and the patient sees this physician to be evaluated for fatigue. The inherent complexity that this code () captures is not in the  "clinical condition itself-- fatigue --but rather the cognitition of the continued responsibility of being the focal point for all needed services for this patient.\"     Follow Up   Return in about 1 month (around 8/22/2025), or if symptoms worsen or fail to improve, for Recheck.  Patient was given instructions and counseling regarding his condition or for health maintenance advice. Please see specific information pulled into the AVS if appropriate.             "

## 2025-07-23 LAB
BASOPHILS # BLD AUTO: 0.07 10*3/MM3 (ref 0–0.2)
BASOPHILS NFR BLD AUTO: 1.1 % (ref 0–1.5)
EOSINOPHIL # BLD AUTO: 0.27 10*3/MM3 (ref 0–0.4)
EOSINOPHIL NFR BLD AUTO: 4.2 % (ref 0.3–6.2)
ERYTHROCYTE [DISTWIDTH] IN BLOOD BY AUTOMATED COUNT: 12.1 % (ref 12.3–15.4)
HCT VFR BLD AUTO: 37.2 % (ref 37.5–51)
HGB BLD-MCNC: 11.9 G/DL (ref 13–17.7)
IMM GRANULOCYTES # BLD AUTO: 0.02 10*3/MM3 (ref 0–0.05)
IMM GRANULOCYTES NFR BLD AUTO: 0.3 % (ref 0–0.5)
LYMPHOCYTES # BLD AUTO: 1.32 10*3/MM3 (ref 0.7–3.1)
LYMPHOCYTES NFR BLD AUTO: 20.6 % (ref 19.6–45.3)
MCH RBC QN AUTO: 32.2 PG (ref 26.6–33)
MCHC RBC AUTO-ENTMCNC: 32 G/DL (ref 31.5–35.7)
MCV RBC AUTO: 100.5 FL (ref 79–97)
MONOCYTES # BLD AUTO: 0.79 10*3/MM3 (ref 0.1–0.9)
MONOCYTES NFR BLD AUTO: 12.3 % (ref 5–12)
NEUTROPHILS # BLD AUTO: 3.94 10*3/MM3 (ref 1.7–7)
NEUTROPHILS NFR BLD AUTO: 61.5 % (ref 42.7–76)
PLATELET # BLD AUTO: 110 10*3/MM3 (ref 140–450)
RBC # BLD AUTO: 3.7 10*6/MM3 (ref 4.14–5.8)
T4 FREE SERPL-MCNC: 1.24 NG/DL (ref 0.92–1.68)
TSH SERPL DL<=0.005 MIU/L-ACNC: 14.3 UIU/ML (ref 0.27–4.2)
WBC # BLD AUTO: 6.41 10*3/MM3 (ref 3.4–10.8)

## 2025-08-01 ENCOUNTER — TELEPHONE (OUTPATIENT)
Dept: INTERNAL MEDICINE | Facility: CLINIC | Age: 85
End: 2025-08-01
Payer: MEDICARE

## 2025-08-06 ENCOUNTER — OFFICE VISIT (OUTPATIENT)
Dept: SPORTS MEDICINE | Facility: CLINIC | Age: 85
End: 2025-08-06
Payer: MEDICARE

## 2025-08-06 VITALS
WEIGHT: 157 LBS | DIASTOLIC BLOOD PRESSURE: 80 MMHG | OXYGEN SATURATION: 99 % | HEART RATE: 68 BPM | HEIGHT: 68 IN | BODY MASS INDEX: 23.79 KG/M2 | SYSTOLIC BLOOD PRESSURE: 128 MMHG | RESPIRATION RATE: 16 BRPM

## 2025-08-06 DIAGNOSIS — M25.512 ACUTE PAIN OF LEFT SHOULDER: Primary | ICD-10-CM

## 2025-08-06 DIAGNOSIS — M75.122 NONTRAUMATIC COMPLETE TEAR OF LEFT ROTATOR CUFF: ICD-10-CM

## 2025-08-07 ENCOUNTER — OFFICE VISIT (OUTPATIENT)
Dept: PAIN MEDICINE | Facility: CLINIC | Age: 85
End: 2025-08-07
Payer: MEDICARE

## 2025-08-07 VITALS
HEART RATE: 94 BPM | DIASTOLIC BLOOD PRESSURE: 87 MMHG | BODY MASS INDEX: 22.58 KG/M2 | RESPIRATION RATE: 20 BRPM | OXYGEN SATURATION: 97 % | TEMPERATURE: 97.1 F | WEIGHT: 149 LBS | HEIGHT: 68 IN | SYSTOLIC BLOOD PRESSURE: 156 MMHG

## 2025-08-07 DIAGNOSIS — R11.0 NAUSEA: ICD-10-CM

## 2025-08-07 DIAGNOSIS — G89.4 CHRONIC PAIN SYNDROME: ICD-10-CM

## 2025-08-07 DIAGNOSIS — M15.3 POST-TRAUMATIC OSTEOARTHRITIS OF MULTIPLE JOINTS: Primary | ICD-10-CM

## 2025-08-07 DIAGNOSIS — Z79.891 LONG TERM CURRENT USE OF METHADONE FOR PAIN CONTROL: ICD-10-CM

## 2025-08-07 DIAGNOSIS — E03.9 ACQUIRED HYPOTHYROIDISM: ICD-10-CM

## 2025-08-07 RX ORDER — ONDANSETRON 4 MG/1
4 TABLET, FILM COATED ORAL EVERY 12 HOURS PRN
Qty: 30 TABLET | Refills: 0 | Status: SHIPPED | OUTPATIENT
Start: 2025-08-07

## 2025-08-07 RX ORDER — METHADONE HYDROCHLORIDE 5 MG/1
5 TABLET ORAL EVERY 12 HOURS PRN
Qty: 60 TABLET | Refills: 0 | Status: SHIPPED | OUTPATIENT
Start: 2025-08-07

## 2025-08-08 RX ORDER — LEVOTHYROXINE SODIUM 75 UG/1
75 TABLET ORAL
Qty: 30 TABLET | Refills: 1 | Status: SHIPPED | OUTPATIENT
Start: 2025-08-08

## 2025-08-20 ENCOUNTER — TELEPHONE (OUTPATIENT)
Dept: SURGERY | Facility: CLINIC | Age: 85
End: 2025-08-20
Payer: MEDICARE

## 2025-08-21 ENCOUNTER — OFFICE VISIT (OUTPATIENT)
Dept: INTERNAL MEDICINE | Facility: CLINIC | Age: 85
End: 2025-08-21
Payer: MEDICARE

## 2025-08-21 ENCOUNTER — LAB (OUTPATIENT)
Dept: LAB | Facility: HOSPITAL | Age: 85
End: 2025-08-21
Payer: MEDICARE

## 2025-08-21 VITALS
SYSTOLIC BLOOD PRESSURE: 140 MMHG | HEART RATE: 69 BPM | HEIGHT: 68 IN | DIASTOLIC BLOOD PRESSURE: 78 MMHG | OXYGEN SATURATION: 98 % | BODY MASS INDEX: 22.78 KG/M2 | WEIGHT: 150.3 LBS | TEMPERATURE: 97.8 F

## 2025-08-21 DIAGNOSIS — Z43.2 ILEOSTOMY CARE: ICD-10-CM

## 2025-08-21 DIAGNOSIS — I10 PRIMARY HYPERTENSION: ICD-10-CM

## 2025-08-21 DIAGNOSIS — E03.9 ACQUIRED HYPOTHYROIDISM: Primary | ICD-10-CM

## 2025-08-21 DIAGNOSIS — R11.0 NAUSEA: ICD-10-CM

## 2025-08-21 LAB
ALBUMIN SERPL-MCNC: 3.3 G/DL (ref 3.5–5.2)
ALBUMIN/GLOB SERPL: 0.9 G/DL
ALP SERPL-CCNC: 104 U/L (ref 39–117)
ALT SERPL W P-5'-P-CCNC: 35 U/L (ref 1–41)
ANION GAP SERPL CALCULATED.3IONS-SCNC: 8.7 MMOL/L (ref 5–15)
AST SERPL-CCNC: 44 U/L (ref 1–40)
BILIRUB SERPL-MCNC: 0.4 MG/DL (ref 0–1.2)
BUN SERPL-MCNC: 40 MG/DL (ref 8–23)
BUN/CREAT SERPL: 40.8 (ref 7–25)
CALCIUM SPEC-SCNC: 9.3 MG/DL (ref 8.6–10.5)
CHLORIDE SERPL-SCNC: 106 MMOL/L (ref 98–107)
CO2 SERPL-SCNC: 22.3 MMOL/L (ref 22–29)
CREAT SERPL-MCNC: 0.98 MG/DL (ref 0.76–1.27)
EGFRCR SERPLBLD CKD-EPI 2021: 75.6 ML/MIN/1.73
GLOBULIN UR ELPH-MCNC: 3.7 GM/DL
GLUCOSE SERPL-MCNC: 102 MG/DL (ref 65–99)
POTASSIUM SERPL-SCNC: 5.1 MMOL/L (ref 3.5–5.2)
PROT SERPL-MCNC: 7 G/DL (ref 6–8.5)
SODIUM SERPL-SCNC: 137 MMOL/L (ref 136–145)
T4 FREE SERPL-MCNC: 1.22 NG/DL (ref 0.92–1.68)

## 2025-08-21 PROCEDURE — 36415 COLL VENOUS BLD VENIPUNCTURE: CPT | Performed by: FAMILY MEDICINE

## 2025-08-21 RX ORDER — ONDANSETRON 4 MG/1
4 TABLET, FILM COATED ORAL EVERY 12 HOURS PRN
Qty: 60 TABLET | Refills: 1 | Status: SHIPPED | OUTPATIENT
Start: 2025-08-21

## 2025-08-21 RX ORDER — VALSARTAN 80 MG/1
80 TABLET ORAL DAILY
Qty: 90 TABLET | Refills: 1 | Status: SHIPPED | OUTPATIENT
Start: 2025-08-21

## 2025-08-22 ENCOUNTER — RESULTS FOLLOW-UP (OUTPATIENT)
Dept: INTERNAL MEDICINE | Facility: CLINIC | Age: 85
End: 2025-08-22

## 2025-08-22 DIAGNOSIS — E03.9 ACQUIRED HYPOTHYROIDISM: Primary | ICD-10-CM

## 2025-08-22 RX ORDER — LEVOTHYROXINE SODIUM 100 UG/1
100 TABLET ORAL
Qty: 30 TABLET | Refills: 1 | Status: SHIPPED | OUTPATIENT
Start: 2025-08-22

## 2025-08-28 ENCOUNTER — PATIENT MESSAGE (OUTPATIENT)
Dept: SURGERY | Facility: CLINIC | Age: 85
End: 2025-08-28
Payer: MEDICARE

## 2025-08-28 ENCOUNTER — TRANSCRIBE ORDERS (OUTPATIENT)
Dept: WOUND CARE | Facility: HOSPITAL | Age: 85
End: 2025-08-28
Payer: MEDICARE

## 2025-08-28 DIAGNOSIS — Z71.89 OSTOMY NURSE CONSULTATION: Primary | ICD-10-CM

## (undated) DEVICE — DEV OPN LIGASURE CRV 180D 36MM 13.5CM  1P/U

## (undated) DEVICE — APPL CHLORAPREP HI/LITE 26ML ORNG

## (undated) DEVICE — GLV SURG PREMIERPRO ORTHO LTX PF SZ7 BRN

## (undated) DEVICE — SUT GUT CHRM 3/0 SH 36IN G172H

## (undated) DEVICE — ELECTRD BLD MEGADYNE EZCLEAN STD 2.75IN XLNG

## (undated) DEVICE — SUT SILK 2/0 TIES 18IN A185H

## (undated) DEVICE — TOWEL,OR,DSP,ST,BLUE,STD,4/PK,20PK/CS: Brand: MEDLINE

## (undated) DEVICE — Device

## (undated) DEVICE — YANKAUER,BULB TIP,W/O VENT,RIGID,STERILE: Brand: MEDLINE

## (undated) DEVICE — SUT MNCRYL 2/0 SH 27IN Y417H

## (undated) DEVICE — RESERVOIR,SUCTION,100CC,SILICONE: Brand: MEDLINE

## (undated) DEVICE — DBD-DRAPE,LAP,CHOLE,W/TROUGHS,STERILE: Brand: MEDLINE

## (undated) DEVICE — WOUND RETRACTOR AND PROTECTOR: Brand: ALEXIS WOUND PROTECTOR-RETRACTOR

## (undated) DEVICE — 3M™ IOBAN™ 2 ANTIMICROBIAL INCISE DRAPE 6650EZ: Brand: IOBAN™ 2

## (undated) DEVICE — PROXIMATE RH ROTATING HEAD SKIN STAPLERS (35 REGULAR) CONTAINS 35 STAINLESS STEEL STAPLES: Brand: PROXIMATE

## (undated) DEVICE — SUT VIC 3/0 CTI 36IN J944H

## (undated) DEVICE — SUT SILK 2/0 SH CR8 18IN CR8 C012D

## (undated) DEVICE — PATIENT RETURN ELECTRODE, SINGLE-USE, CONTACT QUALITY MONITORING, ADULT, WITH 9FT CORD, FOR PATIENTS WEIGING OVER 33LBS. (15KG): Brand: MEGADYNE

## (undated) DEVICE — PK PROC MAJ 40

## (undated) DEVICE — ECHELON FLEX 60 ENDOPATH STAPLER COMPACT ARTICULATING ENDOSCOPIC LINEAR CUTTER: Brand: ECHELON  ENDOPATH

## (undated) DEVICE — SUT SILK 0 TIES 18IN SA66G

## (undated) DEVICE — PENCL SMOKE/EVAC MEGADYNE TELESCP 10FT

## (undated) DEVICE — SUT SILK 3/0 TIES 18IN A184H

## (undated) DEVICE — COVER,MAYO STAND,STERILE: Brand: MEDLINE

## (undated) DEVICE — SUT PDS 0 CT1 36IN Z346H